# Patient Record
Sex: MALE | Race: WHITE | Employment: OTHER | URBAN - METROPOLITAN AREA
[De-identification: names, ages, dates, MRNs, and addresses within clinical notes are randomized per-mention and may not be internally consistent; named-entity substitution may affect disease eponyms.]

---

## 2017-02-25 RX ORDER — SIMVASTATIN 40 MG/1
TABLET, FILM COATED ORAL
Qty: 90 TAB | Refills: 1 | Status: SHIPPED | OUTPATIENT
Start: 2017-02-25 | End: 2017-07-20 | Stop reason: SDUPTHER

## 2017-05-26 RX ORDER — ALLOPURINOL 100 MG/1
TABLET ORAL
Qty: 90 TAB | Refills: 0 | Status: SHIPPED | OUTPATIENT
Start: 2017-05-26 | End: 2017-07-20 | Stop reason: SDUPTHER

## 2017-05-26 NOTE — TELEPHONE ENCOUNTER
Patient has not been seen in office since 1/2016 and never established care with me. Please schedule him for appointment. Thank you.

## 2017-06-26 ENCOUNTER — HOSPITAL ENCOUNTER (OUTPATIENT)
Dept: LAB | Age: 67
Discharge: HOME | End: 2017-06-26
Payer: MEDICARE

## 2017-06-26 ENCOUNTER — LAB ONLY (OUTPATIENT)
Dept: INTERNAL MEDICINE CLINIC | Age: 67
End: 2017-06-26

## 2017-06-26 DIAGNOSIS — I11.9 HYPERTENSIVE HEART DISEASE WITHOUT CONGESTIVE HEART FAILURE: ICD-10-CM

## 2017-06-26 DIAGNOSIS — E55.9 VITAMIN D DEFICIENCY: ICD-10-CM

## 2017-06-26 DIAGNOSIS — Z95.1 S/P CABG (CORONARY ARTERY BYPASS GRAFT): ICD-10-CM

## 2017-06-26 DIAGNOSIS — E78.5 HYPERLIPIDEMIA, UNSPECIFIED HYPERLIPIDEMIA TYPE: ICD-10-CM

## 2017-06-26 DIAGNOSIS — Z95.2 S/P AVR: ICD-10-CM

## 2017-06-26 DIAGNOSIS — E11.9 TYPE 2 DIABETES MELLITUS WITHOUT COMPLICATION, UNSPECIFIED LONG TERM INSULIN USE STATUS: Primary | ICD-10-CM

## 2017-06-26 DIAGNOSIS — Z12.5 SCREENING PSA (PROSTATE SPECIFIC ANTIGEN): ICD-10-CM

## 2017-06-26 DIAGNOSIS — E11.9 TYPE 2 DIABETES MELLITUS WITHOUT COMPLICATION, UNSPECIFIED LONG TERM INSULIN USE STATUS: ICD-10-CM

## 2017-06-26 LAB
ALBUMIN SERPL BCP-MCNC: 4.1 G/DL (ref 3.4–5)
ALBUMIN/GLOB SERPL: 1.3 {RATIO} (ref 0.8–1.7)
ALP SERPL-CCNC: 55 U/L (ref 45–117)
ALT SERPL-CCNC: 29 U/L (ref 16–61)
ANION GAP BLD CALC-SCNC: 9 MMOL/L (ref 3–18)
AST SERPL W P-5'-P-CCNC: 19 U/L (ref 15–37)
BILIRUB SERPL-MCNC: 1.1 MG/DL (ref 0.2–1)
BUN SERPL-MCNC: 15 MG/DL (ref 7–18)
BUN/CREAT SERPL: 11 (ref 12–20)
CALCIUM SERPL-MCNC: 8.9 MG/DL (ref 8.5–10.1)
CHLORIDE SERPL-SCNC: 103 MMOL/L (ref 100–108)
CHOLEST SERPL-MCNC: 153 MG/DL
CO2 SERPL-SCNC: 23 MMOL/L (ref 21–32)
CREAT SERPL-MCNC: 1.33 MG/DL (ref 0.6–1.3)
EST. AVERAGE GLUCOSE BLD GHB EST-MCNC: 123 MG/DL
GLOBULIN SER CALC-MCNC: 3.1 G/DL (ref 2–4)
GLUCOSE SERPL-MCNC: 112 MG/DL (ref 74–99)
HBA1C MFR BLD: 5.9 % (ref 4.2–5.6)
HDLC SERPL-MCNC: 62 MG/DL (ref 40–60)
HDLC SERPL: 2.5 {RATIO} (ref 0–5)
LDLC SERPL CALC-MCNC: 70.6 MG/DL (ref 0–100)
LIPID PROFILE,FLP: ABNORMAL
POTASSIUM SERPL-SCNC: 4.2 MMOL/L (ref 3.5–5.5)
PROT SERPL-MCNC: 7.2 G/DL (ref 6.4–8.2)
PSA SERPL-MCNC: 6 NG/ML (ref 0–4)
SODIUM SERPL-SCNC: 135 MMOL/L (ref 136–145)
TRIGL SERPL-MCNC: 102 MG/DL (ref ?–150)
VLDLC SERPL CALC-MCNC: 20.4 MG/DL

## 2017-06-26 PROCEDURE — 82043 UR ALBUMIN QUANTITATIVE: CPT | Performed by: INTERNAL MEDICINE

## 2017-06-26 PROCEDURE — 84153 ASSAY OF PSA TOTAL: CPT | Performed by: INTERNAL MEDICINE

## 2017-06-26 PROCEDURE — 80061 LIPID PANEL: CPT | Performed by: INTERNAL MEDICINE

## 2017-06-26 PROCEDURE — 83036 HEMOGLOBIN GLYCOSYLATED A1C: CPT | Performed by: INTERNAL MEDICINE

## 2017-06-26 PROCEDURE — 80053 COMPREHEN METABOLIC PANEL: CPT | Performed by: INTERNAL MEDICINE

## 2017-06-26 PROCEDURE — 82306 VITAMIN D 25 HYDROXY: CPT | Performed by: INTERNAL MEDICINE

## 2017-06-26 PROCEDURE — 36415 COLL VENOUS BLD VENIPUNCTURE: CPT | Performed by: INTERNAL MEDICINE

## 2017-06-27 LAB
25(OH)D3 SERPL-MCNC: 34.7 NG/ML (ref 30–100)
CREAT UR-MCNC: 42.44 MG/DL (ref 30–125)
MICROALBUMIN UR-MCNC: <0.13 MG/DL (ref 0–3)
MICROALBUMIN/CREAT UR-RTO: NORMAL MG/G (ref 0–30)

## 2017-06-29 ENCOUNTER — HOSPITAL ENCOUNTER (OUTPATIENT)
Dept: LAB | Age: 67
Discharge: HOME | End: 2017-06-29
Payer: MEDICARE

## 2017-06-29 ENCOUNTER — PATIENT OUTREACH (OUTPATIENT)
Dept: INTERNAL MEDICINE CLINIC | Age: 67
End: 2017-06-29

## 2017-06-29 ENCOUNTER — OFFICE VISIT (OUTPATIENT)
Dept: INTERNAL MEDICINE CLINIC | Age: 67
End: 2017-06-29

## 2017-06-29 VITALS
SYSTOLIC BLOOD PRESSURE: 130 MMHG | BODY MASS INDEX: 27.23 KG/M2 | DIASTOLIC BLOOD PRESSURE: 72 MMHG | RESPIRATION RATE: 16 BRPM | HEIGHT: 75 IN | HEART RATE: 84 BPM | TEMPERATURE: 99.1 F | WEIGHT: 219 LBS | OXYGEN SATURATION: 98 %

## 2017-06-29 DIAGNOSIS — N18.30 STAGE 3 CHRONIC KIDNEY DISEASE (HCC): ICD-10-CM

## 2017-06-29 DIAGNOSIS — Z23 ENCOUNTER FOR IMMUNIZATION: ICD-10-CM

## 2017-06-29 DIAGNOSIS — Z95.1 S/P CABG X 2: ICD-10-CM

## 2017-06-29 DIAGNOSIS — R73.03 PREDIABETES: ICD-10-CM

## 2017-06-29 DIAGNOSIS — Z00.00 MEDICARE ANNUAL WELLNESS VISIT, SUBSEQUENT: ICD-10-CM

## 2017-06-29 DIAGNOSIS — Z71.89 ADVANCE DIRECTIVE DISCUSSED WITH PATIENT: ICD-10-CM

## 2017-06-29 DIAGNOSIS — I10 ESSENTIAL HYPERTENSION: ICD-10-CM

## 2017-06-29 DIAGNOSIS — Z95.2 S/P AVR (AORTIC VALVE REPLACEMENT): ICD-10-CM

## 2017-06-29 DIAGNOSIS — Z12.83 SCREENING EXAM FOR SKIN CANCER: ICD-10-CM

## 2017-06-29 DIAGNOSIS — I10 ESSENTIAL HYPERTENSION: Primary | ICD-10-CM

## 2017-06-29 DIAGNOSIS — Z12.11 SCREENING FOR COLON CANCER: ICD-10-CM

## 2017-06-29 DIAGNOSIS — R73.02 IMPAIRED GLUCOSE TOLERANCE: ICD-10-CM

## 2017-06-29 DIAGNOSIS — R97.20 ELEVATED PSA: ICD-10-CM

## 2017-06-29 DIAGNOSIS — R97.20 ELEVATED PSA MEASUREMENT: ICD-10-CM

## 2017-06-29 DIAGNOSIS — E78.5 HYPERLIPIDEMIA, UNSPECIFIED HYPERLIPIDEMIA TYPE: ICD-10-CM

## 2017-06-29 LAB
APPEARANCE UR: CLEAR
BACTERIA URNS QL MICRO: NEGATIVE /HPF
BASOPHILS # BLD AUTO: 0 K/UL (ref 0–0.06)
BASOPHILS # BLD: 0 % (ref 0–2)
BILIRUB UR QL: NEGATIVE
COLOR UR: YELLOW
DIFFERENTIAL METHOD BLD: ABNORMAL
EOSINOPHIL # BLD: 0.2 K/UL (ref 0–0.4)
EOSINOPHIL NFR BLD: 3 % (ref 0–5)
EPITH CASTS URNS QL MICRO: NORMAL /LPF (ref 0–5)
ERYTHROCYTE [DISTWIDTH] IN BLOOD BY AUTOMATED COUNT: 12.7 % (ref 11.6–14.5)
GLUCOSE UR STRIP.AUTO-MCNC: NEGATIVE MG/DL
HCT VFR BLD AUTO: 43.1 % (ref 36–48)
HGB BLD-MCNC: 14.7 G/DL (ref 13–16)
HGB UR QL STRIP: NEGATIVE
KETONES UR QL STRIP.AUTO: NEGATIVE MG/DL
LEUKOCYTE ESTERASE UR QL STRIP.AUTO: NEGATIVE
LYMPHOCYTES # BLD AUTO: 21 % (ref 21–52)
LYMPHOCYTES # BLD: 1.2 K/UL (ref 0.9–3.6)
MCH RBC QN AUTO: 33.3 PG (ref 24–34)
MCHC RBC AUTO-ENTMCNC: 34.1 G/DL (ref 31–37)
MCV RBC AUTO: 97.5 FL (ref 74–97)
MONOCYTES # BLD: 0.7 K/UL (ref 0.05–1.2)
MONOCYTES NFR BLD AUTO: 12 % (ref 3–10)
NEUTS SEG # BLD: 3.9 K/UL (ref 1.8–8)
NEUTS SEG NFR BLD AUTO: 64 % (ref 40–73)
NITRITE UR QL STRIP.AUTO: NEGATIVE
PH UR STRIP: 5.5 [PH] (ref 5–8)
PLATELET # BLD AUTO: 214 K/UL (ref 135–420)
PMV BLD AUTO: 9.1 FL (ref 9.2–11.8)
PROT UR STRIP-MCNC: NEGATIVE MG/DL
PSA SERPL-MCNC: 6.6 NG/ML (ref 0–4)
RBC # BLD AUTO: 4.42 M/UL (ref 4.7–5.5)
RBC #/AREA URNS HPF: 0 /HPF (ref 0–5)
SP GR UR REFRACTOMETRY: 1.02 (ref 1–1.03)
TSH SERPL DL<=0.05 MIU/L-ACNC: 1.02 UIU/ML (ref 0.36–3.74)
UROBILINOGEN UR QL STRIP.AUTO: 0.2 EU/DL (ref 0.2–1)
WBC # BLD AUTO: 6 K/UL (ref 4.6–13.2)
WBC URNS QL MICRO: NORMAL /HPF (ref 0–4)

## 2017-06-29 PROCEDURE — 81001 URINALYSIS AUTO W/SCOPE: CPT | Performed by: INTERNAL MEDICINE

## 2017-06-29 PROCEDURE — 84153 ASSAY OF PSA TOTAL: CPT | Performed by: INTERNAL MEDICINE

## 2017-06-29 PROCEDURE — 85025 COMPLETE CBC W/AUTO DIFF WBC: CPT | Performed by: INTERNAL MEDICINE

## 2017-06-29 PROCEDURE — 84443 ASSAY THYROID STIM HORMONE: CPT | Performed by: INTERNAL MEDICINE

## 2017-06-29 RX ORDER — SILDENAFIL 100 MG/1
100 TABLET, FILM COATED ORAL AS NEEDED
Qty: 6 TAB | Refills: 3 | Status: SHIPPED | OUTPATIENT
Start: 2017-06-29

## 2017-06-29 NOTE — PROGRESS NOTES
Patient is in the office today for medicare wellness. Patient states that he is having some constipation    Do you have an Advance Directive yes  Do you want more information no    1. Have you been to the ER, urgent care clinic since your last visit? Hospitalized since your last visit? No    2. Have you seen or consulted any other health care providers outside of the [address] [place] [address] since your last visit? Include any pap smears or colon screening. No        Patient presented to office for Prevnar 13 vaccine. Reviewed medications and allergies with patient. Patient states has no allergies to eggs. Injection given in Right Deltoid. Lot # [id number] Expires 05/18. Patient tolerated injection well and left ambulatory.

## 2017-06-29 NOTE — PROGRESS NOTES
Douglas Dia is a 75 y.o. male and presents for annual Medicare Wellness Visit. Problem List: Reviewed with patient and discussed risk factors. Patient Active Problem List   Diagnosis Code    Hyperlipidemia E78.5    Gout M10.9    Vitamin D deficiency E55.9    CAD in native artery I25.10    S/P CABG (coronary artery bypass graft) Z95.1    S/P AVR Z95.2    Hypertensive heart disease without congestive heart failure I11.9    Type 2 diabetes mellitus without complication ([facility] Y70.6       Current medical providers:  Patient Care Team:  Anne Galeana MD as PCP - General (Internal Medicine)  Bridger Marquez, RN as Ambulatory Care Navigator (Internal Medicine)  LUKE Aviles, RN as Ambulatory Care Navigator (Internal Medicine)  Gregorio Ashraf MD (Cardiology)  Gurjit Santiago MD (Ophthalmology)    PSH: Reviewed with patient  Past Surgical History:   Procedure Laterality Date    HX APPENDECTOMY          SH: Reviewed with patient  Social History   Substance Use Topics    Smoking status: Never Smoker    Smokeless tobacco: Never Used    Alcohol use 7.2 oz/week     12 Cans of beer per week       FH: Reviewed with patient  Family History   Problem Relation Age of Onset    COPD Father     Other Father      pneumoconiosis    Lung Disease Father     Other Mother      meigs syndrome       Medications/Allergies: Reviewed with patient  Current Outpatient Prescriptions on File Prior to Visit   Medication Sig Dispense Refill    allopurinol (ZYLOPRIM) 100 mg tablet TAKE 1 TABLET DAILY 90 Tab 0    simvastatin (ZOCOR) 40 mg tablet TAKE 1 TABLET NIGHTLY 90 Tab 1    metoprolol succinate (TOPROL-XL) 25 mg XL tablet TAKE 1 TABLET DAILY 90 Tab 2    lisinopril (PRINIVIL, ZESTRIL) 5 mg tablet Take 1 Tab by mouth daily. 90 Tab 3    docusate sodium (COLACE) 100 mg capsule Take 100 mg by mouth daily.  cholecalciferol, vitamin D3, 2,000 unit tab Take  by mouth daily.  aspirin delayed-release 81 mg tablet Take 1 tablet by mouth daily. 30 tablet 2     No current facility-administered medications on file prior to visit. No Known Allergies    Objective:  Visit Vitals    /72 (BP 1 Location: Left arm, BP Patient Position: Sitting)    Pulse 84    Temp 99.1 °F (37.3 °C) (Oral)    Resp 16    Ht 6' 3\" (1.905 m)    Wt 219 lb (99.3 kg)    SpO2 98%    BMI 27.37 kg/m2    Body mass index is 27.37 kg/(m^2). Assessment of cognitive impairment: Alert and oriented x 3    Depression Screen:   PHQ over the last two weeks 6/29/2017   Little interest or pleasure in doing things Not at all   Feeling down, depressed or hopeless Not at all   Total Score PHQ 2 0       Fall Risk Assessment:    Fall Risk Assessment, last 12 mths 6/29/2017   Able to walk? Yes   Fall in past 12 months? No       Functional Ability:   Does the patient exhibit a steady gait? yes   How long did it take the patient to get up and walk from a sitting position? 1 second. Is the patient self reliant?  (ie can do own laundry, meals, household chores)  yes     Does the patient handle his/her own medications? yes     Does the patient handle his/her own money? yes     Is the patients home safe (ie good lighting, handrails on stairs and bath, etc.)? yes     Did you notice or did patient express any hearing difficulties? no     Did you notice or did patient express any vision difficulties?   no     Were distance and reading eye charts used? no       Advance Care Planning:   Patient was offered the opportunity to discuss advance care planning:  yes     Does patient have an Advance Directive:  no   If no, did you provide information on Caring Connections?   yes       Plan:      Orders Placed This Encounter    Pneumococcal conjugate 13 valent, IM (PREVNAR-13)    CBC WITH AUTOMATED DIFF    TSH 3RD GENERATION    URINALYSIS W/MICROSCOPIC    PSA - PROSTATE SPECIFIC AG    diph,Pertuss,Acell,,Tet Vac-PF (ADACEL) 2 Lf-(2.5-5-3-5 mcg)-5Lf/0.5 mL susp    varicella zoster vacine live (ZOSTAVAX) 19,400 unit/0.65 mL susr injection    sildenafil citrate (VIAGRA) 100 mg tablet       Health Maintenance   Topic Date Due    FOOT EXAM Q1  07/14/2017 (Originally 7/14/2016)    ZOSTER VACCINE AGE 60>  11/03/2017 (Originally 2/2/2009)    DTaP/Tdap/Td series (1 - Tdap) 11/03/2017 (Originally 1/2/2008)    GLAUCOMA SCREENING Q2Y  12/01/2017 (Originally 2/2/2014)    COLONOSCOPY  12/01/2017 (Originally 2/2/1967)   [address] [facility] [address] Q1  12/01/2017 (Originally 2/2/1959)    INFLUENZA AGE 9 TO ADULT  08/01/2017    HEMOGLOBIN A1C Q6M  12/26/2017    MICROALBUMIN Q1  06/26/2018    LIPID PANEL Q1  06/26/2018    MEDICARE YEARLY EXAM  06/30/2018    Hepatitis C Screening  Addressed    Pneumococcal 65+ Low/Medium Risk  Completed       *Patient verbalized understanding and agreement with the plan. A copy of the After Visit Summary with personalized health plan was given to the patient today.

## 2017-06-29 NOTE — PROGRESS NOTES
Advance Care Planning (ACP) Provider Note - Comprehensive     Date of ACP Conversation: 06/29/17  Persons included in Conversation:  patient  Length of ACP Conversation in minutes:  16 minutes    Authorized Decision Maker (if patient is incapable of making informed decisions): wife (primary), son (secondary)  This person is:  Healthcare Agent/Medical Power of  under Advance Directive          General ACP for ALL Patients with Decision Making Capacity:   Importance of advance care planning, including choosing a healthcare agent to communicate patient's healthcare decisions if patient lost the ability to make decisions, such as after a sudden illness or accident  Understanding of the healthcare agent role was assessed and information provided  Exploration of values, goals, and preferences if recovery is not expected, even with continued medical treatment in the event of: Imminent death  Severe, permanent brain injury  \"In these circumstances, what matters most to you? \"  Care focused more on comfort or quality of life. Review of Existing Advance Directive:  Patient has not completed an advance directive previously. He stated that he would designate his wife and son as healthcare agents and expressed his wish to not have life prolonging procedures for end of life care. For Serious or Chronic Illness:  Understanding of medical condition      Interventions Provided:  Recommended completion of Advance Directive form after review of ACP materials and conversation with prospective healthcare agent   Recommended communicating the plan and making copies for the healthcare agent, personal physician, and others as appropriate (e.g., health system)  Recommended review of completed ACP document annually or upon change in health status   Recommended to complete advance directive and return completed form to office to be copied and scanned into chart. Paperwork provided and reviewed.

## 2017-06-29 NOTE — PATIENT INSTRUCTIONS
Medicare Part B Preventive Services Limitations Recommendation Scheduled   Bone Mass Measurement  (age 72 & older, biennial) Requires diagnosis related to osteoporosis or estrogen deficiency. Biennial benefit unless patient has history of long-term glucocorticoid tx or baseline is needed because initial test was by other method Every 2 years or more frequently if medically necessary. N/A       Cardiovascular Screening Blood Tests (every 5 years)  Total cholesterol, HDL, Triglycerides Order as a panel if possible Every 5 years. Done:  6/26/2017         Colorectal Cancer Screening  -Fecal occult blood test (annual)  -Flexible sigmoidoscopy (5y)  -Screening colonoscopy (10y)  -Barium Enema Colorectal cancer screening, ages 54-65. For all patients 48 and older:  -Annual fecal occult blood test or  colonoscopy every 10 years or  -Flexible sigmoidoscopy every 5 years or  -lower endoscopy to be performed more frequently, if advised by GI. Referral         Counseling to Prevent Tobacco Use (up to 8 sessions per year)  - Counseling greater than 3 and up to 10 minutes  - Counseling greater than 10 minutes Patients must be asymptomatic of tobacco-related conditions to receive as preventive service Two cessation counseling attempts (up to 8 counseling sessions) per year. N/A   Diabetes Screening Tests (at least every 3 years, Medicare covers annually or at 6-month intervals for prediabetic patients)    Fasting blood sugar (FBS) or glucose tolerance test (GTT) Patient must be diagnosed with one of the following:  -Hypertension, Dyslipidemia, obesity, previous impaired FBS or GTT  Or any two of the following: overweight, FH of diabetes, age ? 72, history of gestational diabetes, birth of baby weighing more than 9 pounds Annually or every 6 months if previous diagnosis of elevated FBS, elevated HbA1c, or impaired GTT, or glucosuria.  Done:  6/26/2017  A1c: 5.9         Diabetes Self-Management Training (DSMT) (no USPSTF recommendation) Requires referral by treating physician for patient with diabetes or renal disease. 10 hours of initial DSMT session of no less than 30 minutes each in a continuous 12-month period. 2 hours of follow-up DSMT in subsequent years. Up to 10 hours of initial training within a continuous 12 month period of subsequent years: up to 2 hours of follow-up training each year after the initial year. Resources offered patient declined at this time. Glaucoma Screening (no USPSTF recommendation) Diabetes mellitus, family history, , age 48 or over,  American, age 72 or over Annually for covered beneficiaries. Will schedule with Dr. Marilyn Michaud (HIV) Screening (annually for increased risk patients)  HIV-1 and HIV-2 by EIA, DEBRA, rapid antibody test, or oral mucosa transudate Patient must be at increased risk for HIV infection per USPSTF guidelines or pregnant. Tests covered annually for patients at increased risk. Pregnant patients may receive up to 3 test during pregnancy. Annually for beneficiaries at increased risk, including anyone who asks for the test. Not high risk   Medical Nutrition Therapy (MNT) (for diabetes or renal disease not recommended schedule) Requires referral by treating physician for patient with diabetes or renal disease. Can be provided in same year as diabetes self-management training (DSMT), and CMS recommends medical nutrition therapy take place after DSMT. Up to 3 hours for initial year and 2 hours in subsequent years. First year: 3 hours of one-on-one counseling or subsequent years: 2 hours. Resources offered patient declined at this time.     Prostate Cancer Screening (annually up to age 76)  - Digital rectal exam (KIEL)  - Prostate specific antigen (PSA) Annually (age 48 or over), KIEL not paid separately when covered E/M service is provided on same date Once every 12 months for patients age older than 48years of age includes: digital rectal exam and/or prostate specific antigen test. Done:  6/26/2017  PSA: 6.0    Referral to Urology   Seasonal Influenza Vaccination (annually)  Once per fall or winter season. Done:  10/2016       Pneumococcal Vaccination (once after 72)  Once after age 72 and if more than 5 years since last vaccination and/or uncertainty of vaccine status. Pneumococcal:  10/14/2014    Prevnar 13:  6/29/2017   Hepatitis B Vaccinations (if medium/high risk) Medium/high risk factors:  End-stage renal disease,  Hemophiliacs who received Factor VIII or IX concentrates, Clients of institutions for the mentally retarded, Persons who live in the same house as a HepB virus carrier, Homosexual men, Illicit injectable drug abusers. Schedule course of vaccines if patient not previously vaccinated  *additional shots if medically necessary. Not high risk   Screening Mammography (biennial age 54-69)? Annually (age 36 or over) Age 28 through 44: one baseline or aged 36 and older: annually. N/A         Screening Pap Tests and Pelvic Examination (up to age 72 and after 72 if unknown history or abnormal study last 10 years) Every 24 months except high risk Annually if at high risk for developing cervical or vaginal cancer, or childbearing, age with abnormal Pap test within past 3 years or every 2 years for women at normal risk. N/A         Ultrasound Screening for Abdominal Aortic Aneurysm (AAA) (once) Patient must be referred through IPPE and not have had a screening for abdominal aortic aneurysm before under Medicare. Limited to patients who meet one of the following criteria:  - Men who are 73-68 years old and have smoked more than 100 cigarettes in their lifetime.  -Anyone with a FH of AAA  -Anyone recommended for screening by USPSTF Once in a lifetime. N/A       Schedule of Personalized Health Plan  (Provide Copy to Patient)  The best way to stay healthy is to live a healthy lifestyle.  A healthy lifestyle includes regular exercise, eating a well-balanced diet, keeping a healthy weight and not smoking. Regular physical exams and screening tests are another important way to take care of yourself. Preventive exams provided by health care providers can find health problems early when treatment works best and can keep you from getting certain diseases or illnesses. Preventive services include exams, lab tests, screenings, shots, monitoring and information to help you take care of your own health. All people over 65 should have a pneumonia shot. Pneumonia shots are usually only needed once in a lifetime unless your doctor decides differently. All people over 65 should have a yearly flu shot. People over 65 are at medium to high risk for Hepatitis B. Three shots are needed for complete protection. In addition to your physical exam, some screening tests are recommended:    Bone mass measurement (dexa scan) is recommended every two years if you have certain risk factors, such as personal history of vertebral fracture or chronic steroid medication use    Diabetes Mellitus screening is recommended every year. Glaucoma is an eye disease caused by high pressure in the eye. An eye exam is recommended every year. Cardiovascular screening tests that check your cholesterol and other blood fat (lipid) levels are recommended every five years. Colorectal Cancer screening tests help to find pre-cancerous polyps (growths in the colon) so they can be removed before they turn into cancer. Tests ordered for screening depend on your personal and family history risk factors.     Screening for Prostate Cancer is recommended yearly with a digital rectal exam and/or a PSA test    Here is a list of your current Health Maintenance items with a due date:  Health Maintenance   Topic Date Due    FOOT EXAM Q1  07/14/2017 (Originally 7/14/2016)   [facility] [address] AGE 60>  11/03/2017 (Originally 2/2/2009)    DTaP/Tdap/Td series (1 - Tdap) 11/03/2017 (Originally 1/2/2008)   [address]  12/01/2017 (Originally 2/2/2014)    COLONOSCOPY  12/01/2017 (Originally 2/2/1967)   [address] Q1  12/01/2017 (Originally 2/2/1959)    INFLUENZA AGE 9 TO ADULT  08/01/2017    HEMOGLOBIN A1C Q6M  12/26/2017    MICROALBUMIN Q1  06/26/2018    LIPID PANEL Q1  06/26/2018    MEDICARE YEARLY EXAM  06/30/2018    Hepatitis C Screening  Addressed    Pneumococcal 65+ Low/Medium Risk  Completed            Preventing Falls: Care Instructions  Your Care Instructions  Getting around your home safely can be a challenge if you have injuries or health problems that make it easy for you to fall. Loose rugs and furniture in walkways are among the dangers for many older people who have problems walking or who have poor eyesight. People who have conditions such as arthritis, osteoporosis, or dementia also have to be careful not to fall. You can make your home safer with a few simple measures. Follow-up care is a key part of your treatment and safety. Be sure to make and go to all appointments, and call your doctor if you are having problems. It's also a good idea to know your test results and keep a list of the medicines you take. How can you care for yourself at home? Taking care of yourself  · You may get dizzy if you do not drink enough water. To prevent dehydration, drink plenty of fluids, enough so that your urine is light yellow or clear like water. Choose water and other caffeine-free clear liquids. If you have kidney, heart, or liver disease and have to limit fluids, talk with your doctor before you increase the amount of fluids you drink. · Exercise regularly to improve your strength, muscle tone, and balance. Walk if you can. Swimming may be a good choice if you cannot walk easily. · Have your vision and hearing checked each year or any time you notice a change.  If you have trouble seeing and hearing, you might not be able to avoid objects and could lose your balance. · Know the side effects of the medicines you take. Ask your doctor or pharmacist whether the medicines you take can affect your balance. Sleeping pills or sedatives can affect your balance. · Limit the amount of alcohol you drink. Alcohol can impair your balance and other senses. · Ask your doctor whether calluses or corns on your feet need to be removed. If you wear loose-fitting shoes because of calluses or corns, you can lose your balance and fall. · Talk to your doctor if you have numbness in your feet. Preventing falls at home  · Remove raised doorway thresholds, throw rugs, and clutter. Repair loose carpet or raised areas in the floor. · Move furniture and electrical cords to keep them out of walking paths. · Use nonskid floor wax, and wipe up spills right away, especially on ceramic tile floors. · If you use a walker or cane, put rubber tips on it. If you use crutches, clean the bottoms of them regularly with an abrasive pad, such as steel wool. · Keep your house well lit, especially [place], and outside walkways. Use night-lights in areas such as hallways and bathrooms. Add extra light switches or use remote switches (such as switches that go on or off when you clap your hands) to make it easier to turn lights on if you have to get up during the night. · Install sturdy handrails on stairways. · Move items in your cabinets so that the things you use a lot are on the lower shelves (about waist level). · Keep a cordless phone and a flashlight with new batteries by your bed. If possible, put a phone in each of the main rooms of your house, or carry a cell phone in case you fall and cannot reach a phone. Or, you can wear a device around your neck or wrist. You push a button that sends a signal for help. · Wear low-heeled shoes that fit well and give your feet good support. Use footwear with nonskid soles.  Check the heels and soles of your shoes for wear. Repair or replace worn heels or soles. · Do not wear socks without shoes on wood floors. · Walk on the grass when the sidewalks are slippery. If you live in an area that gets snow and ice in the winter, sprinkle salt on slippery steps and sidewalks. Preventing falls in the bath  · Install grab bars and nonskid mats inside and outside your shower or tub and near the toilet and sinks. · Use shower chairs and bath benches. · Use a hand-held shower head that will allow you to sit while showering. · Get into a tub or shower by putting the weaker leg in first. Get out of a tub or shower with your strong side first.  · Repair loose toilet seats and consider installing a raised toilet seat to make getting on and off the toilet easier. · Keep your bathroom door unlocked while you are in the shower. Where can you learn more? Go to http://luis e-erica.info/. Enter 0476 [id number] in the search box to learn more about \"Preventing Falls: Care Instructions. \"  Current as of: August 4, 2016  Content Version: 11.3  © 1425-9266 [employer]. Care instructions adapted under license by [employer] (which disclaims liability or warranty for this information). If you have questions about a medical condition or this instruction, always ask your healthcare professional. [place] [address] any warranty or liability for your use of this information. Advance Directives: Care Instructions  Your Care Instructions  An advance directive is a legal way to state your wishes at the end of your life. It tells your family and your doctor what to do if you can no longer say what you want. There are two main types of advance directives. You can change them any time that your wishes change. · A living will tells your family and your doctor your wishes about life support and other treatment.   · A durable power of  for health care lets you name a person to make treatment decisions for you when you can't speak for yourself. This person is called a health care agent. If you do not have an advance directive, decisions about your medical care may be made by a doctor or a  who doesn't know you. It may help to think of an advance directive as a gift to the people who care for you. If you have one, they won't have to make tough decisions by themselves. Follow-up care is a key part of your treatment and safety. Be sure to make and go to all appointments, and call your doctor if you are having problems. It's also a good idea to know your test results and keep a list of the medicines you take. How can you care for yourself at home? · Discuss your wishes with your loved ones and your doctor. This way, there are no surprises. · Many states have a unique form. Or you might use a universal form that has been approved by many states. This kind of form can sometimes be completed and stored online. Your electronic copy will then be available wherever you have a connection to the Internet. In most cases, doctors will respect your wishes even if you have a form from a different state. · You don't need a  to do an advance directive. But you may want to get legal advice. · Think about these questions when you prepare an advance directive:  ¨ Who do you want to make decisions about your medical care if you are not able to? Many people choose a family member or close friend. ¨ Do you know enough about life support methods that might be used? If not, talk to your doctor so you understand. ¨ What are you most afraid of that might happen? You might be afraid of having pain, losing your independence, or being kept alive by machines. ¨ Where would you prefer to die? Choices include your home, a hospital, or a nursing home. ¨ Would you like to have information about hospice care to support you and your family? ¨ Do you want to donate organs when you die?   ¨ Do you want certain Scientology practices performed before you die? If so, put your wishes in the advance directive. · Read your advance directive every year, and make changes as needed. When should you call for help? Be sure to contact your doctor if you have any questions. Where can you learn more? Go to http://luis e-erica.info/. Enter R264 in the search box to learn more about \"Advance Directives: Care Instructions. \"  Current as of: November 17, 2016  Content Version: 11.3  © 9751-9076 [employer]. Care instructions adapted under license by [employer] (which disclaims liability or warranty for this information). If you have questions about a medical condition or this instruction, always ask your healthcare professional. [address] any warranty or liability for your use of this information.

## 2017-06-29 NOTE — MR AVS SNAPSHOT
Visit Information Date & Time Provider Department Dept. Phone Encounter #  
 6/29/2017  1:00 PM Orval Meigs, MD Internist of [address] [place] [address] [phone] [id number] Follow-up Instructions Return in about 3 months (around 9/29/2017), or if symptoms worsen or fail to improve. Your Appointments 8/8/2017  8:00 AM  
Follow Up with Colby Conde MD  
[facility] ([facility]) Appt Note: 1 year follow up [place] [address] [address] [address] [phone] [address] [facility] [address] [address]  
  
    
 9/26/2017  1:30 PM  
Office Visit with Orval Meigs, MD  
Internist of [address] [place] [address]) Appt Note: 3 month follow up  
 [address] [facility], [address] [address] [address] [place]  
  
   
 [address] [facility], [address] Upcoming Health Maintenance Date Due  
 FOOT EXAM Q1 7/14/2017* ZOSTER VACCINE AGE 60> 11/3/2017* DTaP/Tdap/Td series (1 - Tdap) 11/3/2017* GLAUCOMA SCREENING Q2Y 12/1/2017* COLONOSCOPY 12/1/2017* EYE EXAM RETINAL OR DILATED Q1 12/1/2017* INFLUENZA AGE 9 TO ADULT 8/1/2017 HEMOGLOBIN A1C Q6M 12/26/2017 MICROALBUMIN Q1 6/26/2018 LIPID PANEL Q1 6/26/2018 MEDICARE YEARLY EXAM 6/30/2018 *Topic was postponed. The date shown is not the original due date. Allergies as of 6/29/2017  Review Complete On: 6/29/2017 By: Serena Birch  
 No Known Allergies Current Immunizations  Reviewed on 4/2/2014 Name Date Influenza Vaccine 10/14/2014 Influenza Vaccine Split 9/29/2011 Influenza Vaccine Whole 9/14/2010 Pneumococcal Conjugate (PCV-13)  Incomplete Pneumococcal Polysaccharide (PPSV-23) 10/14/2014 Td 1/1/2008 Not reviewed this visit You Were Diagnosed With   
  
 Codes Comments Medicare annual wellness visit, subsequent    -  Primary ICD-10-CM: Z00.00 ICD-9-CM: V70.0 Advance directive discussed with patient     ICD-10-CM: Z71.89 ICD-9-CM: V65.49 Elevated PSA     ICD-10-CM: R97.20 ICD-9-CM: 790.93 Essential hypertension     ICD-10-CM: I10 
ICD-9-CM: 401.9 Encounter for immunization     ICD-10-CM: T71 ICD-9-CM: V03.89 Vitals BP Pulse Temp Resp Height(growth percentile) Weight(growth percentile) 130/72 (BP 1 Location: Left arm, BP Patient Position: Sitting) 84 99.1 °F (37.3 °C) (Oral) 16 6' 3\" (1.905 m) 219 lb (99.3 kg) SpO2 BMI Smoking Status 98% 27.37 kg/m2 Never Smoker Vitals History BMI and BSA Data Body Mass Index Body Surface Area  
 27.37 kg/m 2 2.29 m 2 Preferred Pharmacy Pharmacy Name Phone [address] [facility] [phone] Your Updated Medication List  
  
   
This list is accurate as of: 6/29/17  2:37 PM.  Always use your most recent med list.  
  
  
  
  
 allopurinol 100 mg tablet Commonly known as:  ZYLOPRIM  
TAKE 1 TABLET DAILY  
  
 aspirin delayed-release 81 mg tablet Take 1 tablet by mouth daily. cholecalciferol (vitamin D3) 2,000 unit Tab Take  by mouth daily. COLACE 100 mg capsule Generic drug:  docusate sodium Take 100 mg by mouth daily. diph,Pertuss(Acell),Tet Vac-PF 2 Lf-(2.5-5-3-5 mcg)-5Lf/0.5 mL susp Commonly known as:  ADACEL  
0.5 mL by IntraMUSCular route once for 1 dose. lisinopril 5 mg tablet Commonly known as:  Carlene Westford Take 1 Tab by mouth daily. metoprolol succinate 25 mg XL tablet Commonly known as:  TOPROL-XL  
TAKE 1 TABLET DAILY  
  
 sildenafil citrate 100 mg tablet Commonly known as:  VIAGRA Take 1 Tab by mouth as needed. simvastatin 40 mg tablet Commonly known as:  ZOCOR  
TAKE 1 TABLET NIGHTLY  
  
 varicella zoster vacine live 19,400 unit/0.65 mL Susr injection Commonly known as:  ZOSTAVAX 1 Vial by SubCUTAneous route once for 1 dose. Prescriptions Printed Refills diph,Pertuss,Acell,,Tet Vac-PF (ADACEL) 2 Lf-(2.5-5-3-5 mcg)-5Lf/0.5 mL susp 0 Si.5 mL by IntraMUSCular route once for 1 dose. Class: Print Route: IntraMUSCular  
 varicella zoster vacine live (ZOSTAVAX) 19,400 unit/0.65 mL susr injection 0 Si Vial by SubCUTAneous route once for 1 dose. Class: Print Route: SubCUTAneous Prescriptions Sent to Pharmacy Refills  
 sildenafil citrate (VIAGRA) 100 mg tablet 3 Sig: Take 1 Tab by mouth as needed. Class: Normal  
 Pharmacy: [address], [address] [place] [address] #: [phone] Route: Oral  
  
We Performed the Following PNEUMOCOCCAL CONJ VACCINE 13 VALENT IM [id number] CPT(R)] Follow-up Instructions Return in about 3 months (around 2017), or if symptoms worsen or fail to improve. To-Do List   
 2017 Lab:  CBC WITH AUTOMATED DIFF   
  
 2017 Lab:  PROSTATE SPECIFIC AG (PSA)   
  
 2017 Lab:  TSH 3RD GENERATION   
  
 2017 Lab:  URINALYSIS W/MICROSCOPIC Patient Instructions Medicare Part B Preventive Services Limitations Recommendation Scheduled Bone Mass Measurement 
(age 72 & older, biennial) Requires diagnosis related to osteoporosis or estrogen deficiency. Biennial benefit unless patient has history of long-term glucocorticoid tx or baseline is needed because initial test was by other method Every 2 years or more frequently if medically necessary. N/A Cardiovascular Screening Blood Tests (every 5 years) Total cholesterol, HDL, Triglycerides Order as a panel if possible Every 5 years. Done: 
2017 Colorectal Cancer Screening 
-Fecal occult blood test (annual) -Flexible sigmoidoscopy (5y) 
-Screening colonoscopy (10y) -Barium Enema Colorectal cancer screening, ages 54-65.   For all patients 48 and older: 
-Annual fecal occult blood test or 
colonoscopy every 10 years or 
-Flexible sigmoidoscopy every 5 years or 
-lower endoscopy to be performed more frequently, if advised by GI. Referral 
 
 
  
Counseling to Prevent Tobacco Use (up to 8 sessions per year) - Counseling greater than 3 and up to 10 minutes - Counseling greater than 10 minutes Patients must be asymptomatic of tobacco-related conditions to receive as preventive service Two cessation counseling attempts (up to 8 counseling sessions) per year. N/A Diabetes Screening Tests (at least every 3 years, Medicare covers annually or at 6-month intervals for prediabetic patients) Fasting blood sugar (FBS) or glucose tolerance test (GTT) Patient must be diagnosed with one of the following: 
-Hypertension, Dyslipidemia, obesity, previous impaired FBS or GTT 
Or any two of the following: overweight, FH of diabetes, age ? 72, history of gestational diabetes, birth of baby weighing more than 9 pounds Annually or every 6 months if previous diagnosis of elevated FBS, elevated HbA1c, or impaired GTT, or glucosuria. Done: 
6/26/2017 A1c: 5.9 Diabetes Self-Management Training (DSMT) (no USPSTF recommendation) Requires referral by treating physician for patient with diabetes or renal disease. 10 hours of initial DSMT session of no less than 30 minutes each in a continuous 12-month period. 2 hours of follow-up DSMT in subsequent years. Up to 10 hours of initial training within a continuous 12 month period of subsequent years: up to 2 hours of follow-up training each year after the initial year. Resources offered patient declined at this time. Glaucoma Screening (no USPSTF recommendation) Diabetes mellitus, family history, , age 48 or over,  American, age 72 or over Annually for covered beneficiaries. Will schedule with Dr. Valery Donohue Human Immunodeficiency Virus (HIV) Screening (annually for increased risk patients) HIV-1 and HIV-2 by EIA, DEBRA, rapid antibody test, or oral mucosa transudate Patient must be at increased risk for HIV infection per USPSTF guidelines or pregnant. Tests covered annually for patients at increased risk. Pregnant patients may receive up to 3 test during pregnancy. Annually for beneficiaries at increased risk, including anyone who asks for the test. Not high risk Medical Nutrition Therapy (MNT) (for diabetes or renal disease not recommended schedule) Requires referral by treating physician for patient with diabetes or renal disease. Can be provided in same year as diabetes self-management training (DSMT), and CMS recommends medical nutrition therapy take place after DSMT. Up to 3 hours for initial year and 2 hours in subsequent years. First year: 3 hours of one-on-one counseling or subsequent years: 2 hours. Resources offered patient declined at this time. Prostate Cancer Screening (annually up to age 76) - Digital rectal exam (KIEL) - Prostate specific antigen (PSA) Annually (age 48 or over), KIEL not paid separately when covered E/M service is provided on same date Once every 12 months for patients age older than 48years of age includes: digital rectal exam and/or prostate specific antigen test. Done: 
6/26/2017 PSA: 6.0 Referral to Urology Seasonal Influenza Vaccination (annually)  Once per fall or winter season. Done: 
10/2016 Pneumococcal Vaccination (once after 72)  Once after age 72 and if more than 5 years since last vaccination and/or uncertainty of vaccine status. Pneumococcal: 
10/14/2014 Prevnar 13: 
6/29/2017 Hepatitis B Vaccinations (if medium/high risk) Medium/high risk factors:  End-stage renal disease, Hemophiliacs who received Factor VIII or IX concentrates, Clients of institutions for the mentally retarded, Persons who live in the same house as a HepB virus carrier, Homosexual men, Illicit injectable drug abusers. Schedule course of vaccines if patient not previously vaccinated *additional shots if medically necessary. Not high risk Screening Mammography (biennial age 54-69)? Annually (age 36 or over) Age 28 through 44: one baseline or aged 36 and older: annually. N/A Screening Pap Tests and Pelvic Examination (up to age 72 and after 72 if unknown history or abnormal study last 10 years) Every 24 months except high risk Annually if at high risk for developing cervical or vaginal cancer, or childbearing, age with abnormal Pap test within past 3 years or every 2 years for women at normal risk. N/A Ultrasound Screening for Abdominal Aortic Aneurysm (AAA) (once) Patient must be referred through IPPE and not have had a screening for abdominal aortic aneurysm before under Medicare. Limited to patients who meet one of the following criteria: 
- Men who are 73-68 years old and have smoked more than 100 cigarettes in their lifetime. 
-Anyone with a FH of AAA 
-Anyone recommended for screening by USPSTF Once in a lifetime. N/A Schedule of Personalized Health Plan (Provide Copy to Patient) The best way to stay healthy is to live a healthy lifestyle. A healthy lifestyle includes regular exercise, eating a well-balanced diet, keeping a healthy weight and not smoking. Regular physical exams and screening tests are another important way to take care of yourself. Preventive exams provided by health care providers can find health problems early when treatment works best and can keep you from getting certain diseases or illnesses. Preventive services include exams, lab tests, screenings, shots, monitoring and information to help you take care of your own health. All people over 65 should have a pneumonia shot. Pneumonia shots are usually only needed once in a lifetime unless your doctor decides differently. All people over 65 should have a yearly flu shot. People over 65 are at medium to high risk for Hepatitis B. Three shots are needed for complete protection. In addition to your physical exam, some screening tests are recommended: 
 
Bone mass measurement (dexa scan) is recommended every two years if you have certain risk factors, such as personal history of vertebral fracture or chronic steroid medication use Diabetes Mellitus screening is recommended every year. Glaucoma is an eye disease caused by high pressure in the eye. An eye exam is recommended every year. Cardiovascular screening tests that check your cholesterol and other blood fat (lipid) levels are recommended every five years. Colorectal Cancer screening tests help to find pre-cancerous polyps (growths in the colon) so they can be removed before they turn into cancer. Tests ordered for screening depend on your personal and family history risk factors. Screening for Prostate Cancer is recommended yearly with a digital rectal exam and/or a PSA test 
 
Here is a list of your current Health Maintenance items with a due date: 
Health Maintenance Topic Date Due  
 FOOT EXAM Q1  07/14/2017 (Originally 7/14/2016)  ZOSTER VACCINE AGE 60>  11/03/2017 (Originally 2/2/2009)  DTaP/Tdap/Td series (1 - Tdap) 11/03/2017 (Originally 1/2/2008)  GLAUCOMA SCREENING Q2Y  12/01/2017 (Originally 2/2/2014)  COLONOSCOPY  12/01/2017 (Originally 2/2/1967)  EYE EXAM RETINAL OR DILATED Q1  12/01/2017 (Originally 2/2/1959)  INFLUENZA AGE 9 TO ADULT  08/01/2017  
 HEMOGLOBIN A1C Q6M  12/26/2017  MICROALBUMIN Q1  06/26/2018  LIPID PANEL Q1  06/26/2018  MEDICARE YEARLY EXAM  06/30/2018  Hepatitis C Screening  Addressed  Pneumococcal 65+ Low/Medium Risk  Completed Preventing Falls: Care Instructions Your Care Instructions Getting around your home safely can be a challenge if you have injuries or health problems that make it easy for you to fall.  Loose rugs and furniture in walkways are among the dangers for many older people who have problems walking or who have poor eyesight. People who have conditions such as arthritis, osteoporosis, or dementia also have to be careful not to fall. You can make your home safer with a few simple measures. Follow-up care is a key part of your treatment and safety. Be sure to make and go to all appointments, and call your doctor if you are having problems. It's also a good idea to know your test results and keep a list of the medicines you take. How can you care for yourself at home? Taking care of yourself · You may get dizzy if you do not drink enough water. To prevent dehydration, drink plenty of fluids, enough so that your urine is light yellow or clear like water. Choose water and other caffeine-free clear liquids. If you have kidney, heart, or liver disease and have to limit fluids, talk with your doctor before you increase the amount of fluids you drink. · Exercise regularly to improve your strength, muscle tone, and balance. Walk if you can. Swimming may be a good choice if you cannot walk easily. · Have your vision and hearing checked each year or any time you notice a change. If you have trouble seeing and hearing, you might not be able to avoid objects and could lose your balance. · Know the side effects of the medicines you take. Ask your doctor or pharmacist whether the medicines you take can affect your balance. Sleeping pills or sedatives can affect your balance. · Limit the amount of alcohol you drink. Alcohol can impair your balance and other senses. · Ask your doctor whether calluses or corns on your feet need to be removed. If you wear loose-fitting shoes because of calluses or corns, you can lose your balance and fall. · Talk to your doctor if you have numbness in your feet. Preventing falls at home · Remove raised doorway thresholds, throw rugs, and clutter.  Repair loose carpet or raised areas in the floor. · Move furniture and electrical cords to keep them out of walking paths. · Use nonskid floor wax, and wipe up spills right away, especially on ceramic tile floors. · If you use a walker or cane, put rubber tips on it. If you use crutches, clean the bottoms of them regularly with an abrasive pad, such as steel wool. · Keep your house well lit, especially [facility], and outside walkways. Use night-lights in areas such as hallways and bathrooms. Add extra light switches or use remote switches (such as switches that go on or off when you clap your hands) to make it easier to turn lights on if you have to get up during the night. · Install sturdy handrails on stairways. · Move items in your cabinets so that the things you use a lot are on the lower shelves (about waist level). · Keep a cordless phone and a flashlight with new batteries by your bed. If possible, put a phone in each of the main rooms of your house, or carry a cell phone in case you fall and cannot reach a phone. Or, you can wear a device around your neck or wrist. You push a button that sends a signal for help. · Wear low-heeled shoes that fit well and give your feet good support. Use footwear with nonskid soles. Check the heels and soles of your shoes for wear. Repair or replace worn heels or soles. · Do not wear socks without shoes on wood floors. · Walk on the grass when the sidewalks are slippery. If you live in an area that gets snow and ice in the winter, sprinkle salt on slippery steps and sidewalks. Preventing falls in the bath · Install grab bars and nonskid mats inside and outside your shower or tub and near the toilet and sinks. · Use shower chairs and bath benches. · Use a hand-held shower head that will allow you to sit while showering.  
· Get into a tub or shower by putting the weaker leg in first. Get out of a tub or shower with your strong side first. 
 · Repair loose toilet seats and consider installing a raised toilet seat to make getting on and off the toilet easier. · Keep your bathroom door unlocked while you are in the shower. Where can you learn more? Go to http://luis e-erica.info/. Enter [id number] [phone] in the search box to learn more about \"Preventing Falls: Care Instructions. \" Current as of: August 4, 2016 Content Version: 11.3 © 1114-5879 [employer]. Care instructions adapted under license by [employer] (which disclaims liability or warranty for this information). If you have questions about a medical condition or this instruction, always ask your healthcare professional. [place] [address] any warranty or liability for your use of this information. Advance Directives: Care Instructions Your Care Instructions An advance directive is a legal way to state your wishes at the end of your life. It tells your family and your doctor what to do if you can no longer say what you want. There are two main types of advance directives. You can change them any time that your wishes change. · A living will tells your family and your doctor your wishes about life support and other treatment. · A durable power of  for health care lets you name a person to make treatment decisions for you when you can't speak for yourself. This person is called a health care agent. If you do not have an advance directive, decisions about your medical care may be made by a doctor or a  who doesn't know you. It may help to think of an advance directive as a gift to the people who care for you. If you have one, they won't have to make tough decisions by themselves. Follow-up care is a key part of your treatment and safety. Be sure to make and go to all appointments, and call your doctor if you are having problems. It's also a good idea to know your test results and keep a list of the medicines you take. How can you care for yourself at home? · Discuss your wishes with your loved ones and your doctor. This way, there are no surprises. · Many states have a unique form. Or you might use a universal form that has been approved by many states. This kind of form can sometimes be completed and stored online. Your electronic copy will then be available wherever you have a connection to the Internet. In most cases, doctors will respect your wishes even if you have a form from a different state. · You don't need a  to do an advance directive. But you may want to get legal advice. · Think about these questions when you prepare an advance directive: ¨ Who do you want to make decisions about your medical care if you are not able to? Many people choose a family member or close friend. ¨ Do you know enough about life support methods that might be used? If not, talk to your doctor so you understand. ¨ What are you most afraid of that might happen? You might be afraid of having pain, losing your independence, or being kept alive by machines. ¨ Where would you prefer to die? Choices include your home, a hospital, or a nursing home. ¨ Would you like to have information about hospice care to support you and your family? ¨ Do you want to donate organs when you die? ¨ Do you want certain Christian practices performed before you die? If so, put your wishes in the advance directive. · Read your advance directive every year, and make changes as needed. When should you call for help? Be sure to contact your doctor if you have any questions. Where can you learn more? Go to http://luis e-erica.info/. Enter R264 in the search box to learn more about \"Advance Directives: Care Instructions. \" Current as of: November 17, 2016 Content Version: 11.3 © 5561-1447 [employer].  Care instructions adapted under license by [employer] (which disclaims liability or warranty for this information). If you have questions about a medical condition or this instruction, always ask your healthcare professional. [address] any warranty or liability for your use of this information. Introducing [facility]! Dear Terence Mcfarlane: Thank you for requesting a Pos[employer]. account. Our records indicate that you already have an active Posib[employer]. account. You can access your account anytime at [email or web address] Did you know that you can access your hospital and ER discharge instructions at any time in Posibl.? You can also review all of your test results from your hospital stay or ER visit. Additional Information If you have questions, please visit the Frequently Asked Questions section of the Posib[employer]. website at [email or web address] Remember, Posib[employer]. is NOT to be used for urgent needs. For medical emergencies, dial 911. Now available from your iPhone and Android! Please provide this summary of care documentation to your next provider. Your primary care clinician is listed as Jaqueline Anand. If you have any questions after today's visit, please call [phone].

## 2017-07-03 ENCOUNTER — TELEPHONE (OUTPATIENT)
Dept: INTERNAL MEDICINE CLINIC | Age: 67
End: 2017-07-03

## 2017-07-03 PROBLEM — R73.02 IMPAIRED GLUCOSE TOLERANCE: Status: ACTIVE | Noted: 2017-07-03

## 2017-07-03 PROBLEM — R97.20 ELEVATED PSA MEASUREMENT: Status: ACTIVE | Noted: 2017-07-03

## 2017-07-03 PROBLEM — N18.30 STAGE 3 CHRONIC KIDNEY DISEASE (HCC): Status: ACTIVE | Noted: 2017-07-03

## 2017-07-03 NOTE — TELEPHONE ENCOUNTER
Please let the patient know that the blood work drawn following his visit showed that his blood count and thyroid function were normal. His PSA was repeated given new elevation to 6.0 and it confirmed that it is indeed elevated and result was slightly higher at 6.6. Please stress the importance of following up with urology referral and ask if he has yet been contacted by [facility]. Thank you.

## 2017-07-03 NOTE — PROGRESS NOTES
HPI:   Warden Brooks is a 75y.o. year old male who presents today to [facility]. He has a history of hypertension, hyperlipidemia, ASHD s/p CABG, aortic stenosis s/p AVR, diabetes mellitus, and gout. He reports that he is doing well and is currently without complaints. He has a history of hypertension, hyperlipidemia, ASHD, and aortic stenosis. In 10/2014, he presented with progressive chest pain and shortness of breath and echocardiogram (10/16/2014) showed normal LV function (EF 55-60%), no RWMA, grade 1 diastolic dysfunction, mild LAE, and severe AS (mean gradient 30 mmHg, peak 67 mmHg, and AV area 0.8 cm2). He was referred to see Dr. Neymar Marie and underwent cardiac catheterization (11/6/2014) showing 100% RCA (distal collaterals from left), 70-80% distal LAD, 80% proximal LCx, inferior basal hypokinesis, and EF 55%. On 11/13/2014, he underwent CABG 2 vessel (LIMA to LAD and SVG to OM1) and AV replacement with a bioprosthetic 23 mm Marifer Barbosa Magna pericardial valve by Dr. Samina Kumar. He has done well since that time and remains asymptomatic. He denies any chest pain, shortness of breath at rest or with exertion, palpitations, lightheadedness, or edema. His current regimen includes aspirin, metoprolol, lisinopril, and moderate intensity dose simvastatin. He is followed by Dr. Neymar Marie. He has not had a follow-up echocardiogram since his surgery despite recommendation to do so annually in order to monitor his bioprosthetic valve. He has a history of diabetes mellitus, which has not required treatment with medication. Review of his record shows that his Hba1c has remained between 5.7-6.3 since 2011. He denies any polyuria, polydipsia, nocturia, or blurry vision, and has no history of retinopathy, neuropathy, or nephropathy.  He has regular eye exams with Dr. Dinora Blakely, although does not recall the date of his last exam.     He has a history of gout, but has not had a flare in many years since being treated with allopurinol. He has never had a screening colonoscopy despite recommendation to do so. He states that he is now willing to proceed with GI referral to schedule. He denies any abdominal pain, nausea, vomiting, melena, hematochezia, or change in bowel movements. Past Medical History:   Diagnosis Date    Aortic stenosis, severe 10/16/2014    Echocardiogram EF 60% peak gradient 67 mmHg, mean gradient 30 mmHg, MARY 0.8 cm    DM (diabetes mellitus) ([address]     Gout     HCD (hypertensive cardiovascular disease)     History of echocardiogram 10/16/2014    EF 55-60%. No RWMA. Mild LVH. Gr 1 DDfx. Mild LAE. Severe AS (mean grad 30 mmHg).  Hyperlipidemia     S/P AVR (aortic valve replacement) 11/13/2014    #23 mm Marifer Barbosa Magna pericardial valve. Dr. Katty Yao.  S/P CABG x 2 11/13/2014    LIMA to LAD, SVG to OM1. Dr. Katty Yao.  S/P cardiac catheterization 11/06/2014    RCA dom. mRCA 100%. LM patent. dLAD 75% x 2. pCX 80% (ELENA-3). LVEDP 14 mmHg. EF 50-55%. Severe inferobasal hypk. Severe AS. AV replacement & CABG recommended. Past Surgical History:   Procedure Laterality Date    HX APPENDECTOMY       Current Outpatient Prescriptions   Medication Sig    sildenafil citrate (VIAGRA) 100 mg tablet Take 1 Tab by mouth as needed.  allopurinol (ZYLOPRIM) 100 mg tablet TAKE 1 TABLET DAILY    simvastatin (ZOCOR) 40 mg tablet TAKE 1 TABLET NIGHTLY    metoprolol succinate (TOPROL-XL) 25 mg XL tablet TAKE 1 TABLET DAILY    lisinopril (PRINIVIL, ZESTRIL) 5 mg tablet Take 1 Tab by mouth daily.  docusate sodium (COLACE) 100 mg capsule Take 100 mg by mouth daily.  cholecalciferol, vitamin D3, 2,000 unit tab Take  by mouth daily.  aspirin delayed-release 81 mg tablet Take 1 tablet by mouth daily. No current facility-administered medications for this visit.       Allergies and Intolerances:   No Known Allergies     Family History:   Family History   Problem Relation Age of Onset    COPD Father     Other Father      pneumoconiosis    Lung Disease Father     Other Mother      meigs syndrome     Social History:   He  reports that he has never smoked. He has never used smokeless tobacco. He reports that he drinks approximately one case of beer per week. He is  with two adult children. He is retired from working in the [employer]. He started as a , and then became an . History   Alcohol Use    7.2 oz/week    12 Cans of beer per week     Immunization History:  Immunization History   Administered Date(s) Administered    Influenza Vaccine 10/14/2014    Influenza Vaccine Split 09/29/2011    Influenza Vaccine Whole 09/14/2010    Pneumococcal Conjugate (PCV-13) 06/29/2017    Pneumococcal Polysaccharide (PPSV-23) 10/14/2014    Td 01/01/2008       Review of Systems:   As above included in HPI. Otherwise 11 point review of systems negative including constitutional, skin, HENT, eyes, respiratory, cardiovascular, gastrointestinal, genitourinary, musculoskeletal, endocrine, hematologic, allergy, and neurologic. Physical:   Vitals:   BP: 130/72  HR: 84  WT: 219 lb (99.3 kg)  BMI:  27.37 kg/m2    Exam:   Patient appears in no apparent distress. Affect is appropriate. HEENT --Anicteric sclerae, tympanic membranes normal,  ear canals normal.  PERRL, EOMI, conjunctiva and lids normal.   Sinuses were nontender, turbinates normal, hearing normal.  Oropharynx without  erythema, normal tongue, oral mucosa and tonsils. No cervical lymphadenopathy. No thyromegaly, JVD, or bruits. Carotid pulses 2+ with normal upstroke. Lungs --Clear to auscultation. No wheezing or rales. Heart --Regular rate and rhythm, no murmurs, rubs, gallops, or clicks. Chest wall --Nontender to palpation. PMI normal.  Abdomen -- Soft and nontender, no hepatosplenomegaly or masses. Prostate  -- patient refused  Rectal  -- patient refused  Extremities -- Without cyanosis, clubbing, edema.  2+ pulses equally and bilaterally. Normal looking digits, ROM intact  Neuro -- CN 2-12 intact, strength 5/5 with intact soft touch in all extremities    Review of Data:  Labs:  Hospital Outpatient Visit on 06/26/2017   Component Date Value Ref Range Status    Hemoglobin A1c 06/26/2017 5.9* 4.2 - 5.6 % Final    Est. average glucose 06/26/2017 123  mg/dL Final    Vitamin D 25-Hydroxy 06/26/2017 34.7  30 - 100 ng/mL Final    Prostate Specific Ag 06/26/2017 6.0* 0.0 - 4.0 ng/mL Final    Sodium 06/26/2017 135* 136 - 145 mmol/L Final    Potassium 06/26/2017 4.2  3.5 - 5.5 mmol/L Final    Chloride 06/26/2017 103  100 - 108 mmol/L Final    CO2 06/26/2017 23  21 - 32 mmol/L Final    Anion gap 06/26/2017 9  3.0 - 18 mmol/L Final    Glucose 06/26/2017 112* 74 - 99 mg/dL Final    BUN 06/26/2017 15  7.0 - 18 MG/DL Final    Creatinine 06/26/2017 1.33* 0.6 - 1.3 MG/DL Final    BUN/Creatinine ratio 06/26/2017 11* 12 - 20   Final    GFR est AA 06/26/2017 >60  >60 ml/min/1.73m2 Final    GFR est non-AA 06/26/2017 53* >60 ml/min/1.73m2 Final    Calcium 06/26/2017 8.9  8.5 - 10.1 MG/DL Final    Bilirubin, total 06/26/2017 1.1* 0.2 - 1.0 MG/DL Final    ALT (SGPT) 06/26/2017 29  16 - 61 U/L Final    AST (SGOT) 06/26/2017 19  15 - 37 U/L Final    Alk. phosphatase 06/26/2017 55  45 - 117 U/L Final    Protein, total 06/26/2017 7.2  6.4 - 8.2 g/dL Final    Albumin 06/26/2017 4.1  3.4 - 5.0 g/dL Final    Globulin 06/26/2017 3.1  2.0 - 4.0 g/dL Final    A-G Ratio 06/26/2017 1.3  0.8 - 1.7   Final    Microalbumin,urine random 06/26/2017 <0.13  0 - 3.0 MG/DL Final    Creatinine, urine 06/26/2017 42.44  30 - 125 mg/dL Final    Microalbumin/Creat ratio (mg/g cre* 06/26/2017 Cannot calculate ratio due to micro albumin result outside reportable range.   0 - 30 mg/g Final    LIPID PROFILE 06/26/2017        Final    Cholesterol, total 06/26/2017 153  <200 MG/DL Final    Triglyceride 06/26/2017 102  <150 MG/DL Final    HDL Cholesterol 06/26/2017 62* 40 - 60 MG/DL Final    LDL, calculated 06/26/2017 70.6  0 - 100 MG/DL Final    VLDL, calculated 06/26/2017 20.4  MG/DL Final    CHOL/HDL Ratio 06/26/2017 2.5  0 - 5.0   Final     Health Maintenance:  Screening:    Colorectal: refused screening previously   Depression: none   DM (HbA1c/FPG): HbA1c 5.9 (6/2017)   Hepatitis C: unknown   Falls: none   DEXA: N/A   PSA/KIEL: PSA 6.0 (6/2017); refused KIEL today   Glaucoma: regular eye exams with Dr. Charles Alfredo (unknown last exam)   Smoking: none   Vitamin D: 34.7 (6/2017)   Medicare Wellness: today    Impression:  Patient Active Problem List   Diagnosis Code    Hyperlipidemia E78.5    Gout M10.9    Vitamin D deficiency E55.9    CAD in native artery I25.10    Hypertensive heart disease without congestive heart failure I11.9    Prediabetes R73.03    S/P CABG x 2 Z95.1    S/P AVR (aortic valve replacement) Z95.2    Elevated PSA measurement R97.20    Impaired glucose tolerance R73.02    Stage 3 chronic kidney disease N18.3       Plan:  1. Hypertension. Well controlled on current regimen of lisinopril and metoprolol. Renal function stable with creatinine 1.33 / eGFR 53. Review of record shows evidence of chronic renal disease stage 3 since 11/2014. Continue to follow. 2. Hyperlipidemia. On moderate intensity dose simvastatin with LDL 70, indicative of good control. Continue to follow. Emphasized importance of lifestyle modifications, including diet, exercise, and weight loss. 3. ASHD s/p 2 vessel CABG. Remains asymptomatic. On aspirin, metoprolol, and statin. Followed by Dr. Sweta Nicole. 4. Aortic stenosis s/p bioprosthetic AVR. Remains asymptomatic, but has not had follow-up echocardiogram as instructed since placement of valve. Will address at next visit. Has appointment with Dr. Sweta Nicole in 8/2017. 5. Prediabetes.  Patient with diagnosis of diabetes mellitus, but review of record shows HbA1c ranging from 5.7-6.3 since 2011 and -112 during that time period. He did have one glucose reading in 11/2014 at 128, but unclear if fasting and not confirmed. On Ace-I and statin. Continue follow-up with Dr. Chavo Granger for annual eye exams. Will perform foot exam at next visit and obtain urine microalbumin/ creatinine ratio. Does have evidence of chronic renal disease stage 3.  6. Chronic renal disease, stage 3. Review of record shows baseline creatinine 1.22-1.37/ eGFR 55-59 since 11/2014. Most likely secondary to long standing hypertension and prediabetes, although also time of CABG and AVR so may be related to stress of surgery. On statin and Ace-I. Discussed importance of avoiding NSAIDS and prerenal status. Follow. 7. Elevated PSA. PSA increased to 6.0. Review of record shows it in 1.3-2.9 range previously, but last checked in 10/2014. Patient refused KIEL today. Will refer to urology for evaluation. 8. Gout. Asymptomatic. On allopurinol. 9. Health maintenance. Will give Prevnar today. Given scripts for Tdap and Zoster vaccines. Other immunizations up to date. Agreeable to referral to GI for colonoscopy as never had colorectal cancer screening. Will refer. Also, referral to dermatology for screening skin exam given multiple actinic keratosis. Stressed importance of regular eye exams with Dr. Chavo Granger. Discussed decreasing alcohol consumption. Will check hepatitis C status at next visit. Vitamin D level normal. Continue maintenance dose supplement. In addition, an annual Medicare wellness visit was done today. Reviewed and agree with assessment. Total time: 40 minutes spent with the patient in face-to-face consultation of which greater than 50% was spent on counseling, answering questions and/or coordination of care. Complex medical review and management performed. Patient understands recommendations and agrees with plan. Follow-up in 3 months.

## 2017-07-12 ENCOUNTER — TELEPHONE (OUTPATIENT)
Dept: INTERNAL MEDICINE CLINIC | Age: 67
End: 2017-07-12

## 2017-07-12 NOTE — TELEPHONE ENCOUNTER
LMTCB per Dr Viktoriya Askew request, she referred him to urology but he is not scheduled, we need to be sure he goes.  LM to see if urology contacted him or not

## 2017-07-12 NOTE — TELEPHONE ENCOUNTER
PT returned call, he said urology contacted him to schedule yesterday but he never called them back. I told him to be sure he schedules with them.  He said he also called dermatology

## 2017-07-21 RX ORDER — METOPROLOL SUCCINATE 25 MG/1
25 TABLET, EXTENDED RELEASE ORAL DAILY
Qty: 90 TAB | Refills: 2 | Status: SHIPPED | OUTPATIENT
Start: 2017-07-21 | End: 2018-05-16 | Stop reason: SDUPTHER

## 2017-07-21 NOTE — TELEPHONE ENCOUNTER
From: Mansoor Samuels  To: Jaki Arroyo MD  Sent: 7/20/2017 9:17 PM EDT  Subject: Medication Renewal Request    Original authorizing provider: Jaki Arroyo MD    Roger Subramanian would like a refill of the following medications:  metoprolol succinate (TOPROL-XL) 25 mg XL tablet Jaki Arroyo MD]    Preferred pharmacy: [facility]    Comment:  Please send prescriptions to [facility].  Thanks, Dyllan Willis    Medication renewals requested in this message routed to other providers:  simvastatin (ZOCOR) 40 mg tablet Jayashree Lyons MD]  allopurinol (ZYLOPRIM) 100 mg tablet Jayashree Lyons MD]

## 2017-08-08 ENCOUNTER — OFFICE VISIT (OUTPATIENT)
Dept: CARDIOLOGY CLINIC | Age: 67
End: 2017-08-08

## 2017-08-08 VITALS
WEIGHT: 220 LBS | OXYGEN SATURATION: 96 % | HEART RATE: 70 BPM | DIASTOLIC BLOOD PRESSURE: 76 MMHG | HEIGHT: 75 IN | BODY MASS INDEX: 27.35 KG/M2 | SYSTOLIC BLOOD PRESSURE: 128 MMHG

## 2017-08-08 DIAGNOSIS — E78.5 HYPERLIPIDEMIA, UNSPECIFIED HYPERLIPIDEMIA TYPE: ICD-10-CM

## 2017-08-08 DIAGNOSIS — Z95.2 S/P AVR (AORTIC VALVE REPLACEMENT): ICD-10-CM

## 2017-08-08 DIAGNOSIS — I25.10 CAD IN NATIVE ARTERY: Primary | ICD-10-CM

## 2017-08-08 DIAGNOSIS — I11.9 HYPERTENSIVE HEART DISEASE WITHOUT CONGESTIVE HEART FAILURE: ICD-10-CM

## 2017-08-08 NOTE — PROGRESS NOTES
1. Have you been to the ER, urgent care clinic since your last visit? Hospitalized since your last visit?no     2. Have you seen or consulted any other health care providers outside of the [address] [place] [address] since your last visit? Include any pap smears or colon screening.  No

## 2017-08-08 NOTE — PROGRESS NOTES
HISTORY OF PRESENT ILLNESS  Jaime Latham is a 75 y.o. male. ASSESSMENT and PLAN    Mr. Leisa Belcher initially presented with chest pains, and significant aortic stenosis with mean gradient of 30 mmHg, peak gradient of 67 mmHg, MARY of 0.8 cm². He ultimately underwent open-heart surgery in November of 2014. He had coronary artery bypass graft surgery with LIMA to LAD and SVG to OM1. He also had aortic valve replacement with #23 Marifer-Barbosa Magna pericardial valve.  CAD:   Remains stable symptomatically.  Bioprosthetic AVR: Clinically stable. Repeat echocardiogram will be checked prior to his next visit.  BP:   Well-controlled.  HR:    Stable.  CHF:   There is no evidence of decompensated CHF noted.  Weight:   His weight today is 220 pounds. This is up about 10 pounds from last year. I advised him to keep his weight no higher than his current weight. Ideally, he should weigh about 200 210 pounds.  Cholesterol:   Target LDL <70. He remains on simvastatin 40 mg daily.  Tobacco:   He does not smoke cigarettes.  Anti-platelet:   Remains on ASA. Repeat echocardiogram is requested. I will see him back annually. Thank you. Encounter Diagnoses   Name Primary?  CAD in native artery Yes    Hypertensive heart disease without congestive heart failure     Hyperlipidemia, unspecified hyperlipidemia type     S/P AVR (aortic valve replacement)      current treatment plan is effective, no change in therapy  lab results and schedule of future lab studies reviewed with patient  reviewed diet, exercise and weight control  cardiovascular risk and specific lipid/LDL goals reviewed  use of aspirin to prevent MI and TIA's discussed      HPI  Today, Mr. Ancelmo Savage has no complaints of chest pains, increased shortness of breath or decreased exercise capacity. He denies any orthopnea or PND. He denies any palpitations or dizziness. He remains active physically.     Review of Systems Respiratory: Negative for shortness of breath. Cardiovascular: Negative for chest pain, palpitations, orthopnea, claudication, leg swelling and PND. All other systems reviewed and are negative. Physical Exam   Constitutional: He is oriented to person, place, and time. He appears well-developed and well-nourished. HENT:   Head: Normocephalic. Eyes: Conjunctivae are normal.   Neck: Neck supple. No JVD present. Carotid bruit is not present. No thyromegaly present. Cardiovascular: Normal rate and regular rhythm. Pulmonary/Chest: Breath sounds normal.   Abdominal: Bowel sounds are normal.   Musculoskeletal: He exhibits no edema. Neurological: He is alert and oriented to person, place, and time. Skin: Skin is warm and dry. Nursing note and vitals reviewed. PCP: Antoinette Fonseca MD    Past Medical History:   Diagnosis Date    Aortic stenosis     s/p AVR    Aortic stenosis, severe 10/16/2014    Echocardiogram EF 60% peak gradient 67 mmHg, mean gradient 30 mmHg, MARY 0.8 cm    ASHD (arteriosclerotic heart disease)     Chronic renal disease, stage III     DM (diabetes mellitus) ([address]     Gout     Gout     HCD (hypertensive cardiovascular disease)     History of echocardiogram 10/16/2014    EF 55-60%. No RWMA. Mild LVH. Gr 1 DDfx. Mild LAE. Severe AS (mean grad 30 mmHg).  Hyperlipidemia     Hypertension     S/P AVR (aortic valve replacement) 11/13/2014    #23 mm Marifer Barbosa Magna pericardial valve. Dr. Abran Eid.  S/P CABG x 2 11/13/2014    LIMA to LAD, SVG to OM1. Dr. Abran Eid.  S/P cardiac catheterization 11/06/2014    RCA dom. mRCA 100%. LM patent. dLAD 75% x 2. pCX 80% (ELENA-3). LVEDP 14 mmHg. EF 50-55%. Severe inferobasal hypk. Severe AS. AV replacement & CABG recommended.        Past Surgical History:   Procedure Laterality Date    HX APPENDECTOMY         Current Outpatient Prescriptions   Medication Sig Dispense Refill    simvastatin (ZOCOR) 40 mg tablet Take 1 Tab by mouth nightly. 90 Tab 3    allopurinol (ZYLOPRIM) 100 mg tablet Take 1 Tab by mouth daily. 90 Tab 3    metoprolol succinate (TOPROL-XL) 25 mg XL tablet Take 1 Tab by mouth daily. 90 Tab 2    sildenafil citrate (VIAGRA) 100 mg tablet Take 1 Tab by mouth as needed. 6 Tab 3    lisinopril (PRINIVIL, ZESTRIL) 5 mg tablet Take 1 Tab by mouth daily. 90 Tab 3    docusate sodium (COLACE) 100 mg capsule Take 100 mg by mouth daily.  cholecalciferol, vitamin D3, 2,000 unit tab Take  by mouth daily.  aspirin delayed-release 81 mg tablet Take 1 tablet by mouth daily.  30 tablet 2       The patient has a family history of    Social History   Substance Use Topics    Smoking status: Never Smoker    Smokeless tobacco: Never Used    Alcohol use 7.2 oz/week     12 Cans of beer per week       Lab Results   Component Value Date/Time    Cholesterol, total 153 06/26/2017 10:09 AM    HDL Cholesterol 62 06/26/2017 10:09 AM    LDL, calculated 70.6 06/26/2017 10:09 AM    Triglyceride 102 06/26/2017 10:09 AM    CHOL/HDL Ratio 2.5 06/26/2017 10:09 AM        BP Readings from Last 3 Encounters:   08/08/17 128/76   08/04/17 122/80   06/29/17 130/72        Pulse Readings from Last 3 Encounters:   08/08/17 70   06/29/17 84   08/09/16 64       Wt Readings from Last 3 Encounters:   08/08/17 99.8 kg (220 lb)   08/04/17 97.1 kg (214 lb)   06/29/17 99.3 kg (219 lb)         EKG: unchanged from previous tracings, normal sinus rhythm, occasional PVC noted, unifocal.

## 2017-08-21 ENCOUNTER — HOSPITAL ENCOUNTER (OUTPATIENT)
Dept: NON INVASIVE DIAGNOSTICS | Age: 67
Discharge: HOME | End: 2017-08-21
Attending: INTERNAL MEDICINE
Payer: MEDICARE

## 2017-08-21 DIAGNOSIS — Z95.2 S/P AVR (AORTIC VALVE REPLACEMENT): ICD-10-CM

## 2017-08-21 DIAGNOSIS — I25.10 CAD IN NATIVE ARTERY: ICD-10-CM

## 2017-08-21 PROCEDURE — 74011250636 HC RX REV CODE- 250/636: Performed by: INTERNAL MEDICINE

## 2017-08-21 PROCEDURE — C8929 TTE W OR WO FOL WCON,DOPPLER: HCPCS

## 2017-08-21 RX ADMIN — PERFLUTREN 2 ML: 6.52 INJECTION, SUSPENSION INTRAVENOUS at 10:48

## 2017-08-29 NOTE — PROGRESS NOTES
Per  Your last note \" Bioprosthetic AVR: Clinically stable. Repeat echocardiogram will be checked prior to his next visit.

## 2017-09-18 RX ORDER — LISINOPRIL 5 MG/1
TABLET ORAL
Qty: 90 TAB | Refills: 3 | Status: SHIPPED | OUTPATIENT
Start: 2017-09-18 | End: 2018-10-04 | Stop reason: SDUPTHER

## 2017-09-19 ENCOUNTER — TELEPHONE (OUTPATIENT)
Dept: CARDIOLOGY CLINIC | Age: 67
End: 2017-09-19

## 2017-09-19 NOTE — TELEPHONE ENCOUNTER
----- Message from [id number] Romeo Hines MD sent at 9/18/2017  3:33 PM EDT -----  Echo looks good. ----- Message -----     From: Meghan Herrera RN     Sent: 8/29/2017   7:51 AM       To: [id number] Romeo Hines MD    Per  Your last note \" Bioprosthetic AVR: Clinically stable. Repeat echocardiogram will be checked prior to his next visit.

## 2017-09-21 ENCOUNTER — TELEPHONE (OUTPATIENT)
Dept: INTERNAL MEDICINE CLINIC | Age: 67
End: 2017-09-21

## 2017-09-26 ENCOUNTER — OFFICE VISIT (OUTPATIENT)
Dept: INTERNAL MEDICINE CLINIC | Age: 67
End: 2017-09-26

## 2017-09-26 VITALS
BODY MASS INDEX: 27.23 KG/M2 | HEART RATE: 87 BPM | HEIGHT: 75 IN | SYSTOLIC BLOOD PRESSURE: 128 MMHG | OXYGEN SATURATION: 98 % | DIASTOLIC BLOOD PRESSURE: 82 MMHG | TEMPERATURE: 98.4 F | WEIGHT: 219 LBS | RESPIRATION RATE: 16 BRPM

## 2017-09-26 DIAGNOSIS — E78.5 HYPERLIPIDEMIA, UNSPECIFIED HYPERLIPIDEMIA TYPE: ICD-10-CM

## 2017-09-26 DIAGNOSIS — I11.9 HYPERTENSIVE HEART DISEASE WITHOUT CONGESTIVE HEART FAILURE: ICD-10-CM

## 2017-09-26 DIAGNOSIS — Z95.1 S/P CABG X 2: ICD-10-CM

## 2017-09-26 DIAGNOSIS — C61 PROSTATE CANCER (HCC): ICD-10-CM

## 2017-09-26 DIAGNOSIS — M10.9 GOUT, UNSPECIFIED CAUSE, UNSPECIFIED CHRONICITY, UNSPECIFIED SITE: Chronic | ICD-10-CM

## 2017-09-26 DIAGNOSIS — N18.30 STAGE 3 CHRONIC KIDNEY DISEASE (HCC): ICD-10-CM

## 2017-09-26 DIAGNOSIS — Z11.59 NEED FOR HEPATITIS C SCREENING TEST: ICD-10-CM

## 2017-09-26 DIAGNOSIS — R73.02 IMPAIRED GLUCOSE TOLERANCE: ICD-10-CM

## 2017-09-26 DIAGNOSIS — I25.10 CAD IN NATIVE ARTERY: ICD-10-CM

## 2017-09-26 DIAGNOSIS — Z95.2 S/P AVR (AORTIC VALVE REPLACEMENT): ICD-10-CM

## 2017-09-26 DIAGNOSIS — I10 ESSENTIAL HYPERTENSION: Primary | ICD-10-CM

## 2017-09-26 DIAGNOSIS — Z12.11 COLON CANCER SCREENING: ICD-10-CM

## 2017-09-26 DIAGNOSIS — R73.03 PREDIABETES: ICD-10-CM

## 2017-09-26 NOTE — PATIENT INSTRUCTIONS
Prediabetes: Care Instructions  Your Care Instructions  Prediabetes is a warning sign that you are at risk for getting type 2 diabetes. It means that your blood sugar is higher than it should be. The food you eat turns into sugar, which your body uses for energy. Normally, an organ called the pancreas makes insulin, which allows the sugar in your blood to get into your body's cells. But when your body can't use insulin the right way, the sugar doesn't move into cells. It stays in your blood instead. This is called insulin resistance. The buildup of sugar in the blood causes prediabetes. The good news is that lifestyle changes may help you get your blood sugar back to normal and help you avoid or delay diabetes. Follow-up care is a key part of your treatment and safety. Be sure to make and go to all appointments, and call your doctor if you are having problems. It's also a good idea to know your test results and keep a list of the medicines you take. How can you care for yourself at home? · Watch your weight. A healthy weight helps your body use insulin properly. · Limit the amount of calories, sweets, and unhealthy fat you eat. Ask your doctor if you should see a dietitian. A registered dietitian can help you create meal plans that fit your lifestyle. · Get at least 30 minutes of exercise on most days of the week. Exercise helps control your blood sugar. It also helps you maintain a healthy weight. Walking is a good choice. You also may want to do other activities, such as running, swimming, cycling, or playing tennis or team sports. · Do not smoke. Smoking can make prediabetes worse. If you need help quitting, talk to your doctor about stop-smoking programs and medicines. These can increase your chances of quitting for good. · If your doctor prescribed medicines, take them exactly as prescribed. Call your doctor if you think you are having a problem with your medicine.  You will get more details on the specific medicines your doctor prescribes. When should you call for help? Watch closely for changes in your health, and be sure to contact your doctor if:  · You have any symptoms of diabetes. These may include:  ¨ Being thirsty more often. ¨ Urinating more. ¨ Being hungrier. ¨ Losing weight. ¨ Being very tired. ¨ Having blurry vision. · You have a wound that will not heal.  · You have an infection that will not go away. · You have problems with your blood pressure. · You want more information about diabetes and how you can keep from getting it. Where can you learn more? Go to http://[facility] [employer].info/. Enter I222 in the search box to learn more about \"Prediabetes: Care Instructions. \"  Current as of: March 13, 2017  Content Version: 11.3  © 7355-1587 [employer]. Care instructions adapted under license by [employer] (which disclaims liability or warranty for this information). If you have questions about a medical condition or this instruction, always ask your healthcare professional. [address] any warranty or liability for your use of this information.

## 2017-09-26 NOTE — MR AVS SNAPSHOT
Visit Information Date & Time Provider Department Dept. Phone Encounter #  
 9/26/2017  1:30 PM Jaqueline Anand MD Internist of [address] [place] [address] Place [id number] Follow-up Instructions Return in about 3 months (around 12/26/2017), or if symptoms worsen or fail to improve. Your Appointments 1/4/2018  9:45 AM  
LAB with [facility] NURSE VISIT Internist of [facility] ([address]) Appt Note: lab  
 [address], [address] [place] [address] [place]  
  
   
 [address], [address]  
  
    
 1/10/2018 10:00 AM  
Office Visit with Jaqueline Anand MD  
Internist of [address] [place] [address]) Appt Note: 3 month f/u  
 [address] [facility], [address] [place] [address] [place]  
  
   
 [address], [address]  
  
    
 8/14/2018  8:00 AM  
Follow Up with Lou Peterson MD  
[facility] ([facility]) Appt Note: 1 year follow up [place] [id number]  
[phone] [address] [facility] [address] [address]  
  
    
  
 2/26/2018  9:45 AM  
Any with Shellie Marino MD  
Urology of [facility] ([address] [place] [address]) Appt Note: 6m fu  
 [address] 
[address] [facility] [address]  
[address] [facility] [address] [facility] [address] [facility] [address] Upcoming Health Maintenance Date Due  
 EYE EXAM RETINAL OR DILATED Q1 2/2/1959 COLONOSCOPY 2/2/1967 DTaP/Tdap/Td series (1 - Tdap) 1/2/2008 ZOSTER VACCINE AGE 60> 12/2/2008 GLAUCOMA SCREENING Q2Y 2/2/2014 FOOT EXAM Q1 7/14/2016 INFLUENZA AGE 9 TO ADULT 8/1/2017 HEMOGLOBIN A1C Q6M 12/26/2017 MICROALBUMIN Q1 6/26/2018 LIPID PANEL Q1 6/26/2018 MEDICARE YEARLY EXAM 6/30/2018 Allergies as of 9/26/2017  Review Complete On: 9/26/2017 By: Calixto Garcia No Known Allergies Current Immunizations  Reviewed on 4/2/2014 Name Date Influenza Vaccine 10/14/2014 Influenza Vaccine Split 9/29/2011 Influenza Vaccine Whole 9/14/2010 Pneumococcal Conjugate (PCV-13) 6/29/2017 Pneumococcal Polysaccharide (PPSV-23) 10/14/2014 Td 1/1/2008 Not reviewed this visit You Were Diagnosed With   
  
 Codes Comments Prediabetes    -  Primary ICD-10-CM: R73.03 
ICD-9-CM: 790.29 Colon cancer screening     ICD-10-CM: Z12.11 ICD-9-CM: V76.51 Vitals BP Pulse Temp Resp Height(growth percentile) Weight(growth percentile) 128/82 (BP 1 Location: Left arm, BP Patient Position: Sitting) 87 98.4 °F (36.9 °C) (Oral) 16 6' 3\" (1.905 m) 219 lb (99.3 kg) SpO2 BMI Smoking Status 98% 27.37 kg/m2 Never Smoker Vitals History BMI and BSA Data Body Mass Index Body Surface Area  
 27.37 kg/m 2 2.29 m 2 Preferred Pharmacy Pharmacy Name Phone [address] [facility] [phone] Your Updated Medication List  
  
   
This list is accurate as of: 9/26/17  2:47 PM.  Always use your most recent med list.  
  
  
  
  
 allopurinol 100 mg tablet Commonly known as:  Tati Lauren Take 1 Tab by mouth daily. aspirin delayed-release 81 mg tablet Take 1 tablet by mouth daily. cholecalciferol (vitamin D3) 2,000 unit Tab Take  by mouth daily. COLACE 100 mg capsule Generic drug:  docusate sodium Take 100 mg by mouth daily. lisinopril 5 mg tablet Commonly known as:  PRINIVIL, ZESTRIL  
TAKE 1 TABLET DAILY  
  
 metoprolol succinate 25 mg XL tablet Commonly known as:  TOPROL-XL Take 1 Tab by mouth daily. sildenafil citrate 100 mg tablet Commonly known as:  VIAGRA Take 1 Tab by mouth as needed. simvastatin 40 mg tablet Commonly known as:  ZOCOR Take 1 Tab by mouth nightly. We Performed the Following COLOGUARD TEST (FECAL DNA COLORECTAL CANCER SCREENING) [48568 CPT(R)] REFERRAL TO OPHTHALMOLOGY [REF57 Custom] Follow-up Instructions Return in about 3 months (around 12/26/2017), or if symptoms worsen or fail to improve. Referral Information Referral ID Referred By Referred To  
  
 [id number] Mary Lou Jj MD   
   [address] [place], [address] [address] Phone: [phone] Fax: [phone] Visits Status Start Date End Date 1 New Request 9/26/17 9/26/18 If your referral has a status of pending review or denied, additional information will be sent to support the outcome of this decision. Patient Instructions Prediabetes: Care Instructions Your Care Instructions Prediabetes is a warning sign that you are at risk for getting type 2 diabetes. It means that your blood sugar is higher than it should be. The food you eat turns into sugar, which your body uses for energy. Normally, an organ called the pancreas makes insulin, which allows the sugar in your blood to get into your body's cells. But when your body can't use insulin the right way, the sugar doesn't move into cells. It stays in your blood instead. This is called insulin resistance. The buildup of sugar in the blood causes prediabetes. The good news is that lifestyle changes may help you get your blood sugar back to normal and help you avoid or delay diabetes. Follow-up care is a key part of your treatment and safety. Be sure to make and go to all appointments, and call your doctor if you are having problems. It's also a good idea to know your test results and keep a list of the medicines you take. How can you care for yourself at home? · Watch your weight. A healthy weight helps your body use insulin properly. · Limit the amount of calories, sweets, and unhealthy fat you eat. Ask your doctor if you should see a dietitian.  A registered dietitian can help you create meal plans that fit your lifestyle. · Get at least 30 minutes of exercise on most days of the week. Exercise helps control your blood sugar. It also helps you maintain a healthy weight. Walking is a good choice. You also may want to do other activities, such as running, swimming, cycling, or playing tennis or team sports. · Do not smoke. Smoking can make prediabetes worse. If you need help quitting, talk to your doctor about stop-smoking programs and medicines. These can increase your chances of quitting for good. · If your doctor prescribed medicines, take them exactly as prescribed. Call your doctor if you think you are having a problem with your medicine. You will get more details on the specific medicines your doctor prescribes. When should you call for help? Watch closely for changes in your health, and be sure to contact your doctor if: 
· You have any symptoms of diabetes. These may include: ¨ Being thirsty more often. ¨ Urinating more. ¨ Being hungrier. ¨ Losing weight. ¨ Being very tired. ¨ Having blurry vision. · You have a wound that will not heal. 
· You have an infection that will not go away. · You have problems with your blood pressure. · You want more information about diabetes and how you can keep from getting it. Where can you learn more? Go to http://luis e-erica.info/. Enter I222 in the search box to learn more about \"Prediabetes: Care Instructions. \" Current as of: March 13, 2017 Content Version: 11.3 © 2383-4505 [employer]. Care instructions adapted under license by [employer] (which disclaims liability or warranty for this information). If you have questions about a medical condition or this instruction, always ask your healthcare professional. [place] [address] any warranty or liability for your use of this information. Introducing [facility]! Dear Alison Olmos: Thank you for requesting a [employer] account. Our records indicate that you already have an active [employer] account. You can access your account anytime at [email or web address] Path Did you know that you can access your hospital and ER discharge instructions at any time in [employer]? You can also review all of your test results from your hospital stay or ER visit. Additional Information If you have questions, please visit the Frequently Asked Questions section of the [employer] website at [email or web address] Remember, [employer] is NOT to be used for urgent needs. For medical emergencies, dial 911. Now available from your iPhone and Android! Please provide this summary of care documentation to your next provider. Your primary care clinician is listed as Antoniette Fonseca. If you have any questions after today's visit, please call [phone].

## 2017-09-26 NOTE — PROGRESS NOTES
1. Have you been to the ER, urgent care clinic or hospitalized since your last visit? NO.     2. Have you seen or consulted any other health care providers outside of the [address] [place] [address] since your last visit (Include any pap smears or colon screening)?  NO        Health Maintenance Due   Topic Date Due    EYE EXAM RETINAL OR DILATED Q1  02/02/1959    COLONOSCOPY  02/02/1967    DTaP/Tdap/Td series (1 - Tdap) 01/02/2008    ZOSTER VACCINE AGE 60>  12/02/2008    GLAUCOMA SCREENING Q2Y  02/02/2014    FOOT EXAM Q1  07/14/2016    INFLUENZA AGE 9 TO ADULT  08/01/2017

## 2017-09-30 PROBLEM — C61 PROSTATE CANCER (HCC): Status: ACTIVE | Noted: 2017-09-30

## 2017-09-30 PROBLEM — I10 ESSENTIAL HYPERTENSION: Status: ACTIVE | Noted: 2017-09-30

## 2017-09-30 NOTE — PROGRESS NOTES
HPI:   Richard Thibodeaux is a 75y.o. year old male who presents for evaluation of hypertension, hyperlipidemia, ASHD s/p CABG, aortic stenosis s/p AVR, diabetes mellitus, chronic renal disease stage 3, and gout. He was last seen on 6/29/2017 and was noted to have an elevated PSA of 6.0, which was confirmed on repeat to be 6.6. He was referred to Dr. Phoenix Gordon and PSA was again repeated and found to be increased at 7.24. He underwent TRUS biopsy on 8/17/2017, which showed [id number] Kaylynn Grade 7 (3 + 4) adenocarcinoma of the prostate in 3/12 cores, 2-10% of each core. Options for management were discussed and patient selected active surveillance. Plans are for MRI prostate in 6 months. He states today that he is doing well and is currently without complaints. He has a history of hypertension, hyperlipidemia, ASHD, and aortic stenosis. In 10/2014, he presented with progressive chest pain and shortness of breath and echocardiogram (10/16/2014) showed normal LV function (EF 55-60%), no RWMA, grade 1 diastolic dysfunction, mild LAE, and severe AS (mean gradient 30 mmHg, peak 67 mmHg, and AV area 0.8 cm2). He was referred to see Dr. Serina Pedro and underwent cardiac catheterization (11/6/2014) showing 100% RCA (distal collaterals from left), 70-80% distal LAD, 80% proximal LCx, inferior basal hypokinesis, and EF 55%. On 11/13/2014, he underwent CABG 2 vessel (LIMA to LAD and SVG to OM1) and AV replacement with a bioprosthetic 23 mm Marifer Barbosa Magna pericardial valve by Dr. Lucas Khan. He has done well since that time and remains asymptomatic. He denies any chest pain, shortness of breath at rest or with exertion, palpitations, lightheadedness, or edema. His current regimen includes aspirin, metoprolol, lisinopril, and moderate intensity dose simvastatin. He is followed by Dr. Serina Pedro.  He had a repeat echocardiogram (8/21/2017) showing normal LV function (EF 55-60%), no RWMA, mild MIL, and normally functioning bioprosthetic valve with peak gradient 17 mmHg, mean gradient 10 mmHg, and valve area 1.86 cm2. He has a history of diabetes mellitus, which has not required treatment with medication. Review of his record shows that his Hba1c has remained between 5.7-6.3 since 2011. He denies any polyuria, polydipsia, nocturia, or blurry vision, and has no history of retinopathy or neuropathy. He does have evidence of chronic renal disease, stage 3, with baseline creatinine 1.22-1.37/ eGFR 55-59 since 11/2014. He has regular eye exams with Dr. Ana Akers, although does not recall the date of his last exam.     He has a history of gout, but has not had a flare in many years since being treated with allopurinol. He has never had a screening colonoscopy despite recommendation to do so. He was referred following his last visit, but decided not to proceed. He denies any abdominal pain, nausea, vomiting, melena, hematochezia, or change in bowel movements. Past Medical History:   Diagnosis Date    Aortic stenosis     s/p AVR    Aortic stenosis, severe 10/16/2014    Echocardiogram EF 60% peak gradient 67 mmHg, mean gradient 30 mmHg, MARY 0.8 cm    ASHD (arteriosclerotic heart disease)     Chronic renal disease, stage III     DM [facility]diabetes mellitus) ([facility] [address]     Gout     HCD (hypertensive cardiovascular disease)     History of echocardiogram 10/16/2014    EF 55-60%. No RWMA. Mild LVH. Gr 1 DDfx. Mild LAE. Severe AS (mean grad 30 mmHg).  Hyperlipidemia     Hypertension     Prostate cancer ([facility] [address] 08/17/2017    [id number] Kaylynn Grade 7 (3 + 4) adenocarcinoma of the prostate in 3/12 cores, 2-10% of each core; PSA 7.24. Dr. Chyna Blanchard.  S/P AVR (aortic valve replacement) 11/13/2014    #23 mm Marifer Barbosa Magna pericardial valve. Dr. Georgina Rowell.  S/P CABG x 2 11/13/2014    LIMA to LAD, SVG to OM1. Dr. Georgina Rowell.  S/P cardiac catheterization 11/06/2014    RCA dom. mRCA 100%. LM patent. dLAD 75% x 2.   pCX 80% (ELENA-3). LVEDP 14 mmHg. EF 50-55%. Severe inferobasal hypk. Severe AS. AV replacement & CABG recommended. Past Surgical History:   Procedure Laterality Date    HX APPENDECTOMY       Current Outpatient Prescriptions   Medication Sig    lisinopril (PRINIVIL, ZESTRIL) 5 mg tablet TAKE 1 TABLET DAILY    simvastatin (ZOCOR) 40 mg tablet Take 1 Tab by mouth nightly.  allopurinol (ZYLOPRIM) 100 mg tablet Take 1 Tab by mouth daily.  metoprolol succinate (TOPROL-XL) 25 mg XL tablet Take 1 Tab by mouth daily.  docusate sodium (COLACE) 100 mg capsule Take 100 mg by mouth daily.  cholecalciferol, vitamin D3, 2,000 unit tab Take  by mouth daily.  aspirin delayed-release 81 mg tablet Take 1 tablet by mouth daily.  sildenafil citrate (VIAGRA) 100 mg tablet Take 1 Tab by mouth as needed. No current facility-administered medications for this visit. Allergies and Intolerances:   No Known Allergies     Family History:   Family History   Problem Relation Age of Onset   Kennedy Perrin COPD Father     Other Father      pneumoconiosis    Lung Disease Father     Other Mother      meigs syndrome     Social History:   He  reports that he has never smoked. He has never used smokeless tobacco. He reports that he drinks approximately one case of beer per week. He is  with two adult children. He is retired from working in the [employer]. He started as a , and then became an . History   Alcohol Use    7.2 oz/week    12 Cans of beer per week     Immunization History:  Immunization History   Administered Date(s) Administered    Influenza Vaccine 10/14/2014    Influenza Vaccine Split 09/29/2011    Influenza Vaccine Whole 09/14/2010    Pneumococcal Conjugate (PCV-13) 06/29/2017    Pneumococcal Polysaccharide (PPSV-23) 10/14/2014    Td 01/01/2008       Review of Systems:   As above included in HPI.   Otherwise 11 point review of systems negative including constitutional, skin, HENT, eyes, respiratory, cardiovascular, gastrointestinal, genitourinary, musculoskeletal, endocrine, hematologic, allergy, and neurologic. Physical:   Vitals:   BP: 128/82  HR: 87  WT: 219 lb (99.3 kg)  BMI:  27.37 kg/m2    Exam:   Pt appears well; alert and oriented x 3; appropriate affect. HEENT: PERRLA, anicteric, oropharynx clear, no JVD, adenopathy or thyromegaly. No carotid bruits or radiated murmur. Lungs: clear to auscultation, no wheezes, rhonchi, or rales. Heart: regular rate and rhythm. No murmur, rubs, gallops  Abdomen: soft, nontender, nondistended, normal bowel sounds, no hepatosplenomegaly or masses. Extremities: without edema. Pulses 1-2+ bilaterally. Review of Data:  Labs:  Office Visit on 08/28/2017   Component Date Value Ref Range Status    Color (UA POC) 08/28/2017 Yellow   Final    Clarity (UA POC) 08/28/2017 Clear   Final    Glucose (UA POC) 08/28/2017 Negative  Negative Final    Bilirubin (UA POC) 08/28/2017 Negative  Negative Final    Ketones (UA POC) 08/28/2017 Negative  Negative Final    Specific gravity (UA POC) 08/28/2017 1.005  1.001 - 1.035 Final    Blood (UA POC) 08/28/2017 3+  Negative Final    pH (UA POC) 08/28/2017 5.5  4.6 - 8.0 Final    Protein (UA POC) 08/28/2017 Negative  Negative mg/dL Final    Urobilinogen (UA POC) 08/28/2017 0.2 mg/dL  0.2 - 1 Final    Nitrites (UA POC) 08/28/2017 Negative  Negative Final    Leukocyte esterase (UA POC) 08/28/2017 Negative  Negative Final     Health Maintenance:  Screening:    Colorectal: refused screening previously   Depression: none   DM (HbA1c/FPG): HbA1c 5.9 (6/2017)   Hepatitis C: unknown   Falls: none   DEXA: N/A   PSA/KIEL: PSA 7.24 (8/2017).  Now followed by Dr. Jackson Tamez   Glaucoma: regular eye exams with Dr. Cecil Hoang (unknown last exam)   Smoking: none   Vitamin D: 34.7 (6/2017)   Medicare Wellness: 6/29/2017    Impression:  Patient Active Problem List   Diagnosis Code    Hyperlipidemia E78.5    Gout M10.9  Vitamin D deficiency E55.9    CAD in native artery I25.10    Hypertensive heart disease without congestive heart failure I11.9    Prediabetes R73.03    S/P CABG x 2 Z95.1    S/P AVR (aortic valve replacement) Z95.2    Elevated PSA measurement R97.20    Impaired glucose tolerance R73.02    Stage 3 chronic kidney disease N18.3    Prostate cancer ([facility] [address] C61    Essential hypertension I10       Plan:  1. Hypertension. Well controlled on current regimen of lisinopril and metoprolol. Renal function stable with creatinine 1.33 / eGFR 53. Continue to follow. 2. Hyperlipidemia. On moderate intensity dose simvastatin with LDL 70, indicative of good control. Continue to follow. Emphasized importance of lifestyle modifications, including diet, exercise, and weight loss. 3. ASHD s/p 2 vessel CABG. Remains asymptomatic. On aspirin, metoprolol, and statin. Followed by Dr. Wally Pulido. 4. Aortic stenosis s/p bioprosthetic AVR. Remains asymptomatic, and repeat echocardiogram in 8/2017 with good functioning valve. Follow. 5. Prediabetes. Patient with diagnosis of diabetes mellitus, but review of record shows HbA1c ranging from 5.7-6.3 since 2011 and -112 during that time period. He did have one glucose reading in 11/2014 at 128, but unclear if fasting and not confirmed. On Ace-I and statin. Continue follow-up with Dr. Cristel Granados for annual eye exams. Will perform foot exam at next visit. Urine microalbumin/ creatinine ratio without evidence of microalbuminuria. Does have evidence of chronic renal disease stage 3.  6. Chronic renal disease, stage 3. Most likely secondary to long standing hypertension and prediabetes, although also appears to have developed at time of CABG and AVR so may be related to stress of surgery. On statin and Ace-I. Discussed importance of avoiding NSAIDS and prerenal status. Follow. 7. Prostate cancer. [id number] Kaylynn Grade 7 (3 + 4) adenocarcinoma. Patient choosing active surveillance.  MRI prostate ordered for 6 months, but patient has not yet scheduled. Will discuss at next visit. Being followed by Dr. Missy Ni. 8. Gout. Asymptomatic. On allopurinol. 9. Health maintenance. Received Prevnar at last visit, completing pneumococcal series. Given scripts for Tdap and Zoster vaccines at last visit, but patient unsure if obtained. Will check with pharmacy. Other immunizations up to date. Currently not willing to pursue colonoscopy, but agreeable to testing with Cologuard. Did not undergo dermatology screening skin exam as referred following last visit. Will refer to Dr. Smiley Miranda as patient overdue for exam. Discussed decreasing alcohol consumption. Will check hepatitis C status at next visit. Vitamin D level normal. Continue maintenance dose supplement. Medicare wellness visit up to date. Total time: 40 minutes spent with the patient in face-to-face consultation of which greater than 50% was spent on counseling, answering questions and/or coordination of care. Complex medical review and management performed. Patient understands recommendations and agrees with plan. Follow-up in 3 months.

## 2017-11-07 ENCOUNTER — TELEPHONE (OUTPATIENT)
Dept: INTERNAL MEDICINE CLINIC | Age: 67
End: 2017-11-07

## 2018-01-08 ENCOUNTER — HOSPITAL ENCOUNTER (OUTPATIENT)
Dept: LAB | Age: 68
Discharge: HOME | End: 2018-01-08
Payer: MEDICARE

## 2018-01-08 DIAGNOSIS — Z11.59 NEED FOR HEPATITIS C SCREENING TEST: ICD-10-CM

## 2018-01-08 DIAGNOSIS — E78.5 HYPERLIPIDEMIA, UNSPECIFIED HYPERLIPIDEMIA TYPE: ICD-10-CM

## 2018-01-08 DIAGNOSIS — I11.9 HYPERTENSIVE HEART DISEASE WITHOUT CONGESTIVE HEART FAILURE: ICD-10-CM

## 2018-01-08 DIAGNOSIS — R73.03 PREDIABETES: ICD-10-CM

## 2018-01-08 DIAGNOSIS — I10 ESSENTIAL HYPERTENSION: ICD-10-CM

## 2018-01-08 DIAGNOSIS — R73.02 IMPAIRED GLUCOSE TOLERANCE: ICD-10-CM

## 2018-01-08 LAB
ALBUMIN SERPL-MCNC: 4.2 G/DL (ref 3.4–5)
ALBUMIN/GLOB SERPL: 1.3 {RATIO} (ref 0.8–1.7)
ALP SERPL-CCNC: 56 U/L (ref 45–117)
ALT SERPL-CCNC: 34 U/L (ref 16–61)
ANION GAP SERPL CALC-SCNC: 10 MMOL/L (ref 3–18)
AST SERPL-CCNC: 21 U/L (ref 15–37)
BILIRUB SERPL-MCNC: 0.8 MG/DL (ref 0.2–1)
BUN SERPL-MCNC: 16 MG/DL (ref 7–18)
BUN/CREAT SERPL: 13 (ref 12–20)
CALCIUM SERPL-MCNC: 9.3 MG/DL (ref 8.5–10.1)
CHLORIDE SERPL-SCNC: 103 MMOL/L (ref 100–108)
CHOLEST SERPL-MCNC: 179 MG/DL
CO2 SERPL-SCNC: 24 MMOL/L (ref 21–32)
CREAT SERPL-MCNC: 1.24 MG/DL (ref 0.6–1.3)
EST. AVERAGE GLUCOSE BLD GHB EST-MCNC: 126 MG/DL
GLOBULIN SER CALC-MCNC: 3.3 G/DL (ref 2–4)
GLUCOSE SERPL-MCNC: 120 MG/DL (ref 74–99)
HBA1C MFR BLD: 6 % (ref 4.2–5.6)
HDLC SERPL-MCNC: 71 MG/DL (ref 40–60)
HDLC SERPL: 2.5 {RATIO} (ref 0–5)
LDLC SERPL CALC-MCNC: 83.8 MG/DL (ref 0–100)
LIPID PROFILE,FLP: ABNORMAL
POTASSIUM SERPL-SCNC: 4.1 MMOL/L (ref 3.5–5.5)
PROT SERPL-MCNC: 7.5 G/DL (ref 6.4–8.2)
SODIUM SERPL-SCNC: 137 MMOL/L (ref 136–145)
TRIGL SERPL-MCNC: 121 MG/DL (ref ?–150)
VLDLC SERPL CALC-MCNC: 24.2 MG/DL

## 2018-01-08 PROCEDURE — 83036 HEMOGLOBIN GLYCOSYLATED A1C: CPT | Performed by: INTERNAL MEDICINE

## 2018-01-08 PROCEDURE — 36415 COLL VENOUS BLD VENIPUNCTURE: CPT | Performed by: INTERNAL MEDICINE

## 2018-01-08 PROCEDURE — 80053 COMPREHEN METABOLIC PANEL: CPT | Performed by: INTERNAL MEDICINE

## 2018-01-08 PROCEDURE — 80061 LIPID PANEL: CPT | Performed by: INTERNAL MEDICINE

## 2018-01-08 PROCEDURE — 86803 HEPATITIS C AB TEST: CPT | Performed by: INTERNAL MEDICINE

## 2018-01-09 LAB
HCV AB SER IA-ACNC: <0.02 INDEX
HCV AB SERPL QL IA: NEGATIVE
HCV COMMENT,HCGAC: NORMAL

## 2018-01-10 ENCOUNTER — TELEPHONE (OUTPATIENT)
Dept: INTERNAL MEDICINE CLINIC | Age: 68
End: 2018-01-10

## 2018-01-10 ENCOUNTER — OFFICE VISIT (OUTPATIENT)
Dept: INTERNAL MEDICINE CLINIC | Age: 68
End: 2018-01-10

## 2018-01-10 VITALS
HEIGHT: 75 IN | WEIGHT: 222.8 LBS | HEART RATE: 79 BPM | OXYGEN SATURATION: 99 % | BODY MASS INDEX: 27.7 KG/M2 | RESPIRATION RATE: 16 BRPM | DIASTOLIC BLOOD PRESSURE: 78 MMHG | TEMPERATURE: 98.4 F | SYSTOLIC BLOOD PRESSURE: 130 MMHG

## 2018-01-10 DIAGNOSIS — I11.9 HYPERTENSIVE HEART DISEASE WITHOUT CONGESTIVE HEART FAILURE: ICD-10-CM

## 2018-01-10 DIAGNOSIS — Z95.2 S/P AVR (AORTIC VALVE REPLACEMENT): ICD-10-CM

## 2018-01-10 DIAGNOSIS — I10 ESSENTIAL HYPERTENSION: Primary | ICD-10-CM

## 2018-01-10 DIAGNOSIS — N18.30 STAGE 3 CHRONIC KIDNEY DISEASE (HCC): ICD-10-CM

## 2018-01-10 DIAGNOSIS — M10.9 GOUT, UNSPECIFIED CAUSE, UNSPECIFIED CHRONICITY, UNSPECIFIED SITE: Chronic | ICD-10-CM

## 2018-01-10 DIAGNOSIS — E78.5 HYPERLIPIDEMIA, UNSPECIFIED HYPERLIPIDEMIA TYPE: ICD-10-CM

## 2018-01-10 DIAGNOSIS — C61 PROSTATE CANCER (HCC): ICD-10-CM

## 2018-01-10 DIAGNOSIS — I25.10 CAD IN NATIVE ARTERY: ICD-10-CM

## 2018-01-10 DIAGNOSIS — Z95.1 S/P CABG X 2: ICD-10-CM

## 2018-01-10 DIAGNOSIS — E11.22 TYPE 2 DIABETES MELLITUS WITH STAGE 3 CHRONIC KIDNEY DISEASE, WITHOUT LONG-TERM CURRENT USE OF INSULIN (HCC): ICD-10-CM

## 2018-01-10 DIAGNOSIS — N18.30 TYPE 2 DIABETES MELLITUS WITH STAGE 3 CHRONIC KIDNEY DISEASE, WITHOUT LONG-TERM CURRENT USE OF INSULIN (HCC): ICD-10-CM

## 2018-01-10 NOTE — MR AVS SNAPSHOT
Visit Information Date & Time Provider Department Dept. Phone Encounter #  
 1/10/2018 10:00 AM Bakari Peterson MD [facility] [phone] Follow-up Instructions Return in about 5 months (around 6/10/2018), or if symptoms worsen or fail to improve. Your Appointments 8/14/2018  8:00 AM  
Follow Up with Lobito Lora MD  
[facility] ([address]) Appt Note: 1 year follow up [facility] [address] [address] [id number] [phone] [address] [facility] [address] [address]  
  
    
  
 2/26/2018  9:45 AM  
Any with Dave Norris MD  
[facility] ([address]) Appt Note: 6m fu  
 [address] 
[address] [facility] [address]  
[address] [facility] [address] [facility] [address] Upcoming Health Maintenance Date Due  
 EYE EXAM RETINAL OR DILATED Q1 2/2/1959 DTaP/Tdap/Td series (1 - Tdap) 1/2/2008 ZOSTER VACCINE AGE 60> 12/2/2008 GLAUCOMA SCREENING Q2Y 2/2/2014 FOOT EXAM Q1 7/14/2016 MICROALBUMIN Q1 6/26/2018 MEDICARE YEARLY EXAM 6/30/2018 HEMOGLOBIN A1C Q6M 7/8/2018 LIPID PANEL Q1 1/8/2019 COLONOSCOPY 10/26/2020 Allergies as of 1/10/2018  Review Complete On: 1/10/2018 By: Jenni Cook No Known Allergies Current Immunizations  Reviewed on 4/2/2014 Name Date Influenza High Dose Vaccine PF 10/10/2017 Influenza Vaccine 10/14/2014 Influenza Vaccine Split 9/29/2011 Influenza Vaccine Whole 9/14/2010 Pneumococcal Conjugate (PCV-13) 6/29/2017 Pneumococcal Polysaccharide (PPSV-23) 10/14/2014 Td 1/1/2008 Not reviewed this visit You Were Diagnosed With   
  
 Codes Comments Impaired glucose tolerance    -  Primary ICD-10-CM: R73.02 
ICD-9-CM: 790.22 Vitals BP Pulse Temp Resp Height(growth percentile) Weight(growth percentile) 130/78 (BP 1 Location: Left arm, BP Patient Position: Sitting) 79 98.4 °F (36.9 °C) (Oral) 16 6' 3\" (1.905 m) 222 lb 12.8 oz (101.1 kg) SpO2 BMI Smoking Status 99% 27.85 kg/m2 Never Smoker Vitals History BMI and BSA Data Body Mass Index Body Surface Area  
 27.85 kg/m 2 2.31 m 2 Preferred Pharmacy Pharmacy Name Phone [address] [facility] [phone] Your Updated Medication List  
  
   
This list is accurate as of: 1/10/18 10:53 AM.  Always use your most recent med list.  
  
  
  
  
 allopurinol 100 mg tablet Commonly known as:  Shanel Moh Take 1 Tab by mouth daily. aspirin delayed-release 81 mg tablet Take 1 tablet by mouth daily. cholecalciferol (vitamin D3) 2,000 unit Tab Take  by mouth daily. COLACE 100 mg capsule Generic drug:  docusate sodium Take 100 mg by mouth daily. diph,Pertuss(Acell),Tet Vac-PF 2 Lf-(2.5-5-3-5 mcg)-5Lf/0.5 mL susp Commonly known as:  ADACEL  
0.5 mL by IntraMUSCular route once for 1 dose. lisinopril 5 mg tablet Commonly known as:  PRINIVIL, ZESTRIL  
TAKE 1 TABLET DAILY  
  
 metoprolol succinate 25 mg XL tablet Commonly known as:  TOPROL-XL Take 1 Tab by mouth daily. sildenafil citrate 100 mg tablet Commonly known as:  VIAGRA Take 1 Tab by mouth as needed. simvastatin 40 mg tablet Commonly known as:  ZOCOR Take 1 Tab by mouth nightly. Prescriptions Printed Refills diph,Pertuss,Acell,,Tet Vac-PF (ADACEL) 2 Lf-(2.5-5-3-5 mcg)-5Lf/0.5 mL susp 0 Si.5 mL by IntraMUSCular route once for 1 dose. Class: Print Route: IntraMUSCular We Performed the Following  DIABETES FOOT EXAM [[facility]] Follow-up Instructions Return in about 5 months (around 6/10/2018), or if symptoms worsen or fail to improve. Patient Instructions Learning About Diabetes Food Guidelines Your Care Instructions Meal planning is important to manage diabetes. It helps keep your blood sugar at a target level (which you set with your doctor). You don't have to eat special foods. You can eat what your family eats, including sweets once in a while. But you do have to pay attention to how often you eat and how much you eat of certain foods. You may want to work with a dietitian or a certified diabetes educator (CDE) to help you plan meals and snacks. A dietitian or CDE can also help you lose weight if that is one of your goals. What should you know about eating carbs? Managing the amount of carbohydrate (carbs) you eat is an important part of healthy meals when you have diabetes. Carbohydrate is found in many foods. · Learn which foods have carbs. And learn the amounts of carbs in different foods. ¨ Bread, cereal, pasta, and rice have about 15 grams of carbs in a serving. A serving is 1 slice of bread (1 ounce), ½ cup of cooked cereal, or 1/3 cup of cooked pasta or rice. ¨ Fruits have 15 grams of carbs in a serving. A serving is 1 small fresh fruit, such as an apple or orange; ½ of a banana; ½ cup of cooked or canned fruit; ½ cup of fruit juice; 1 cup of melon or raspberries; or 2 tablespoons of dried fruit. ¨ Milk and no-sugar-added yogurt have 15 grams of carbs in a serving. A serving is 1 cup of milk or 2/3 cup of no-sugar-added yogurt. ¨ Starchy vegetables have 15 grams of carbs in a serving. A serving is ½ cup of mashed potatoes or sweet potato; 1 cup winter squash; ½ of a small baked potato; ½ cup of cooked beans; or ½ cup cooked corn or green peas. · Learn how much carbs to eat each day and at each meal. A dietitian or CDE can teach you how to keep track of the amount of carbs you eat. This is called carbohydrate counting.  
· If you are not sure how to count carbohydrate grams, use the Plate Method to plan meals. It is a good, quick way to make sure that you have a balanced meal. It also helps you spread carbs throughout the day. ¨ Divide your plate by types of foods. Put non-starchy vegetables on half the plate, meat or other protein food on one-quarter of the plate, and a grain or starchy vegetable in the final quarter of the plate. To this you can add a small piece of fruit and 1 cup of milk or yogurt, depending on how many carbs you are supposed to eat at a meal. 
· Try to eat about the same amount of carbs at each meal. Do not \"save up\" your daily allowance of carbs to eat at one meal. 
· Proteins have very little or no carbs per serving. Examples of proteins are beef, chicken, turkey, fish, eggs, tofu, cheese, cottage cheese, and peanut butter. A serving size of meat is 3 ounces, which is about the size of a deck of cards. Examples of meat substitute serving sizes (equal to 1 ounce of meat) are 1/4 cup of cottage cheese, 1 egg, 1 tablespoon of peanut butter, and ½ cup of tofu. How can you eat out and still eat healthy? · Learn to estimate the serving sizes of foods that have carbohydrate. If you measure food at home, it will be easier to estimate the amount in a serving of restaurant food. · If the meal you order has too much carbohydrate (such as potatoes, corn, or baked beans), ask to have a low-carbohydrate food instead. Ask for a salad or green vegetables. · If you use insulin, check your blood sugar before and after eating out to help you plan how much to eat in the future. · If you eat more carbohydrate at a meal than you had planned, take a walk or do other exercise. This will help lower your blood sugar. What else should you know? · Limit saturated fat, such as the fat from meat and dairy products. This is a healthy choice because people who have diabetes are at higher risk of heart disease.  So choose lean cuts of meat and nonfat or low-fat dairy products. Use olive or canola oil instead of butter or shortening when cooking. · Don't skip meals. Your blood sugar may drop too low if you skip meals and take insulin or certain medicines for diabetes. · Check with your doctor before you drink alcohol. Alcohol can cause your blood sugar to drop too low. Alcohol can also cause a bad reaction if you take certain diabetes medicines. Follow-up care is a key part of your treatment and safety. Be sure to make and go to all appointments, and call your doctor if you are having problems. It's also a good idea to know your test results and keep a list of the medicines you take. Where can you learn more? Go to http://luis e-erica.info/. Enter L572 in the search box to learn more about \"Learning About Diabetes Food Guidelines. \" Current as of: March 13, 2017 Content Version: 11.4 © 8979-0902 [employer]. Care instructions adapted under license by [employer] (which disclaims liability or warranty for this information). If you have questions about a medical condition or this instruction, always ask your healthcare professional. [address] any warranty or liability for your use of this information. Learning About Meal Planning for Diabetes Why plan your meals? Meal planning can be a key part of managing diabetes. Planning meals and snacks with the right balance of carbohydrate, protein, and fat can help you keep your blood sugar at the target level you set with your doctor. You don't have to eat special foods. You can eat what your family eats, including sweets once in a while. But you do have to pay attention to how often you eat and how much you eat of certain foods. You may want to work with a dietitian or a certified diabetes educator. He or she can give you tips and meal ideas and can answer your questions about meal planning.  This health professional can also help you reach a healthy weight if that is one of your goals. What plan is right for you? Your dietitian or diabetes educator may suggest that you start with the plate format or carbohydrate counting. The plate format The plate format is a simple way to help you manage how you eat. You plan meals by learning how much space each food should take on a plate. Using the plate format helps you spread carbohydrate throughout the day. It can make it easier to keep your blood sugar level within your target range. It also helps you see if you're eating healthy portion sizes. To use the plate format, you put non-starchy vegetables on half your plate. Add meat or meat substitutes on one-quarter of the plate. Put a grain or starchy vegetable (such as brown rice or a potato) on the final quarter of the plate. You can add a small piece of fruit and some low-fat or fat-free milk or yogurt, depending on your carbohydrate goal for each meal. 
Here are some tips for using the plate format: · Make sure that you are not using an oversized plate. A 9-inch plate is best. Many restaurants use larger plates. · Get used to using the plate format at home. Then you can use it when you eat out. · Write down your questions about using the plate format. Talk to your doctor, a dietitian, or a diabetes educator about your concerns. Carbohydrate counting With carbohydrate counting, you plan meals based on the amount of carbohydrate in each food. Carbohydrate raises blood sugar higher and more quickly than any other nutrient. It is found in desserts, breads and cereals, and fruit. It's also found in starchy vegetables such as potatoes and corn, grains such as rice and pasta, and milk and yogurt. Spreading carbohydrate throughout the day helps keep your blood sugar levels within your target range.  
Your daily amount depends on several things, including your weight, how active you are, which diabetes medicines you take, and what your goals are for your blood sugar levels. A registered dietitian or diabetes educator can help you plan how much carbohydrate to include in each meal and snack. A guideline for your daily amount of carbohydrate is: · 45 to 60 grams at each meal. That's about the same as 3 to 4 carbohydrate servings. · 15 to 20 grams at each snack. That's about the same as 1 carbohydrate serving. The Nutrition Facts label on packaged foods tells you how much carbohydrate is in a serving of the food. First, look at the serving size on the food label. Is that the amount you eat in a serving? All of the nutrition information on a food label is based on that serving size. So if you eat more or less than that, you'll need to adjust the other numbers. Total carbohydrate is the next thing you need to look for on the label. If you count carbohydrate servings, one serving of carbohydrate is 15 grams. For foods that don't come with labels, such as fresh fruits and vegetables, you'll need a guide that lists carbohydrate in these foods. Ask your doctor, dietitian, or diabetes educator about books or other nutrition guides you can use. If you take insulin, you need to know how many grams of carbohydrate are in a meal. This lets you know how much rapid-acting insulin to take before you eat. If you use an insulin pump, you get a constant rate of insulin during the day. So the pump must be programmed at meals to give you extra insulin to cover the rise in blood sugar after meals. When you know how much carbohydrate you will eat, you can take the right amount of insulin. Or, if you always use the same amount of insulin, you need to make sure that you eat the same amount of carbohydrate at meals. If you need more help to understand carbohydrate counting and food labels, ask your doctor, dietitian, or diabetes educator. How do you get started with meal planning? Here are some tips to get started: · Plan your meals a week at a time. Don't forget to include snacks too. · Use cookbooks or online recipes to plan several main meals. Plan some quick meals for busy nights. You also can double some recipes that freeze well. Then you can save half for other busy nights when you don't have time to cook. · Make sure you have the ingredients you need for your recipes. If you're running low on basic items, put these items on your shopping list too. · List foods that you use to make breakfasts, lunches, and snacks. List plenty of fruits and vegetables. · Post this list on the refrigerator. Add to it as you think of more things you need. · Take the list to the store to do your weekly shopping. Follow-up care is a key part of your treatment and safety. Be sure to make and go to all appointments, and call your doctor if you are having problems. It's also a good idea to know your test results and keep a list of the medicines you take. Where can you learn more? Go to http://luis e-erica.info/. Libra Kaplan in the search box to learn more about \"Learning About Meal Planning for Diabetes. \" Current as of: March 13, 2017 Content Version: 11.4 © 9506-4440 Healthwise, [employer]. Care instructions adapted under license by [employer] (which disclaims liability or warranty for this information). If you have questions about a medical condition or this instruction, always ask your healthcare professional. [place] [address] any warranty or liability for your use of this information. Learning About Healthy Weight What is a healthy weight? A healthy weight is the weight at which you feel good about yourself and have energy for work and play. It's also one that lowers your risk for health problems. What can you do to stay at a healthy weight?  
It can be hard to stay at a healthy weight, especially when fast food, vending-machine snacks, and processed foods are so easy to find. And with your busy lifestyle, activity may be low on your list of things to do. But staying at a healthy weight may be easier than you think. Here are some dos and don'ts for staying at a healthy weight: 
Do eat healthy foods The kinds of foods you eat have a big impact on both your weight and your health. Reaching and staying at a healthy weight is not about going on a diet. It's about making healthier food choices every day and changing your diet for good. Healthy eating means eating a variety of foods so that you get all the nutrients you need. Your body needs protein, carbohydrate, and fats for energy. They keep your heart beating, your brain active, and your muscles working. On most days, try to eat from each food group. This means eating a variety of: · Whole grains, such as whole wheat breads and pastas. · Fruits and vegetables. · Dairy products, such as low-fat milk, yogurt, and cheese. · Lean proteins, such as all types of fish, chicken without the skin, and beans. Don't have too much or too little of one thing. All foods, if eaten in moderation, can be part of healthy eating. Even sweets can be okay. If your favorite foods are high in fat, salt, sugar, or calories, limit how often you eat them. Eat smaller servings, or look for healthy substitutes. Do watch what you eat Many people eat more than their bodies need. Part of staying at a healthy weight means learning how much food you really need from day to day and not eating more than that. Even with healthy foods, eating too much can make you gain weight. Having a well-balanced diet means that you eat enough, but not too much, and that your food gives you the nutrients you need to stay healthy. So listen to your body. Eat when you're hungry. Stop when you feel satisfied. It's a good idea to have healthy snacks ready for when you get hungry. Keep healthy snacks with you at work, in your car, and at home. If you have a healthy snack easily available, you'll be less likely to pick a candy bar or bag of chips from a vending machine instead. Some healthy snacks you might want to keep on hand are fruit, low-fat yogurt, string cheese, low-fat microwave popcorn, raisins and other dried fruit, nuts, whole wheat crackers, pretzels, carrots, celery sticks, and broccoli. Do some physical activity A big part of reaching and staying at a healthy weight is being active. When you're active, you burn calories. This makes it easier to reach and stay at a healthy weight. When you're active on a regular basis, your body burns more calories, even when you're at rest. Being active helps you lose fat and build lean muscle. Try to be active for at least 1 hour every day. This may sound like a lot, but it's okay to be active in smaller blocks of time that add up to 1 hour a day. Any activity that makes your heart beat faster and keeps it there for a while counts. A brisk walk, run, or swim will get your heart beating faster. So will climbing stairs, shooting baskets, or cycling. Even some household chores like vacuuming and mowing the lawn will get your heart rate up. Pick activities that you enjoy-ones that make your heart beat faster, your muscles stronger, and your muscles and joints more flexible. If you find more than one thing you like doing, do them all. You don't have to do the same thing every day. Don't diet Diets don't work. Diets are temporary. Because you give up so much when you diet, you may be hungry and think about food all the time. And after you stop dieting, you also may overeat to make up for what you missed. Most people who diet end up gaining back the pounds they lost-and more. Remember that healthy bodies come in lots of shapes and sizes. Everyone can get healthier by eating better and being more active. Where can you learn more? Go to http://luis e-erica.info/. Enter 449 5500 in the search box to learn more about \"Learning About Healthy Weight. \" Current as of: October 13, 2016 Content Version: 11.4 © 4205-6624 Healthwise, [employer]. Care instructions adapted under license by [employer] (which disclaims liability or warranty for this information). If you have questions about a medical condition or this instruction, always ask your healthcare professional. [address] any warranty or liability for your use of this information. Introducing [facility]! Dear Delmi Paredes: Thank you for requesting a Sqeeqee account. Our records indicate that you already have an active Sqeeqee account. You can access your account anytime at [email or web address]. [employer] Did you know that you can access your hospital and ER discharge instructions at any time in Sqeeqee? You can also review all of your test results from your hospital stay or ER visit. Additional Information If you have questions, please visit the Frequently Asked Questions section of the Sqeeqee website at [email or web address]. [employer]/. Remember, Sqeeqee is NOT to be used for urgent needs. For medical emergencies, dial 911. Now available from your iPhone and Android! Please provide this summary of care documentation to your next provider. Your primary care clinician is listed as Khoa Saenz. If you have any questions after today's visit, please call [phone].

## 2018-01-10 NOTE — TELEPHONE ENCOUNTER
Please request recent eye exam from Dr. Estuardo Saldaña . Patient was seen in last 6 months . Thank you.

## 2018-01-10 NOTE — PROGRESS NOTES
Chief Complaint   Patient presents with    Hypertension     3 month follow up with lab results. Health Maintenance Due   Topic Date Due    EYE EXAM RETINAL OR DILATED Q1  02/02/1959    DTaP/Tdap/Td series (1 - Tdap) 01/02/2008    ZOSTER VACCINE AGE 60>  12/02/2008    GLAUCOMA SCREENING Q2Y  02/02/2014    FOOT EXAM Q1  07/14/2016     1. Have you been to the ER, urgent care clinic or hospitalized since your last visit? NO.     2. Have you seen or consulted any other health care providers outside of the [address] [place] [address] since your last visit (Include any pap smears or colon screening)?  No

## 2018-01-10 NOTE — PATIENT INSTRUCTIONS
Learning About Diabetes Food Guidelines  Your Care Instructions    Meal planning is important to manage diabetes. It helps keep your blood sugar at a target level (which you set with your doctor). You don't have to eat special foods. You can eat what your family eats, including sweets once in a while. But you do have to pay attention to how often you eat and how much you eat of certain foods. You may want to work with a dietitian or a certified diabetes educator (CDE) to help you plan meals and snacks. A dietitian or CDE can also help you lose weight if that is one of your goals. What should you know about eating carbs? Managing the amount of carbohydrate (carbs) you eat is an important part of healthy meals when you have diabetes. Carbohydrate is found in many foods. · Learn which foods have carbs. And learn the amounts of carbs in different foods. ¨ Bread, cereal, pasta, and rice have about 15 grams of carbs in a serving. A serving is 1 slice of bread (1 ounce), ½ cup of cooked cereal, or 1/3 cup of cooked pasta or rice. ¨ Fruits have 15 grams of carbs in a serving. A serving is 1 small fresh fruit, such as an apple or orange; ½ of a banana; ½ cup of cooked or canned fruit; ½ cup of fruit juice; 1 cup of melon or raspberries; or 2 tablespoons of dried fruit. ¨ Milk and no-sugar-added yogurt have 15 grams of carbs in a serving. A serving is 1 cup of milk or 2/3 cup of no-sugar-added yogurt. ¨ Starchy vegetables have 15 grams of carbs in a serving. A serving is ½ cup of mashed potatoes or sweet potato; 1 cup winter squash; ½ of a small baked potato; ½ cup of cooked beans; or ½ cup cooked corn or green peas. · Learn how much carbs to eat each day and at each meal. A dietitian or CDE can teach you how to keep track of the amount of carbs you eat. This is called carbohydrate counting. · If you are not sure how to count carbohydrate grams, use the Plate Method to plan meals.  It is a good, quick way to make sure that you have a balanced meal. It also helps you spread carbs throughout the day. ¨ Divide your plate by types of foods. Put non-starchy vegetables on half the plate, meat or other protein food on one-quarter of the plate, and a grain or starchy vegetable in the final quarter of the plate. To this you can add a small piece of fruit and 1 cup of milk or yogurt, depending on how many carbs you are supposed to eat at a meal.  · Try to eat about the same amount of carbs at each meal. Do not \"save up\" your daily allowance of carbs to eat at one meal.  · Proteins have very little or no carbs per serving. Examples of proteins are beef, chicken, turkey, fish, eggs, tofu, cheese, cottage cheese, and peanut butter. A serving size of meat is 3 ounces, which is about the size of a deck of cards. Examples of meat substitute serving sizes (equal to 1 ounce of meat) are 1/4 cup of cottage cheese, 1 egg, 1 tablespoon of peanut butter, and ½ cup of tofu. How can you eat out and still eat healthy? · Learn to estimate the serving sizes of foods that have carbohydrate. If you measure food at home, it will be easier to estimate the amount in a serving of restaurant food. · If the meal you order has too much carbohydrate (such as potatoes, corn, or baked beans), ask to have a low-carbohydrate food instead. Ask for a salad or green vegetables. · If you use insulin, check your blood sugar before and after eating out to help you plan how much to eat in the future. · If you eat more carbohydrate at a meal than you had planned, take a walk or do other exercise. This will help lower your blood sugar. What else should you know? · Limit saturated fat, such as the fat from meat and dairy products. This is a healthy choice because people who have diabetes are at higher risk of heart disease. So choose lean cuts of meat and nonfat or low-fat dairy products.  Use olive or canola oil instead of butter or shortening when cooking. · Don't skip meals. Your blood sugar may drop too low if you skip meals and take insulin or certain medicines for diabetes. · Check with your doctor before you drink alcohol. Alcohol can cause your blood sugar to drop too low. Alcohol can also cause a bad reaction if you take certain diabetes medicines. Follow-up care is a key part of your treatment and safety. Be sure to make and go to all appointments, and call your doctor if you are having problems. It's also a good idea to know your test results and keep a list of the medicines you take. Where can you learn more? Go to http://luis e-erica.info/. Enter L364 in the search box to learn more about \"Learning About Diabetes Food Guidelines. \"  Current as of: March 13, 2017  Content Version: 11.4  © 9345-6326 [employer]. Care instructions adapted under license by [employer] (which disclaims liability or warranty for this information). If you have questions about a medical condition or this instruction, always ask your healthcare professional. [address] any warranty or liability for your use of this information. Learning About Meal Planning for Diabetes  Why plan your meals? Meal planning can be a key part of managing diabetes. Planning meals and snacks with the right balance of carbohydrate, protein, and fat can help you keep your blood sugar at the target level you set with your doctor. You don't have to eat special foods. You can eat what your family eats, including sweets once in a while. But you do have to pay attention to how often you eat and how much you eat of certain foods. You may want to work with a dietitian or a certified diabetes educator. He or she can give you tips and meal ideas and can answer your questions about meal planning. This health professional can also help you reach a healthy weight if that is one of your goals. What plan is right for you?   Your dietitian or diabetes educator may suggest that you start with the plate format or carbohydrate counting. The plate format  The plate format is a simple way to help you manage how you eat. You plan meals by learning how much space each food should take on a plate. Using the plate format helps you spread carbohydrate throughout the day. It can make it easier to keep your blood sugar level within your target range. It also helps you see if you're eating healthy portion sizes. To use the plate format, you put non-starchy vegetables on half your plate. Add meat or meat substitutes on one-quarter of the plate. Put a grain or starchy vegetable (such as brown rice or a potato) on the final quarter of the plate. You can add a small piece of fruit and some low-fat or fat-free milk or yogurt, depending on your carbohydrate goal for each meal.  Here are some tips for using the plate format:  · Make sure that you are not using an oversized plate. A 9-inch plate is best. Many restaurants use larger plates. · Get used to using the plate format at home. Then you can use it when you eat out. · Write down your questions about using the plate format. Talk to your doctor, a dietitian, or a diabetes educator about your concerns. Carbohydrate counting  With carbohydrate counting, you plan meals based on the amount of carbohydrate in each food. Carbohydrate raises blood sugar higher and more quickly than any other nutrient. It is found in desserts, breads and cereals, and fruit. It's also found in starchy vegetables such as potatoes and corn, grains such as rice and pasta, and milk and yogurt. Spreading carbohydrate throughout the day helps keep your blood sugar levels within your target range. Your daily amount depends on several things, including your weight, how active you are, which diabetes medicines you take, and what your goals are for your blood sugar levels.  A registered dietitian or diabetes educator can help you plan how much carbohydrate to include in each meal and snack. A guideline for your daily amount of carbohydrate is:  · 45 to 60 grams at each meal. That's about the same as 3 to 4 carbohydrate servings. · 15 to 20 grams at each snack. That's about the same as 1 carbohydrate serving. The Nutrition Facts label on packaged foods tells you how much carbohydrate is in a serving of the food. First, look at the serving size on the food label. Is that the amount you eat in a serving? All of the nutrition information on a food label is based on that serving size. So if you eat more or less than that, you'll need to adjust the other numbers. Total carbohydrate is the next thing you need to look for on the label. If you count carbohydrate servings, one serving of carbohydrate is 15 grams. For foods that don't come with labels, such as fresh fruits and vegetables, you'll need a guide that lists carbohydrate in these foods. Ask your doctor, dietitian, or diabetes educator about books or other nutrition guides you can use. If you take insulin, you need to know how many grams of carbohydrate are in a meal. This lets you know how much rapid-acting insulin to take before you eat. If you use an insulin pump, you get a constant rate of insulin during the day. So the pump must be programmed at meals to give you extra insulin to cover the rise in blood sugar after meals. When you know how much carbohydrate you will eat, you can take the right amount of insulin. Or, if you always use the same amount of insulin, you need to make sure that you eat the same amount of carbohydrate at meals. If you need more help to understand carbohydrate counting and food labels, ask your doctor, dietitian, or diabetes educator. How do you get started with meal planning? Here are some tips to get started:  · Plan your meals a week at a time. Don't forget to include snacks too. · Use cookbooks or online recipes to plan several main meals.  Plan some quick meals for busy nights. You also can double some recipes that freeze well. Then you can save half for other busy nights when you don't have time to cook. · Make sure you have the ingredients you need for your recipes. If you're running low on basic items, put these items on your shopping list too. · List foods that you use to make breakfasts, lunches, and snacks. List plenty of fruits and vegetables. · Post this list on the refrigerator. Add to it as you think of more things you need. · Take the list to the store to do your weekly shopping. Follow-up care is a key part of your treatment and safety. Be sure to make and go to all appointments, and call your doctor if you are having problems. It's also a good idea to know your test results and keep a list of the medicines you take. Where can you learn more? Go to http://luis [employer] [employer].info/. Julius Mcintyre in the search box to learn more about \"Learning About Meal Planning for Diabetes. \"  Current as of: March 13, 2017  Content Version: 11.4  © 6202-7108 [employer]. Care instructions adapted under license by [employer] (which disclaims liability or warranty for this information). If you have questions about a medical condition or this instruction, always ask your healthcare professional. [address] any warranty or liability for your use of this information. Learning About Healthy Weight  What is a healthy weight? A healthy weight is the weight at which you feel good about yourself and have energy for work and play. It's also one that lowers your risk for health problems. What can you do to stay at a healthy weight? It can be hard to stay at a healthy weight, especially when fast food, vending-machine snacks, and processed foods are so easy to find. And with your busy lifestyle, activity may be low on your list of things to do.  But staying at a healthy weight may be easier than you think.  Here are some dos and don'ts for staying at a healthy weight:  Do eat healthy foods  The kinds of foods you eat have a big impact on both your weight and your health. Reaching and staying at a healthy weight is not about going on a diet. It's about making healthier food choices every day and changing your diet for good. Healthy eating means eating a variety of foods so that you get all the nutrients you need. Your body needs protein, carbohydrate, and fats for energy. They keep your heart beating, your brain active, and your muscles working. On most days, try to eat from each food group. This means eating a variety of:  · Whole grains, such as whole wheat breads and pastas. · Fruits and vegetables. · Dairy products, such as low-fat milk, yogurt, and cheese. · Lean proteins, such as all types of fish, chicken without the skin, and beans. Don't have too much or too little of one thing. All foods, if eaten in moderation, can be part of healthy eating. Even sweets can be okay. If your favorite foods are high in fat, salt, sugar, or calories, limit how often you eat them. Eat smaller servings, or look for healthy substitutes. Do watch what you eat  Many people eat more than their bodies need. Part of staying at a healthy weight means learning how much food you really need from day to day and not eating more than that. Even with healthy foods, eating too much can make you gain weight. Having a well-balanced diet means that you eat enough, but not too much, and that your food gives you the nutrients you need to stay healthy. So listen to your body. Eat when you're hungry. Stop when you feel satisfied. It's a good idea to have healthy snacks ready for when you get hungry. Keep healthy snacks with you at work, in your car, and at home. If you have a healthy snack easily available, you'll be less likely to pick a candy bar or bag of chips from a vending machine instead.   Some healthy snacks you might want to keep on hand are fruit, low-fat yogurt, string cheese, low-fat microwave popcorn, raisins and other dried fruit, nuts, whole wheat crackers, pretzels, carrots, celery sticks, and broccoli. Do some physical activity  A big part of reaching and staying at a healthy weight is being active. When you're active, you burn calories. This makes it easier to reach and stay at a healthy weight. When you're active on a regular basis, your body burns more calories, even when you're at rest. Being active helps you lose fat and build lean muscle. Try to be active for at least 1 hour every day. This may sound like a lot, but it's okay to be active in smaller blocks of time that add up to 1 hour a day. Any activity that makes your heart beat faster and keeps it there for a while counts. A brisk walk, run, or swim will get your heart beating faster. So will climbing stairs, shooting baskets, or cycling. Even some household chores like vacuuming and mowing the lawn will get your heart rate up. Pick activities that you enjoy-ones that make your heart beat faster, your muscles stronger, and your muscles and joints more flexible. If you find more than one thing you like doing, do them all. You don't have to do the same thing every day. Don't diet  Diets don't work. Diets are temporary. Because you give up so much when you diet, you may be hungry and think about food all the time. And after you stop dieting, you also may overeat to make up for what you missed. Most people who diet end up gaining back the pounds they lost-and more. Remember that healthy bodies come in lots of shapes and sizes. Everyone can get healthier by eating better and being more active. Where can you learn more? Go to http://luis e-erica.info/. Enter 979 9630 in the search box to learn more about \"Learning About Healthy Weight. \"  Current as of: October 13, 2016  Content Version: 11.4  © 3868-3957 [employer], [employer].  Care instructions adapted under license by [employer] (which disclaims liability or warranty for this information). If you have questions about a medical condition or this instruction, always ask your healthcare professional. [address] any warranty or liability for your use of this information.

## 2018-01-13 PROBLEM — E11.22 TYPE 2 DIABETES MELLITUS WITH STAGE 3 CHRONIC KIDNEY DISEASE, WITHOUT LONG-TERM CURRENT USE OF INSULIN (HCC): Status: ACTIVE | Noted: 2017-07-03

## 2018-01-13 PROBLEM — N18.30 TYPE 2 DIABETES MELLITUS WITH STAGE 3 CHRONIC KIDNEY DISEASE, WITHOUT LONG-TERM CURRENT USE OF INSULIN (HCC): Status: ACTIVE | Noted: 2017-07-03

## 2018-01-14 NOTE — PROGRESS NOTES
HPI:   Milka Zepeda is a 75y.o. year old male who presents for evaluation of hypertension, hyperlipidemia, ASHD s/p CABG, aortic stenosis s/p AVR, diabetes mellitus, chronic renal disease stage 3, prostate cancer, and gout. He reports that he is doing well and is currently without complaints. In 6/2017, he was noted to have an elevated PSA of 6.0, which was confirmed on repeat to be 6.6. He was referred to Dr. Mimi Varela and PSA was again repeated and found to be increased at 7.24. He underwent TRUS biopsy on 8/17/2017, which showed [id number] [place] Grade 7 (3 + 4) adenocarcinoma of the prostate in 3/12 cores, 2-10% of each core. Options for management were discussed and patient selected active surveillance. Plans are for MRI prostate in 6 months (due 2/2018). He denies any dysuria, hematuria, or flank pain. He has a history of hypertension, hyperlipidemia, ASHD, and aortic stenosis. In 10/2014, he presented with progressive chest pain and shortness of breath and echocardiogram (10/16/2014) showed normal LV function (EF 55-60%), no RWMA, grade 1 diastolic dysfunction, mild LAE, and severe AS (mean gradient 30 mmHg, peak 67 mmHg, and AV area 0.8 cm2). He was referred to see Dr. Priscila Otero and underwent cardiac catheterization (11/6/2014) showing 100% RCA (distal collaterals from left), 70-80% distal LAD, 80% proximal LCx, inferior basal hypokinesis, and EF 55%. On 11/13/2014, he underwent 2 vessel CABG (LIMA to LAD and SVG to OM1) and AV replacement with a bioprosthetic 23 mm Marifer Barbosa Magna pericardial valve by Dr. Celia Ashraf. He has done well since that time and remains asymptomatic. He denies any chest pain, shortness of breath at rest or with exertion, palpitations, lightheadedness, or edema. His current regimen includes aspirin, metoprolol, lisinopril, and moderate intensity dose simvastatin. He is followed by Dr. Priscila tOero.  He had a repeat echocardiogram (8/21/2017) showing normal LV function (EF 55-60%), no RWMA, mild MIL, and normally functioning bioprosthetic valve with peak gradient 17 mmHg, mean gradient 10 mmHg, and valve area 1.86 cm2. He has a history of diabetes mellitus, which has not required treatment with medication. Review of his record shows that his Hba1c has remained between 5.7-6.3 since 2011. He denies any polyuria, polydipsia, nocturia, or blurry vision, and has no history of retinopathy or neuropathy. He does have evidence of chronic renal disease, stage 3, with baseline creatinine 1.22-1.37/ eGFR 55-59 since 11/2014. He has regular eye exams with Dr. Marilin Johnston, although does not recall the date of his last exam.     He has a history of gout, but has not had a flare in many years since being treated with allopurinol. He has never had a screening colonoscopy. He denies any abdominal pain, nausea, vomiting, melena, hematochezia, or change in bowel movements. Past Medical History:   Diagnosis Date    Aortic stenosis     s/p AVR    Aortic stenosis, severe 10/16/2014    Echocardiogram EF 60% peak gradient 67 mmHg, mean gradient 30 mmHg, MARY 0.8 cm    ASHD (arteriosclerotic heart disease)     Chronic renal disease, stage III     DM [facility]diabetes mellitus) ([facility] [address]     Gout     HCD (hypertensive cardiovascular disease)     History of echocardiogram 10/16/2014    EF 55-60%. No RWMA. Mild LVH. Gr 1 DDfx. Mild LAE. Severe AS (mean grad 30 mmHg).  Hyperlipidemia     Hypertension     Prostate cancer ([facility] [address] 08/17/2017    [id number] [place] Grade 7 (3 + 4) adenocarcinoma of the prostate in 3/12 cores, 2-10% of each core; PSA 7.24. Dr. Lashay Friedman.  S/P AVR (aortic valve replacement) 11/13/2014    #23 mm Marifer Barbosa Magna pericardial valve. Dr. Juan Sheffield.  S/P CABG x 2 11/13/2014    LIMA to LAD, SVG to OM1. Dr. Juan Sheffield.  S/P cardiac catheterization 11/06/2014    RCA dom. mRCA 100%. LM patent. dLAD 75% x 2. pCX 80% (ELENA-3). LVEDP 14 mmHg. EF 50-55%.   Severe inferobasal hypk. Severe AS. AV replacement & CABG recommended. Past Surgical History:   Procedure Laterality Date    HX APPENDECTOMY       Current Outpatient Prescriptions   Medication Sig    lisinopril (PRINIVIL, ZESTRIL) 5 mg tablet TAKE 1 TABLET DAILY    simvastatin (ZOCOR) 40 mg tablet Take 1 Tab by mouth nightly.  allopurinol (ZYLOPRIM) 100 mg tablet Take 1 Tab by mouth daily.  metoprolol succinate (TOPROL-XL) 25 mg XL tablet Take 1 Tab by mouth daily.  docusate sodium (COLACE) 100 mg capsule Take 100 mg by mouth daily.  cholecalciferol, vitamin D3, 2,000 unit tab Take  by mouth daily.  aspirin delayed-release 81 mg tablet Take 1 tablet by mouth daily.  sildenafil citrate (VIAGRA) 100 mg tablet Take 1 Tab by mouth as needed. No current facility-administered medications for this visit. Allergies and Intolerances:   No Known Allergies     Family History:   Family History   Problem Relation Age of Onset   [facility] COPD Father     Other Father      pneumoconiosis    Lung Disease Father     Other Mother      meigs syndrome     Social History:   He  reports that he has never smoked. He has never used smokeless tobacco. He reports that he drinks approximately one case of beer per week. He is  with two adult children. He is retired from working in the [employer]. He started as a , and then became an . History   Alcohol Use    7.2 oz/week    12 Cans of beer per week     Immunization History:  Immunization History   Administered Date(s) Administered    Influenza High Dose Vaccine PF 10/10/2017    Influenza Vaccine 10/14/2014    Influenza Vaccine Split 09/29/2011    Influenza Vaccine Whole 09/14/2010    Pneumococcal Conjugate (PCV-13) 06/29/2017    Pneumococcal Polysaccharide (PPSV-23) 10/14/2014    Td 01/01/2008    Tdap 01/12/2018       Review of Systems:   As above included in HPI.   Otherwise 11 point review of systems negative including constitutional, skin, HENT, eyes, respiratory, cardiovascular, gastrointestinal, genitourinary, musculoskeletal, endocrine, hematologic, allergy, and neurologic. Physical:   Vitals:   BP: 130/78  HR: 79  WT: 222 lb 12.8 oz (101.1 kg)  BMI:  27.85 kg/m2    Exam:   Pt appears well; alert and oriented x 3; appropriate affect. HEENT: PERRLA, anicteric, oropharynx clear, no JVD, adenopathy or thyromegaly. No carotid bruits or radiated murmur. Lungs: clear to auscultation, no wheezes, rhonchi, or rales. Heart: regular rate and rhythm. No murmur, rubs, gallops  Abdomen: soft, nontender, nondistended, normal bowel sounds, no hepatosplenomegaly or masses. Extremities: without edema. Pulses 1-2+ bilaterally. Foot exam: tuning fork and microfilament test with decreased sensation on great toe bilaterally; normal dorsalis pedis and posterior tibial pulses bilaterally.       Review of Data:  Labs:  Hospital Outpatient Visit on 01/08/2018   Component Date Value Ref Range Status    Hemoglobin A1c 01/08/2018 6.0* 4.2 - 5.6 % Final    Est. average glucose 01/08/2018 126  mg/dL Final    LIPID PROFILE 01/08/2018        Final    Cholesterol, total 01/08/2018 179  <200 MG/DL Final    Triglyceride 01/08/2018 121  <150 MG/DL Final    HDL Cholesterol 01/08/2018 71* 40 - 60 MG/DL Final    LDL, calculated 01/08/2018 83.8  0 - 100 MG/DL Final    VLDL, calculated 01/08/2018 24.2  MG/DL Final    CHOL/HDL Ratio 01/08/2018 2.5  0 - 5.0   Final    Sodium 01/08/2018 137  136 - 145 mmol/L Final    Potassium 01/08/2018 4.1  3.5 - 5.5 mmol/L Final    Chloride 01/08/2018 103  100 - 108 mmol/L Final    CO2 01/08/2018 24  21 - 32 mmol/L Final    Anion gap 01/08/2018 10  3.0 - 18 mmol/L Final    Glucose 01/08/2018 120* 74 - 99 mg/dL Final    BUN 01/08/2018 16  7.0 - 18 MG/DL Final    Creatinine 01/08/2018 1.24  0.6 - 1.3 MG/DL Final    BUN/Creatinine ratio 01/08/2018 13  12 - 20   Final    GFR est AA 01/08/2018 >60  >60 ml/min/1.73m2 Final    GFR est non-AA 01/08/2018 58* >60 ml/min/1.73m2 Final    Calcium 01/08/2018 9.3  8.5 - 10.1 MG/DL Final    Bilirubin, total 01/08/2018 0.8  0.2 - 1.0 MG/DL Final    ALT (SGPT) 01/08/2018 34  16 - 61 U/L Final    AST (SGOT) 01/08/2018 21  15 - 37 U/L Final    Alk. phosphatase 01/08/2018 56  45 - 117 U/L Final    Protein, total 01/08/2018 7.5  6.4 - 8.2 g/dL Final    Albumin 01/08/2018 4.2  3.4 - 5.0 g/dL Final    Globulin 01/08/2018 3.3  2.0 - 4.0 g/dL Final    A-G Ratio 01/08/2018 1.3  0.8 - 1.7   Final    Hepatitis C virus Ab 01/08/2018 <0.02  <0.80 Index Final    Hep C  virus Ab Interp. 01/08/2018 NEGATIVE   NEG   Final    Hep C  virus Ab comment 01/08/2018        Final     Health Maintenance:  Screening:    Colorectal: Cologuard (11/2/2017) negative. Due 2020. Depression: none   DM (HbA1c/FPG): HbA1c 6.0 (1/2018)   Hepatitis C: negative (1/2018)   Falls: none   DEXA: N/A   PSA/KIEL: PSA 7.24 (8/2017). Now followed by Dr. Moris Ramirez   Glaucoma: regular eye exams with Dr. Candida Hernandez (reports seen in last 2 months)   Smoking: none   Vitamin D: 34.7 (6/2017)   Medicare Wellness: 6/29/2017    Impression:  Patient Active Problem List   Diagnosis Code    Hyperlipidemia E78.5    Gout M10.9    Vitamin D deficiency E55.9    CAD in native artery I25.10    Hypertensive heart disease without congestive heart failure I11.9    S/P CABG x 2 Z95.1    S/P AVR (aortic valve replacement) Z95.2    Elevated PSA measurement R97.20    Type 2 diabetes mellitus with stage 3 chronic kidney disease, without long-term current use of insulin (HCC) E11.22, N18.3    Stage 3 chronic kidney disease N18.3    Prostate cancer ([facility] [address] C61    Essential hypertension I10       Plan:  1. Hypertension. Well controlled on current regimen of lisinopril and metoprolol. Renal function stable with creatinine 1.24 / eGFR 58. Continue to follow. 2. Hyperlipidemia.  On moderate intensity dose simvastatin with LDL 83, indicative of reasonable control, although goal would be <70 given history of ASHD and CABG. Continue to follow. Emphasized importance of lifestyle modifications, including diet, exercise, and weight loss. If remains elevated, will consider changing statin to Lipitor or Crestor. 3. ASHD s/p 2 vessel CABG. Remains asymptomatic. On aspirin, metoprolol, and statin. Followed by Dr. Susie Sanchez. 4. Aortic stenosis s/p bioprosthetic AVR. Remains asymptomatic, and repeat echocardiogram in 8/2017 with good functioning valve. Follow. 5. Diabetes mellitus. Patient with diagnosis of diabetes mellitus, but review of record shows HbA1c ranging from 5.7-6.3 since 2011 and -112 during that time period. He did have one glucose reading in 11/2014 at 128, but unclear if fasting and not confirmed. On Ace-I and statin. Continue follow-up with Dr. Wily Hunter for annual eye exams. Will request note. Foot exam abnormal with decrease sensation of great toes. Urine microalbumin/ creatinine ratio without evidence of microalbuminuria, but does have evidence of chronic renal disease stage 3. Emphasized importance of lifestyle modifications, including diet, exercise, and weight loss. 6. Chronic renal disease, stage 3. Most likely secondary to long standing hypertension and prediabetes, although also appears to have developed at time of CABG and AVR so may be related to stress of surgery. On statin and Ace-I. Discussed importance of avoiding NSAIDS and prerenal status. Follow. 7. Prostate cancer. [id number] [place] Grade 7 (3 + 4) adenocarcinoma. Patient choosing active surveillance. MRI prostate ordered for 6 months, but patient has not yet scheduled. Addressed importance of surveillance MRI and provided with phone number for scheduling. Being followed by Dr. Fatoumata Jenkins. 8. Gout. Asymptomatic. On allopurinol. 9. Health maintenance. Already received influenza vaccine. Completed pneumococcal series.  Given scripts for Tdap and Zoster vaccines at last visit, but patient did not obtain. Will given new scripts today. Other immunizations up to date. Completed Cologuard for colorectal cancer screening. Did have eye exam with Dr. Ricky Santo. Will request note. Discussed decreasing alcohol consumption. Vitamin D level normal. Continue maintenance dose supplement. Medicare wellness visit up to date. Total time: 40 minutes spent with the patient in face-to-face consultation of which greater than 50% was spent on counseling, answering questions and/or coordination of care. Complex medical review and management performed. Patient understands recommendations and agrees with plan.   Follow-up in 6/2017 for physical.

## 2018-05-16 RX ORDER — METOPROLOL SUCCINATE 25 MG/1
25 TABLET, EXTENDED RELEASE ORAL DAILY
Qty: 90 TAB | Refills: 3 | Status: SHIPPED | OUTPATIENT
Start: 2018-05-16 | End: 2019-05-15 | Stop reason: SDUPTHER

## 2018-06-05 ENCOUNTER — HOSPITAL ENCOUNTER (OUTPATIENT)
Dept: LAB | Age: 68
Discharge: HOME | End: 2018-06-05
Payer: MEDICARE

## 2018-06-05 ENCOUNTER — APPOINTMENT (OUTPATIENT)
Dept: INTERNAL MEDICINE CLINIC | Age: 68
End: 2018-06-05

## 2018-06-05 DIAGNOSIS — E78.5 HYPERLIPIDEMIA, UNSPECIFIED HYPERLIPIDEMIA TYPE: ICD-10-CM

## 2018-06-05 DIAGNOSIS — N18.30 TYPE 2 DIABETES MELLITUS WITH STAGE 3 CHRONIC KIDNEY DISEASE, WITHOUT LONG-TERM CURRENT USE OF INSULIN (HCC): ICD-10-CM

## 2018-06-05 DIAGNOSIS — I10 ESSENTIAL HYPERTENSION: ICD-10-CM

## 2018-06-05 DIAGNOSIS — E11.22 TYPE 2 DIABETES MELLITUS WITH STAGE 3 CHRONIC KIDNEY DISEASE, WITHOUT LONG-TERM CURRENT USE OF INSULIN (HCC): ICD-10-CM

## 2018-06-05 DIAGNOSIS — N18.30 STAGE 3 CHRONIC KIDNEY DISEASE (HCC): ICD-10-CM

## 2018-06-05 DIAGNOSIS — I25.10 CAD IN NATIVE ARTERY: ICD-10-CM

## 2018-06-05 LAB
ALBUMIN SERPL-MCNC: 4.1 G/DL (ref 3.4–5)
ALBUMIN/GLOB SERPL: 1.3 {RATIO} (ref 0.8–1.7)
ALP SERPL-CCNC: 50 U/L (ref 45–117)
ALT SERPL-CCNC: 27 U/L (ref 16–61)
ANION GAP SERPL CALC-SCNC: 10 MMOL/L (ref 3–18)
APPEARANCE UR: CLEAR
AST SERPL-CCNC: 20 U/L (ref 15–37)
BACTERIA URNS QL MICRO: ABNORMAL /HPF
BASOPHILS # BLD: 0 K/UL (ref 0–0.06)
BASOPHILS NFR BLD: 0 % (ref 0–2)
BILIRUB SERPL-MCNC: 0.9 MG/DL (ref 0.2–1)
BILIRUB UR QL: NEGATIVE
BUN SERPL-MCNC: 20 MG/DL (ref 7–18)
BUN/CREAT SERPL: 14 (ref 12–20)
CALCIUM SERPL-MCNC: 8.6 MG/DL (ref 8.5–10.1)
CHLORIDE SERPL-SCNC: 104 MMOL/L (ref 100–108)
CHOLEST SERPL-MCNC: 154 MG/DL
CO2 SERPL-SCNC: 22 MMOL/L (ref 21–32)
COLOR UR: YELLOW
CREAT SERPL-MCNC: 1.38 MG/DL (ref 0.6–1.3)
DIFFERENTIAL METHOD BLD: ABNORMAL
EOSINOPHIL # BLD: 0.3 K/UL (ref 0–0.4)
EOSINOPHIL NFR BLD: 6 % (ref 0–5)
EPITH CASTS URNS QL MICRO: ABNORMAL /LPF (ref 0–5)
ERYTHROCYTE [DISTWIDTH] IN BLOOD BY AUTOMATED COUNT: 13.3 % (ref 11.6–14.5)
EST. AVERAGE GLUCOSE BLD GHB EST-MCNC: 123 MG/DL
GLOBULIN SER CALC-MCNC: 3.1 G/DL (ref 2–4)
GLUCOSE SERPL-MCNC: 108 MG/DL (ref 74–99)
GLUCOSE UR STRIP.AUTO-MCNC: NEGATIVE MG/DL
HBA1C MFR BLD: 5.9 % (ref 4.2–5.6)
HCT VFR BLD AUTO: 41.4 % (ref 36–48)
HDLC SERPL-MCNC: 60 MG/DL (ref 40–60)
HDLC SERPL: 2.6 {RATIO} (ref 0–5)
HGB BLD-MCNC: 13.9 G/DL (ref 13–16)
HGB UR QL STRIP: NEGATIVE
KETONES UR QL STRIP.AUTO: NEGATIVE MG/DL
LDLC SERPL CALC-MCNC: 71.8 MG/DL (ref 0–100)
LEUKOCYTE ESTERASE UR QL STRIP.AUTO: NEGATIVE
LIPID PROFILE,FLP: NORMAL
LYMPHOCYTES # BLD: 1.3 K/UL (ref 0.9–3.6)
LYMPHOCYTES NFR BLD: 23 % (ref 21–52)
MCH RBC QN AUTO: 33.3 PG (ref 24–34)
MCHC RBC AUTO-ENTMCNC: 33.6 G/DL (ref 31–37)
MCV RBC AUTO: 99.3 FL (ref 74–97)
MONOCYTES # BLD: 0.5 K/UL (ref 0.05–1.2)
MONOCYTES NFR BLD: 8 % (ref 3–10)
NEUTS SEG # BLD: 3.4 K/UL (ref 1.8–8)
NEUTS SEG NFR BLD: 63 % (ref 40–73)
NITRITE UR QL STRIP.AUTO: NEGATIVE
PH UR STRIP: 5.5 [PH] (ref 5–8)
PLATELET # BLD AUTO: 176 K/UL (ref 135–420)
PMV BLD AUTO: 9 FL (ref 9.2–11.8)
POTASSIUM SERPL-SCNC: 4.4 MMOL/L (ref 3.5–5.5)
PROT SERPL-MCNC: 7.2 G/DL (ref 6.4–8.2)
PROT UR STRIP-MCNC: NEGATIVE MG/DL
RBC # BLD AUTO: 4.17 M/UL (ref 4.7–5.5)
RBC #/AREA URNS HPF: ABNORMAL /HPF (ref 0–5)
SODIUM SERPL-SCNC: 136 MMOL/L (ref 136–145)
SP GR UR REFRACTOMETRY: 1.01 (ref 1–1.03)
TRIGL SERPL-MCNC: 111 MG/DL (ref ?–150)
TSH SERPL DL<=0.05 MIU/L-ACNC: 0.96 UIU/ML (ref 0.36–3.74)
UROBILINOGEN UR QL STRIP.AUTO: 0.2 EU/DL (ref 0.2–1)
VLDLC SERPL CALC-MCNC: 22.2 MG/DL
WBC # BLD AUTO: 5.5 K/UL (ref 4.6–13.2)
WBC URNS QL MICRO: ABNORMAL /HPF (ref 0–4)

## 2018-06-05 PROCEDURE — 85025 COMPLETE CBC W/AUTO DIFF WBC: CPT | Performed by: INTERNAL MEDICINE

## 2018-06-05 PROCEDURE — 36415 COLL VENOUS BLD VENIPUNCTURE: CPT | Performed by: INTERNAL MEDICINE

## 2018-06-05 PROCEDURE — 84443 ASSAY THYROID STIM HORMONE: CPT | Performed by: INTERNAL MEDICINE

## 2018-06-05 PROCEDURE — 83036 HEMOGLOBIN GLYCOSYLATED A1C: CPT | Performed by: INTERNAL MEDICINE

## 2018-06-05 PROCEDURE — 82043 UR ALBUMIN QUANTITATIVE: CPT | Performed by: INTERNAL MEDICINE

## 2018-06-05 PROCEDURE — 80061 LIPID PANEL: CPT | Performed by: INTERNAL MEDICINE

## 2018-06-05 PROCEDURE — 80053 COMPREHEN METABOLIC PANEL: CPT | Performed by: INTERNAL MEDICINE

## 2018-06-05 PROCEDURE — 81001 URINALYSIS AUTO W/SCOPE: CPT | Performed by: INTERNAL MEDICINE

## 2018-06-05 PROCEDURE — 82306 VITAMIN D 25 HYDROXY: CPT | Performed by: INTERNAL MEDICINE

## 2018-06-06 LAB
25(OH)D3 SERPL-MCNC: 28.7 NG/ML (ref 30–100)
CREAT UR-MCNC: 59.15 MG/DL (ref 30–125)
MICROALBUMIN UR-MCNC: <0.13 MG/DL (ref 0–3)
MICROALBUMIN/CREAT UR-RTO: NORMAL MG/G (ref 0–30)

## 2018-07-30 RX ORDER — SIMVASTATIN 40 MG/1
TABLET, FILM COATED ORAL
Qty: 90 TAB | Refills: 3 | Status: SHIPPED | OUTPATIENT
Start: 2018-07-30 | End: 2019-07-28 | Stop reason: SDUPTHER

## 2018-07-30 RX ORDER — ALLOPURINOL 100 MG/1
TABLET ORAL
Qty: 90 TAB | Refills: 3 | Status: SHIPPED | OUTPATIENT
Start: 2018-07-30 | End: 2019-07-28 | Stop reason: SDUPTHER

## 2018-08-31 ENCOUNTER — OFFICE VISIT (OUTPATIENT)
Dept: CARDIOLOGY CLINIC | Age: 68
End: 2018-08-31

## 2018-08-31 VITALS
DIASTOLIC BLOOD PRESSURE: 80 MMHG | WEIGHT: 212 LBS | HEART RATE: 71 BPM | BODY MASS INDEX: 26.36 KG/M2 | OXYGEN SATURATION: 97 % | SYSTOLIC BLOOD PRESSURE: 140 MMHG | HEIGHT: 75 IN

## 2018-08-31 DIAGNOSIS — I10 ESSENTIAL HYPERTENSION: ICD-10-CM

## 2018-08-31 DIAGNOSIS — E78.5 HYPERLIPIDEMIA, UNSPECIFIED HYPERLIPIDEMIA TYPE: ICD-10-CM

## 2018-08-31 DIAGNOSIS — N18.30 STAGE 3 CHRONIC KIDNEY DISEASE (HCC): ICD-10-CM

## 2018-08-31 DIAGNOSIS — Z95.2 S/P AVR (AORTIC VALVE REPLACEMENT): ICD-10-CM

## 2018-08-31 DIAGNOSIS — I11.9 HYPERTENSIVE HEART DISEASE WITHOUT CONGESTIVE HEART FAILURE: ICD-10-CM

## 2018-08-31 DIAGNOSIS — I25.10 CAD IN NATIVE ARTERY: Primary | ICD-10-CM

## 2018-08-31 NOTE — PROGRESS NOTES
HISTORY OF PRESENT ILLNESS Hardeep Gillis is a 70 y.o. male. ASSESSMENT and PLAN Mr. Ricky Hua initially presented with chest pains, and significant aortic stenosis with mean gradient of 30 mmHg, peak gradient of 67 mmHg, MARY of 0.8 cm². He ultimately underwent open-heart surgery in November of 2014. He had coronary artery bypass graft surgery with LIMA to LAD and SVG to OM1. He also had aortic valve replacement with #23 Marifer-Barbosa Magna pericardial valve. His echocardiogram from August 2017 shows normal LV function with well-functioning Marifer-Barbosa magna pericardial bioprosthetic aortic valve with mean gradient of 10 mmHg, and MARY of 1.86 cm². ? CAD:   Symptomatically stable. ? Bioprosthetic AVR: Clinically stable. 
? BP:   Reasonably well-controlled. ? HR:    Stable. ? CHF:   There is no evidence of decompensated CHF noted. ? Weight:   His weight today is 212 pounds. His baseline, target weight is 200-210 pounds. ? Cholesterol:   Target LDL <70. Zocor 40 mg daily. ? Anti-platelet:   Remains on ASA. I will see him back annually. Thank you. Encounter Diagnoses Name Primary?  CAD in native artery Yes  Hypertensive heart disease without congestive heart failure  Essential hypertension  Hyperlipidemia, unspecified hyperlipidemia type  S/P AVR (aortic valve replacement)  Stage 3 chronic kidney disease   
 
current treatment plan is effective, no change in therapy 
lab results and schedule of future lab studies reviewed with patient 
reviewed diet, exercise and weight control 
cardiovascular risk and specific lipid/LDL goals reviewed 
use of aspirin to prevent MI and TIA's discussed HPI Today, Mr. Heidi Maxwell has no complaints of chest pains, increased shortness of breath or decreased exercise capacity. He denies any orthopnea or PND. Denies any palpitations or dizziness.   He remains active physically doing yard work and managing his house. He is currently helping to take care of his wife who is undergoing some medical issues. Review of Systems Respiratory: Positive for shortness of breath. Cardiovascular: Negative for chest pain, palpitations, orthopnea, claudication, leg swelling and PND. All other systems reviewed and are negative. Physical Exam  
Constitutional: He is oriented to person, place, and time. He appears well-developed and well-nourished. HENT:  
Head: Normocephalic. Eyes: Conjunctivae are normal.  
Neck: Neck supple. No JVD present. Carotid bruit is not present. No thyromegaly present. Cardiovascular: Normal rate and regular rhythm. Pulmonary/Chest: Breath sounds normal.  
Abdominal: Bowel sounds are normal.  
Musculoskeletal: He exhibits no edema. Neurological: He is alert and oriented to person, place, and time. Skin: Skin is warm and dry. Nursing note and vitals reviewed. PCP: Sagar Han MD 
 
Past Medical History:  
Diagnosis Date  Aortic stenosis s/p AVR  Aortic stenosis, severe 10/16/2014 Echocardiogram EF 60% peak gradient 67 mmHg, mean gradient 30 mmHg, MARY 0.8 cm  ASHD (arteriosclerotic heart disease)  Chronic renal disease, stage III   
 DM (diabetes mellitus) ([facility] [address]  Gout  HCD (hypertensive cardiovascular disease)  History of echocardiogram 10/16/2014 EF 55-60%. No RWMA. Mild LVH. Gr 1 DDfx. Mild LAE. Severe AS (mean grad 30 mmHg).  Hyperlipidemia  Hypertension  Prostate cancer ([facility] [address] 08/17/2017  
 [id number] [place] Grade 7 (3 + 4) adenocarcinoma of the prostate in 3/12 cores, 2-10% of each core; PSA 7.24. Dr. Nestor Hong.  S/P AVR (aortic valve replacement) 11/13/2014 #23 mm Marifer [facility] pericardial valve. Dr. Amilcar Lujan.  S/P CABG x 2 11/13/2014 LIMA to LAD, SVG to OM1. Dr. Amilcar Lujan.  S/P cardiac catheterization 11/06/2014 RCA dom. mRCA 100%. LM patent. dLAD 75% x 2. pCX 80% (ELENA-3). LVEDP 14 mmHg. EF 50-55%. Severe inferobasal hypk. Severe AS. AV replacement & CABG recommended. Past Surgical History:  
Procedure Laterality Date  HX APPENDECTOMY Current Outpatient Prescriptions Medication Sig Dispense Refill  allopurinol (ZYLOPRIM) 100 mg tablet TAKE 1 TABLET DAILY 90 Tab 3  
 simvastatin (ZOCOR) 40 mg tablet TAKE 1 TABLET NIGHTLY 90 Tab 3  
 metoprolol succinate (TOPROL-XL) 25 mg XL tablet Take 1 Tab by mouth daily. 90 Tab 3  
 lisinopril (PRINIVIL, ZESTRIL) 5 mg tablet TAKE 1 TABLET DAILY 90 Tab 3  
 sildenafil citrate (VIAGRA) 100 mg tablet Take 1 Tab by mouth as needed. 6 Tab 3  
 docusate sodium (COLACE) 100 mg capsule Take 100 mg by mouth daily.  cholecalciferol, vitamin D3, 2,000 unit tab Take  by mouth daily.  aspirin delayed-release 81 mg tablet Take 1 tablet by mouth daily. 30 tablet 2 The patient has a family history of 
 
Social History Substance Use Topics  Smoking status: Never Smoker  Smokeless tobacco: Never Used  Alcohol use 7.2 oz/week 12 Cans of beer per week Lab Results Component Value Date/Time Cholesterol, total 154 06/05/2018 10:21 AM  
 HDL Cholesterol 60 06/05/2018 10:21 AM  
 LDL, calculated 71.8 06/05/2018 10:21 AM  
 Triglyceride 111 06/05/2018 10:21 AM  
 CHOL/HDL Ratio 2.6 06/05/2018 10:21 AM  
  
 
BP Readings from Last 3 Encounters:  
08/31/18 140/80  
01/10/18 130/78  
09/26/17 128/82 Pulse Readings from Last 3 Encounters:  
08/31/18 71  
01/10/18 79  
09/26/17 87 Wt Readings from Last 3 Encounters:  
08/31/18 96.2 kg (212 lb)  
01/10/18 101.1 kg (222 lb 12.8 oz) 09/26/17 99.3 kg (219 lb) EKG: normal EKG, normal sinus rhythm, unchanged from previous tracings.

## 2018-08-31 NOTE — PROGRESS NOTES
Tex Andino presents today for Chief Complaint Patient presents with  Coronary Artery Disease 1 year follow up  Shortness of Breath  
  exertion Tex Andino preferred language for health care discussion is english/other. Is someone accompanying this pt? Self Is the patient using any DME equipment during OV? No  
 
Depression Screening: PHQ over the last two weeks 1/10/2018 Little interest or pleasure in doing things Several days Feeling down, depressed, irritable, or hopeless Not at all Total Score PHQ 2 1 Learning Assessment: 
Learning Assessment 8/31/2018 PRIMARY LEARNER Patient PRIMARY LANGUAGE ENGLISH  
LEARNER PREFERENCE PRIMARY DEMONSTRATION  
  -  
ANSWERED BY Patient RELATIONSHIP SELF Abuse Screening: 
Abuse Screening Questionnaire 12/16/2014 Do you ever feel afraid of your partner? Soraya Garcia Are you in a relationship with someone who physically or mentally threatens you? Soraya Garcia Is it safe for you to go home? Claudy Granados Fall Risk Fall Risk Assessment, last 12 mths 1/10/2018 Able to walk? Yes Fall in past 12 months? No  
 
 
Pt currently taking Antiplatelet therapy? ASA 81mg daily Coordination of Care: 1. Have you been to the ER, urgent care clinic since your last visit? Hospitalized since your last visit? No  
 
2. Have you seen or consulted any other health care providers outside of the [address] [place] [address] since your last visit? Include any pap smears or colon screening.  No

## 2018-08-31 NOTE — MR AVS SNAPSHOT
[facility], [address] [place] [address] 
[phone] Patient: Becki Yen MRN: [id number] FHO:[id number] Visit Information Date & Time Provider Department Dept. Phone Encounter #  
 8/31/2018  1:00 PM Phoenix Lynne MD [facility] [id number] Your Appointments 10/4/2018 11:00 AM  
Office Visit with Alyce Booth MD  
[facility] [address]) Appt Note: ov bs; ov r/s  
 [address] [facility], [address] [place] [address]  
  
   
 [address] [facility], [facility] Upcoming Health Maintenance Date Due  
 EYE EXAM RETINAL OR DILATED Q1 2/2/1960 GLAUCOMA SCREENING Q2Y 2/2/2014 MEDICARE YEARLY EXAM 6/30/2018 Influenza Age 5 to Adult 8/1/2018 ZOSTER VACCINE AGE 60> 1/31/2019* HEMOGLOBIN A1C Q6M 12/5/2018 FOOT EXAM Q1 1/10/2019 MICROALBUMIN Q1 6/5/2019 LIPID PANEL Q1 6/5/2019 Cologuard Q 3 Years 11/2/2020 DTaP/Tdap/Td series (2 - Td) 1/12/2028 *Topic was postponed. The date shown is not the original due date. Allergies as of 8/31/2018  Review Complete On: 1/13/2018 By: Alyce Booth MD  
 No Known Allergies Current Immunizations  Reviewed on 4/2/2014 Name Date Influenza High Dose Vaccine PF 10/10/2017 Influenza Vaccine 10/14/2014 Influenza Vaccine Split 9/29/2011 Influenza Vaccine Whole 9/14/2010 Pneumococcal Conjugate (PCV-13) 6/29/2017 Pneumococcal Polysaccharide (PPSV-23) 10/14/2014 Td 1/1/2008 Tdap 1/12/2018 Not reviewed this visit You Were Diagnosed With   
  
 Codes Comments Hypertensive heart disease without congestive heart failure    -  Primary ICD-10-CM: I11.9 ICD-9-CM: 402.90 Essential hypertension     ICD-10-CM: I10 
ICD-9-CM: 401.9   
 CAD in native artery     ICD-10-CM: I25.10 ICD-9-CM: 414.01   
 Hyperlipidemia, unspecified hyperlipidemia type     ICD-10-CM: E78.5 ICD-9-CM: 272.4 Vitals BP Pulse Height(growth percentile) Weight(growth percentile) SpO2 BMI  
 140/80 71 6' 3\" (1.905 m) 212 lb (96.2 kg) 97% 26.5 kg/m2 Smoking Status Never Smoker Vitals History BMI and BSA Data Body Mass Index Body Surface Area  
 26.5 kg/m 2 2.26 m 2 Preferred Pharmacy Pharmacy Name Phone [facility], [facility] [phone] Your Updated Medication List  
  
   
This list is accurate as of 8/31/18  1:34 PM.  Always use your most recent med list.  
  
  
  
  
 allopurinol 100 mg tablet Commonly known as:  ZYLOPRIM  
TAKE 1 TABLET DAILY  
  
 aspirin delayed-release 81 mg tablet Take 1 tablet by mouth daily. cholecalciferol (vitamin D3) 2,000 unit Tab Take  by mouth daily. COLACE 100 mg capsule Generic drug:  docusate sodium Take 100 mg by mouth daily. lisinopril 5 mg tablet Commonly known as:  PRINIVIL, ZESTRIL  
TAKE 1 TABLET DAILY  
  
 metoprolol succinate 25 mg XL tablet Commonly known as:  TOPROL-XL Take 1 Tab by mouth daily. sildenafil citrate 100 mg tablet Commonly known as:  VIAGRA Take 1 Tab by mouth as needed. simvastatin 40 mg tablet Commonly known as:  ZOCOR  
TAKE 1 TABLET NIGHTLY We Performed the Following AMB POC EKG ROUTINE W/ 12 LEADS, INTER & REP [15697 CPT(R)] Introducing [facility]! Dear Rachna Hinkle: Thank you for requesting a Shmoop account. Our records indicate that you already have an active Shmoop account. You can access your account anytime at [email or web address] Did you know that you can access your hospital and ER discharge instructions at any time in Shmoop? You can also review all of your test results from your hospital stay or ER visit. Additional Information If you have questions, please visit the Frequently Asked Questions section of the [email or web address] website at [email or web address] Remember, [employer] is NOT to be used for urgent needs. For medical emergencies, dial 911. Now available from your iPhone and Android! Please provide this summary of care documentation to your next provider. Your primary care clinician is listed as Yudith Kirby. If you have any questions after today's visit, please call [phone].

## 2018-10-04 ENCOUNTER — OFFICE VISIT (OUTPATIENT)
Dept: INTERNAL MEDICINE CLINIC | Age: 68
End: 2018-10-04

## 2018-10-04 ENCOUNTER — HOSPITAL ENCOUNTER (OUTPATIENT)
Dept: LAB | Age: 68
Discharge: HOME | End: 2018-10-04
Payer: MEDICARE

## 2018-10-04 VITALS
BODY MASS INDEX: 27.1 KG/M2 | RESPIRATION RATE: 16 BRPM | HEART RATE: 76 BPM | OXYGEN SATURATION: 98 % | TEMPERATURE: 98.3 F | HEIGHT: 75 IN | SYSTOLIC BLOOD PRESSURE: 150 MMHG | WEIGHT: 218 LBS | DIASTOLIC BLOOD PRESSURE: 72 MMHG

## 2018-10-04 DIAGNOSIS — I25.10 CAD IN NATIVE ARTERY: ICD-10-CM

## 2018-10-04 DIAGNOSIS — N18.30 TYPE 2 DIABETES MELLITUS WITH STAGE 3 CHRONIC KIDNEY DISEASE, WITHOUT LONG-TERM CURRENT USE OF INSULIN (HCC): ICD-10-CM

## 2018-10-04 DIAGNOSIS — E11.22 TYPE 2 DIABETES MELLITUS WITH STAGE 3 CHRONIC KIDNEY DISEASE, WITHOUT LONG-TERM CURRENT USE OF INSULIN (HCC): ICD-10-CM

## 2018-10-04 DIAGNOSIS — C61 PROSTATE CANCER (HCC): ICD-10-CM

## 2018-10-04 DIAGNOSIS — I10 ESSENTIAL HYPERTENSION: ICD-10-CM

## 2018-10-04 DIAGNOSIS — Z00.00 MEDICARE ANNUAL WELLNESS VISIT, SUBSEQUENT: ICD-10-CM

## 2018-10-04 DIAGNOSIS — Z23 ENCOUNTER FOR IMMUNIZATION: ICD-10-CM

## 2018-10-04 DIAGNOSIS — Z95.2 S/P AVR (AORTIC VALVE REPLACEMENT): ICD-10-CM

## 2018-10-04 DIAGNOSIS — E55.9 VITAMIN D DEFICIENCY: ICD-10-CM

## 2018-10-04 DIAGNOSIS — E78.5 HYPERLIPIDEMIA, UNSPECIFIED HYPERLIPIDEMIA TYPE: ICD-10-CM

## 2018-10-04 DIAGNOSIS — R97.20 RISING PSA LEVEL: ICD-10-CM

## 2018-10-04 DIAGNOSIS — M10.09 IDIOPATHIC GOUT OF MULTIPLE SITES, UNSPECIFIED CHRONICITY: Chronic | ICD-10-CM

## 2018-10-04 DIAGNOSIS — Z95.1 S/P CABG X 2: ICD-10-CM

## 2018-10-04 DIAGNOSIS — I10 ESSENTIAL HYPERTENSION: Primary | ICD-10-CM

## 2018-10-04 DIAGNOSIS — Z71.89 ACP (ADVANCE CARE PLANNING): ICD-10-CM

## 2018-10-04 LAB
ALBUMIN SERPL-MCNC: 4.4 G/DL (ref 3.4–5)
ANION GAP SERPL CALC-SCNC: 15 MMOL/L (ref 3–18)
BUN SERPL-MCNC: 18 MG/DL (ref 7–18)
BUN/CREAT SERPL: 13 (ref 12–20)
CALCIUM SERPL-MCNC: 9.1 MG/DL (ref 8.5–10.1)
CHLORIDE SERPL-SCNC: 105 MMOL/L (ref 100–108)
CO2 SERPL-SCNC: 18 MMOL/L (ref 21–32)
CREAT SERPL-MCNC: 1.41 MG/DL (ref 0.6–1.3)
GLUCOSE SERPL-MCNC: 107 MG/DL (ref 74–99)
PHOSPHATE SERPL-MCNC: 3.8 MG/DL (ref 2.5–4.9)
POTASSIUM SERPL-SCNC: 4.5 MMOL/L (ref 3.5–5.5)
PSA SERPL-MCNC: 11.3 NG/ML (ref 0–4)
SODIUM SERPL-SCNC: 138 MMOL/L (ref 136–145)

## 2018-10-04 PROCEDURE — 83036 HEMOGLOBIN GLYCOSYLATED A1C: CPT | Performed by: INTERNAL MEDICINE

## 2018-10-04 PROCEDURE — 84153 ASSAY OF PSA TOTAL: CPT | Performed by: INTERNAL MEDICINE

## 2018-10-04 PROCEDURE — 80069 RENAL FUNCTION PANEL: CPT | Performed by: INTERNAL MEDICINE

## 2018-10-04 RX ORDER — LISINOPRIL 10 MG/1
10 TABLET ORAL DAILY
Qty: 90 TAB | Refills: 2
Start: 2018-10-04 | End: 2018-11-06

## 2018-10-04 NOTE — PROGRESS NOTES
This is the Subsequent Medicare Annual Wellness Exam, performed 12 months or more after the Initial AWV or the last Subsequent AWV I have reviewed the patient's medical history in detail and updated the computerized patient record. History Past Medical History:  
Diagnosis Date  Aortic stenosis s/p AVR  Aortic stenosis, severe 10/16/2014 Echocardiogram EF 60% peak gradient 67 mmHg, mean gradient 30 mmHg, MARY 0.8 cm  ASHD (arteriosclerotic heart disease)  Chronic renal disease, stage III ([facility] [address]  DM [facility]diabetes mellitus) [facility]  Gout  HCD (hypertensive cardiovascular disease)  History of echocardiogram 10/16/2014 EF 55-60%. No RWMA. Mild LVH. Gr 1 DDfx. Mild LAE. Severe AS (mean grad 30 mmHg).  Hyperlipidemia  Hypertension  Prostate cancer ([facility] 08/17/2017  
 B4dIhCt [place] Grade 7 (3 + 4) adenocarcinoma of the prostate in 3/12 cores, 2-10% of each core; PSA 7.24. Dr. Lin Esquivel.  S/P AVR (aortic valve replacement) 11/13/2014 #23 mm [facility] pericardial valve. Dr. Grazyna Donato.  S/P CABG x 2 11/13/2014 LIMA to LAD, SVG to OM1. Dr. Grazyna Donato.  S/P cardiac catheterization 11/06/2014 RCA dom. mRCA 100%. LM patent. dLAD 75% x 2. pCX 80% (ELENA-3). LVEDP 14 mmHg. EF 50-55%. Severe inferobasal hypk. Severe AS. AV replacement & CABG recommended. Past Surgical History:  
Procedure Laterality Date  HX APPENDECTOMY Current Outpatient Prescriptions Medication Sig Dispense Refill  lisinopril (PRINIVIL, ZESTRIL) 10 mg tablet Take 1 Tab by mouth daily. 90 Tab 2  
 allopurinol (ZYLOPRIM) 100 mg tablet TAKE 1 TABLET DAILY 90 Tab 3  
 simvastatin (ZOCOR) 40 mg tablet TAKE 1 TABLET NIGHTLY 90 Tab 3  
 metoprolol succinate (TOPROL-XL) 25 mg XL tablet Take 1 Tab by mouth daily. 90 Tab 3  cholecalciferol, vitamin D3, 2,000 unit tab Take  by mouth daily.  aspirin delayed-release 81 mg tablet Take 1 tablet by mouth daily. 30 tablet 2  
 sildenafil citrate (VIAGRA) 100 mg tablet Take 1 Tab by mouth as needed. 6 Tab 3  
 docusate sodium (COLACE) 100 mg capsule Take 100 mg by mouth daily. No Known Allergies Family History Problem Relation Age of Onset  COPD Father  Other Father   
  pneumoconiosis  Lung Disease Father  Other Mother   
  meigs syndrome Social History Substance Use Topics  Smoking status: Never Smoker  Smokeless tobacco: Never Used  Alcohol use 7.2 oz/week 12 Cans of beer per week Patient Active Problem List  
Diagnosis Code  Hyperlipidemia E78.5  Gout M10.9  Vitamin D deficiency E55.9  CAD in native artery I25.10  Hypertensive heart disease without congestive heart failure I11.9  
 S/P CABG x 2 Z95.1  
 S/P AVR (aortic valve replacement) Z95.2  Elevated PSA measurement R97.20  Type 2 diabetes mellitus with stage 3 chronic kidney disease, without long-term current use of insulin (HCC) E11.22, N18.3  Stage 3 chronic kidney disease (HCC) N18.3  Prostate cancer ([facility] [address] C61  
 Essential hypertension I10 Depression Risk Factor Screening: PHQ over the last two weeks 10/4/2018 Little interest or pleasure in doing things Not at all Feeling down, depressed, irritable, or hopeless Not at all Total Score PHQ 2 0 Alcohol Risk Factor Screening: You do not drink alcohol or very rarely. Functional Ability and Level of Safety:  
Hearing Loss Hearing is good, although notes slight decrease on right. Activities of Daily Living The home contains: no safety equipment. Patient does total self care Fall Risk Fall Risk Assessment, last 12 mths 10/4/2018 Able to walk? Yes Fall in past 12 months? Yes Fall with injury? Yes  
Number of falls in past 12 months 1 Fall Risk Score 2 Abuse Screen Patient is not abused Cognitive Screening Evaluation of Cognitive Function: 
Has your family/caregiver stated any concerns about your memory: no 
Normal 
 
Patient Care Team  
Patient Care Team: 
Shasha Miller MD as PCP - General (Internal Medicine) Carrie Youngblood, RN as Ambulatory Care Navigator (Internal Medicine) LUKE Robledo, RN as Ambulatory Care Navigator (Internal Medicine) Álvaro Enriquez MD (Cardiology) Carson Branch MD (Ophthalmology) Channing Sahu MD (Urology) Assessment/Plan Education and counseling provided: 
Are appropriate based on today's review and evaluation End-of-Life planning (with patient's consent) Influenza Vaccine Colorectal cancer screening tests Cardiovascular screening blood test 
Screening for glaucoma Diabetes screening test 
Shingrix vaccine Diagnoses and all orders for this visit: 1. Essential hypertension 
-     RENAL FUNCTION PANEL; Future 2. Type 2 diabetes mellitus with stage 3 chronic kidney disease, without long-term current use of insulin ([facility] [address] 
-     RENAL FUNCTION PANEL; Future 
-     HEMOGLOBIN A1C WITH EAG; Future 3. Rising PSA level 4. [facility] 
-     PROSTATE SPECIFIC AG; Future 5. Encounter for immunization -     Influenza Vaccine Inactivated (IIV) (FLUAD), Subunit, Adjuvanted, IM (24995) 6. CAD in native artery 7. S/P AVR (aortic valve replacement) 8. S/P CABG x 2 
 
9. Hyperlipidemia, unspecified hyperlipidemia type 10. Idiopathic gout of multiple sites, unspecified chronicity 11. Vitamin D deficiency 12. Medicare annual wellness visit, subsequent 13. ACP (advance care planning) Other orders 
-     varicella-zoster recombinant, PF, (SHINGRIX) 50 mcg/0.5 mL susr injection; 0.5 mL by IntraMUSCular route once for 1 dose. Repeat x 1 dose in 2-6 months 
-     lisinopril (PRINIVIL, ZESTRIL) 10 mg tablet; Take 1 Tab by mouth daily. Health Maintenance Due Topic Date Due  
  EYE EXAM RETINAL OR DILATED Q1  02/[birth year]/1959  Shingrix Vaccine Age 50> (1 of 2) 02/02/1999  GLAUCOMA SCREENING Q2Y  02/02/2014

## 2018-10-04 NOTE — PATIENT INSTRUCTIONS
Increase lisinopril to 10 mg daily. May take two 5 mg tablets each day. DASH Diet: Care Instructions Your Care Instructions The DASH diet is an eating plan that can help lower your blood pressure. DASH stands for Dietary Approaches to Stop Hypertension. Hypertension is high blood pressure. The DASH diet focuses on eating foods that are high in calcium, potassium, and magnesium. These nutrients can lower blood pressure. The foods that are highest in these nutrients are fruits, vegetables, low-fat dairy products, nuts, seeds, and legumes. But taking calcium, potassium, and magnesium supplements instead of eating foods that are high in those nutrients does not have the same effect. The DASH diet also includes whole grains, fish, and poultry. The DASH diet is one of several lifestyle changes your doctor may recommend to lower your high blood pressure. Your doctor may also want you to decrease the amount of sodium in your diet. Lowering sodium while following the DASH diet can lower blood pressure even further than just the DASH diet alone. Follow-up care is a key part of your treatment and safety. Be sure to make and go to all appointments, and call your doctor if you are having problems. It's also a good idea to know your test results and keep a list of the medicines you take. How can you care for yourself at home? Following the DASH diet · Eat 4 to 5 servings of fruit each day. A serving is 1 medium-sized piece of fruit, ½ cup chopped or canned fruit, 1/4 cup dried fruit, or 4 ounces (½ cup) of fruit juice. Choose fruit more often than fruit juice. · Eat 4 to 5 servings of vegetables each day. A serving is 1 cup of lettuce or raw leafy vegetables, ½ cup of chopped or cooked vegetables, or 4 ounces (½ cup) of vegetable juice. Choose vegetables more often than vegetable juice. · Get 2 to 3 servings of low-fat and fat-free dairy each day.  A serving is 8 ounces of milk, 1 cup of yogurt, or 1 ½ ounces of cheese. · Eat 6 to 8 servings of grains each day. A serving is 1 slice of bread, 1 ounce of dry cereal, or ½ cup of cooked rice, pasta, or cooked cereal. Try to choose whole-grain products as much as possible. · Limit lean meat, poultry, and fish to 2 servings each day. A serving is 3 ounces, about the size of a deck of cards. · Eat 4 to 5 servings of nuts, seeds, and legumes (cooked dried beans, lentils, and split peas) each week. A serving is 1/3 cup of nuts, 2 tablespoons of seeds, or ½ cup of cooked beans or peas. · Limit fats and oils to 2 to 3 servings each day. A serving is 1 teaspoon of vegetable oil or 2 tablespoons of salad dressing. · Limit sweets and added sugars to 5 servings or less a week. A serving is 1 tablespoon jelly or jam, ½ cup sorbet, or 1 cup of lemonade. · Eat less than 2,300 milligrams (mg) of sodium a day. If you limit your sodium to 1,500 mg a day, you can lower your blood pressure even more. Tips for success · Start small. Do not try to make dramatic changes to your diet all at once. You might feel that you are missing out on your favorite foods and then be more likely to not follow the plan. Make small changes, and stick with them. Once those changes become habit, add a few more changes. · Try some of the following: ¨ Make it a goal to eat a fruit or vegetable at every meal and at snacks. This will make it easy to get the recommended amount of fruits and vegetables each day. ¨ Try yogurt topped with fruit and nuts for a snack or healthy dessert. ¨ Add lettuce, tomato, cucumber, and onion to sandwiches. ¨ Combine a ready-made pizza crust with low-fat mozzarella cheese and lots of vegetable toppings. Try using tomatoes, squash, spinach, broccoli, carrots, cauliflower, and onions. ¨ Have a variety of cut-up vegetables with a low-fat dip as an appetizer instead of chips and dip. ¨ Sprinkle sunflower seeds or chopped almonds over salads. Or try adding chopped walnuts or almonds to cooked vegetables. ¨ Try some vegetarian meals using beans and peas. Add garbanzo or kidney beans to salads. Make burritos and tacos with mashed gaffney beans or black beans. Where can you learn more? Go to http://luis [employer]-erica.info/. Enter N482 in the search box to learn more about \"DASH Diet: Care Instructions. \" Current as of: December 6, 2017 Content Version: 11.8 © 1897-5644 [employer]. Care instructions adapted under license by [employer] (which disclaims liability or warranty for this information). If you have questions about a medical condition or this instruction, always ask your healthcare professional. [address] any warranty or liability for your use of this information. Medicare Wellness Visit, Male The best way to improve and maintain good health is to have a healthy lifestyle by eating a well-balanced diet, exercising regularly, limiting alcohol and stopping smoking. Regular visits with your physician or non-physician health care provider also support your good health. Preventive screening tests can find health problems before they become diseases or illnesses. Preventive services such as immunizations prevent serious infections. All people over age 72 should have a Pneumovax and a Prevnar-13 shot to prevent potentially life threatening infections with the pneumococcus bacteria, a common cause of pneumonia. These are once in a lifetime unless you and your provider decide differently. All people over 65 should have a yearly influenza vaccine or \"flu\" shot. This does not prevent infection with cold viruses but has been proven to prevent hospitalization and death from influenza.  
 
Although Medicare part B \"regular Medicare\" currently only covers tetanus vaccination in the context of an injury, a tetanus vaccine (Tdap or Td) is recommended every 10 years. A shingles vaccine is recommended once in a lifetime after age 61. The Shingles vaccine is also not covered by Medicare part B. Note, however, that both the Shingles vaccine and Tdap/Td are generally covered by secondary carriers. Please check your coverage and out of pocket expenses. Consider contacting your local health department because it may stock these vaccines for a reasonable charge. We currently have documentation of the following immunization history for you: 
Immunization History Administered Date(s) Administered  Influenza High Dose Vaccine PF 10/10/2017  Influenza Vaccine 10/14/2014  Influenza Vaccine Split 09/29/2011  Influenza Vaccine Whole 09/14/2010  Pneumococcal Conjugate (PCV-13) 06/29/2017  Pneumococcal Polysaccharide (PPSV-23) 10/14/2014  Td 01/01/2008  Tdap 01/12/2018 Screening for infection with Hepatitis C is recommended for anyone born between [birth year] through [address]. The table at the bottom of this document indicates the status of this and other screening services. Screening for diabetes mellitus with a blood sugar test (glucose) should be done at least every 3 years until age 79. You and your health care provider may decide whether to continue screening after age 79. The most recent blood glucose we have on file for you is:  
Lab Results Component Value Date/Time Glucose 108 (H) 06/05/2018 10:21 AM  
 Glucose (POC) 105 11/17/2014 07:18 AM  
 Glucose,  (H) 11/14/2014 04:26 AM  
 
 
Glaucoma is a disease of the eye due to increased ocular pressure that can lead to blindness. People with risk factors for glaucoma ( race, diabetes, family history) should be screened at least every 2 years by an eye professional. This may be covered annually if indicated as determined by you and your doctor. Cardiovascular screening tests that check for elevated lipids or cholesterol (fatty part of blood) which can lead to heart disease and strokes should be done every 4-6 years through age 79. You and your health care provider may decide whether to continue screening after age 79. The most recent lipid panel we have on file for you is:  
Lab Results Component Value Date/Time Cholesterol, total 154 06/05/2018 10:21 AM  
 HDL Cholesterol 60 06/05/2018 10:21 AM  
 LDL, calculated 71.8 06/05/2018 10:21 AM  
 VLDL, calculated 22.2 06/05/2018 10:21 AM  
 Triglyceride 111 06/05/2018 10:21 AM  
 CHOL/HDL Ratio 2.6 06/05/2018 10:21 AM  
 
 
Colorectal cancer screening that evaluates for blood or polyps in your colon for people with average risk should be done yearly as a stool test, every five years as a flexible sigmoidoscope or every 10 years as a colonoscopy up to age 76. You and your health care provider may decide whether to continue screening after age 76. Men up to age 76 may elect to screen for prostate cancer with a blood test called a PSA at certain intervals, depending on their personal and family history. This decision is between the patient and his provider. The most recent PSA values we have on file for you are: 
Lab Results Component Value Date/Time  
 Prostate Specific Ag 7.24 (H) 08/04/2017 03:06 PM  
 Prostate Specific Ag 6.6 (H) 06/29/2017 02:32 PM  
 Prostate Specific Ag 6.0 (H) 06/26/2017 10:09 AM  
 Prostate Specific Ag 2.9 10/07/2014 07:53 AM  
 Prostate Specific Ag 2.5 09/19/2013 07:51 AM  
 Prostate Specific Ag 1.3 02/02/2009 If you have been a smoker or had family history of abdominal aortic aneurysms, you and your provider may decide to schedule an ultrasound test of your aorta. Our records show this was done on: N/A People who have smoked the equivalent of 1 pack per day for 30 years or more may benefit from screening for lung cancer with a yearly low dose CT scan until they have been non smokers for 15 years or competing health conditions render this unlikely to be beneficial. Our records show: N/A Your Medicare Wellness Exam is recommended annually. Here is a list of your current Health Maintenance items with a due date: 
Health Maintenance Topic Date Due  
 EYE EXAM RETINAL OR DILATED Q1  02/02/1959  Shingrix Vaccine Age 50> (1 of 2) 02/02/1999  GLAUCOMA SCREENING Q2Y  02/02/2014  MEDICARE YEARLY EXAM  06/30/2018  Influenza Age 5 to Adult  08/01/2018  HEMOGLOBIN A1C Q6M  12/05/2018  
 FOOT EXAM Q1  01/10/2019  MICROALBUMIN Q1  06/05/2019  LIPID PANEL Q1  06/05/2019  Cologuard Q 3 Years  11/02/2020  
 DTaP/Tdap/Td series (2 - Td) 01/12/2028  Hepatitis C Screening  Addressed  Pneumococcal 65+ High/Highest Risk  Completed

## 2018-10-04 NOTE — ACP (ADVANCE CARE PLANNING)
Advance Care Planning (ACP) Provider Note - Comprehensive     Date of ACP Conversation: 10/04/18  Persons included in Conversation:  patient  Length of ACP Conversation in minutes:  16 minutes    Authorized Decision Maker (if patient is incapable of making informed decisions): wife and children  This person is:  Healthcare Agent/Medical Power of  under Advance Directive          General ACP for ALL Patients with Decision Making Capacity:   Importance of advance care planning, including choosing a healthcare agent to communicate patient's healthcare decisions if patient lost the ability to make decisions, such as after a sudden illness or accident  Understanding of the healthcare agent role was assessed and information provided  Exploration of values, goals, and preferences if recovery is not expected, even with continued medical treatment in the event of: Imminent death  Severe, permanent brain injury  \"In these circumstances, what matters most to you? \"  Care focused more on comfort or quality of life. Review of Existing Advance Directive:  Patient has an existing advance directive on file, signed 8/14/2013. It designates his wife and children as his healthcare agents and expresses that he does not wish life prolonging procedures for end of life care. It was reviewed with him and still reflects his wishes.      For Serious or Chronic Illness:  Understanding of medical condition      Interventions Provided:  Reviewed existing Advance Directive   Recommended review of completed ACP document annually or upon change in health status

## 2018-10-04 NOTE — MR AVS SNAPSHOT
[address] [id number] [address] 
[phone] Patient: Carlee Barrientos MRN: [id number] Visit Information Date & Time Provider Department Dept. Phone Encounter #  
 10/4/2018 11:00 AM Ramu Guzman MD [facility] [address] [place] [address] Road [phone] [id number] Follow-up Instructions Return in about 4 weeks (around 11/1/2018), or if symptoms worsen or fail to improve. Your Appointments 9/3/2019  1:00 PM  
Follow Up with Jayde Tenorio MD  
[facility] ([facility]) Appt Note: 1 yr f/u with EKG  
 [address] [address] [id number] [address] [address] [phone] [address] [place] [address] [address] Upcoming Health Maintenance Date Due  
 EYE EXAM RETINAL OR DILATED Q1 2/2/1959 Shingrix Vaccine Age 50> (1 of 2) 2/2/1999 GLAUCOMA SCREENING Q2Y 2/2/2014 Influenza Age 5 to Adult 8/1/2018 HEMOGLOBIN A1C Q6M 12/5/2018 FOOT EXAM Q1 1/10/2019 MICROALBUMIN Q1 6/5/2019 LIPID PANEL Q1 6/5/2019 MEDICARE YEARLY EXAM 10/5/2019 Cologuard Q 3 Years 11/2/2020 DTaP/Tdap/Td series (2 - Td) 1/12/2028 Allergies as of 10/4/2018  Review Complete On: 10/4/2018 By: Tristen Owens No Known Allergies Current Immunizations  Reviewed on 4/2/2014 Name Date Influenza High Dose Vaccine PF 10/10/2017 Influenza Vaccine 10/14/2014 Influenza Vaccine (Tri) Adjuvanted  Incomplete Influenza Vaccine Split 9/29/2011 Influenza Vaccine Whole 9/14/2010 Pneumococcal Conjugate (PCV-13) 6/29/2017 Pneumococcal Polysaccharide (PPSV-23) 10/14/2014 Td 1/1/2008 Tdap 1/12/2018 Not reviewed this visit You Were Diagnosed With   
  
 Codes Comments Encounter for immunization    -  Primary ICD-10-CM: Z97 ICD-9-CM: V03.89 Essential hypertension     ICD-10-CM: I10 
ICD-9-CM: 401.9 Type 2 diabetes mellitus with stage 3 chronic kidney disease, without long-term current use of insulin (HCC)     ICD-10-CM: E11.22, N18.3 ICD-9-CM: 250.40, 585.3 Prostate cancer [facility])     ICD-10-CM: D91 ICD-9-CM: 390 Vitals BP Pulse Temp Resp Height(growth percentile) Weight(growth percentile) 150/72 (BP 1 Location: Right arm, BP Patient Position: Sitting) 76 98.3 °F (36.8 °C) (Oral) 16 6' 3\" (1.905 m) 218 lb (98.9 kg) SpO2 BMI Smoking Status 98% 27.25 kg/m2 Never Smoker Vitals History BMI and BSA Data Body Mass Index Body Surface Area  
 27.25 kg/m 2 2.29 m 2 Preferred Pharmacy Pharmacy Name Phone [facility], [facility] [phone] Your Updated Medication List  
  
   
This list is accurate as of 10/4/18 12:31 PM.  Always use your most recent med list.  
  
  
  
  
 allopurinol 100 mg tablet Commonly known as:  ZYLOPRIM  
TAKE 1 TABLET DAILY  
  
 aspirin delayed-release 81 mg tablet Take 1 tablet by mouth daily. cholecalciferol (vitamin D3) 2,000 unit Tab Take  by mouth daily. COLACE 100 mg capsule Generic drug:  docusate sodium Take 100 mg by mouth daily. lisinopril 5 mg tablet Commonly known as:  PRINIVIL, ZESTRIL  
TAKE 1 TABLET DAILY  
  
 metoprolol succinate 25 mg XL tablet Commonly known as:  TOPROL-XL Take 1 Tab by mouth daily. sildenafil citrate 100 mg tablet Commonly known as:  VIAGRA Take 1 Tab by mouth as needed. simvastatin 40 mg tablet Commonly known as:  ZOCOR  
TAKE 1 TABLET NIGHTLY  
  
 varicella-zoster recombinant (PF) 50 mcg/0.5 mL Susr injection Commonly known as:  SHINGRIX  
0.5 mL by IntraMUSCular route once for 1 dose. Repeat x 1 dose in 2-6 months Prescriptions Printed  Refills  
 varicella-zoster recombinant, PF, (SHINGRIX) 50 mcg/0.5 mL susr injection 1  
 Si.5 mL by IntraMUSCular route once for 1 dose. Repeat x 1 dose in 2-6 months Class: Print Route: IntraMUSCular We Performed the Following INFLUENZA VACCINE INACTIVATED (IIV), SUBUNIT, ADJUVANTED, IM [id number] CPT(R)] Follow-up Instructions Return in about 4 weeks (around 2018), or if symptoms worsen or fail to improve. To-Do List   
 10/04/2018 Lab:  HEMOGLOBIN A1C WITH EAG   
  
 10/04/2018 Lab:  PSA, DIAGNOSTIC (PROSTATE SPECIFIC AG) 10/04/2018 Lab:  RENAL FUNCTION PANEL Patient Instructions Increase lisinopril to 10 mg daily. May take two 5 mg tablets each day. DASH Diet: Care Instructions Your Care Instructions The DASH diet is an eating plan that can help lower your blood pressure. DASH stands for Dietary Approaches to Stop Hypertension. Hypertension is high blood pressure. The DASH diet focuses on eating foods that are high in calcium, potassium, and magnesium. These nutrients can lower blood pressure. The foods that are highest in these nutrients are fruits, vegetables, low-fat dairy products, nuts, seeds, and legumes. But taking calcium, potassium, and magnesium supplements instead of eating foods that are high in those nutrients does not have the same effect. The DASH diet also includes whole grains, fish, and poultry. The DASH diet is one of several lifestyle changes your doctor may recommend to lower your high blood pressure. Your doctor may also want you to decrease the amount of sodium in your diet. Lowering sodium while following the DASH diet can lower blood pressure even further than just the DASH diet alone. Follow-up care is a key part of your treatment and safety. Be sure to make and go to all appointments, and call your doctor if you are having problems. It's also a good idea to know your test results and keep a list of the medicines you take. How can you care for yourself at home? Following the DASH diet · Eat 4 to 5 servings of fruit each day. A serving is 1 medium-sized piece of fruit, ½ cup chopped or canned fruit, 1/4 cup dried fruit, or 4 ounces (½ cup) of fruit juice. Choose fruit more often than fruit juice. · Eat 4 to 5 servings of vegetables each day. A serving is 1 cup of lettuce or raw leafy vegetables, ½ cup of chopped or cooked vegetables, or 4 ounces (½ cup) of vegetable juice. Choose vegetables more often than vegetable juice. · Get 2 to 3 servings of low-fat and fat-free dairy each day. A serving is 8 ounces of milk, 1 cup of yogurt, or 1 ½ ounces of cheese. · Eat 6 to 8 servings of grains each day. A serving is 1 slice of bread, 1 ounce of dry cereal, or ½ cup of cooked rice, pasta, or cooked cereal. Try to choose whole-grain products as much as possible. · Limit lean meat, poultry, and fish to 2 servings each day. A serving is 3 ounces, about the size of a deck of cards. · Eat 4 to 5 servings of nuts, seeds, and legumes (cooked dried beans, lentils, and split peas) each week. A serving is 1/3 cup of nuts, 2 tablespoons of seeds, or ½ cup of cooked beans or peas. · Limit fats and oils to 2 to 3 servings each day. A serving is 1 teaspoon of vegetable oil or 2 tablespoons of salad dressing. · Limit sweets and added sugars to 5 servings or less a week. A serving is 1 tablespoon jelly or jam, ½ cup sorbet, or 1 cup of lemonade. · Eat less than 2,300 milligrams (mg) of sodium a day. If you limit your sodium to 1,500 mg a day, you can lower your blood pressure even more. Tips for success · Start small. Do not try to make dramatic changes to your diet all at once. You might feel that you are missing out on your favorite foods and then be more likely to not follow the plan. Make small changes, and stick with them. Once those changes become habit, add a few more changes. · Try some of the following: ¨ Make it a goal to eat a fruit or vegetable at every meal and at snacks. This will make it easy to get the recommended amount of fruits and vegetables each day. ¨ Try yogurt topped with fruit and nuts for a snack or healthy dessert. ¨ Add lettuce, tomato, cucumber, and onion to sandwiches. ¨ Combine a ready-made pizza crust with low-fat mozzarella cheese and lots of vegetable toppings. Try using tomatoes, squash, spinach, broccoli, carrots, cauliflower, and onions. ¨ Have a variety of cut-up vegetables with a low-fat dip as an appetizer instead of chips and dip. ¨ Sprinkle sunflower seeds or chopped almonds over salads. Or try adding chopped walnuts or almonds to cooked vegetables. ¨ Try some vegetarian meals using beans and peas. Add garbanzo or kidney beans to salads. Make burritos and tacos with mashed gaffney beans or black beans. Where can you learn more? Go to http://luis e-erica.info/. Enter T169 in the search box to learn more about \"DASH Diet: Care Instructions. \" Current as of: December 6, 2017 Content Version: 11.8 © 9980-2803 be2. Care instructions adapted under license by "[employer] (which disclaims liability or warranty for this information). If you have questions about a medical condition or this instruction, always ask your healthcare professional. [place] [address] any warranty or liability for your use of this information. Medicare Wellness Visit, Male The best way to improve and maintain good health is to have a healthy lifestyle by eating a well-balanced diet, exercising regularly, limiting alcohol and stopping smoking. Regular visits with your physician or non-physician health care provider also support your good health. Preventive screening tests can find health problems before they become diseases or illnesses. Preventive services such as immunizations prevent serious infections.  
 
All people over age 72 should have a Pneumovax and a Prevnar-13 shot to prevent potentially life threatening infections with the pneumococcus bacteria, a common cause of pneumonia. These are once in a lifetime unless you and your provider decide differently. All people over 65 should have a yearly influenza vaccine or \"flu\" shot. This does not prevent infection with cold viruses but has been proven to prevent hospitalization and death from influenza. Although Medicare part B \"regular Medicare\" currently only covers tetanus vaccination in the context of an injury, a tetanus vaccine (Tdap or Td) is recommended every 10 years. A shingles vaccine is recommended once in a lifetime after age 61. The Shingles vaccine is also not covered by Medicare part B. Note, however, that both the Shingles vaccine and Tdap/Td are generally covered by secondary carriers. Please check your coverage and out of pocket expenses. Consider contacting your local health department because it may stock these vaccines for a reasonable charge. We currently have documentation of the following immunization history for you: 
Immunization History Administered Date(s) Administered  Influenza High Dose Vaccine PF 10/10/2017  Influenza Vaccine 10/14/2014  Influenza Vaccine Split 09/29/2011  Influenza Vaccine Whole 09/14/2010  Pneumococcal Conjugate (PCV-13) 06/29/2017  Pneumococcal Polysaccharide (PPSV-23) 10/14/2014  Td 01/01/2008  Tdap 01/12/2018 Screening for infection with Hepatitis C is recommended for anyone born between [birth year] through [address]. The table at the bottom of this document indicates the status of this and other screening services. Screening for diabetes mellitus with a blood sugar test (glucose) should be done at least every 3 years until age 79. You and your health care provider may decide whether to continue screening after age 79. The most recent blood glucose we have on file for you is:  
Lab Results Component Value Date/Time Glucose 108 (H) 06/05/2018 10:21 AM  
 Glucose (POC) 105 11/17/2014 07:18 AM  
 Glucose,  (H) 11/14/2014 04:26 AM  
 
 
Glaucoma is a disease of the eye due to increased ocular pressure that can lead to blindness. People with risk factors for glaucoma ( race, diabetes, family history) should be screened at least every 2 years by an eye professional. This may be covered annually if indicated as determined by you and your doctor. Cardiovascular screening tests that check for elevated lipids or cholesterol (fatty part of blood) which can lead to heart disease and strokes should be done every 4-6 years through age 79. You and your health care provider may decide whether to continue screening after age 79. The most recent lipid panel we have on file for you is:  
Lab Results Component Value Date/Time Cholesterol, total 154 06/05/2018 10:21 AM  
 HDL Cholesterol 60 06/05/2018 10:21 AM  
 LDL, calculated 71.8 06/05/2018 10:21 AM  
 VLDL, calculated 22.2 06/05/2018 10:21 AM  
 Triglyceride 111 06/05/2018 10:21 AM  
 CHOL/HDL Ratio 2.6 06/05/2018 10:21 AM  
 
 
Colorectal cancer screening that evaluates for blood or polyps in your colon for people with average risk should be done yearly as a stool test, every five years as a flexible sigmoidoscope or every 10 years as a colonoscopy up to age 76. You and your health care provider may decide whether to continue screening after age 76. Men up to age 76 may elect to screen for prostate cancer with a blood test called a PSA at certain intervals, depending on their personal and family history. This decision is between the patient and his provider. The most recent PSA values we have on file for you are: 
Lab Results Component Value Date/Time  
 Prostate Specific Ag 7.24 (H) 08/04/2017 03:06 PM  
 Prostate Specific Ag 6.6 (H) 06/29/2017 02:32 PM  
 Prostate Specific Ag 6.0 (H) 06/26/2017 10:09 AM  
 Prostate Specific Ag 2.9 10/07/2014 07:53 AM  
 Prostate Specific Ag 2.5 09/19/2013 07:51 AM  
 Prostate Specific Ag 1.3 02/02/2009 If you have been a smoker or had family history of abdominal aortic aneurysms, you and your provider may decide to schedule an ultrasound test of your aorta. Our records show this was done on: N/A People who have smoked the equivalent of 1 pack per day for 30 years or more may benefit from screening for lung cancer with a yearly low dose CT scan until they have been non smokers for 15 years or competing health conditions render this unlikely to be beneficial. Our records show: N/A Your Medicare Wellness Exam is recommended annually. Here is a list of your current Health Maintenance items with a due date: 
Health Maintenance Topic Date Due  
 EYE EXAM RETINAL OR DILATED Q1  02/02/1959  Shingrix Vaccine Age 50> (1 of 2) 02/02/1999  GLAUCOMA SCREENING Q2Y  02/02/2014  MEDICARE YEARLY EXAM  06/30/2018  Influenza Age 5 to Adult  08/01/2018  HEMOGLOBIN A1C Q6M  12/05/2018  
 FOOT EXAM Q1  01/10/2019  MICROALBUMIN Q1  06/05/2019  LIPID PANEL Q1  06/05/2019  Cologuard Q 3 Years  11/02/2020  
 DTaP/Tdap/Td series (2 - Td) 01/12/2028  Hepatitis C Screening  Addressed  Pneumococcal 65+ High/Highest Risk  Completed [facility] SERVICES! Dear Colleen Love: Thank you for requesting a [employer] account. Our records indicate that you already have an active [employer] account. You can access your account anytime at [email or web address] Did you know that you can access your hospital and ER discharge instructions at any time in [employer]? You can also review all of your test results from your hospital stay or ER visit. Additional Information If you have questions, please visit the Frequently Asked Questions section of the [employer] website at [email or web address] Remember, [employer] is NOT to be used for urgent needs.  For medical emergencies, dial 911. Now available from your iPhone and Android! Please provide this summary of care documentation to your next provider. Your primary care clinician is listed as Eve Yusuf. If you have any questions after today's visit, please call [phone].

## 2018-10-04 NOTE — PROGRESS NOTES
Chief Complaint Patient presents with  Hypertension 10 month follow up with labs from June. Health Maintenance Due Topic Date Due  
 EYE EXAM RETINAL OR DILATED Q1  02/02/1959  Shingrix Vaccine Age 50> (1 of 2) 02/02/1999  GLAUCOMA SCREENING Q2Y  02/02/2014  MEDICARE YEARLY EXAM  06/30/2018  Influenza Age 5 to Adult  08/01/2018 Patient given influenza vaccine, Adjuvanted FLUAD, in left deltoid, per verbal order from Dr. Beverly Rodriguez. Instructed patient to sit and wait 10-20 minutes before leaving the premises so that we can watch for any complications or adverse reactions. Patient given vaccine information statement handout before vaccine was given. Patient tolerated well without adverse reactions or complications. 1. Have you been to the ER, urgent care clinic or hospitalized since your last visit? NO.  
 
2. Have you seen or consulted any other health care providers outside of the [facility] since your last visit (Include any pap smears or colon screening)?  NO

## 2018-10-05 ENCOUNTER — TELEPHONE (OUTPATIENT)
Dept: INTERNAL MEDICINE CLINIC | Age: 68
End: 2018-10-05

## 2018-10-05 LAB
EST. AVERAGE GLUCOSE BLD GHB EST-MCNC: 123 MG/DL
HBA1C MFR BLD: 5.9 % (ref 4.2–5.6)

## 2018-10-05 NOTE — PROGRESS NOTES
Called and discussed lab results with patient. Notified of significant rise in PSA to 11.3 from 6.6 one year ago. Patient with known prostate cancer (Kaylynn 3+4) diagnosed 8/2017 and had opted for active surveillance, but did not follow-up. Provided with phone number to Dr. Ana Mendez office and urged to schedule an appointment.

## 2018-10-05 NOTE — TELEPHONE ENCOUNTER
Please fax result of PSA from labs drawn 10/4/2018 to Dr. Florence Swartz office. Called and notified patient regarding rising PSA and told to schedule a follow-up appointment with Dr. Macrina Hilliard. Patient with prostate cancer diagnosed in 8/2017 but he did not follow up with Dr. Macrina Hilliard. Now with increasing PSA. Please note on fax and hopefully they will also reach out to him to schedule. Thanks.

## 2018-10-08 NOTE — PROGRESS NOTES
HPI:  
Maxi Ware is a 70y.o. year old male who presents for evaluation of hypertension, hyperlipidemia, ASHD s/p CABG, aortic stenosis s/p AVR, diabetes mellitus, chronic renal disease stage 3, prostate cancer, and gout. He reports that he is doing well and is currently without complaints. In 6/2017, he was noted to have an elevated PSA of 6.0, which was confirmed on repeat to be 6.6. He was referred to Dr. George Delgado and PSA was again repeated and found to be increased at 7.24. He underwent TRUS biopsy on 8/17/2017, which showed [id number] [place] Grade 7 (3 + 4) adenocarcinoma of the prostate in 3/12 cores, 2-10% of each core. Options for management were discussed and he selected active surveillance. Plans were for repeat PSA and MRI prostate in 6 months (due 2/2018). However, the patient never had tests performed and he did not follow-up with urology. He denies any dysuria, hematuria, or flank pain. He has a history of hypertension, hyperlipidemia, ASHD, and aortic stenosis. In 10/2014, he presented with progressive chest pain and shortness of breath and an echocardiogram was obtained (10/16/2014) showing normal LV function (EF 55-60%), no RWMA, grade 1 diastolic dysfunction, mild LAE, and severe AS (mean gradient 30 mmHg, peak 67 mmHg, and AV area 0.8 cm2). He was referred to see Dr. Ana Lilia Lares and underwent cardiac catheterization (11/6/2014) showing 100% RCA (distal collaterals from left), 70-80% distal LAD, 80% proximal LCx, inferior basal hypokinesis, and EF 55%. On 11/13/2014, he underwent 2 vessel CABG (LIMA to LAD and SVG to OM1) and AV replacement with a bioprosthetic 23 mm Marifer Barbosa Magna pericardial valve by Dr. Celsa Daniel. He has done well since that time and remains asymptomatic. He denies any chest pain, shortness of breath at rest or with exertion, palpitations, lightheadedness, or edema.  His current regimen includes aspirin, metoprolol, lisinopril, and moderate intensity dose simvastatin. He is followed by Dr. Basilio Fountain. He had a repeat echocardiogram (8/21/2017) showing normal LV function (EF 55-60%), no RWMA, mild MIL, and normally functioning bioprosthetic valve with peak gradient 17 mmHg, mean gradient 10 mmHg, and valve area 1.86 cm2. He has a history of diabetes mellitus, which has not required treatment with medication. Review of his record shows that his Hba1c has remained between 5.7-6.3 since 2011. He denies any polyuria, polydipsia, nocturia, or blurry vision, and has no history of retinopathy or neuropathy. He does have evidence of chronic renal disease, stage 3, with baseline creatinine 1.22-1.37/ eGFR 55-59 since 11/2014. He has regular eye exams with Dr. Christopher Lezama, although does not recall the date of his last exam.  
 
He has a history of gout, but has not had a flare in many years since being treated with allopurinol. He has never had a screening colonoscopy. He did undergo Cologuard testing in 11/2017 which was negative. He denies any abdominal pain, nausea, vomiting, melena, hematochezia, or change in bowel movements. Past Medical History:  
Diagnosis Date  Aortic stenosis s/p AVR  Aortic stenosis, severe 10/16/2014 Echocardiogram EF 60% peak gradient 67 mmHg, mean gradient 30 mmHg, MARY 0.8 cm  ASHD (arteriosclerotic heart disease)  Chronic renal disease, stage III ([address]  DM (diabetes mellitus) [facility]  Gout  HCD (hypertensive cardiovascular disease)  History of echocardiogram 10/16/2014 EF 55-60%. No RWMA. Mild LVH. Gr 1 DDfx. Mild LAE. Severe AS (mean grad 30 mmHg).  Hyperlipidemia  Hypertension  Prostate cancer ([facility] [address] 08/17/2017  
 [id number] [place] Grade 7 (3 + 4) adenocarcinoma of the prostate in 3/12 cores, 2-10% of each core; PSA 7.24. Dr. Jenna Siegel.  S/P AVR (aortic valve replacement) 11/13/2014 #23 mm Marifer [facility] pericardial valve. Dr. Crow Springer.  S/P CABG x 2 11/13/2014 LIMA to LAD, SVG to OM1. Dr. Crow Springer.  S/P cardiac catheterization 11/06/2014 RCA dom. mRCA 100%. LM patent. dLAD 75% x 2. pCX 80% (ELENA-3). LVEDP 14 mmHg. EF 50-55%. Severe inferobasal hypk. Severe AS. AV replacement & CABG recommended. Past Surgical History:  
Procedure Laterality Date  HX APPENDECTOMY Current Outpatient Prescriptions Medication Sig  
 lisinopril (PRINIVIL, ZESTRIL) 10 mg tablet Take 1 Tab by mouth daily.  allopurinol (ZYLOPRIM) 100 mg tablet TAKE 1 TABLET DAILY  simvastatin (ZOCOR) 40 mg tablet TAKE 1 TABLET NIGHTLY  metoprolol succinate (TOPROL-XL) 25 mg XL tablet Take 1 Tab by mouth daily.  cholecalciferol, vitamin D3, 2,000 unit tab Take  by mouth daily.  aspirin delayed-release 81 mg tablet Take 1 tablet by mouth daily.  sildenafil citrate (VIAGRA) 100 mg tablet Take 1 Tab by mouth as needed.  docusate sodium (COLACE) 100 mg capsule Take 100 mg by mouth daily. No current facility-administered medications for this visit. Allergies and Intolerances:  
No Known Allergies Family History:  
Family History Problem Relation Age of Onset  COPD Father  Other Father   
  pneumoconiosis  Lung Disease Father  Other Mother   
  meigs syndrome Social History: He  reports that he has never smoked. He has never used smokeless tobacco. He reports that he drinks approximately one case of beer per week. He is  with two adult children. He is retired from working in the [employer]. He started as a , and then became an . History Alcohol Use  
 7.2 oz/week  12 Cans of beer per week Immunization History: 
Immunization History Administered Date(s) Administered  Influenza High Dose Vaccine PF 10/10/2017  Influenza Vaccine 10/14/2014  Influenza Vaccine (Tri) Adjuvanted 10/04/2018  Influenza Vaccine Split 09/29/2011  Influenza Vaccine Whole 09/14/2010  Pneumococcal Conjugate (PCV-13) 06/29/2017  Pneumococcal Polysaccharide (PPSV-23) 10/14/2014  Td 01/01/2008  Tdap 01/12/2018 Review of Systems: As above included in HPI. Otherwise 11 point review of systems negative including constitutional, skin, HENT, eyes, respiratory, cardiovascular, gastrointestinal, genitourinary, musculoskeletal, endocrine, hematologic, allergy, and neurologic. Physical:  
Vitals:  
BP: 150/72 HR: 76 WT: 218 lb (98.9 kg) BMI:  27.25 kg/m2 Exam:  
Pt appears well; alert and oriented x 3; appropriate affect. HEENT: PERRLA, anicteric, oropharynx clear, no JVD, adenopathy or thyromegaly. No carotid bruits or radiated murmur. Lungs: clear to auscultation, no wheezes, rhonchi, or rales. Heart: regular rate and rhythm. No murmur, rubs, gallops Abdomen: soft, nontender, nondistended, normal bowel sounds, no hepatosplenomegaly or masses. Extremities: without edema. Pulses 1-2+ bilaterally. Review of Data: 
Labs: 
No visits with results within 1 Month(s) from this visit. Latest known visit with results is: 
 
Hospital Outpatient Visit on 06/05/2018 Component Date Value Ref Range Status  WBC 06/05/2018 5.5  4.6 - 13.2 K/uL Final  
 RBC 06/05/2018 4.17* 4.70 - 5.50 M/uL Final  
 HGB 06/05/2018 13.9  13.0 - 16.0 g/dL Final  
 HCT 06/05/2018 41.4  36.0 - 48.0 % Final  
 MCV 06/05/2018 99.3* 74.0 - 97.0 FL Final  
 MCH 06/05/2018 33.3  24.0 - 34.0 PG Final  
 MCHC 06/05/2018 33.6  31.0 - 37.0 g/dL Final  
 RDW 06/05/2018 13.3  11.6 - 14.5 % Final  
 PLATELET 87/39/3450 685  135 - 420 K/uL Final  
 MPV 06/05/2018 9.0* 9.2 - 11.8 FL Final  
 NEUTROPHILS 06/05/2018 63  40 - 73 % Final  
 LYMPHOCYTES 06/05/2018 23  21 - 52 % Final  
 MONOCYTES 06/05/2018 8  3 - 10 % Final  
 EOSINOPHILS 06/05/2018 6* 0 - 5 % Final  
 BASOPHILS 06/05/2018 0  0 - 2 % Final  
  ABS. NEUTROPHILS 06/05/2018 3.4  1.8 - 8.0 K/UL Final  
 ABS. LYMPHOCYTES 06/05/2018 1.3  0.9 - 3.6 K/UL Final  
 ABS. MONOCYTES 06/05/2018 0.5  0.05 - 1.2 K/UL Final  
 ABS. EOSINOPHILS 06/05/2018 0.3  0.0 - 0.4 K/UL Final  
 ABS. BASOPHILS 06/05/2018 0.0  0.0 - 0.06 K/UL Final  
 DF 06/05/2018 AUTOMATED    Final  
 Hemoglobin A1c 06/05/2018 5.9* 4.2 - 5.6 % Final  
 Est. average glucose 06/05/2018 123  mg/dL Final  
 LIPID PROFILE 06/05/2018        Final  
 Cholesterol, total 06/05/2018 154  <200 MG/DL Final  
 Triglyceride 06/05/2018 111  <150 MG/DL Final  
 HDL Cholesterol 06/05/2018 60  40 - 60 MG/DL Final  
 LDL, calculated 06/05/2018 71.8  0 - 100 MG/DL Final  
 VLDL, calculated 06/05/2018 22.2  MG/DL Final  
 CHOL/HDL Ratio 06/05/2018 2.6  0 - 5.0   Final  
 Sodium 06/05/2018 136  136 - 145 mmol/L Final  
 Potassium 06/05/2018 4.4  3.5 - 5.5 mmol/L Final  
 Chloride 06/05/2018 104  100 - 108 mmol/L Final  
 CO2 06/05/2018 22  21 - 32 mmol/L Final  
 Anion gap 06/05/2018 10  3.0 - 18 mmol/L Final  
 Glucose 06/05/2018 108* 74 - 99 mg/dL Final  
 BUN 06/05/2018 20* 7.0 - 18 MG/DL Final  
 Creatinine 06/05/2018 1.38* 0.6 - 1.3 MG/DL Final  
 BUN/Creatinine ratio 06/05/2018 14  12 - 20   Final  
 GFR est AA 06/05/2018 >60  >60 ml/min/1.73m2 Final  
 GFR est non-AA 06/05/2018 51* >60 ml/min/1.73m2 Final  
 Calcium 06/05/2018 8.6  8.5 - 10.1 MG/DL Final  
 Bilirubin, total 06/05/2018 0.9  0.2 - 1.0 MG/DL Final  
 ALT (SGPT) 06/05/2018 27  16 - 61 U/L Final  
 AST (SGOT) 06/05/2018 20  15 - 37 U/L Final  
 Alk.  phosphatase 06/05/2018 50  45 - 117 U/L Final  
 Protein, total 06/05/2018 7.2  6.4 - 8.2 g/dL Final  
 Albumin 06/05/2018 4.1  3.4 - 5.0 g/dL Final  
 Globulin 06/05/2018 3.1  2.0 - 4.0 g/dL Final  
 A-G Ratio 06/05/2018 1.3  0.8 - 1.7   Final  
 Color 06/05/2018 YELLOW    Final  
 Appearance 06/05/2018 CLEAR    Final  
  Specific gravity 06/05/2018 1.012  1.005 - 1.030   Final  
 pH (UA) 06/05/2018 5.5  5.0 - 8.0   Final  
 Protein 06/05/2018 NEGATIVE   NEG mg/dL Final  
 Glucose 06/05/2018 NEGATIVE   NEG mg/dL Final  
 Ketone 06/05/2018 NEGATIVE   NEG mg/dL Final  
 Bilirubin 06/05/2018 NEGATIVE   NEG   Final  
 Blood 06/05/2018 NEGATIVE   NEG   Final  
 Urobilinogen 06/05/2018 0.2  0.2 - 1.0 EU/dL Final  
 Nitrites 06/05/2018 NEGATIVE   NEG   Final  
 Leukocyte Esterase 06/05/2018 NEGATIVE   NEG   Final  
 WBC 06/05/2018 0 to 3  0 - 4 /hpf Final  
 RBC 06/05/2018 0 to 3  0 - 5 /hpf Final  
 Epithelial cells 06/05/2018 FEW  0 - 5 /lpf Final  
 Bacteria 06/05/2018 FEW* NEG /hpf Final  
 TSH 06/05/2018 0.96  0.36 - 3.74 uIU/mL Final  
 Microalbumin,urine random 06/05/2018 <0.13  0 - 3.0 MG/DL Final  
 Creatinine, urine 06/05/2018 59.15  30 - 125 mg/dL Final  
 Microalbumin/Creat ratio (mg/g cre* 06/05/2018 Cannot calculate ratio due to microalbumin result outside reportable range. 0 - 30 mg/g Final  
 Vitamin D 25-Hydroxy 06/05/2018 28.7* 30 - 100 ng/mL Final  
 
Health Maintenance: 
Screening:  
 Colorectal: Cologuard (11/2/2017) negative. Due 2020. Depression: none DM (HbA1c/FPG): HbA1c 5.9 (6/2018) Hepatitis C: negative (1/2018) Falls: none DEXA: N/A 
 PSA/KIEL: PSA 7.24 (8/2017). Followed by Dr. Jenna Siegel 
 Glaucoma: regular eye exams with Dr. Christopher Lezama Smoking: none Vitamin D: 28.7 (6/2017) Medicare Wellness: today Impression: 
Patient Active Problem List  
Diagnosis Code  Hyperlipidemia E78.5  Gout M10.9  Vitamin D deficiency E55.9  CAD in native artery I25.10  Hypertensive heart disease without congestive heart failure I11.9  
 S/P CABG x 2 Z95.1  
 S/P AVR (aortic valve replacement) Z95.2  Elevated PSA measurement R97.20  Type 2 diabetes mellitus with stage 3 chronic kidney disease, without long-term current use of insulin (HCC) E11.22, N18.3  Stage 3 chronic kidney disease (HCC) N18.3  Prostate cancer ([facility] [address] C61  
 Essential hypertension I10 Plan: 1. Hypertension. Blood pressure elevated today on current regimen of lisinopril 5 mg daily and metoprolol succinate 25 mg daily. Instructed to increase lisinopril to 10 mg daily. Requested that patient begin monitoring his blood pressure at home. Renal function stable with creatinine 1.38 / eGFR 51. Will reassess blood pressure in 4 weeks. Continue to follow. 2. Hyperlipidemia. On moderate intensity dose simvastatin with LDL 71, indicative of good control in this patient with goal <70 given history of ASHD and CABG. Continue to follow. Emphasized importance of lifestyle modifications, including diet, exercise, and weight loss. If remains elevated, will consider changing statin to Lipitor or Crestor. 3. ASHD s/p 2 vessel CABG. Remains asymptomatic. On aspirin, metoprolol, and statin. Followed by Dr. Jaime Jeong. 4. Aortic stenosis s/p bioprosthetic AVR. Remains asymptomatic, and repeat echocardiogram in 8/2017 with good functioning valve. Follow. 5. Diabetes mellitus. Patient with diagnosis of diabetes mellitus, but review of record shows HbA1c ranging from 5.7-6.3 since 2011 and -112 during that time period. He did have one glucose reading in 11/2014 at 128, but unclear if fasting and not confirmed. Today HbA1c improved to 5.9. On Ace-I and statin. Continue follow-up with Dr. Nighat Gutierrez for annual eye exams. Foot exam abnormal with decrease sensation of great toes. Urine microalbumin/ creatinine ratio without evidence of microalbuminuria, but does have evidence of chronic renal disease stage 3. Emphasized importance of lifestyle modifications, including diet, exercise, and weight loss. 6. Chronic renal disease, stage 3.  Most likely secondary to long standing hypertension and prediabetes, although also appears to have developed at time of CABG and AVR so may be related to stress of surgery. On statin and Ace-I. Discussed importance of avoiding NSAIDS and prerenal status. Follow. 7. Prostate cancer. [id number] [place] Grade 7 (3 + 4) adenocarcinoma. Patient chose active surveillance rather than definitive treatment. Repeat PSA and MRI prostate ordered for 6 months, but patient did not follow-up. Addressed importance of surveillance MRI and provided with phone number at last visit for scheduling. However, he states that he is too busy caring for his wife and has not had time to make an appointment. Will obtain repeat PSA today. Had been followed by Dr. Zaki Casiano. 8. Gout. Asymptomatic. On allopurinol. 9. Health maintenance. Will give influenza vaccine today. Given script for Shingrix vaccine. Completed pneumococcal series. Given scripts for Tdap previously, but patient did not obtain. Other immunizations up to date. Completed Cologuard for colorectal cancer screening. Did have eye exam with Dr. Caridad Basurto. Will request note. Discussed decreasing alcohol consumption. Vitamin D level normal. Continue maintenance dose supplement. In addition, an annual Medicare wellness visit was done today. Patient understands recommendations and agrees with plan. Follow-up in 4 weeks to reassess blood pressure. [facility] Outpatient Visit on 10/04/2018 Component Date Value Ref Range Status  Prostate Specific Ag 10/04/2018 11.3* 0.0 - 4.0 ng/mL Final  
 Sodium 10/04/2018 138  136 - 145 mmol/L Final  
 Potassium 10/04/2018 4.5  3.5 - 5.5 mmol/L Final  
 Chloride 10/04/2018 105  100 - 108 mmol/L Final  
 CO2 10/04/2018 18* 21 - 32 mmol/L Final  
 Anion gap 10/04/2018 15  3.0 - 18 mmol/L Final  
 Glucose 10/04/2018 107* 74 - 99 mg/dL Final  
 BUN 10/04/2018 18  7.0 - 18 MG/DL Final  
 Creatinine 10/04/2018 1.41* 0.6 - 1.3 MG/DL Final  
 BUN/Creatinine ratio 10/04/2018 13  12 - 20   Final  
  GFR est AA 10/04/2018 >60  >60 ml/min/1.73m2 Final  
 GFR est non-AA 10/04/2018 50* >60 ml/min/1.73m2 Final  
 Calcium 10/04/2018 9.1  8.5 - 10.1 MG/DL Final  
 Phosphorus 10/04/2018 3.8  2.5 - 4.9 MG/DL Final  
 Albumin 10/04/2018 4.4  3.4 - 5.0 g/dL Final  
 Hemoglobin A1c 10/04/2018 5.9* 4.2 - 5.6 % Final  
 Est. average glucose 10/04/2018 123  mg/dL Final  
 
Reviewed labs from visit. Renal function stable with creatinine 1.41/ eGFR 50. HbA1c stable at 5.9 PSA increased to 11.3 from 6.6 one year ago. Patient with known prostate cancer ([place] 3+4) diagnosed 8/2017 and had opted for active surveillance, but did not follow-up. Provided with phone number to Dr. Leandra Ortiz office and urged to schedule an appointment.

## 2018-11-06 ENCOUNTER — OFFICE VISIT (OUTPATIENT)
Dept: INTERNAL MEDICINE CLINIC | Age: 68
End: 2018-11-06

## 2018-11-06 VITALS
WEIGHT: 221.6 LBS | BODY MASS INDEX: 27.55 KG/M2 | RESPIRATION RATE: 14 BRPM | OXYGEN SATURATION: 97 % | TEMPERATURE: 98.6 F | SYSTOLIC BLOOD PRESSURE: 144 MMHG | HEART RATE: 87 BPM | HEIGHT: 75 IN | DIASTOLIC BLOOD PRESSURE: 70 MMHG

## 2018-11-06 DIAGNOSIS — I10 ESSENTIAL HYPERTENSION: Primary | ICD-10-CM

## 2018-11-06 DIAGNOSIS — M1A.09X0 IDIOPATHIC CHRONIC GOUT OF MULTIPLE SITES WITHOUT TOPHUS: ICD-10-CM

## 2018-11-06 DIAGNOSIS — Z95.2 S/P AVR (AORTIC VALVE REPLACEMENT): ICD-10-CM

## 2018-11-06 DIAGNOSIS — E11.22 TYPE 2 DIABETES MELLITUS WITH STAGE 3 CHRONIC KIDNEY DISEASE, WITHOUT LONG-TERM CURRENT USE OF INSULIN (HCC): ICD-10-CM

## 2018-11-06 DIAGNOSIS — R97.20 RISING PSA LEVEL: ICD-10-CM

## 2018-11-06 DIAGNOSIS — Z95.1 S/P CABG X 2: ICD-10-CM

## 2018-11-06 DIAGNOSIS — E78.5 HYPERLIPIDEMIA, UNSPECIFIED HYPERLIPIDEMIA TYPE: ICD-10-CM

## 2018-11-06 DIAGNOSIS — C61 PROSTATE CANCER (HCC): ICD-10-CM

## 2018-11-06 DIAGNOSIS — I25.10 CAD IN NATIVE ARTERY: ICD-10-CM

## 2018-11-06 DIAGNOSIS — N18.30 TYPE 2 DIABETES MELLITUS WITH STAGE 3 CHRONIC KIDNEY DISEASE, WITHOUT LONG-TERM CURRENT USE OF INSULIN (HCC): ICD-10-CM

## 2018-11-06 DIAGNOSIS — N18.30 STAGE 3 CHRONIC KIDNEY DISEASE (HCC): ICD-10-CM

## 2018-11-06 RX ORDER — LISINOPRIL 20 MG/1
20 TABLET ORAL DAILY
Qty: 90 TAB | Refills: 2 | Status: SHIPPED | OUTPATIENT
Start: 2018-11-06 | End: 2018-11-16

## 2018-11-06 NOTE — PROGRESS NOTES
1. Have you been to the ER, urgent care clinic or hospitalized since your last visit? NO.  
 
2. Have you seen or consulted any other health care providers outside of the [address] [place] [address] since your last visit (Include any pap smears or colon screening)? Dr. Zamora Proper

## 2018-11-06 NOTE — PATIENT INSTRUCTIONS
Increase lisinopril to 20 mg daily. DASH Diet: Care Instructions Your Care Instructions The DASH diet is an eating plan that can help lower your blood pressure. DASH stands for Dietary Approaches to Stop Hypertension. Hypertension is high blood pressure. The DASH diet focuses on eating foods that are high in calcium, potassium, and magnesium. These nutrients can lower blood pressure. The foods that are highest in these nutrients are fruits, vegetables, low-fat dairy products, nuts, seeds, and legumes. But taking calcium, potassium, and magnesium supplements instead of eating foods that are high in those nutrients does not have the same effect. The DASH diet also includes whole grains, fish, and poultry. The DASH diet is one of several lifestyle changes your doctor may recommend to lower your high blood pressure. Your doctor may also want you to decrease the amount of sodium in your diet. Lowering sodium while following the DASH diet can lower blood pressure even further than just the DASH diet alone. Follow-up care is a key part of your treatment and safety. Be sure to make and go to all appointments, and call your doctor if you are having problems. It's also a good idea to know your test results and keep a list of the medicines you take. How can you care for yourself at home? Following the DASH diet · Eat 4 to 5 servings of fruit each day. A serving is 1 medium-sized piece of fruit, ½ cup chopped or canned fruit, 1/4 cup dried fruit, or 4 ounces (½ cup) of fruit juice. Choose fruit more often than fruit juice. · Eat 4 to 5 servings of vegetables each day. A serving is 1 cup of lettuce or raw leafy vegetables, ½ cup of chopped or cooked vegetables, or 4 ounces (½ cup) of vegetable juice. Choose vegetables more often than vegetable juice. · Get 2 to 3 servings of low-fat and fat-free dairy each day. A serving is 8 ounces of milk, 1 cup of yogurt, or 1 ½ ounces of cheese. · Eat 6 to 8 servings of grains each day. A serving is 1 slice of bread, 1 ounce of dry cereal, or ½ cup of cooked rice, pasta, or cooked cereal. Try to choose whole-grain products as much as possible. · Limit lean meat, poultry, and fish to 2 servings each day. A serving is 3 ounces, about the size of a deck of cards. · Eat 4 to 5 servings of nuts, seeds, and legumes (cooked dried beans, lentils, and split peas) each week. A serving is 1/3 cup of nuts, 2 tablespoons of seeds, or ½ cup of cooked beans or peas. · Limit fats and oils to 2 to 3 servings each day. A serving is 1 teaspoon of vegetable oil or 2 tablespoons of salad dressing. · Limit sweets and added sugars to 5 servings or less a week. A serving is 1 tablespoon jelly or jam, ½ cup sorbet, or 1 cup of lemonade. · Eat less than 2,300 milligrams (mg) of sodium a day. If you limit your sodium to 1,500 mg a day, you can lower your blood pressure even more. Tips for success · Start small. Do not try to make dramatic changes to your diet all at once. You might feel that you are missing out on your favorite foods and then be more likely to not follow the plan. Make small changes, and stick with them. Once those changes become habit, add a few more changes. · Try some of the following: ? Make it a goal to eat a fruit or vegetable at every meal and at snacks. This will make it easy to get the recommended amount of fruits and vegetables each day. ? Try yogurt topped with fruit and nuts for a snack or healthy dessert. ? Add lettuce, tomato, cucumber, and onion to sandwiches. ? Combine a ready-made pizza crust with low-fat mozzarella cheese and lots of vegetable toppings. Try using tomatoes, squash, spinach, broccoli, carrots, cauliflower, and onions. ? Have a variety of cut-up vegetables with a low-fat dip as an appetizer instead of chips and dip. ? Sprinkle sunflower seeds or chopped almonds over salads.  Or try adding chopped walnuts or almonds to cooked vegetables. ? Try some vegetarian meals using beans and peas. Add garbanzo or kidney beans to salads. Make burritos and tacos with mashed gaffney beans or black beans. Where can you learn more? Go to http://luis e-erica.info/. Enter L493 in the search box to learn more about \"DASH Diet: Care Instructions. \" Current as of: December 6, 2017 Content Version: 11.8 © 2511-4533 [employer]. Care instructions adapted under license by [employer] (which disclaims liability or warranty for this information). If you have questions about a medical condition or this instruction, always ask your healthcare professional. [address] any warranty or liability for your use of this information.

## 2018-11-10 NOTE — PROGRESS NOTES
HPI:  
Carlee Barrientos is a 70y.o. year old male who presents for reevaluation of blood pressure. He has a history of hypertension, hyperlipidemia, ASHD s/p CABG, aortic stenosis s/p AVR, diabetes mellitus, chronic renal disease stage 3, prostate cancer, and gout. He was last seen on 10/4/2018 and blood pressure was noted to be elevated at 150/72 on lisinopril 5 mg daily and metoprolol succinate 25 mg daily. His dose of lisinopril was increased to 10 mg daily and he returns today for follow-up. He states that he has not been monitoring his blood pressure at home since he does not have a blood pressure cuff, but has increased the medication as instructed. At his last visit, he also admitted to not following up with Dr. Reina Chapman since his diagnosis of prostate cancer was made. A PSA level was obtained, and was found to have increased to 11.3 and he was advised to follow-up with Dr. Reina Chapman. He states that he did have an appointment on 11/1/2018, and is scheduled to undergo a prostate MRI 3T followed by repeat TRUS biopsy. He is otherwise without complaints. In 6/2017, he was noted to have an elevated PSA of 6.0, which was confirmed on repeat to be 6.6. He was referred to Dr. Reina Chapman and PSA was again repeated and found to be increased at 7.24. He underwent TRUS biopsy on 8/17/2017, which showed [id number] [place] Grade 7 (3 + 4) adenocarcinoma of the prostate in 3/12 cores, 2-10% of each core. Options for management were discussed and he selected active surveillance. Plans were for repeat PSA and MRI prostate in 6 months (due 2/2018). However, the patient never had tests performed and he did not follow-up as discussed. He denies any dysuria, hematuria, or flank pain. He has a history of hypertension, hyperlipidemia, ASHD, and aortic stenosis.  In 10/2014, he presented with progressive chest pain and shortness of breath and an echocardiogram was obtained (10/16/2014) showing normal LV function (EF 55-60%), no RWMA, grade 1 diastolic dysfunction, mild LAE, and severe AS (mean gradient 30 mmHg, peak 67 mmHg, and AV area 0.8 cm2). He was referred to see Dr. Radha Mccarty and underwent cardiac catheterization (11/6/2014) showing 100% RCA (distal collaterals from left), 70-80% distal LAD, 80% proximal LCx, inferior basal hypokinesis, and EF 55%. On 11/13/2014, he underwent 2 vessel CABG (LIMA to LAD and SVG to OM1) and AV replacement with a bioprosthetic 23 mm Marifer Barbosa Magna pericardial valve by Dr. Brte Abdullahi. He has done well since that time and remains asymptomatic. He denies any chest pain, shortness of breath at rest or with exertion, palpitations, lightheadedness, or edema. His current regimen includes aspirin, metoprolol, lisinopril, and moderate intensity dose simvastatin. He is followed by Dr. Radha Mccarty. He had a repeat echocardiogram (8/21/2017) showing normal LV function (EF 55-60%), no RWMA, mild MIL, and normally functioning bioprosthetic valve with peak gradient 17 mmHg, mean gradient 10 mmHg, and valve area 1.86 cm2. He has a history of diabetes mellitus, which has not required treatment with medication. Review of his record shows that his Hba1c has remained between 5.7-6.3 since 2011. He denies any polyuria, polydipsia, nocturia, or blurry vision, and has no history of retinopathy or neuropathy. He does have evidence of chronic renal disease, stage 3, with baseline creatinine 1.22-1.37/ eGFR 55-59 since 11/2014. He has regular eye exams with Dr. Tisha Khan, although does not recall the date of his last exam.  
 
He has a history of gout, but has not had a flare in many years since being treated with allopurinol. He has never had a screening colonoscopy. He did undergo Cologuard testing in 11/2017 which was negative. He denies any abdominal pain, nausea, vomiting, melena, hematochezia, or change in bowel movements. Past Medical History:  
Diagnosis Date  Aortic stenosis s/p AVR  Aortic stenosis, severe 10/16/2014 Echocardiogram EF 60% peak gradient 67 mmHg, mean gradient 30 mmHg, MARY 0.8 cm  ASHD (arteriosclerotic heart disease)  Chronic renal disease, stage III ([facility]  DM (diabetes mellitus) ([facility]  Gout  HCD (hypertensive cardiovascular disease)  History of echocardiogram 10/16/2014 EF 55-60%. No RWMA. Mild LVH. Gr 1 DDfx. Mild LAE. Severe AS (mean grad 30 mmHg).  Hyperlipidemia  Hypertension  Prostate cancer ([facility] 08/17/2017  
 [id number] [place] Grade 7 (3 + 4) adenocarcinoma of the prostate in 3/12 cores, 2-10% of each core; PSA 7.24. Dr. Dara Valenzuela.  S/P AVR (aortic valve replacement) 11/13/2014 #23 mm Marifer [facility] pericardial valve. Dr. Reinier Sexton.  S/P CABG x 2 11/13/2014 LIMA to LAD, SVG to OM1. Dr. Reinier Sexton.  S/P cardiac catheterization 11/06/2014 RCA dom. mRCA 100%. LM patent. dLAD 75% x 2. pCX 80% (ELENA-3). LVEDP 14 mmHg. EF 50-55%. Severe inferobasal hypk. Severe AS. AV replacement & CABG recommended. Past Surgical History:  
Procedure Laterality Date  HX APPENDECTOMY Current Outpatient Medications Medication Sig  
 lisinopril (PRINIVIL, ZESTRIL) 20 mg tablet Take 1 Tab by mouth daily.  allopurinol (ZYLOPRIM) 100 mg tablet TAKE 1 TABLET DAILY  simvastatin (ZOCOR) 40 mg tablet TAKE 1 TABLET NIGHTLY  metoprolol succinate (TOPROL-XL) 25 mg XL tablet Take 1 Tab by mouth daily.  sildenafil citrate (VIAGRA) 100 mg tablet Take 1 Tab by mouth as needed.  docusate sodium (COLACE) 100 mg capsule Take 100 mg by mouth daily.  cholecalciferol, vitamin D3, 2,000 unit tab Take  by mouth daily.  aspirin delayed-release 81 mg tablet Take 1 tablet by mouth daily. No current facility-administered medications for this visit. Allergies and Intolerances:  
No Known Allergies Family History:  
Family History Problem Relation Age of Onset  COPD Father  Other Father   
     pneumoconiosis  Lung Disease Father  Other Mother   
     meigs syndrome Social History: He  reports that  has never smoked. he has never used smokeless tobacco. He reports that he drinks approximately one case of beer per week. He is  with two adult children. He is retired from working in the [employer]. He started as a , and then became an . Social History Substance and Sexual Activity Alcohol Use Yes  Alcohol/week: 7.2 oz  Types: 12 Cans of beer per week Immunization History: 
Immunization History Administered Date(s) Administered  Influenza High Dose Vaccine PF 10/10/2017, 10/26/2018  Influenza Vaccine 10/14/2014  Influenza Vaccine (Tri) Adjuvanted 10/04/2018  Influenza Vaccine Split 09/29/2011  Influenza Vaccine Whole 09/14/2010  Pneumococcal Conjugate (PCV-13) 06/29/2017  Pneumococcal Polysaccharide (PPSV-23) 10/14/2014  Td 01/01/2008  Tdap 01/12/2018 Review of Systems: As above included in HPI. Otherwise 11 point review of systems negative including constitutional, skin, HENT, eyes, respiratory, cardiovascular, gastrointestinal, genitourinary, musculoskeletal, endocrine, hematologic, allergy, and neurologic. Physical:  
Vitals:  
BP: 144/70 HR: 87 
WT: 221 lb 9.6 oz (100.5 kg) BMI:  27.25 kg/m2 Exam:  
Pt appears well; alert and oriented x 3; appropriate affect. HEENT: PERRLA, anicteric, oropharynx clear, no JVD, adenopathy or thyromegaly. No carotid bruits or radiated murmur. Lungs: clear to auscultation, no wheezes, rhonchi, or rales. Heart: regular rate and rhythm. No murmur, rubs, gallops Abdomen: soft, nontender, nondistended, normal bowel sounds, no hepatosplenomegaly or masses. Extremities: without edema. Pulses 1-2+ bilaterally. Review of Data: 
Labs: 
Office Visit on 11/01/2018 Component Date Value Ref Range Status  Color (UA POC) 11/01/2018 Yellow   Final  
 Clarity (UA POC) 11/01/2018 Clear   Final  
 Glucose (UA POC) 11/01/2018 Negative  Negative Final  
 Bilirubin (UA POC) 11/01/2018 Negative  Negative Final  
 Ketones (UA POC) 11/01/2018 Negative  Negative Final  
 Specific gravity (UA POC) 11/01/2018 1.010  1.001 - 1.035 Final  
 Blood (UA POC) 11/01/2018 Negative  Negative Final  
 pH (UA POC) 11/01/2018 6.0  4.6 - 8.0 Final  
 Protein (UA POC) 11/01/2018 Negative  Negative Final  
 Urobilinogen (UA POC) 11/01/2018 0.2 mg/dL  0.2 - 1 Final  
 Nitrites (UA POC) 11/01/2018 Negative  Negative Final  
 Leukocyte esterase (UA POC) 11/01/2018 Negative  Negative Final  
Hospital Outpatient Visit on 10/04/2018 Component Date Value Ref Range Status  Prostate Specific Ag 10/04/2018 11.3* 0.0 - 4.0 ng/mL Final  
 Sodium 10/04/2018 138  136 - 145 mmol/L Final  
 Potassium 10/04/2018 4.5  3.5 - 5.5 mmol/L Final  
 Chloride 10/04/2018 105  100 - 108 mmol/L Final  
 CO2 10/04/2018 18* 21 - 32 mmol/L Final  
 Anion gap 10/04/2018 15  3.0 - 18 mmol/L Final  
 Glucose 10/04/2018 107* 74 - 99 mg/dL Final  
 BUN 10/04/2018 18  7.0 - 18 MG/DL Final  
 Creatinine 10/04/2018 1.41* 0.6 - 1.3 MG/DL Final  
 BUN/Creatinine ratio 10/04/2018 13  12 - 20   Final  
 GFR est AA 10/04/2018 >60  >60 ml/min/1.73m2 Final  
 GFR est non-AA 10/04/2018 50* >60 ml/min/1.73m2 Final  
 Calcium 10/04/2018 9.1  8.5 - 10.1 MG/DL Final  
 Phosphorus 10/04/2018 3.8  2.5 - 4.9 MG/DL Final  
 Albumin 10/04/2018 4.4  3.4 - 5.0 g/dL Final  
 Hemoglobin A1c 10/04/2018 5.9* 4.2 - 5.6 % Final  
 Est. average glucose 10/04/2018 123  mg/dL Final  
 
 
Health Maintenance: 
Screening:  
 Colorectal: Cologuard (11/2/2017) negative. Due 2020. Depression: none DM (HbA1c/FPG): HbA1c 5.9 (10/2018) Hepatitis C: negative (1/2018) Falls: none DEXA: N/A 
 PSA/KIEL: PSA 11.3 (10/2018). Followed by Dr. Yaya Childers Glaucoma: regular eye exams with Dr. Benjamin Kumar. Overdue for exam. 
 Smoking: none Vitamin D: 28.7 (6/2017) Medicare Wellness: 10/4/2018 Impression: 
Patient Active Problem List  
Diagnosis Code  Hyperlipidemia E78.5  Gout M10.9  Vitamin D deficiency E55.9  CAD in native artery I25.10  Hypertensive heart disease without congestive heart failure I11.9  
 S/P CABG x 2 Z95.1  
 S/P AVR (aortic valve replacement) Z95.2  Elevated PSA measurement R97.20  Type 2 diabetes mellitus with stage 3 chronic kidney disease, without long-term current use of insulin (HCC) E11.22, N18.3  Stage 3 chronic kidney disease (HCC) N18.3  Prostate cancer ([facility] C61  
 Essential hypertension I10 Plan: 1. Hypertension. Blood pressure improved, but remains elevated today on current regimen of lisinopril 10 mg daily and metoprolol succinate 25 mg daily. Instructed to increase lisinopril to 20 mg daily. Again requested that he begin monitoring his blood pressure at home,but he states that he is unable to do so. Will ask him to return in 4 weeks for reassessment. Renal function has been stable with creatinine 1.38 / eGFR 51. Will reassess blood pressure and renal function in 4 weeks. Continue to follow. 2. Prostate cancer. [id number] [place] Grade 7 (3 + 4) adenocarcinoma. Patient chose active surveillance rather than definitive treatment. Repeat PSA and MRI prostate ordered for 2/2018, but he did not follow-up. Repeat PSA obtained and had increased from 7.24 at diagnosis to 11.3. Referred for follow-up with Dr. Hebert Husbands, and now scheduled for MRI prostate next month with probable repeat TRUS biopsy. Patient aware of need to keep appointments. 3. Hyperlipidemia. On moderate intensity dose simvastatin with LDL 71 (6/2018), indicative of good control in this patient with goal <70 given history of ASHD and CABG. Continue to follow.  Emphasized importance of lifestyle modifications, including diet, exercise, and weight loss. If remains elevated, will consider changing statin to Lipitor or Crestor. 4. ASHD s/p 2 vessel CABG. Remains asymptomatic. On aspirin, metoprolol, and statin. Followed by Dr. Amna Quevedo. 5. Aortic stenosis s/p bioprosthetic AVR. Remains asymptomatic, and repeat echocardiogram in 8/2017 with good functioning valve. Follow. 6. Diabetes mellitus. Patient with diagnosis of diabetes mellitus, but review of record shows HbA1c ranging from 5.7-6.3 since 2011 and -112 during that time period. He did have one glucose reading in 11/2014 at 128, but unclear if fasting and not confirmed. Today HbA1c improved to 5.9. On Ace-I and statin. Continue follow-up with Dr. Alma Tyler for annual eye exams. Foot exam abnormal with decrease sensation of great toes. Urine microalbumin/ creatinine ratio without evidence of microalbuminuria, but does have evidence of chronic renal disease stage 3. Emphasized importance of lifestyle modifications, including diet, exercise, and weight loss. 7. Chronic renal disease, stage 3. Most likely secondary to long standing hypertension and prediabetes, although also appears to have developed at time of CABG and AVR so may be related to stress of surgery. On statin and Ace-I. Discussed importance of avoiding NSAIDS and prerenal status. Follow. 8. Gout. Asymptomatic. On allopurinol. 9. Health maintenance. Already received influenza vaccine. Given script for Shingrix vaccine but not yet obtained. Completed pneumococcal series. Given script for Tdap previously, but patient did not obtain. Other immunizations up to date. Completed Cologuard for colorectal cancer screening. Did not yet have eye exam with Dr. Alma Tyler. Stressed importance of follow-up. Discussed decreasing alcohol consumption. Vitamin D level normal. Continue maintenance dose supplement. Medicare wellness visit up to date. Patient understands recommendations and agrees with plan. Follow-up in 4 weeks to reassess blood pressure and renal function.

## 2018-11-16 ENCOUNTER — TELEPHONE (OUTPATIENT)
Dept: INTERNAL MEDICINE CLINIC | Age: 68
End: 2018-11-16

## 2018-11-16 RX ORDER — LISINOPRIL 5 MG/1
10 TABLET ORAL DAILY
Qty: 30 TAB | Refills: 0
Start: 2018-11-16 | End: 2018-12-13

## 2018-11-16 NOTE — TELEPHONE ENCOUNTER
His dose of lisinopril was increased from 5 mg to 10 mg at visit on 10/4/2018 for elevated blood pressure. He returned for follow-up on 11/6/2018 and given that his blood pressure was still elevated, he was instructed to increase his dose from 10 mg to 20 mg. Please clarify what he is taking and if he is following instructions. Thanks.

## 2018-11-16 NOTE — TELEPHONE ENCOUNTER
Patient is confused regarding his Lisinopril. Stating when he saw you on the 6th he was taking 5 mg and you told him to increase to 10 mg. Looking at your note, it says he was taking 10 mg and to increase to 20 mg. Can you please clarify?

## 2018-11-16 NOTE — TELEPHONE ENCOUNTER
Spoke with patient. States that he did not increase his dose as instructed after his visit on 10/4/2018. He did increase his dose on 11/6/2018 to 10 mg daily. He states he is taking two of the 5 mg tablets. Instructed to continue at 10 mg dose, and will reassess his blood pressure at next visit in 12/2018 before making any further changes. Chart corrected to reflect his current dose.

## 2018-12-13 ENCOUNTER — OFFICE VISIT (OUTPATIENT)
Dept: INTERNAL MEDICINE CLINIC | Age: 68
End: 2018-12-13

## 2018-12-13 VITALS
HEIGHT: 75 IN | DIASTOLIC BLOOD PRESSURE: 82 MMHG | WEIGHT: 224 LBS | BODY MASS INDEX: 27.85 KG/M2 | RESPIRATION RATE: 16 BRPM | HEART RATE: 72 BPM | OXYGEN SATURATION: 99 % | SYSTOLIC BLOOD PRESSURE: 160 MMHG | TEMPERATURE: 97.8 F

## 2018-12-13 DIAGNOSIS — C61 PROSTATE CANCER (HCC): ICD-10-CM

## 2018-12-13 DIAGNOSIS — N18.30 STAGE 3 CHRONIC KIDNEY DISEASE (HCC): ICD-10-CM

## 2018-12-13 DIAGNOSIS — I25.10 CAD IN NATIVE ARTERY: ICD-10-CM

## 2018-12-13 DIAGNOSIS — E11.22 TYPE 2 DIABETES MELLITUS WITH STAGE 3 CHRONIC KIDNEY DISEASE, WITHOUT LONG-TERM CURRENT USE OF INSULIN (HCC): ICD-10-CM

## 2018-12-13 DIAGNOSIS — Z95.2 S/P AVR (AORTIC VALVE REPLACEMENT): ICD-10-CM

## 2018-12-13 DIAGNOSIS — I10 ESSENTIAL HYPERTENSION: Primary | ICD-10-CM

## 2018-12-13 DIAGNOSIS — R97.20 RISING PSA LEVEL: ICD-10-CM

## 2018-12-13 DIAGNOSIS — E78.5 HYPERLIPIDEMIA, UNSPECIFIED HYPERLIPIDEMIA TYPE: ICD-10-CM

## 2018-12-13 DIAGNOSIS — N18.30 TYPE 2 DIABETES MELLITUS WITH STAGE 3 CHRONIC KIDNEY DISEASE, WITHOUT LONG-TERM CURRENT USE OF INSULIN (HCC): ICD-10-CM

## 2018-12-13 DIAGNOSIS — M1A.09X0 IDIOPATHIC CHRONIC GOUT OF MULTIPLE SITES WITHOUT TOPHUS: Chronic | ICD-10-CM

## 2018-12-13 DIAGNOSIS — Z95.1 S/P CABG X 2: ICD-10-CM

## 2018-12-13 DIAGNOSIS — I11.9 HYPERTENSIVE HEART DISEASE WITHOUT CONGESTIVE HEART FAILURE: ICD-10-CM

## 2018-12-13 RX ORDER — LISINOPRIL 20 MG/1
20 TABLET ORAL DAILY
Qty: 30 TAB | Refills: 0
Start: 2018-12-13 | End: 2019-05-08 | Stop reason: SDUPTHER

## 2018-12-13 NOTE — PATIENT INSTRUCTIONS
Increase lisinopril 20 mg daily. DASH Diet: Care Instructions  Your Care Instructions    The DASH diet is an eating plan that can help lower your blood pressure. DASH stands for Dietary Approaches to Stop Hypertension. Hypertension is high blood pressure. The DASH diet focuses on eating foods that are high in calcium, potassium, and magnesium. These nutrients can lower blood pressure. The foods that are highest in these nutrients are fruits, vegetables, low-fat dairy products, nuts, seeds, and legumes. But taking calcium, potassium, and magnesium supplements instead of eating foods that are high in those nutrients does not have the same effect. The DASH diet also includes whole grains, fish, and poultry. The DASH diet is one of several lifestyle changes your doctor may recommend to lower your high blood pressure. Your doctor may also want you to decrease the amount of sodium in your diet. Lowering sodium while following the DASH diet can lower blood pressure even further than just the DASH diet alone. Follow-up care is a key part of your treatment and safety. Be sure to make and go to all appointments, and call your doctor if you are having problems. It's also a good idea to know your test results and keep a list of the medicines you take. How can you care for yourself at home? Following the DASH diet  · Eat 4 to 5 servings of fruit each day. A serving is 1 medium-sized piece of fruit, ½ cup chopped or canned fruit, 1/4 cup dried fruit, or 4 ounces (½ cup) of fruit juice. Choose fruit more often than fruit juice. · Eat 4 to 5 servings of vegetables each day. A serving is 1 cup of lettuce or raw leafy vegetables, ½ cup of chopped or cooked vegetables, or 4 ounces (½ cup) of vegetable juice. Choose vegetables more often than vegetable juice. · Get 2 to 3 servings of low-fat and fat-free dairy each day. A serving is 8 ounces of milk, 1 cup of yogurt, or 1 ½ ounces of cheese.   · Eat 6 to 8 servings of grains each day. A serving is 1 slice of bread, 1 ounce of dry cereal, or ½ cup of cooked rice, pasta, or cooked cereal. Try to choose whole-grain products as much as possible. · Limit lean meat, poultry, and fish to 2 servings each day. A serving is 3 ounces, about the size of a deck of cards. · Eat 4 to 5 servings of nuts, seeds, and legumes (cooked dried beans, lentils, and split peas) each week. A serving is 1/3 cup of nuts, 2 tablespoons of seeds, or ½ cup of cooked beans or peas. · Limit fats and oils to 2 to 3 servings each day. A serving is 1 teaspoon of vegetable oil or 2 tablespoons of salad dressing. · Limit sweets and added sugars to 5 servings or less a week. A serving is 1 tablespoon jelly or jam, ½ cup sorbet, or 1 cup of lemonade. · Eat less than 2,300 milligrams (mg) of sodium a day. If you limit your sodium to 1,500 mg a day, you can lower your blood pressure even more. Tips for success  · Start small. Do not try to make dramatic changes to your diet all at once. You might feel that you are missing out on your favorite foods and then be more likely to not follow the plan. Make small changes, and stick with them. Once those changes become habit, add a few more changes. · Try some of the following:  ? Make it a goal to eat a fruit or vegetable at every meal and at snacks. This will make it easy to get the recommended amount of fruits and vegetables each day. ? Try yogurt topped with fruit and nuts for a snack or healthy dessert. ? Add lettuce, tomato, cucumber, and onion to sandwiches. ? Combine a ready-made pizza crust with low-fat mozzarella cheese and lots of vegetable toppings. Try using tomatoes, squash, spinach, broccoli, carrots, cauliflower, and onions. ? Have a variety of cut-up vegetables with a low-fat dip as an appetizer instead of chips and dip. ? Sprinkle sunflower seeds or chopped almonds over salads. Or try adding chopped walnuts or almonds to cooked vegetables.   ? Try some vegetarian meals using beans and peas. Add garbanzo or kidney beans to salads. Make burritos and tacos with mashed gaffney beans or black beans. Where can you learn more? Go to http://luis [employer]-erica.info/. Enter C113 in the search box to learn more about \"DASH Diet: Care Instructions. \"  Current as of: December 6, 2017  Content Version: 11.8  © 6374-7964 [employer], [employer]. Care instructions adapted under license by [employer] (which disclaims liability or warranty for this information). If you have questions about a medical condition or this instruction, always ask your healthcare professional. [address] any warranty or liability for your use of this information.

## 2018-12-13 NOTE — PROGRESS NOTES
Chief Complaint   Patient presents with    Hypertension     1 month follow up     1. Have you been to the ER, urgent care clinic or hospitalized since your last visit? NO.     2. Have you seen or consulted any other health care providers outside of the [address] [place] [address] since your last visit (Include any pap smears or colon screening)?  NO

## 2018-12-16 NOTE — PROGRESS NOTES
HPI:   Jazz Corral is a 70y.o. year old male who presents for reevaluation of blood pressure. He has a history of hypertension, hyperlipidemia, ASHD s/p CABG, aortic stenosis s/p AVR, diabetes mellitus, chronic renal disease stage 3, prostate cancer, and gout. He was seen on 10/4/2018 and blood pressure was noted to be elevated at 150/72 on lisinopril 5 mg daily and metoprolol succinate 25 mg daily. His dose of lisinopril was increased to 10 mg daily and he returned on 11/6/2018 for follow-up. His blood pressure remained elevated, and he was instructed to increase his dose of lisinopril to 20 mg daily. He returns today for follow-up. He states that he does not monitor his blood pressure at home since he does not have a blood pressure cuff. When questioned regarding the dose of lisinopril he is now taking, he states that he is taking two (2) of the 5 mg tablets, although he did receive a new bottle of 20 mg tablets from his mail order pharmacy. However, he states that he has not yet started this dose. He is otherwise without complaints. In 6/2017, he was noted to have an elevated PSA of 6.0, which was confirmed on repeat to be 6.6. He was referred to Dr. Zaki Casiano and PSA was again repeated and found to be increased at 7.24. He underwent TRUS biopsy on 8/17/2017, which showed [id number] [place] Grade 7 (3 + 4) adenocarcinoma of the prostate in 3/12 cores, 2-10% of each core. Options for management were discussed and he selected active surveillance. Plans were for repeat PSA and MRI prostate in 6 months (due 2/2018). However, the patient never had tests performed and he did not follow-up as discussed. On 10/4/2018, at his routine visit, a PSA level was obtained and was found to have increased to 11.3, and he was advised to follow-up with Dr. Zaki Casiano. He is now scheduled to undergo a prostate MRI 3T at [facility] on 12/31/2018 with a follow-up appointment with Dr. Zaki Casiano on 1/11/2018.  He states that he is being considered for a repeat TRUS biopsy depending on the results of the MRI. He denies any dysuria, hematuria, or flank pain. He has a history of hypertension, hyperlipidemia, ASHD, and aortic stenosis. In 10/2014, he presented with progressive chest pain and shortness of breath and an echocardiogram was obtained (10/16/2014) showing normal LV function (EF 55-60%), no RWMA, grade 1 diastolic dysfunction, mild LAE, and severe AS (mean gradient 30 mmHg, peak 67 mmHg, and AV area 0.8 cm2). He was referred to see Dr. Radha Mccarty and underwent cardiac catheterization (11/6/2014) showing 100% RCA (distal collaterals from left), 70-80% distal LAD, 80% proximal LCx, inferior basal hypokinesis, and EF 55%. On 11/13/2014, he underwent 2 vessel CABG (LIMA to LAD and SVG to OM1) and AV replacement with a bioprosthetic 23 mm Marifer Barbosa Magna pericardial valve by Dr. Bret Abdullahi. He has done well since that time and remains asymptomatic. He denies any chest pain, shortness of breath at rest or with exertion, palpitations, lightheadedness, or edema. His current regimen includes aspirin, metoprolol, lisinopril, and moderate intensity dose simvastatin. He is followed by Dr. Radha Mccarty. He had a repeat echocardiogram (8/21/2017) showing normal LV function (EF 55-60%), no RWMA, mild MIL, and normally functioning bioprosthetic valve with peak gradient 17 mmHg, mean gradient 10 mmHg, and valve area 1.86 cm2. He has a history of diabetes mellitus, which has not required treatment with medication. Review of his record shows that his Hba1c has remained between 5.7-6.3 since 2011. He denies any polyuria, polydipsia, nocturia, or blurry vision, and has no history of retinopathy or neuropathy. He does have evidence of chronic renal disease, stage 3, with baseline creatinine 1.22-1.37/ eGFR 55-59 since 11/2014.  He has regular eye exams with Dr. Tisha Khan, although does not recall the date of his last exam.     He has a history of gout, but has not had a flare in many years since being treated with allopurinol. He has never had a screening colonoscopy. He did undergo Cologuard testing in 11/2017 which was negative. He denies any abdominal pain, nausea, vomiting, melena, hematochezia, or change in bowel movements. Past Medical History:   Diagnosis Date    Aortic stenosis     s/p AVR    Aortic stenosis, severe 10/16/2014    Echocardiogram EF 60% peak gradient 67 mmHg, mean gradient 30 mmHg, MARY 0.8 cm    ASHD (arteriosclerotic heart disease)     Chronic renal disease, stage III (HCC)     DM (diabetes mellitus) ([facility]     Gout     HCD (hypertensive cardiovascular disease)     History of echocardiogram 10/16/2014    EF 55-60%. No RWMA. Mild LVH. Gr 1 DDfx. Mild LAE. Severe AS (mean grad 30 mmHg).  Hyperlipidemia     Hypertension     Prostate cancer ([facility] 08/17/2017    [id number] [place] Grade 7 (3 + 4) adenocarcinoma of the prostate in 3/12 cores, 2-10% of each core; PSA 7.24. Dr. Mallorie Berg.  S/P AVR (aortic valve replacement) 11/13/2014    #23 mm Marifer Barbosa Magna pericardial valve. Dr. Ayaz Gaston.  S/P CABG x 2 11/13/2014    LIMA to LAD, SVG to OM1. Dr. Ayaz Gaston.  S/P cardiac catheterization 11/06/2014    RCA dom. mRCA 100%. LM patent. dLAD 75% x 2. pCX 80% (ELENA-3). LVEDP 14 mmHg. EF 50-55%. Severe inferobasal hypk. Severe AS. AV replacement & CABG recommended. Past Surgical History:   Procedure Laterality Date    HX APPENDECTOMY       Current Outpatient Medications   Medication Sig    lisinopril (PRINIVIL, ZESTRIL) 20 mg tablet Take 1 Tab by mouth daily.  allopurinol (ZYLOPRIM) 100 mg tablet TAKE 1 TABLET DAILY    simvastatin (ZOCOR) 40 mg tablet TAKE 1 TABLET NIGHTLY    metoprolol succinate (TOPROL-XL) 25 mg XL tablet Take 1 Tab by mouth daily.  cholecalciferol, vitamin D3, 2,000 unit tab Take  by mouth daily.  aspirin delayed-release 81 mg tablet Take 1 tablet by mouth daily.     sildenafil citrate (VIAGRA) 100 mg tablet Take 1 Tab by mouth as needed.  docusate sodium (COLACE) 100 mg capsule Take 100 mg by mouth daily. No current facility-administered medications for this visit. Allergies and Intolerances:   No Known Allergies     Family History:   Family History   Problem Relation Age of Onset   [facility] COPD Father     Other Father         pneumoconiosis    Lung Disease Father     Other Mother         meigs syndrome     Social History:   He  reports that  has never smoked. he has never used smokeless tobacco. He reports that he drinks approximately one case of beer per week. He is  with two adult children. He is retired from working in the [employer]. He started as a , and then became an . Social History     Substance and Sexual Activity   Alcohol Use Yes    Alcohol/week: 7.2 oz    Types: 12 Cans of beer per week     Immunization History:  Immunization History   Administered Date(s) Administered    Influenza High Dose Vaccine PF 10/10/2017, 10/26/2018    Influenza Vaccine 10/14/2014    Influenza Vaccine (Tri) Adjuvanted 10/04/2018    Influenza Vaccine Split 09/29/2011    Influenza Vaccine Whole 09/14/2010    Pneumococcal Conjugate (PCV-13) 06/29/2017    Pneumococcal Polysaccharide (PPSV-23) 10/14/2014    Td 01/01/2008    Tdap 01/12/2018       Review of Systems:   As above included in HPI. Otherwise 11 point review of systems negative including constitutional, skin, HENT, eyes, respiratory, cardiovascular, gastrointestinal, genitourinary, musculoskeletal, endocrine, hematologic, allergy, and neurologic. Physical:   Vitals:   BP: 160/82  HR: 72  WT: 224 lb (101.6 kg)  BMI:  28.00 kg/m2    Exam:   Pt appears well; alert and oriented x 3; appropriate affect. HEENT: PERRLA, anicteric, oropharynx clear, no JVD, adenopathy or thyromegaly. No carotid bruits or radiated murmur.   Lungs: clear to auscultation, no wheezes, rhonchi, or rales.  Heart: regular rate and rhythm. No murmur, rubs, gallops  Abdomen: soft, nontender, nondistended, normal bowel sounds, no hepatosplenomegaly or masses. Extremities: without edema. Pulses 1-2+ bilaterally. Review of Data:  Labs:  Office Visit on 11/01/2018   Component Date Value Ref Range Status    Color (UA POC) 11/01/2018 Yellow   Final    Clarity (UA POC) 11/01/2018 Clear   Final    Glucose (UA POC) 11/01/2018 Negative  Negative Final    Bilirubin (UA POC) 11/01/2018 Negative  Negative Final    Ketones (UA POC) 11/01/2018 Negative  Negative Final    Specific gravity (UA POC) 11/01/2018 1.010  1.001 - 1.035 Final    Blood (UA POC) 11/01/2018 Negative  Negative Final    pH (UA POC) 11/01/2018 6.0  4.6 - 8.0 Final    Protein (UA POC) 11/01/2018 Negative  Negative Final    Urobilinogen (UA POC) 11/01/2018 0.2 mg/dL  0.2 - 1 Final    Nitrites (UA POC) 11/01/2018 Negative  Negative Final    Leukocyte esterase (UA POC) 11/01/2018 Negative  Negative Final       Health Maintenance:  Screening:    Colorectal: Cologuard (11/2/2017) negative. Due 2020. Depression: none   DM (HbA1c/FPG): HbA1c 5.9 (10/2018)   Hepatitis C: negative (1/2018)   Falls: none   DEXA: N/A   PSA/KIEL: PSA 11.3 (10/2018). Followed by Dr. Ap Rodriguez   Glaucoma: regular eye exams with Dr. Serina Grayson.  Overdue for exam.   Smoking: none   Vitamin D: 28.7 (6/2017)   Medicare Wellness: 10/4/2018    Impression:  Patient Active Problem List   Diagnosis Code    Hyperlipidemia E78.5    Gout M10.9    Vitamin D deficiency E55.9    CAD in native artery I25.10    Hypertensive heart disease without congestive heart failure I11.9    S/P CABG x 2 Z95.1    S/P AVR (aortic valve replacement) Z95.2    Rising PSA level R97.20    Type 2 diabetes mellitus with stage 3 chronic kidney disease, without long-term current use of insulin (HCC) E11.22, N18.3    Stage 3 chronic kidney disease (HCC) N18.3    Prostate cancer ([address] C61    Essential hypertension I10       Plan:  1. Hypertension. Blood pressure continues to remain elevated today on current regimen of lisinopril 10 mg daily and metoprolol succinate 25 mg daily. He was instructed to increase lisinopril to 20 mg daily at his last visit one month ago, but did not do so. He was again instructed to begin 20 mg daily, and asked to discard the 5 mg tablets given the confusion. However, he states that he will take four (4) 5 mg tablets until gone prior to beginning the 20 mg tablet dose. Again requested that he begin monitoring his blood pressure at home, but he states that he is unable to do so. Will ask him to return in 4 weeks for reassessment. Renal function has been stable with creatinine 1.38 / eGFR 51. Will not reassess today since he did not increase his dose. Will reassess blood pressure and renal function in 4 weeks. Continue to follow. 2. Prostate cancer. [id number] [place] Grade 7 (3 + 4) adenocarcinoma. Patient chose active surveillance rather than definitive treatment. Repeat PSA and MRI prostate ordered for 2/2018, but he did not follow-up. Repeat PSA obtained and had increased from 7.24 at diagnosis to 11.3. Referred for follow-up with Dr. Marion Polanco, and now scheduled for MRI prostate on 12/31/2018 with probable repeat TRUS biopsy pending results. Patient aware of need to keep appointments. 3. Hyperlipidemia. On moderate intensity dose simvastatin with LDL 71 (6/2018), indicative of good control in this patient with goal <70 given history of ASHD and CABG. Continue to follow. Emphasized importance of lifestyle modifications, including diet, exercise, and weight loss. Continue to follow. 4. ASHD s/p 2 vessel CABG. Remains asymptomatic. On aspirin, metoprolol, and statin. Followed by Dr. Dae Drake. 5. Aortic stenosis s/p bioprosthetic AVR. Remains asymptomatic, and repeat echocardiogram in 8/2017 with good functioning valve. Follow. 6. Diabetes mellitus.  Patient with diagnosis of diabetes mellitus, but review of record shows HbA1c ranging from 5.7-6.3 since 2011 and -112 during that time period. He did have one glucose reading in 11/2014 at 128, but unclear if fasting and not confirmed. HbA1c now improved to 5.9. On Ace-I and statin. Continue follow-up with Dr. Imelda Castellanos for annual eye exams. Foot exam abnormal with decreased sensation of great toes. Urine microalbumin/ creatinine ratio without evidence of microalbuminuria, but does have evidence of chronic renal disease stage 3. Emphasized importance of lifestyle modifications, including diet, exercise, and weight loss. 7. Chronic renal disease, stage 3. Most likely secondary to long standing hypertension and prediabetes, although also appears to have developed at time of CABG and AVR so may be related to stress of surgery. On statin and Ace-I. Discussed importance of avoiding NSAIDS and prerenal status. Follow. 8. Gout. Asymptomatic. On allopurinol. 9. Health maintenance. Already received influenza vaccine. Given script for Shingrix vaccine but not yet obtained. Completed pneumococcal series. Given script for Tdap previously, but patient did not obtain. Other immunizations up to date. Completed Cologuard for colorectal cancer screening. Did not yet have eye exam with Dr. Imelda Castellanos. Stressed importance of follow-up. Discussed decreasing alcohol consumption. Vitamin D level normal. Continue maintenance dose supplement. Medicare wellness visit up to date. Patient understands recommendations and agrees with plan. Follow-up in 4 weeks to reassess blood pressure and renal function.

## 2019-01-09 ENCOUNTER — OFFICE VISIT (OUTPATIENT)
Dept: INTERNAL MEDICINE CLINIC | Age: 69
End: 2019-01-09

## 2019-01-09 VITALS
SYSTOLIC BLOOD PRESSURE: 152 MMHG | RESPIRATION RATE: 18 BRPM | OXYGEN SATURATION: 99 % | DIASTOLIC BLOOD PRESSURE: 70 MMHG | HEART RATE: 71 BPM | BODY MASS INDEX: 27.98 KG/M2 | WEIGHT: 225 LBS | HEIGHT: 75 IN | TEMPERATURE: 97.8 F

## 2019-01-09 DIAGNOSIS — H61.21 IMPACTED CERUMEN OF RIGHT EAR: ICD-10-CM

## 2019-01-09 DIAGNOSIS — E78.5 HYPERLIPIDEMIA, UNSPECIFIED HYPERLIPIDEMIA TYPE: ICD-10-CM

## 2019-01-09 DIAGNOSIS — N18.30 STAGE 3 CHRONIC KIDNEY DISEASE (HCC): ICD-10-CM

## 2019-01-09 DIAGNOSIS — Z95.2 S/P AVR (AORTIC VALVE REPLACEMENT): ICD-10-CM

## 2019-01-09 DIAGNOSIS — E11.22 TYPE 2 DIABETES MELLITUS WITH STAGE 3 CHRONIC KIDNEY DISEASE, WITHOUT LONG-TERM CURRENT USE OF INSULIN (HCC): ICD-10-CM

## 2019-01-09 DIAGNOSIS — C61 PROSTATE CANCER (HCC): ICD-10-CM

## 2019-01-09 DIAGNOSIS — N18.30 TYPE 2 DIABETES MELLITUS WITH STAGE 3 CHRONIC KIDNEY DISEASE, WITHOUT LONG-TERM CURRENT USE OF INSULIN (HCC): ICD-10-CM

## 2019-01-09 DIAGNOSIS — I10 ESSENTIAL HYPERTENSION: Primary | ICD-10-CM

## 2019-01-09 DIAGNOSIS — I25.10 CAD IN NATIVE ARTERY: ICD-10-CM

## 2019-01-09 DIAGNOSIS — Z95.1 S/P CABG X 2: ICD-10-CM

## 2019-01-09 DIAGNOSIS — H92.02 LEFT EAR PAIN: ICD-10-CM

## 2019-01-09 DIAGNOSIS — R97.20 RISING PSA LEVEL: ICD-10-CM

## 2019-01-09 DIAGNOSIS — M1A.09X0 IDIOPATHIC CHRONIC GOUT OF MULTIPLE SITES WITHOUT TOPHUS: Chronic | ICD-10-CM

## 2019-01-09 RX ORDER — HYDROCHLOROTHIAZIDE 12.5 MG/1
12.5 TABLET ORAL DAILY
Qty: 30 TAB | Refills: 3 | Status: SHIPPED | OUTPATIENT
Start: 2019-01-09 | End: 2019-01-31 | Stop reason: SDUPTHER

## 2019-01-09 RX ORDER — ACETAMINOPHEN 160 MG/5ML
SUSPENSION, ORAL (FINAL DOSE FORM) ORAL
COMMUNITY

## 2019-01-09 NOTE — PATIENT INSTRUCTIONS
Begin hydrochlorothiazide 12.5 mg daily. Continue lisinopril 20 mg daily. DASH Diet: Care Instructions Your Care Instructions The DASH diet is an eating plan that can help lower your blood pressure. DASH stands for Dietary Approaches to Stop Hypertension. Hypertension is high blood pressure. The DASH diet focuses on eating foods that are high in calcium, potassium, and magnesium. These nutrients can lower blood pressure. The foods that are highest in these nutrients are fruits, vegetables, low-fat dairy products, nuts, seeds, and legumes. But taking calcium, potassium, and magnesium supplements instead of eating foods that are high in those nutrients does not have the same effect. The DASH diet also includes whole grains, fish, and poultry. The DASH diet is one of several lifestyle changes your doctor may recommend to lower your high blood pressure. Your doctor may also want you to decrease the amount of sodium in your diet. Lowering sodium while following the DASH diet can lower blood pressure even further than just the DASH diet alone. Follow-up care is a key part of your treatment and safety. Be sure to make and go to all appointments, and call your doctor if you are having problems. It's also a good idea to know your test results and keep a list of the medicines you take. How can you care for yourself at home? Following the DASH diet · Eat 4 to 5 servings of fruit each day. A serving is 1 medium-sized piece of fruit, ½ cup chopped or canned fruit, 1/4 cup dried fruit, or 4 ounces (½ cup) of fruit juice. Choose fruit more often than fruit juice. · Eat 4 to 5 servings of vegetables each day. A serving is 1 cup of lettuce or raw leafy vegetables, ½ cup of chopped or cooked vegetables, or 4 ounces (½ cup) of vegetable juice. Choose vegetables more often than vegetable juice. · Get 2 to 3 servings of low-fat and fat-free dairy each day.  A serving is 8 ounces of milk, 1 cup of yogurt, or 1 ½ ounces of cheese. · Eat 6 to 8 servings of grains each day. A serving is 1 slice of bread, 1 ounce of dry cereal, or ½ cup of cooked rice, pasta, or cooked cereal. Try to choose whole-grain products as much as possible. · Limit lean meat, poultry, and fish to 2 servings each day. A serving is 3 ounces, about the size of a deck of cards. · Eat 4 to 5 servings of nuts, seeds, and legumes (cooked dried beans, lentils, and split peas) each week. A serving is 1/3 cup of nuts, 2 tablespoons of seeds, or ½ cup of cooked beans or peas. · Limit fats and oils to 2 to 3 servings each day. A serving is 1 teaspoon of vegetable oil or 2 tablespoons of salad dressing. · Limit sweets and added sugars to 5 servings or less a week. A serving is 1 tablespoon jelly or jam, ½ cup sorbet, or 1 cup of lemonade. · Eat less than 2,300 milligrams (mg) of sodium a day. If you limit your sodium to 1,500 mg a day, you can lower your blood pressure even more. Tips for success · Start small. Do not try to make dramatic changes to your diet all at once. You might feel that you are missing out on your favorite foods and then be more likely to not follow the plan. Make small changes, and stick with them. Once those changes become habit, add a few more changes. · Try some of the following: ? Make it a goal to eat a fruit or vegetable at every meal and at snacks. This will make it easy to get the recommended amount of fruits and vegetables each day. ? Try yogurt topped with fruit and nuts for a snack or healthy dessert. ? Add lettuce, tomato, cucumber, and onion to sandwiches. ? Combine a ready-made pizza crust with low-fat mozzarella cheese and lots of vegetable toppings. Try using tomatoes, squash, spinach, broccoli, carrots, cauliflower, and onions. ? Have a variety of cut-up vegetables with a low-fat dip as an appetizer instead of chips and dip. ? Sprinkle sunflower seeds or chopped almonds over salads. Or try adding chopped walnuts or almonds to cooked vegetables. ? Try some vegetarian meals using beans and peas. Add garbanzo or kidney beans to salads. Make burritos and tacos with mashed gaffney beans or black beans. Where can you learn more? Go to http://luis [employer]-erica.info/. Enter H667 in the search box to learn more about \"DASH Diet: Care Instructions. \" Current as of: December 6, 2017 Content Version: 11.8 © 0343-4116 [employer]. Care instructions adapted under license by [employer] (which disclaims liability or warranty for this information). If you have questions about a medical condition or this instruction, always ask your healthcare professional. [address] any warranty or liability for your use of this information.

## 2019-01-09 NOTE — PROGRESS NOTES
Chief Complaint Patient presents with  Hypertension 4 week follow up 1. Have you been to the ER, urgent care clinic or hospitalized since your last visit? NO.  
 
2. Have you seen or consulted any other health care providers outside of the [address] [place] [address] since your last visit (Include any pap smears or colon screening)? NO Patient states he has an appointment with Dr. Epperson Never this coming Friday, Jan. 11, 2018.

## 2019-01-13 ENCOUNTER — TELEPHONE (OUTPATIENT)
Dept: INTERNAL MEDICINE CLINIC | Age: 69
End: 2019-01-13

## 2019-01-13 NOTE — TELEPHONE ENCOUNTER
Please contact patient and ask him to schedule a lab appointment prior to his next visit on 1/31/2019. Thanks.

## 2019-01-13 NOTE — PROGRESS NOTES
HPI:  
Douglas Dia is a 70y.o. year old male who presents for reevaluation of blood pressure. He has a history of hypertension, hyperlipidemia, ASHD s/p CABG, aortic stenosis s/p AVR, diabetes mellitus, chronic renal disease stage 3, prostate cancer, and gout. He was seen on 10/4/2018 and blood pressure was noted to be elevated at 150/72 on lisinopril 5 mg daily and metoprolol succinate 25 mg daily. His dose of lisinopril was increased to 10 mg daily and he returned on 11/6/2018 for follow-up. His blood pressure remained elevated, and he was instructed to increase his dose of lisinopril to 20 mg daily. He returned on 12/13/2018 and reported that he was only taking two (2) of the 5 mg tablets, although he did receive a new bottle of 20 mg tablets from his mail order pharmacy. However, he states that he has not yet started this dose since he wanted to use up the 5 mg tablets. His blood pressure remained elevated at 160/82 and he was instructed to increase his dose of lisinopril to 20 mg daily (four (4) of the 5 mg tablets). He returns today and reports that he did increase his dose as instructed. He also reports that he is having discomfort and \"popping\" in his left ear. He denies any decreased hearing, nasal congestion, fever, or chills. He is otherwise without complaints. In 6/2017, he was noted to have an elevated PSA of 6.0, which was confirmed on repeat to be 6.6. He was referred to Dr. Antonio Gregory and PSA was again repeated and found to be increased at 7.24. He underwent TRUS biopsy on 8/17/2017, which showed [id number] [place] Grade 7 (3 + 4) adenocarcinoma of the prostate in 3/12 cores, 2-10% of each core. Options for management were discussed and he selected active surveillance. Plans were for repeat PSA and MRI prostate in 6 months (due 2/2018). However, the patient never had tests performed and he did not follow-up as discussed.  On 10/4/2018, at his routine visit, a PSA level was obtained and was found to have increased to 11.3, and he was advised to follow-up with Dr. Jessica Forbes. He underwent a prostate MRI 3T at [facility] on 12/31/2018 showing several peripheral zone PI-RADS 4 observations: right posterolateral peripheral zone at the mid gland/apex and anterior peripheral zone at the apex bilaterally. The former exhibits questionable capsular bulging laterally, but not a definitive appearance for extracapsular extension. He has a follow-up appointment with Dr. Jessica Forbes on 1/11/2019 to discuss and formulate plan of treatment. He denies any dysuria, hematuria, or flank pain. He has a history of hypertension, hyperlipidemia, ASHD, and aortic stenosis. In 10/2014, he presented with progressive chest pain and shortness of breath and an echocardiogram was obtained (10/16/2014) showing normal LV function (EF 55-60%), no RWMA, grade 1 diastolic dysfunction, mild LAE, and severe AS (mean gradient 30 mmHg, peak 67 mmHg, and AV area 0.8 cm2). He was referred to see Dr. Tod Burleson and underwent cardiac catheterization (11/6/2014) showing 100% RCA (distal collaterals from left), 70-80% distal LAD, 80% proximal LCx, inferior basal hypokinesis, and EF 55%. On 11/13/2014, he underwent 2 vessel CABG (LIMA to LAD and SVG to OM1) and AV replacement with a bioprosthetic 23 mm Marifer Barbosa Magna pericardial valve by Dr. Ezequiel Martínez. He has done well since that time and remains asymptomatic. He denies any chest pain, shortness of breath at rest or with exertion, palpitations, lightheadedness, or edema. His current regimen includes aspirin, metoprolol, lisinopril, and moderate intensity dose simvastatin. He is followed by Dr. Tod Burleson. He had a repeat echocardiogram (8/21/2017) showing normal LV function (EF 55-60%), no RWMA, mild MIL, and normally functioning bioprosthetic valve with peak gradient 17 mmHg, mean gradient 10 mmHg, and valve area 1.86 cm2. He has a history of diabetes mellitus, which has not required treatment with medication. Review of his record shows that his Hba1c has remained between 5.7-6.3 since 2011. He denies any polyuria, polydipsia, nocturia, or blurry vision, and has no history of retinopathy or neuropathy. He does have evidence of chronic renal disease, stage 3, with baseline creatinine 1.22-1.37/ eGFR 55-59 since 11/2014. He has regular eye exams with Dr. Erica Almodovar, although does not recall the date of his last exam.  
 
He has a history of gout, but has not had a flare in many years since being treated with allopurinol. He has never had a screening colonoscopy. He did undergo Cologuard testing in 11/2017 which was negative. He denies any abdominal pain, nausea, vomiting, melena, hematochezia, or change in bowel movements. Past Medical History:  
Diagnosis Date  Aortic stenosis s/p AVR  Aortic stenosis, severe 10/16/2014 Echocardiogram EF 60% peak gradient 67 mmHg, mean gradient 30 mmHg, MARY 0.8 cm  ASHD (arteriosclerotic heart disease)  Chronic renal disease, stage III ([facility]  DM (diabetes mellitus) ([facility]  Gout  HCD (hypertensive cardiovascular disease)  History of echocardiogram 10/16/2014 EF 55-60%. No RWMA. Mild LVH. Gr 1 DDfx. Mild LAE. Severe AS (mean grad 30 mmHg).  Hyperlipidemia  Hypertension  Prostate cancer ([facility] 08/17/2017  
 [id number] [place] Grade 7 (3 + 4) adenocarcinoma of the prostate in 3/12 cores, 2-10% of each core; PSA 7.24. Dr. Mars Bhatt.  S/P AVR (aortic valve replacement) 11/13/2014 #23 mm Marifer [facility] pericardial valve. Dr. Holly Kidney.  S/P CABG x 2 11/13/2014 LIMA to LAD, SVG to OM1. Dr. Holly Kidney.  S/P cardiac catheterization 11/06/2014 RCA dom. mRCA 100%. LM patent. dLAD 75% x 2. pCX 80% (ELENA-3). LVEDP 14 mmHg. EF 50-55%. Severe inferobasal hypk. Severe AS. AV replacement & CABG recommended. Past Surgical History:  
Procedure Laterality Date  HX APPENDECTOMY Current Outpatient Medications Medication Sig  
 ascorbic acid, vitamin C, (VITAMIN C) 500 mg tablet Take  by mouth.  hydroCHLOROthiazide (HYDRODIURIL) 12.5 mg tablet Take 1 Tab by mouth daily.  lisinopril (PRINIVIL, ZESTRIL) 20 mg tablet Take 1 Tab by mouth daily.  allopurinol (ZYLOPRIM) 100 mg tablet TAKE 1 TABLET DAILY  simvastatin (ZOCOR) 40 mg tablet TAKE 1 TABLET NIGHTLY  metoprolol succinate (TOPROL-XL) 25 mg XL tablet Take 1 Tab by mouth daily.  docusate sodium (COLACE) 100 mg capsule Take 100 mg by mouth daily.  cholecalciferol, vitamin D3, 2,000 unit tab Take  by mouth daily.  aspirin delayed-release 81 mg tablet Take 1 tablet by mouth daily.  sildenafil citrate (VIAGRA) 100 mg tablet Take 1 Tab by mouth as needed. No current facility-administered medications for this visit. Allergies and Intolerances:  
No Known Allergies Family History:  
Family History Problem Relation Age of Onset  COPD Father  Other Father   
     pneumoconiosis  Lung Disease Father  Other Mother   
     meigs syndrome Social History: He  reports that  has never smoked. he has never used smokeless tobacco. He reports that he drinks approximately one case of beer per week. He is  with two adult children. He is retired from working in the [employer]. He started as a , and then became an . Social History Substance and Sexual Activity Alcohol Use Yes  Alcohol/week: 7.2 oz  Types: 12 Cans of beer per week Immunization History: 
Immunization History Administered Date(s) Administered  Influenza High Dose Vaccine PF 10/10/2017, 10/26/2018  Influenza Vaccine 10/14/2014  Influenza Vaccine (Tri) Adjuvanted 10/04/2018  Influenza Vaccine Split 09/29/2011  Influenza Vaccine Whole 09/14/2010  Pneumococcal Conjugate (PCV-13) 06/29/2017  Pneumococcal Polysaccharide (PPSV-23) 10/14/2014  Td 01/01/2008  Tdap 01/12/2018 Review of Systems: As above included in HPI. Otherwise 11 point review of systems negative including constitutional, skin, HENT, eyes, respiratory, cardiovascular, gastrointestinal, genitourinary, musculoskeletal, endocrine, hematologic, allergy, and neurologic. Physical:  
Vitals:  
BP: 152/70 HR: 71 
WT: 225 lb (102.1 kg) BMI:  28.12 kg/m2 Exam:  
Pt appears well; alert and oriented x 3; appropriate affect. HEENT: PERRLA, anicteric, oropharynx clear, bilateral cerumen impaction, no JVD, adenopathy or thyromegaly. No carotid bruits or radiated murmur. Lungs: clear to auscultation, no wheezes, rhonchi, or rales. Heart: regular rate and rhythm. No murmur, rubs, gallops Abdomen: soft, nontender, nondistended, normal bowel sounds, no hepatosplenomegaly or masses. Extremities: without edema. Pulses 1-2+ bilaterally. Review of Data: 
Labs: 
No visits with results within 2 Month(s) from this visit. Latest known visit with results is:  
Office Visit on 11/01/2018 Component Date Value Ref Range Status  Color (UA POC) 11/01/2018 Yellow   Final  
 Clarity (UA POC) 11/01/2018 Clear   Final  
 Glucose (UA POC) 11/01/2018 Negative  Negative Final  
 Bilirubin (UA POC) 11/01/2018 Negative  Negative Final  
 Ketones (UA POC) 11/01/2018 Negative  Negative Final  
 Specific gravity (UA POC) 11/01/2018 1.010  1.001 - 1.035 Final  
 Blood (UA POC) 11/01/2018 Negative  Negative Final  
 pH (UA POC) 11/01/2018 6.0  4.6 - 8.0 Final  
 Protein (UA POC) 11/01/2018 Negative  Negative Final  
 Urobilinogen (UA POC) 11/01/2018 0.2 mg/dL  0.2 - 1 Final  
 Nitrites (UA POC) 11/01/2018 Negative  Negative Final  
 Leukocyte esterase (UA POC) 11/01/2018 Negative  Negative Final  
 
 
Health Maintenance: 
Screening:  
 Colorectal: Cologuard (11/2/2017) negative. Due 2020. Depression: none DM (HbA1c/FPG): HbA1c 5.9 (10/2018) Hepatitis C: negative (1/2018) Falls: none DEXA: N/A 
 PSA/KIEL: PSA 11.3 (10/2018). Followed by Dr. Joshua Clark 
 Glaucoma: regular eye exams with Dr. Vidya Villasenor. Overdue for exam. 
 Smoking: none Vitamin D: 28.7 (6/2017) Medicare Wellness: 10/4/2018 Impression: 
Patient Active Problem List  
Diagnosis Code  Hyperlipidemia E78.5  Gout M10.9  Vitamin D deficiency E55.9  CAD in native artery I25.10  Hypertensive heart disease without congestive heart failure I11.9  
 S/P CABG x 2 Z95.1  
 S/P AVR (aortic valve replacement) Z95.2  Rising PSA level R97.20  Type 2 diabetes mellitus with stage 3 chronic kidney disease, without long-term current use of insulin (HCC) E11.22, N18.3  Stage 3 chronic kidney disease (HCC) N18.3  Prostate cancer ([facility] C61  
 Essential hypertension I10 Plan: 1. Hypertension. Blood pressure improved, but continues to remain elevated today on current regimen of lisinopril 20 mg daily and metoprolol succinate 25 mg daily. Again requested that he begin monitoring his blood pressure at home, but he states that he is unable to do so. Will begin hydrochlorothiazide 12.5 mg daily and ask him to return in 3-4 weeks for reassessment. Renal function has been stable with creatinine 1.38 / eGFR 51. Will reassess blood pressure and renal function in 4 weeks. Continue to follow. 2. Prostate cancer. [id number] [place] Grade 7 (3 + 4) adenocarcinoma. Patient chose active surveillance rather than definitive treatment. Repeat PSA and MRI prostate ordered for 2/2018, but he did not follow-up. Repeat PSA obtained and had increased from 7.24 at diagnosis to 11.3.  Referred for follow-up with Dr. Joshua Clark, and MRI prostate performed on 12/31/2018 showing several peripheral zone PI-RADS 4 with one area showing questionable capsular bulging laterally, but not a definitive appearance for extracapsular extension. He has a follow-up appointment with Dr. Phoenix Gordon on 1/11/2019 to discuss further plans and therapy. 3. Hyperlipidemia. On moderate intensity dose simvastatin with LDL 71 (6/2018), indicative of good control in this patient with goal <70 given history of ASHD and CABG. Continue to follow. Emphasized importance of lifestyle modifications, including diet, exercise, and weight loss. Continue to follow. 4. ASHD s/p 2 vessel CABG. Remains asymptomatic. On aspirin, metoprolol, and statin. Followed by Dr. Serina Pedro. 5. Aortic stenosis s/p bioprosthetic AVR. Remains asymptomatic, and repeat echocardiogram in 8/2017 with good functioning valve. Follow. 6. Diabetes mellitus. Patient with diagnosis of diabetes mellitus, but review of record shows HbA1c ranging from 5.7-6.3 since 2011 and -112 during that time period. He did have one glucose reading in 11/2014 at 128, but unclear if fasting and not confirmed. HbA1c now improved to 5.9. On Ace-I and statin. Continue follow-up with Dr. Tresa Faust for annual eye exams. Foot exam abnormal with decreased sensation of great toes. Will repeat next visit. Urine microalbumin/ creatinine ratio without evidence of microalbuminuria, but does have evidence of chronic renal disease stage 3. Emphasized importance of lifestyle modifications, including diet, exercise, and weight loss. 7. Chronic renal disease, stage 3. Most likely secondary to long standing hypertension and prediabetes, although also appears to have developed at time of CABG and AVR so may be related to stress of surgery. On statin and Ace-I. Discussed importance of avoiding NSAIDS and prerenal status. Follow. 8. Gout. Asymptomatic. On allopurinol. 9. Cerumen impaction/left ear discomfort. Will refer to Dr. Carol Velásquez for evaluation and management. 10. Health maintenance. Already received influenza vaccine.  Given script for Shingrix vaccine but not yet obtained. Completed pneumococcal series. Given script for Tdap previously, but patient did not obtain. Other immunizations up to date. Completed Cologuard for colorectal cancer screening. Did not yet have eye exam with Dr. Marilee Casper. Stressed importance of follow-up. Discussed decreasing alcohol consumption. Vitamin D level normal. Continue maintenance dose supplement. Medicare wellness visit up to date. Patient understands recommendations and agrees with plan. Follow-up in 4 weeks to reassess blood pressure and renal function.

## 2019-01-22 ENCOUNTER — HOSPITAL ENCOUNTER (OUTPATIENT)
Dept: LAB | Age: 69
Discharge: HOME | End: 2019-01-22
Payer: MEDICARE

## 2019-01-22 ENCOUNTER — APPOINTMENT (OUTPATIENT)
Dept: INTERNAL MEDICINE CLINIC | Age: 69
End: 2019-01-22

## 2019-01-22 DIAGNOSIS — E11.22 TYPE 2 DIABETES MELLITUS WITH STAGE 3 CHRONIC KIDNEY DISEASE, WITHOUT LONG-TERM CURRENT USE OF INSULIN (HCC): ICD-10-CM

## 2019-01-22 DIAGNOSIS — Z95.2 S/P AVR (AORTIC VALVE REPLACEMENT): ICD-10-CM

## 2019-01-22 DIAGNOSIS — R97.20 RISING PSA LEVEL: ICD-10-CM

## 2019-01-22 DIAGNOSIS — H61.21 IMPACTED CERUMEN OF RIGHT EAR: ICD-10-CM

## 2019-01-22 DIAGNOSIS — I10 ESSENTIAL HYPERTENSION: ICD-10-CM

## 2019-01-22 DIAGNOSIS — E78.5 HYPERLIPIDEMIA, UNSPECIFIED HYPERLIPIDEMIA TYPE: ICD-10-CM

## 2019-01-22 DIAGNOSIS — N18.30 STAGE 3 CHRONIC KIDNEY DISEASE (HCC): ICD-10-CM

## 2019-01-22 DIAGNOSIS — H92.02 LEFT EAR PAIN: ICD-10-CM

## 2019-01-22 DIAGNOSIS — N18.30 TYPE 2 DIABETES MELLITUS WITH STAGE 3 CHRONIC KIDNEY DISEASE, WITHOUT LONG-TERM CURRENT USE OF INSULIN (HCC): ICD-10-CM

## 2019-01-22 DIAGNOSIS — I25.10 CAD IN NATIVE ARTERY: ICD-10-CM

## 2019-01-22 DIAGNOSIS — M1A.09X0 IDIOPATHIC CHRONIC GOUT OF MULTIPLE SITES WITHOUT TOPHUS: Chronic | ICD-10-CM

## 2019-01-22 DIAGNOSIS — C61 PROSTATE CANCER (HCC): ICD-10-CM

## 2019-01-22 DIAGNOSIS — Z95.1 S/P CABG X 2: ICD-10-CM

## 2019-01-22 LAB
ALBUMIN SERPL-MCNC: 4.6 G/DL (ref 3.4–5)
ALBUMIN/GLOB SERPL: 1.3 {RATIO} (ref 0.8–1.7)
ALP SERPL-CCNC: 49 U/L (ref 45–117)
ALT SERPL-CCNC: 30 U/L (ref 16–61)
ANION GAP SERPL CALC-SCNC: 8 MMOL/L (ref 3–18)
AST SERPL-CCNC: 24 U/L (ref 15–37)
BASOPHILS # BLD: 0 K/UL (ref 0–0.1)
BASOPHILS NFR BLD: 1 % (ref 0–2)
BILIRUB SERPL-MCNC: 1 MG/DL (ref 0.2–1)
BUN SERPL-MCNC: 33 MG/DL (ref 7–18)
BUN/CREAT SERPL: 23 (ref 12–20)
CALCIUM SERPL-MCNC: 9.8 MG/DL (ref 8.5–10.1)
CHLORIDE SERPL-SCNC: 100 MMOL/L (ref 100–108)
CHOLEST SERPL-MCNC: 172 MG/DL
CO2 SERPL-SCNC: 27 MMOL/L (ref 21–32)
CREAT SERPL-MCNC: 1.42 MG/DL (ref 0.6–1.3)
DIFFERENTIAL METHOD BLD: ABNORMAL
EOSINOPHIL # BLD: 0.2 K/UL (ref 0–0.4)
EOSINOPHIL NFR BLD: 3 % (ref 0–5)
ERYTHROCYTE [DISTWIDTH] IN BLOOD BY AUTOMATED COUNT: 12.6 % (ref 11.6–14.5)
GLOBULIN SER CALC-MCNC: 3.5 G/DL (ref 2–4)
GLUCOSE SERPL-MCNC: 137 MG/DL (ref 74–99)
HCT VFR BLD AUTO: 44.1 % (ref 36–48)
HDLC SERPL-MCNC: 69 MG/DL (ref 40–60)
HDLC SERPL: 2.5 {RATIO} (ref 0–5)
HGB BLD-MCNC: 14.5 G/DL (ref 13–16)
LDLC SERPL CALC-MCNC: 84.4 MG/DL (ref 0–100)
LIPID PROFILE,FLP: ABNORMAL
LYMPHOCYTES # BLD: 1.4 K/UL (ref 0.9–3.6)
LYMPHOCYTES NFR BLD: 23 % (ref 21–52)
MAGNESIUM SERPL-MCNC: 2.2 MG/DL (ref 1.6–2.6)
MCH RBC QN AUTO: 33.6 PG (ref 24–34)
MCHC RBC AUTO-ENTMCNC: 32.9 G/DL (ref 31–37)
MCV RBC AUTO: 102.3 FL (ref 74–97)
MONOCYTES # BLD: 0.6 K/UL (ref 0.05–1.2)
MONOCYTES NFR BLD: 9 % (ref 3–10)
NEUTS SEG # BLD: 4 K/UL (ref 1.8–8)
NEUTS SEG NFR BLD: 64 % (ref 40–73)
PLATELET # BLD AUTO: 233 K/UL (ref 135–420)
PMV BLD AUTO: 9.3 FL (ref 9.2–11.8)
POTASSIUM SERPL-SCNC: 5.3 MMOL/L (ref 3.5–5.5)
PROT SERPL-MCNC: 8.1 G/DL (ref 6.4–8.2)
RBC # BLD AUTO: 4.31 M/UL (ref 4.7–5.5)
SODIUM SERPL-SCNC: 135 MMOL/L (ref 136–145)
TRIGL SERPL-MCNC: 93 MG/DL (ref ?–150)
VLDLC SERPL CALC-MCNC: 18.6 MG/DL
WBC # BLD AUTO: 6.1 K/UL (ref 4.6–13.2)

## 2019-01-22 PROCEDURE — 36415 COLL VENOUS BLD VENIPUNCTURE: CPT

## 2019-01-22 PROCEDURE — 82306 VITAMIN D 25 HYDROXY: CPT

## 2019-01-22 PROCEDURE — 80053 COMPREHEN METABOLIC PANEL: CPT

## 2019-01-22 PROCEDURE — 80061 LIPID PANEL: CPT

## 2019-01-22 PROCEDURE — 83735 ASSAY OF MAGNESIUM: CPT

## 2019-01-22 PROCEDURE — 85025 COMPLETE CBC W/AUTO DIFF WBC: CPT

## 2019-01-23 LAB — 25(OH)D3 SERPL-MCNC: 27.4 NG/ML (ref 30–100)

## 2019-01-30 ENCOUNTER — HOSPITAL ENCOUNTER (OUTPATIENT)
Dept: RADIATION THERAPY | Age: 69
Discharge: HOME | End: 2019-01-30
Payer: MEDICARE

## 2019-01-30 PROCEDURE — 99211 OFF/OP EST MAY X REQ PHY/QHP: CPT

## 2019-01-31 ENCOUNTER — TELEPHONE (OUTPATIENT)
Dept: INTERNAL MEDICINE CLINIC | Age: 69
End: 2019-01-31

## 2019-01-31 ENCOUNTER — OFFICE VISIT (OUTPATIENT)
Dept: INTERNAL MEDICINE CLINIC | Age: 69
End: 2019-01-31

## 2019-01-31 VITALS
RESPIRATION RATE: 16 BRPM | HEART RATE: 76 BPM | SYSTOLIC BLOOD PRESSURE: 124 MMHG | DIASTOLIC BLOOD PRESSURE: 60 MMHG | OXYGEN SATURATION: 98 % | TEMPERATURE: 97.8 F | BODY MASS INDEX: 26.98 KG/M2 | WEIGHT: 217 LBS | HEIGHT: 75 IN

## 2019-01-31 DIAGNOSIS — E55.9 VITAMIN D DEFICIENCY: ICD-10-CM

## 2019-01-31 DIAGNOSIS — N18.30 TYPE 2 DIABETES MELLITUS WITH STAGE 3 CHRONIC KIDNEY DISEASE, WITHOUT LONG-TERM CURRENT USE OF INSULIN (HCC): ICD-10-CM

## 2019-01-31 DIAGNOSIS — Z95.2 S/P AVR (AORTIC VALVE REPLACEMENT): ICD-10-CM

## 2019-01-31 DIAGNOSIS — Z95.1 S/P CABG X 2: ICD-10-CM

## 2019-01-31 DIAGNOSIS — N18.30 STAGE 3 CHRONIC KIDNEY DISEASE (HCC): ICD-10-CM

## 2019-01-31 DIAGNOSIS — E78.5 HYPERLIPIDEMIA, UNSPECIFIED HYPERLIPIDEMIA TYPE: ICD-10-CM

## 2019-01-31 DIAGNOSIS — E11.22 TYPE 2 DIABETES MELLITUS WITH STAGE 3 CHRONIC KIDNEY DISEASE, WITHOUT LONG-TERM CURRENT USE OF INSULIN (HCC): ICD-10-CM

## 2019-01-31 DIAGNOSIS — I25.10 CAD IN NATIVE ARTERY: ICD-10-CM

## 2019-01-31 DIAGNOSIS — M1A.09X0 IDIOPATHIC CHRONIC GOUT OF MULTIPLE SITES WITHOUT TOPHUS: Chronic | ICD-10-CM

## 2019-01-31 DIAGNOSIS — C61 PROSTATE CANCER (HCC): ICD-10-CM

## 2019-01-31 DIAGNOSIS — I10 ESSENTIAL HYPERTENSION: Primary | ICD-10-CM

## 2019-01-31 RX ORDER — HYDROCHLOROTHIAZIDE 12.5 MG/1
12.5 TABLET ORAL DAILY
Qty: 90 TAB | Refills: 3 | Status: SHIPPED | OUTPATIENT
Start: 2019-01-31 | End: 2019-06-05

## 2019-01-31 NOTE — PROGRESS NOTES
Chief Complaint Patient presents with  Hypertension 3 week follow up with lab results. 1. Have you been to the ER, urgent care clinic or hospitalized since your last visit? NO.  
 
2. Have you seen or consulted any other health care providers outside of the [address] [place] [address] since your last visit (Include any pap smears or colon screening)?  NO

## 2019-01-31 NOTE — PATIENT INSTRUCTIONS
Learning About Diabetes Food Guidelines Your Care Instructions Meal planning is important to manage diabetes. It helps keep your blood sugar at a target level (which you set with your doctor). You don't have to eat special foods. You can eat what your family eats, including sweets once in a while. But you do have to pay attention to how often you eat and how much you eat of certain foods. You may want to work with a dietitian or a certified diabetes educator (CDE) to help you plan meals and snacks. A dietitian or CDE can also help you lose weight if that is one of your goals. What should you know about eating carbs? Managing the amount of carbohydrate (carbs) you eat is an important part of healthy meals when you have diabetes. Carbohydrate is found in many foods. · Learn which foods have carbs. And learn the amounts of carbs in different foods. ? Bread, cereal, pasta, and rice have about 15 grams of carbs in a serving. A serving is 1 slice of bread (1 ounce), ½ cup of cooked cereal, or 1/3 cup of cooked pasta or rice. ? Fruits have 15 grams of carbs in a serving. A serving is 1 small fresh fruit, such as an apple or orange; ½ of a banana; ½ cup of cooked or canned fruit; ½ cup of fruit juice; 1 cup of melon or raspberries; or 2 tablespoons of dried fruit. ? Milk and no-sugar-added yogurt have 15 grams of carbs in a serving. A serving is 1 cup of milk or 2/3 cup of no-sugar-added yogurt. ? Starchy vegetables have 15 grams of carbs in a serving. A serving is ½ cup of mashed potatoes or sweet potato; 1 cup winter squash; ½ of a small baked potato; ½ cup of cooked beans; or ½ cup cooked corn or green peas. · Learn how much carbs to eat each day and at each meal. A dietitian or CDE can teach you how to keep track of the amount of carbs you eat. This is called carbohydrate counting.  
· If you are not sure how to count carbohydrate grams, use the Plate Method to plan meals. It is a good, quick way to make sure that you have a balanced meal. It also helps you spread carbs throughout the day. ? Divide your plate by types of foods. Put non-starchy vegetables on half the plate, meat or other protein food on one-quarter of the plate, and a grain or starchy vegetable in the final quarter of the plate. To this you can add a small piece of fruit and 1 cup of milk or yogurt, depending on how many carbs you are supposed to eat at a meal. 
· Try to eat about the same amount of carbs at each meal. Do not \"save up\" your daily allowance of carbs to eat at one meal. 
· Proteins have very little or no carbs per serving. Examples of proteins are beef, chicken, turkey, fish, eggs, tofu, cheese, cottage cheese, and peanut butter. A serving size of meat is 3 ounces, which is about the size of a deck of cards. Examples of meat substitute serving sizes (equal to 1 ounce of meat) are 1/4 cup of cottage cheese, 1 egg, 1 tablespoon of peanut butter, and ½ cup of tofu. How can you eat out and still eat healthy? · Learn to estimate the serving sizes of foods that have carbohydrate. If you measure food at home, it will be easier to estimate the amount in a serving of restaurant food. · If the meal you order has too much carbohydrate (such as potatoes, corn, or baked beans), ask to have a low-carbohydrate food instead. Ask for a salad or green vegetables. · If you use insulin, check your blood sugar before and after eating out to help you plan how much to eat in the future. · If you eat more carbohydrate at a meal than you had planned, take a walk or do other exercise. This will help lower your blood sugar. What else should you know? · Limit saturated fat, such as the fat from meat and dairy products. This is a healthy choice because people who have diabetes are at higher risk of heart disease.  So choose lean cuts of meat and nonfat or low-fat dairy products. Use olive or canola oil instead of butter or shortening when cooking. · Don't skip meals. Your blood sugar may drop too low if you skip meals and take insulin or certain medicines for diabetes. · Check with your doctor before you drink alcohol. Alcohol can cause your blood sugar to drop too low. Alcohol can also cause a bad reaction if you take certain diabetes medicines. Follow-up care is a key part of your treatment and safety. Be sure to make and go to all appointments, and call your doctor if you are having problems. It's also a good idea to know your test results and keep a list of the medicines you take. Where can you learn more? Go to http://luis e-erica.info/. Enter O593 in the search box to learn more about \"Learning About Diabetes Food Guidelines. \" Current as of: July 25, 2018 Content Version: 11.9 © 1272-3655 [employer]. Care instructions adapted under license by [employer] (which disclaims liability or warranty for this information). If you have questions about a medical condition or this instruction, always ask your healthcare professional. [address] any warranty or liability for your use of this information. DASH Diet: Care Instructions Your Care Instructions The DASH diet is an eating plan that can help lower your blood pressure. DASH stands for Dietary Approaches to Stop Hypertension. Hypertension is high blood pressure. The DASH diet focuses on eating foods that are high in calcium, potassium, and magnesium. These nutrients can lower blood pressure. The foods that are highest in these nutrients are fruits, vegetables, low-fat dairy products, nuts, seeds, and legumes. But taking calcium, potassium, and magnesium supplements instead of eating foods that are high in those nutrients does not have the same effect. The DASH diet also includes whole grains, fish, and poultry. The DASH diet is one of several lifestyle changes your doctor may recommend to lower your high blood pressure. Your doctor may also want you to decrease the amount of sodium in your diet. Lowering sodium while following the DASH diet can lower blood pressure even further than just the DASH diet alone. Follow-up care is a key part of your treatment and safety. Be sure to make and go to all appointments, and call your doctor if you are having problems. It's also a good idea to know your test results and keep a list of the medicines you take. How can you care for yourself at home? Following the DASH diet · Eat 4 to 5 servings of fruit each day. A serving is 1 medium-sized piece of fruit, ½ cup chopped or canned fruit, 1/4 cup dried fruit, or 4 ounces (½ cup) of fruit juice. Choose fruit more often than fruit juice. · Eat 4 to 5 servings of vegetables each day. A serving is 1 cup of lettuce or raw leafy vegetables, ½ cup of chopped or cooked vegetables, or 4 ounces (½ cup) of vegetable juice. Choose vegetables more often than vegetable juice. · Get 2 to 3 servings of low-fat and fat-free dairy each day. A serving is 8 ounces of milk, 1 cup of yogurt, or 1 ½ ounces of cheese. · Eat 6 to 8 servings of grains each day. A serving is 1 slice of bread, 1 ounce of dry cereal, or ½ cup of cooked rice, pasta, or cooked cereal. Try to choose whole-grain products as much as possible. · Limit lean meat, poultry, and fish to 2 servings each day. A serving is 3 ounces, about the size of a deck of cards. · Eat 4 to 5 servings of nuts, seeds, and legumes (cooked dried beans, lentils, and split peas) each week. A serving is 1/3 cup of nuts, 2 tablespoons of seeds, or ½ cup of cooked beans or peas. · Limit fats and oils to 2 to 3 servings each day. A serving is 1 teaspoon of vegetable oil or 2 tablespoons of salad dressing. · Limit sweets and added sugars to 5 servings or less a week.  A serving is 1 tablespoon jelly or jam, ½ cup sorbet, or 1 cup of lemonade. · Eat less than 2,300 milligrams (mg) of sodium a day. If you limit your sodium to 1,500 mg a day, you can lower your blood pressure even more. Tips for success · Start small. Do not try to make dramatic changes to your diet all at once. You might feel that you are missing out on your favorite foods and then be more likely to not follow the plan. Make small changes, and stick with them. Once those changes become habit, add a few more changes. · Try some of the following: ? Make it a goal to eat a fruit or vegetable at every meal and at snacks. This will make it easy to get the recommended amount of fruits and vegetables each day. ? Try yogurt topped with fruit and nuts for a snack or healthy dessert. ? Add lettuce, tomato, cucumber, and onion to sandwiches. ? Combine a ready-made pizza crust with low-fat mozzarella cheese and lots of vegetable toppings. Try using tomatoes, squash, spinach, broccoli, carrots, cauliflower, and onions. ? Have a variety of cut-up vegetables with a low-fat dip as an appetizer instead of chips and dip. ? Sprinkle sunflower seeds or chopped almonds over salads. Or try adding chopped walnuts or almonds to cooked vegetables. ? Try some vegetarian meals using beans and peas. Add garbanzo or kidney beans to salads. Make burritos and tacos with mashed gaffney beans or black beans. Where can you learn more? Go to http://luis e-erica.info/. Enter W945 in the search box to learn more about \"DASH Diet: Care Instructions. \" Current as of: July 22, 2018 Content Version: 11.9 © 5262-1387 [employer]. Care instructions adapted under license by [employer] (which disclaims liability or warranty for this information).  If you have questions about a medical condition or this instruction, always ask your healthcare professional. [employer] disclaims any warranty or liability for your use of this information.

## 2019-02-04 NOTE — PROGRESS NOTES
HPI:  
Amy Murillo is a 78y.o. year old male who presents for reevaluation of blood pressure. He has a history of hypertension, hyperlipidemia, ASHD s/p CABG, aortic stenosis s/p AVR, diabetes mellitus, chronic renal disease stage 3, prostate cancer, and gout. He was first seen on 10/4/2018 and blood pressure was noted to be elevated at 150/72 on lisinopril 5 mg daily and metoprolol succinate 25 mg daily. His dose of lisinopril was gradually increased and at his last visit on 1/9/2019, he was taking lisinopril 20 mg daily and metoprolol succinate 25 mg daily. His blood pressure was improved, but remained elevated, and he was started on hydrochlorothiazide 12.5 mg daily. He returns today and reports that he has had no difficulty tolerating the new medication. He has not been monitoring his blood pressure at home. He also reports that he has met with Dr. Izabela Everett, and plans are to proceed with EBRT for treatment of his prostate cancer. He is in the process of scheduling fiducial marker and Space[employer] placement to minimize effects on surrounding tissues. He is otherwise without complaints and feeling generally well. In 6/2017, he was noted to have an elevated PSA of 6.0, which was confirmed on repeat to be 6.6. He was referred to Dr. Dianna Atkins and PSA was again repeated and found to be increased at 7.24. He underwent TRUS biopsy on 8/17/2017, which showed [id number] Ol[place] Grade 7 (3 + 4) adenocarcinoma of the prostate in 3/12 cores, 2-10% of each core. Options for management were discussed and he selected active surveillance. Plans were for repeat PSA and MRI prostate in 6 months (due 2/2018). However, the patient never had tests performed and he did not follow-up as discussed. On 10/4/2018, at his routine visit, a PSA level was obtained and was found to have increased to 11.3, and he was advised to follow-up with Dr. Dianna Atkins.  He underwent a prostate MRI 3T at [facility] on 12/31/2018 showing several peripheral zone PI-RADS 4 observations: right posterolateral peripheral zone at the mid gland/apex and anterior peripheral zone at the apex bilaterally. The former exhibits questionable capsular bulging laterally, but not a definitive appearance for extracapsular extension. He had a follow-up appointment with Dr. Alexa Toure on 1/11/2019 and decision made to proceed with EBRT. He had a staging bone scan (1/23/2019) which was negative for osseous metastases, and a CT abdomen and pelvis (1/23/2019) which showed no adenopathy of evidence of metastases, mild hepatic steatosis, aortic atherosclerosis, and cholelithiasis. He met with Dr. Asa Smith on 1/30/2019 and has decided to proceed with ERBT for definitive treatment. He has a history of hypertension, hyperlipidemia, ASHD, and aortic stenosis. In 10/2014, he presented with progressive chest pain and shortness of breath and an echocardiogram was obtained (10/16/2014) showing normal LV function (EF 55-60%), no RWMA, grade 1 diastolic dysfunction, mild LAE, and severe AS (mean gradient 30 mmHg, peak 67 mmHg, and AV area 0.8 cm2). He was referred to see Dr. Humera Infante and underwent cardiac catheterization (11/6/2014) showing 100% RCA (distal collaterals from left), 70-80% distal LAD, 80% proximal LCx, inferior basal hypokinesis, and EF 55%. On 11/13/2014, he underwent 2 vessel CABG (LIMA to LAD and SVG to OM1) and AV replacement with a bioprosthetic 23 mm Marifer Barbosa Magna pericardial valve by Dr. Nikolas Norton. He has done well since that time and remains asymptomatic. He denies any chest pain, shortness of breath at rest or with exertion, palpitations, lightheadedness, or edema. His current regimen includes aspirin, metoprolol, lisinopril, and moderate intensity dose simvastatin. He is followed by Dr. Humera Infante.  He had a repeat echocardiogram (8/21/2017) showing normal LV function (EF 55-60%), no RWMA, mild MIL, and normally functioning bioprosthetic valve with peak gradient 17 mmHg, mean gradient 10 mmHg, and valve area 1.86 cm2. He has a history of diabetes mellitus, which has not required treatment with medication. Review of his record shows that his Hba1c has remained between 5.7-6.3 since 2011. He denies any polyuria, polydipsia, nocturia, or blurry vision, and has no history of retinopathy or neuropathy. He does have evidence of chronic renal disease, stage 3, with baseline creatinine 1.22-1.37/ eGFR 55-59 since 11/2014. He had been having regular eye exams with Dr. Jes Prado, although does not recall the date of his last exam and is overdue. He has a history of gout, but has not had a flare in many years since being treated with allopurinol. He has never had a screening colonoscopy. He did undergo Cologuard testing in 11/2017 which was negative. He denies any abdominal pain, nausea, vomiting, melena, hematochezia, or change in bowel movements. Past Medical History:  
Diagnosis Date  Aortic stenosis s/p AVR  Aortic stenosis, severe 10/16/2014 Echocardiogram EF 60% peak gradient 67 mmHg, mean gradient 30 mmHg, MARY 0.8 cm  ASHD (arteriosclerotic heart disease)  Chronic renal disease, stage III ([address]  DM (diabetes mellitus) [facility]  Gout  HCD (hypertensive cardiovascular disease)  History of echocardiogram 10/16/2014 EF 55-60%. No RWMA. Mild LVH. Gr 1 DDfx. Mild LAE. Severe AS (mean grad 30 mmHg).  Hyperlipidemia  Hypertension  Prostate cancer ([facility] [address] 08/17/2017  
 [id number] Kaylynn Grade 7 (3 + 4) adenocarcinoma of the prostate in 3/12 cores, 2-10% of each core; PSA 7.24. Dr. Yemi Matthews.  S/P AVR (aortic valve replacement) 11/13/2014 #23 mm Marifer [facility] pericardial valve. Dr. Fabiola Salazar.  S/P CABG x 2 11/13/2014 LIMA to LAD, SVG to OM1. Dr. Fabiola Salazar.  S/P cardiac catheterization 11/06/2014 RCA dom. mRCA 100%. LM patent. dLAD 75% x 2. pCX 80% (ELENA-3). LVEDP 14 mmHg. EF 50-55%. Severe inferobasal hypk. Severe AS. AV replacement & CABG recommended. Past Surgical History:  
Procedure Laterality Date  HX APPENDECTOMY Current Outpatient Medications Medication Sig  
 hydroCHLOROthiazide (HYDRODIURIL) 12.5 mg tablet Take 1 Tab by mouth daily.  ascorbic acid, vitamin C, (VITAMIN C) 500 mg tablet Take  by mouth.  lisinopril (PRINIVIL, ZESTRIL) 20 mg tablet Take 1 Tab by mouth daily.  allopurinol (ZYLOPRIM) 100 mg tablet TAKE 1 TABLET DAILY  simvastatin (ZOCOR) 40 mg tablet TAKE 1 TABLET NIGHTLY  metoprolol succinate (TOPROL-XL) 25 mg XL tablet Take 1 Tab by mouth daily.  docusate sodium (COLACE) 100 mg capsule Take 100 mg by mouth daily.  cholecalciferol, vitamin D3, 2,000 unit tab Take  by mouth daily.  aspirin delayed-release 81 mg tablet Take 1 tablet by mouth daily.  sildenafil citrate (VIAGRA) 100 mg tablet Take 1 Tab by mouth as needed. No current facility-administered medications for this visit. Allergies and Intolerances:  
No Known Allergies Family History:  
Family History Problem Relation Age of Onset  COPD Father  Other Father   
     pneumoconiosis  Lung Disease Father  Other Mother   
     meigs syndrome Social History: He  reports that  has never smoked. he has never used smokeless tobacco. He reports that he drinks approximately one case of beer per week. He is  with two adult children. He is retired from working in the [employer]. He started as a , and then became an . Social History Substance and Sexual Activity Alcohol Use Yes  Alcohol/week: 7.2 oz  Types: 12 Cans of beer per week Immunization History: 
Immunization History Administered Date(s) Administered  Influenza High Dose Vaccine PF 10/10/2017, 10/26/2018  Influenza Vaccine 10/14/2014  Influenza Vaccine (Tri) Adjuvanted 10/04/2018  Influenza Vaccine Split 09/29/2011  Influenza Vaccine Whole 09/14/2010  Pneumococcal Conjugate (PCV-13) 06/29/2017  Pneumococcal Polysaccharide (PPSV-23) 10/14/2014  Td 01/01/2008  Tdap 01/12/2018 Review of Systems: As above included in HPI. Otherwise 11 point review of systems negative including constitutional, skin, HENT, eyes, respiratory, cardiovascular, gastrointestinal, genitourinary, musculoskeletal, endocrine, hematologic, allergy, and neurologic. Physical:  
Vitals:  
BP: 124/60 HR: 76 WT: 217 lb (98.4 kg) BMI:  27.12 kg/m2 Exam:  
Pt appears well; alert and oriented x 3; appropriate affect. HEENT: PERRLA, anicteric, oropharynx clear, bilateral cerumen impaction, no JVD, adenopathy or thyromegaly. No carotid bruits or radiated murmur. Lungs: clear to auscultation, no wheezes, rhonchi, or rales. Heart: regular rate and rhythm. No murmur, rubs, gallops Abdomen: soft, nontender, nondistended, normal bowel sounds, no hepatosplenomegaly or masses. Extremities: without edema. Pulses 1-2+ bilaterally. Diabetic foot exam:  
 
Left Foot: 
 Visual Exam: normal  
 Pulse DP: 2+ (normal) Filament test: normal sensation Vibratory sensation: normal 
   
Right Foot: 
 Visual Exam: normal  
 Pulse DP: 2+ (normal) Filament test: normal sensation Vibratory sensation: normal 
 
 
 
Review of Data: 
Labs: Hospital Outpatient Visit on 01/22/2019 Component Date Value Ref Range Status  Magnesium 01/22/2019 2.2  1.6 - 2.6 mg/dL Final  
 WBC 01/22/2019 6.1  4.6 - 13.2 K/uL Final  
 RBC 01/22/2019 4.31* 4.70 - 5.50 M/uL Final  
 HGB 01/22/2019 14.5  13.0 - 16.0 g/dL Final  
 HCT 01/22/2019 44.1  36.0 - 48.0 % Final  
 MCV 01/22/2019 102.3* 74.0 - 97.0 FL Final  
 MCH 01/22/2019 33.6  24.0 - 34.0 PG Final  
 MCHC 01/22/2019 32.9  31.0 - 37.0 g/dL Final  
 RDW 01/22/2019 12.6  11.6 - 14.5 % Final  
  PLATELET 65/80/8118 940  135 - 420 K/uL Final  
 MPV 01/22/2019 9.3  9.2 - 11.8 FL Final  
 NEUTROPHILS 01/22/2019 64  40 - 73 % Final  
 LYMPHOCYTES 01/22/2019 23  21 - 52 % Final  
 MONOCYTES 01/22/2019 9  3 - 10 % Final  
 EOSINOPHILS 01/22/2019 3  0 - 5 % Final  
 BASOPHILS 01/22/2019 1  0 - 2 % Final  
 ABS. NEUTROPHILS 01/22/2019 4.0  1.8 - 8.0 K/UL Final  
 ABS. LYMPHOCYTES 01/22/2019 1.4  0.9 - 3.6 K/UL Final  
 ABS. MONOCYTES 01/22/2019 0.6  0.05 - 1.2 K/UL Final  
 ABS. EOSINOPHILS 01/22/2019 0.2  0.0 - 0.4 K/UL Final  
 ABS. BASOPHILS 01/22/2019 0.0  0.0 - 0.1 K/UL Final  
 DF 01/22/2019 AUTOMATED    Final  
 LIPID PROFILE 01/22/2019        Final  
 Cholesterol, total 01/22/2019 172  <200 MG/DL Final  
 Triglyceride 01/22/2019 93  <150 MG/DL Final  
 HDL Cholesterol 01/22/2019 69* 40 - 60 MG/DL Final  
 LDL, calculated 01/22/2019 84.4  0 - 100 MG/DL Final  
 VLDL, calculated 01/22/2019 18.6  MG/DL Final  
 CHOL/HDL Ratio 01/22/2019 2.5  0 - 5.0   Final  
 Sodium 01/22/2019 135* 136 - 145 mmol/L Final  
 Potassium 01/22/2019 5.3  3.5 - 5.5 mmol/L Final  
 Chloride 01/22/2019 100  100 - 108 mmol/L Final  
 CO2 01/22/2019 27  21 - 32 mmol/L Final  
 Anion gap 01/22/2019 8  3.0 - 18 mmol/L Final  
 Glucose 01/22/2019 137* 74 - 99 mg/dL Final  
 BUN 01/22/2019 33* 7.0 - 18 MG/DL Final  
 Creatinine 01/22/2019 1.42* 0.6 - 1.3 MG/DL Final  
 BUN/Creatinine ratio 01/22/2019 23* 12 - 20   Final  
 GFR est AA 01/22/2019 60* >60 ml/min/1.73m2 Final  
 GFR est non-AA 01/22/2019 49* >60 ml/min/1.73m2 Final  
 Calcium 01/22/2019 9.8  8.5 - 10.1 MG/DL Final  
 Bilirubin, total 01/22/2019 1.0  0.2 - 1.0 MG/DL Final  
 ALT (SGPT) 01/22/2019 30  16 - 61 U/L Final  
 AST (SGOT) 01/22/2019 24  15 - 37 U/L Final  
 Alk.  phosphatase 01/22/2019 49  45 - 117 U/L Final  
 Protein, total 01/22/2019 8.1  6.4 - 8.2 g/dL Final  
 Albumin 01/22/2019 4.6  3.4 - 5.0 g/dL Final  
  Globulin 01/22/2019 3.5  2.0 - 4.0 g/dL Final  
 A-G Ratio 01/22/2019 1.3  0.8 - 1.7   Final  
 Vitamin D 25-Hydroxy 01/22/2019 27.4* 30 - 100 ng/mL Final  
Office Visit on 01/11/2019 Component Date Value Ref Range Status  Color (UA POC) 01/11/2019 Yellow   Final  
 Clarity (UA POC) 01/11/2019 Clear   Final  
 Glucose (UA POC) 01/11/2019 Negative  Negative Final  
 Bilirubin (UA POC) 01/11/2019 Negative  Negative Final  
 Ketones (UA POC) 01/11/2019 Negative  Negative Final  
 Specific gravity (UA POC) 01/11/2019 1.005  1.001 - 1.035 Final  
 Blood (UA POC) 01/11/2019 Negative  Negative Final  
 pH (UA POC) 01/11/2019 5.5  4.6 - 8.0 Final  
 Protein (UA POC) 01/11/2019 Negative  Negative Final  
 Urobilinogen (UA POC) 01/11/2019 0.2 mg/dL  0.2 - 1 Final  
 Nitrites (UA POC) 01/11/2019 Negative  Negative Final  
 Leukocyte esterase (UA POC) 01/11/2019 Negative  Negative Final  
 
 
Health Maintenance: 
Screening:  
 Colorectal: Cologuard (11/2/2017) negative. Due 2020. Depression: none DM (HbA1c/FPG): HbA1c 5.9 (10/2018) Hepatitis C: negative (1/2018) Falls: none DEXA: N/A 
 PSA/KIEL: PSA 11.3 (10/2018). Followed by Dr. Scott Espinal 
 Glaucoma: regular eye exams with Dr. Karel Harper. Overdue for exam. 
 Smoking: none Vitamin D: 27.4 (1/2019) Medicare Wellness: 10/4/2018 Impression: 
Patient Active Problem List  
Diagnosis Code  Hyperlipidemia E78.5  Gout M10.9  Vitamin D deficiency E55.9  CAD in native artery I25.10  Hypertensive heart disease without congestive heart failure I11.9  
 S/P CABG x 2 Z95.1  
 S/P AVR (aortic valve replacement) Z95.2  Rising PSA level R97.20  Type 2 diabetes mellitus with stage 3 chronic kidney disease, without long-term current use of insulin (HCC) E11.22, N18.3  Stage 3 chronic kidney disease (HCC) N18.3  Prostate cancer ([address] C61  
 Essential hypertension I10 Plan: 1. Hypertension. Blood pressure control much improved with addition of hydrochlorothiazide 12.5 mg daily to regimen of lisinopril 20 mg daily and metoprolol succinate 25 mg daily. Renal function remains generally stable with creatinine 1.42 / eGFR 49. Advised to continue to drink plenty of fluids. Continue to follow. 2. Prostate cancer. [id number] Kaylynn Grade 7 (3 + 4) adenocarcinoma. Patient chose active surveillance rather than definitive treatment. Repeat PSA and MRI prostate ordered for 2/2018, but he did not follow-up. Repeat PSA obtained and had increased from 7.24 at diagnosis to 11.3. Referred for follow-up with Dr. Marcial Holter, and MRI prostate performed on 12/31/2018 showing several peripheral zone PI-RADS 4 with one area showing questionable capsular bulging laterally, but not a definitive appearance for extracapsular extension. He has a follow-up appointment with Dr. Marcial Holter and decision made to proceed with EBRT. He met with Dr. Clovis Perdomo and in the process of being scheduled. Continue to follow. 3. Hyperlipidemia. On moderate intensity dose simvastatin with LDL 84 and HDL 69, indicative of fair control in this patient with goal <70 given history of ASHD and CABG. Continue to follow. Emphasized importance of lifestyle modifications, including diet, exercise, and weight loss. Weight decreased eight pounds since last visit. If lipid panel does not improve by next visit, will consider changing to higher potency statin. Continue to follow. 4. ASHD s/p 2 vessel CABG. Remains asymptomatic. On aspirin, metoprolol, and statin. Followed by Dr. Shannan Howell. 5. Aortic stenosis s/p bioprosthetic AVR. Remains asymptomatic, and repeat echocardiogram in 8/2017 with good functioning valve. Follow. 6. Diabetes mellitus. Patient with diagnosis of diabetes mellitus, but review of record shows HbA1c ranging from 5.7-6.3 since 2011 and -112 during that time period.  He did have one glucose reading in 11/2014 at 128, but unclear if fasting and not confirmed. HbA1c now improved to 5.9. On Ace-I and statin. Continue follow-up with Dr. Edin Flor for annual eye exams. Foot exam normal today. Urine microalbumin/ creatinine ratio without evidence of microalbuminuria, but does have evidence of chronic renal disease stage 3. Emphasized importance of lifestyle modifications, including diet, exercise, and weight loss. 7. Chronic renal disease, stage 3. Most likely secondary to long standing hypertension and prediabetes, although also appears to have developed at time of CABG and AVR so may be related to stress of surgery. On statin and Ace-I. Discussed importance of avoiding NSAIDS and prerenal status. Follow. 8. Gout. Asymptomatic. On allopurinol. 9. Health maintenance. Already received influenza vaccine. Given script for Shingrix vaccine but not yet obtained. Completed pneumococcal series. Given script for Tdap previously, but patient did not obtain. Other immunizations up to date. Completed Cologuard for colorectal cancer screening. Did not yet have eye exam with Dr. Edin Flor. Stressed importance of follow-up. Discussed decreasing alcohol consumption. Vitamin D level normal. Continue maintenance dose supplement. Medicare wellness visit up to date. Patient understands recommendations and agrees with plan. Follow-up in 3 months

## 2019-03-08 ENCOUNTER — HOSPITAL ENCOUNTER (OUTPATIENT)
Dept: LAB | Age: 69
Discharge: HOME | End: 2019-03-08
Payer: MEDICARE

## 2019-03-08 DIAGNOSIS — C61 MALIGNANT NEOPLASM OF PROSTATE (HCC): ICD-10-CM

## 2019-03-08 DIAGNOSIS — R39.14 FEELING OF INCOMPLETE BLADDER EMPTYING: ICD-10-CM

## 2019-03-08 LAB
ANION GAP SERPL CALC-SCNC: 9 MMOL/L (ref 3–18)
APPEARANCE UR: CLEAR
ATRIAL RATE: 83 BPM
BACTERIA URNS QL MICRO: ABNORMAL /HPF
BASOPHILS # BLD: 0 K/UL (ref 0–0.1)
BASOPHILS NFR BLD: 0 % (ref 0–2)
BILIRUB UR QL: NEGATIVE
BUN SERPL-MCNC: 31 MG/DL (ref 7–18)
BUN/CREAT SERPL: 18 (ref 12–20)
CALCIUM SERPL-MCNC: 8.7 MG/DL (ref 8.5–10.1)
CALCULATED P AXIS, ECG09: 57 DEGREES
CALCULATED R AXIS, ECG10: -3 DEGREES
CALCULATED T AXIS, ECG11: 75 DEGREES
CHLORIDE SERPL-SCNC: 100 MMOL/L (ref 100–108)
CO2 SERPL-SCNC: 24 MMOL/L (ref 21–32)
COLOR UR: YELLOW
CREAT SERPL-MCNC: 1.69 MG/DL (ref 0.6–1.3)
DIAGNOSIS, 93000: NORMAL
DIFFERENTIAL METHOD BLD: ABNORMAL
EOSINOPHIL # BLD: 0.3 K/UL (ref 0–0.4)
EOSINOPHIL NFR BLD: 4 % (ref 0–5)
EPITH CASTS URNS QL MICRO: ABNORMAL /LPF (ref 0–5)
ERYTHROCYTE [DISTWIDTH] IN BLOOD BY AUTOMATED COUNT: 12.4 % (ref 11.6–14.5)
GLUCOSE SERPL-MCNC: 122 MG/DL (ref 74–99)
GLUCOSE UR STRIP.AUTO-MCNC: NEGATIVE MG/DL
HCT VFR BLD AUTO: 37.1 % (ref 36–48)
HGB BLD-MCNC: 12.8 G/DL (ref 13–16)
HGB UR QL STRIP: NEGATIVE
KETONES UR QL STRIP.AUTO: NEGATIVE MG/DL
LEUKOCYTE ESTERASE UR QL STRIP.AUTO: NEGATIVE
LYMPHOCYTES # BLD: 1.2 K/UL (ref 0.9–3.6)
LYMPHOCYTES NFR BLD: 18 % (ref 21–52)
MCH RBC QN AUTO: 33.1 PG (ref 24–34)
MCHC RBC AUTO-ENTMCNC: 34.5 G/DL (ref 31–37)
MCV RBC AUTO: 95.9 FL (ref 74–97)
MONOCYTES # BLD: 0.8 K/UL (ref 0.05–1.2)
MONOCYTES NFR BLD: 13 % (ref 3–10)
NEUTS SEG # BLD: 4.3 K/UL (ref 1.8–8)
NEUTS SEG NFR BLD: 65 % (ref 40–73)
NITRITE UR QL STRIP.AUTO: NEGATIVE
P-R INTERVAL, ECG05: 172 MS
PH UR STRIP: 5 [PH] (ref 5–8)
PLATELET # BLD AUTO: 200 K/UL (ref 135–420)
PMV BLD AUTO: 8.7 FL (ref 9.2–11.8)
POTASSIUM SERPL-SCNC: 4.7 MMOL/L (ref 3.5–5.5)
PROT UR STRIP-MCNC: NEGATIVE MG/DL
PSA SERPL-MCNC: 13.8 NG/ML (ref 0–4)
Q-T INTERVAL, ECG07: 364 MS
QRS DURATION, ECG06: 100 MS
QTC CALCULATION (BEZET), ECG08: 427 MS
RBC # BLD AUTO: 3.87 M/UL (ref 4.7–5.5)
SODIUM SERPL-SCNC: 133 MMOL/L (ref 136–145)
SP GR UR REFRACTOMETRY: 1.01 (ref 1–1.03)
UROBILINOGEN UR QL STRIP.AUTO: 0.2 EU/DL (ref 0.2–1)
VENTRICULAR RATE, ECG03: 83 BPM
WBC # BLD AUTO: 6.6 K/UL (ref 4.6–13.2)
WBC URNS QL MICRO: ABNORMAL /HPF (ref 0–4)

## 2019-03-08 PROCEDURE — 93005 ELECTROCARDIOGRAM TRACING: CPT

## 2019-03-08 PROCEDURE — 81001 URINALYSIS AUTO W/SCOPE: CPT

## 2019-03-08 PROCEDURE — 84153 ASSAY OF PSA TOTAL: CPT

## 2019-03-08 PROCEDURE — 84403 ASSAY OF TOTAL TESTOSTERONE: CPT

## 2019-03-08 PROCEDURE — 36415 COLL VENOUS BLD VENIPUNCTURE: CPT

## 2019-03-08 PROCEDURE — 80048 BASIC METABOLIC PNL TOTAL CA: CPT

## 2019-03-08 PROCEDURE — 87086 URINE CULTURE/COLONY COUNT: CPT

## 2019-03-08 PROCEDURE — 85025 COMPLETE CBC W/AUTO DIFF WBC: CPT

## 2019-03-09 LAB
BACTERIA SPEC CULT: NORMAL
SERVICE CMNT-IMP: NORMAL
TESTOST SERPL-MCNC: 282 NG/DL (ref 264–916)

## 2019-03-11 ENCOUNTER — HOSPITAL ENCOUNTER (OUTPATIENT)
Dept: RADIATION THERAPY | Age: 69
Discharge: HOME | End: 2019-03-11
Payer: MEDICARE

## 2019-03-11 ENCOUNTER — ANESTHESIA EVENT (OUTPATIENT)
Dept: RADIATION THERAPY | Age: 69
End: 2019-03-11
Payer: MEDICARE

## 2019-03-11 ENCOUNTER — ANESTHESIA (OUTPATIENT)
Dept: RADIATION THERAPY | Age: 69
End: 2019-03-11
Payer: MEDICARE

## 2019-03-11 VITALS
WEIGHT: 218.19 LBS | BODY MASS INDEX: 25.76 KG/M2 | HEART RATE: 71 BPM | TEMPERATURE: 97.5 F | SYSTOLIC BLOOD PRESSURE: 113 MMHG | DIASTOLIC BLOOD PRESSURE: 51 MMHG | HEIGHT: 77 IN | RESPIRATION RATE: 16 BRPM | OXYGEN SATURATION: 100 %

## 2019-03-11 PROCEDURE — 77030036874 HC SPCR RECTAL HYDRGEL SPACEOAR AGMX -I

## 2019-03-11 PROCEDURE — 76060000032 HC ANESTHESIA 0.5 TO 1 HR

## 2019-03-11 PROCEDURE — 77030012510 HC MSK AIRWY LMA TELE -B: Performed by: RADIOLOGY

## 2019-03-11 PROCEDURE — 73290000068 HC RAD ONC TIME 0.5 TO 1 HR

## 2019-03-11 PROCEDURE — A4648 IMPLANTABLE TISSUE MARKER: HCPCS

## 2019-03-11 PROCEDURE — 77030015736 HC BLLN ENDOCAV CIVC -B

## 2019-03-11 PROCEDURE — 74011250636 HC RX REV CODE- 250/636

## 2019-03-11 PROCEDURE — 74011250637 HC RX REV CODE- 250/637: Performed by: RADIOLOGY

## 2019-03-11 PROCEDURE — 51798 US URINE CAPACITY MEASURE: CPT

## 2019-03-11 PROCEDURE — 77030018846 HC SOL IRR STRL H20 ICUM -A

## 2019-03-11 PROCEDURE — 74011250636 HC RX REV CODE- 250/636: Performed by: RADIOLOGY

## 2019-03-11 RX ORDER — LIDOCAINE HYDROCHLORIDE 20 MG/ML
INJECTION, SOLUTION EPIDURAL; INFILTRATION; INTRACAUDAL; PERINEURAL AS NEEDED
Status: DISCONTINUED | OUTPATIENT
Start: 2019-03-11 | End: 2019-03-11 | Stop reason: HOSPADM

## 2019-03-11 RX ORDER — FENTANYL CITRATE 50 UG/ML
INJECTION, SOLUTION INTRAMUSCULAR; INTRAVENOUS AS NEEDED
Status: DISCONTINUED | OUTPATIENT
Start: 2019-03-11 | End: 2019-03-11 | Stop reason: HOSPADM

## 2019-03-11 RX ORDER — DEXAMETHASONE SODIUM PHOSPHATE 4 MG/ML
INJECTION, SOLUTION INTRA-ARTICULAR; INTRALESIONAL; INTRAMUSCULAR; INTRAVENOUS; SOFT TISSUE AS NEEDED
Status: DISCONTINUED | OUTPATIENT
Start: 2019-03-11 | End: 2019-03-11 | Stop reason: HOSPADM

## 2019-03-11 RX ORDER — ONDANSETRON 2 MG/ML
INJECTION INTRAMUSCULAR; INTRAVENOUS AS NEEDED
Status: DISCONTINUED | OUTPATIENT
Start: 2019-03-11 | End: 2019-03-11 | Stop reason: HOSPADM

## 2019-03-11 RX ORDER — FAMOTIDINE 20 MG/50ML
20 INJECTION, SOLUTION INTRAVENOUS ONCE
Status: DISCONTINUED | OUTPATIENT
Start: 2019-03-11 | End: 2019-03-11 | Stop reason: CLARIF

## 2019-03-11 RX ORDER — CEFAZOLIN SODIUM 2 G/50ML
2 SOLUTION INTRAVENOUS ONCE
Status: COMPLETED | OUTPATIENT
Start: 2019-03-11 | End: 2019-03-11

## 2019-03-11 RX ORDER — TAMSULOSIN HYDROCHLORIDE 0.4 MG/1
0.4 CAPSULE ORAL DAILY
COMMUNITY
End: 2019-08-12

## 2019-03-11 RX ORDER — FAMOTIDINE 20 MG/1
20 TABLET, FILM COATED ORAL ONCE
Status: COMPLETED | OUTPATIENT
Start: 2019-03-11 | End: 2019-03-11

## 2019-03-11 RX ORDER — SODIUM CHLORIDE 0.9 % (FLUSH) 0.9 %
10 SYRINGE (ML) INJECTION AS NEEDED
Status: DISCONTINUED | OUTPATIENT
Start: 2019-03-11 | End: 2019-03-15 | Stop reason: HOSPADM

## 2019-03-11 RX ORDER — SODIUM CHLORIDE, SODIUM LACTATE, POTASSIUM CHLORIDE, CALCIUM CHLORIDE 600; 310; 30; 20 MG/100ML; MG/100ML; MG/100ML; MG/100ML
75 INJECTION, SOLUTION INTRAVENOUS CONTINUOUS
Status: DISCONTINUED | OUTPATIENT
Start: 2019-03-11 | End: 2019-03-15 | Stop reason: HOSPADM

## 2019-03-11 RX ORDER — FAMOTIDINE 20 MG/1
TABLET, FILM COATED ORAL
Status: DISPENSED
Start: 2019-03-11 | End: 2019-03-11

## 2019-03-11 RX ORDER — MIDAZOLAM HYDROCHLORIDE 1 MG/ML
INJECTION, SOLUTION INTRAMUSCULAR; INTRAVENOUS AS NEEDED
Status: DISCONTINUED | OUTPATIENT
Start: 2019-03-11 | End: 2019-03-11 | Stop reason: HOSPADM

## 2019-03-11 RX ORDER — PROPOFOL 10 MG/ML
INJECTION, EMULSION INTRAVENOUS AS NEEDED
Status: DISCONTINUED | OUTPATIENT
Start: 2019-03-11 | End: 2019-03-11 | Stop reason: HOSPADM

## 2019-03-11 RX ADMIN — SODIUM CHLORIDE, SODIUM LACTATE, POTASSIUM CHLORIDE, AND CALCIUM CHLORIDE 75 ML/HR: 600; 310; 30; 20 INJECTION, SOLUTION INTRAVENOUS at 10:28

## 2019-03-11 RX ADMIN — ONDANSETRON 4 MG: 2 INJECTION INTRAMUSCULAR; INTRAVENOUS at 11:59

## 2019-03-11 RX ADMIN — CEFAZOLIN SODIUM 2 G: 2 SOLUTION INTRAVENOUS at 11:45

## 2019-03-11 RX ADMIN — MIDAZOLAM HYDROCHLORIDE 2 MG: 1 INJECTION, SOLUTION INTRAMUSCULAR; INTRAVENOUS at 11:40

## 2019-03-11 RX ADMIN — PROPOFOL 150 MG: 10 INJECTION, EMULSION INTRAVENOUS at 11:43

## 2019-03-11 RX ADMIN — DEXAMETHASONE SODIUM PHOSPHATE 4 MG: 4 INJECTION, SOLUTION INTRA-ARTICULAR; INTRALESIONAL; INTRAMUSCULAR; INTRAVENOUS; SOFT TISSUE at 11:44

## 2019-03-11 RX ADMIN — LIDOCAINE HYDROCHLORIDE 40 MG: 20 INJECTION, SOLUTION EPIDURAL; INFILTRATION; INTRACAUDAL; PERINEURAL at 11:43

## 2019-03-11 RX ADMIN — Medication 10 ML: at 10:27

## 2019-03-11 RX ADMIN — FENTANYL CITRATE 50 MCG: 50 INJECTION, SOLUTION INTRAMUSCULAR; INTRAVENOUS at 11:48

## 2019-03-11 RX ADMIN — FENTANYL CITRATE 50 MCG: 50 INJECTION, SOLUTION INTRAMUSCULAR; INTRAVENOUS at 11:43

## 2019-03-11 RX ADMIN — FAMOTIDINE 20 MG: 20 TABLET ORAL at 10:44

## 2019-03-11 NOTE — ANESTHESIA PREPROCEDURE EVALUATION
Anesthetic History   No history of anesthetic complications            Review of Systems / Medical History  Patient summary reviewed, nursing notes reviewed and pertinent labs reviewed    Pulmonary  Within defined limits                 Neuro/Psych   Within defined limits           Cardiovascular    Hypertension: well controlled              Exercise tolerance: >4 METS     GI/Hepatic/Renal  Within defined limits              Endo/Other  Within defined limits           Other Findings              Physical Exam    Airway  Mallampati: II  TM Distance: 4 - 6 cm  Neck ROM: normal range of motion   Mouth opening: Normal     Cardiovascular    Rhythm: regular  Rate: normal         Dental  No notable dental hx       Pulmonary  Breath sounds clear to auscultation               Abdominal  Abdominal exam normal       Other Findings            Anesthetic Plan    ASA: 3  Anesthesia type: general          Induction: Intravenous  Anesthetic plan and risks discussed with: Patient

## 2019-03-11 NOTE — DISCHARGE INSTRUCTIONS
DISCHARGE SUMMARY from Nurse    The following personal items are in your possession at time of discharge:  Clothes, shoes and glassess      PATIENT INSTRUCTIONS:    After general anesthesia or intravenous sedation, for 24 hours or while taking prescription Narcotics:  · Limit your activities  · Do not drive and operate hazardous machinery  · Do not make important personal or business decisions  · Do  not drink alcoholic beverages  · If you have not urinated within 8 hours after discharge, please contact your surgeon on call. Report the following to your surgeon:  · Excessive pain, swelling, redness or odor of or around the surgical area  · Temperature over 100.5F  · Nausea and vomiting lasting longer than 4 hours or if unable to take medications  · Any signs of decreased circulation or nerve impairment to extremity: change in color, persistent  numbness, tingling, coldness or increase pain  · Any questions      What to do at Home:    Recommended activity: Activity as tolerated and no driving for today. *  Please give a list of your current medications to your Primary Care Provider. *  Please update this list whenever your medications are discontinued, doses are      changed, or new medications (including over-the-counter products) are added. *  Please carry medication information at all times in case of emergency situations. These are general instructions for a healthy lifestyle:    No smoking/ No tobacco products/ Avoid exposure to second hand smoke    Surgeon General's Warning:  Quitting smoking now greatly reduces serious risk to your health.     Obesity, smoking, and sedentary lifestyle greatly increases your risk for illness    A healthy diet, regular physical exercise & weight monitoring are important for maintaining a healthy lifestyle    You may be retaining fluid if you have a history of heart failure or if you experience any of the following symptoms:  Weight gain of 3 pounds or more overnight or 5 pounds in a week, increased swelling in our hands or feet or shortness of breath while lying flat in bed. Please call your doctor as soon as you notice any of these symptoms; do not wait until your next office visit. Recognize signs and symptoms of STROKE:    F-face looks uneven    A-arms unable to move or move unevenly    S-speech slurred or non-existent    T-time-call 911 as soon as signs and symptoms begin-DO NOT go       Back to bed or wait to see if you get better-TIME IS BRAIN. Warning Signs of HEART ATTACK     Call 911 if you have these symptoms:   Chest discomfort. Most heart attacks involve discomfort in the center of the chest that lasts more than a few minutes, or that goes away and comes back. It can feel like uncomfortable pressure, squeezing, fullness, or pain.  Discomfort in other areas of the upper body. Symptoms can include pain or discomfort in one or both arms, the back, neck, jaw, or stomach.  Shortness of breath with or without chest discomfort.  Other signs may include breaking out in a cold sweat, nausea, or lightheadedness. Don't wait more than five minutes to call 911 - MINUTES MATTER! Fast action can save your life. Calling 911 is almost always the fastest way to get lifesaving treatment. Emergency Medical Services staff can begin treatment when they arrive -- up to an hour sooner than if someone gets to the hospital by car. The discharge information has been reviewed with the patient and caregiver. The patient and caregiver verbalized understanding. Discharge medications reviewed with the patient and caregiver and appropriate educational materials and side effects teaching were provided. Post-Operative F[employer] Markers and Space Oar Hydrogel Instructions      During the first few days you may have some bruising on the perineum (the place between your anus and your scrotum) or on the scrotum itself.  This is caused by the needles which are used for placement. This will resolve on its own and usually does not cause any discomfort. Rarely a larger hematoma may form on the perineum and this will cause some discomfort with sitting. This should be brought to the doctors attention, but it will resolve on its own. You can alternate between an ice pack and heat pack for 10 minutes interval to assist with the reduction of inflammation and pain to perineum. Side Effects    Immediately post procedure: blood in the urine, bruising/tenderness/discoloration at the implant site, and/or swelling at the implant site. These symptoms should subside after a few days. Some patients will have trouble passing urine after the catheter is removed due to swelling in the prostate. You should call Dr. Zachery Wood or go to Emergency Room if you cannot pass urine within 6 hours of catheter removal or any time if you are having trouble passing your urine. Some patients may require a catheter for 1 week or more for urinary retention. Other: The following side effects are most likely to occur over the first two months following the procedure: Frequency and/or urgency with urination, burning with urination, a weaker urine stream, as well as frequency and /or urgency of bowel movements. After the initial two months, you should notice a steady decline in these symptoms. Your symptoms should subside completely by the end of six month to a year. Diet:    Regular, unless you are on a special diet for other reasons. Activity:     Avoid heavy lifting, nothing >10lbs for 10 days after the procedure. At that time you may return to your normal activity level if the urine is clear and you feel fine. If the urine is still bloody you should rest and drink plenty of fluids until it is clear, and then you may resume normal activity. Avoid strenuous activity for one month. Avoid sitting on a hard seat (such as a bicycle) for 2 months.     Avoid long periods of sitting, such as in a car or on an airplane without taking leg stretching  breaks. Depending on the demands of your job, you may return to work any time during the week after the procedure, noting the precaution about prolonged sitting. You may return to a regular diet as tolerated following the procedure. Do not drink excessive amounts of fluids, as they can make side effects worse. You will experience a decrease in ejaculate following the procedures. This is normal, as the prostate gland is responsible for generating over 80% of the fluid disseminated during  ejaculation. You will most likely experience a reddish color in your ejaculate for a few weeks following the procedure. This is normal and will improve as time  passes, if it persists, see your doctor.     Follow up     Your follow up imaging will be at [address] [place] [address]  at [facility]  on ____03/18/19__________ at _1300__pm **([address])    Please call us if you have any questions: [phone]    Please call Dr Natasha Goltz office this week  @  [phone] a follow up appointment in 2 weeks    Radiation Therapy  DISCHARGE SUMMARY from Nurse    The following personal items are in your possession at time of discharge:

## 2019-03-11 NOTE — ANESTHESIA POSTPROCEDURE EVALUATION
* No procedures listed *.     Anesthesia Post Evaluation      Multimodal analgesia: multimodal analgesia used between 6 hours prior to anesthesia start to PACU discharge  Patient location during evaluation: bedside  Patient participation: complete - patient participated  Level of consciousness: awake  Pain management: adequate  Airway patency: patent  Anesthetic complications: no  Cardiovascular status: stable  Respiratory status: acceptable  Hydration status: acceptable  Post anesthesia nausea and vomiting:  controlled      Visit Vitals  /51   Pulse 81   Temp 36.6 °C (97.8 °F)   Resp 17   Ht 6' 5\" (1.956 m)   Wt 99 kg (218 lb 3 oz)   SpO2 99%   BMI 25.87 kg/m²

## 2019-03-11 NOTE — PROGRESS NOTES
Patient ambulatory AOx3. Placed in treatment area 2 to change into gown. Vitals obatined. Son in waiting room. 1000 Ambulate to pre-procedureal  area B. Assessment done. Consent done. Reviewed procedure with patient and son. IV started. Anxious to get procedure done. 1044 Pepcid 20 mg po has been given. PROCEDURE NOTE    Pt arrived to procedure room at 1140. Pt placed supine, monitors placed. Body aligned. Pt arms on cushioned armboard, head remains aligned Right & Left Leg placed in Yellow Fin Stirrups, eggcrates & gelpads for pressure points    Time-out performed: 1153    Procedure Start: 4300    Procedure stop:1200    8617- Transferred to recovery area B. CRNA monitoring vital signs. 1220 Awake. VSS. Son at bedside. [phone]  Discharge instructions given. Voided 300cc with bladder scan of 42cc. Son verbalized understanding. Documents handed to son. Patient discharged via w/c to car.

## 2019-03-18 ENCOUNTER — HOSPITAL ENCOUNTER (OUTPATIENT)
Dept: MRI IMAGING | Age: 69
Discharge: HOME | End: 2019-03-18
Attending: RADIOLOGY
Payer: MEDICARE

## 2019-03-18 ENCOUNTER — HOSPITAL ENCOUNTER (OUTPATIENT)
Dept: RADIATION THERAPY | Age: 69
Discharge: HOME | End: 2019-03-18
Payer: MEDICARE

## 2019-03-18 DIAGNOSIS — C61 PROSTATE CA (HCC): ICD-10-CM

## 2019-03-18 LAB — CREAT UR-MCNC: 1.5 MG/DL (ref 0.6–1.3)

## 2019-03-18 PROCEDURE — 77334 RADIATION TREATMENT AID(S): CPT

## 2019-03-18 PROCEDURE — 76498 UNLISTED MR PROCEDURE: CPT

## 2019-03-18 PROCEDURE — 82565 ASSAY OF CREATININE: CPT

## 2019-03-19 ENCOUNTER — HOSPITAL ENCOUNTER (OUTPATIENT)
Dept: RADIATION THERAPY | Age: 69
Discharge: HOME | End: 2019-03-19
Payer: MEDICARE

## 2019-03-20 ENCOUNTER — HOSPITAL ENCOUNTER (OUTPATIENT)
Dept: RADIATION THERAPY | Age: 69
Discharge: HOME | End: 2019-03-20
Payer: MEDICARE

## 2019-03-21 ENCOUNTER — HOSPITAL ENCOUNTER (OUTPATIENT)
Dept: RADIATION THERAPY | Age: 69
Discharge: HOME | End: 2019-03-21
Payer: MEDICARE

## 2019-03-22 ENCOUNTER — HOSPITAL ENCOUNTER (OUTPATIENT)
Dept: RADIATION THERAPY | Age: 69
Discharge: HOME | End: 2019-03-22
Payer: MEDICARE

## 2019-03-25 ENCOUNTER — HOSPITAL ENCOUNTER (OUTPATIENT)
Dept: RADIATION THERAPY | Age: 69
Discharge: HOME | End: 2019-03-25
Payer: MEDICARE

## 2019-03-26 ENCOUNTER — HOSPITAL ENCOUNTER (OUTPATIENT)
Dept: RADIATION THERAPY | Age: 69
Discharge: HOME | End: 2019-03-26
Payer: MEDICARE

## 2019-03-26 PROCEDURE — 77300 RADIATION THERAPY DOSE PLAN: CPT

## 2019-03-26 PROCEDURE — 77301 RADIOTHERAPY DOSE PLAN IMRT: CPT

## 2019-03-26 PROCEDURE — 77338 DESIGN MLC DEVICE FOR IMRT: CPT

## 2019-03-27 ENCOUNTER — HOSPITAL ENCOUNTER (OUTPATIENT)
Dept: RADIATION THERAPY | Age: 69
Discharge: HOME | End: 2019-03-27
Payer: MEDICARE

## 2019-03-27 PROCEDURE — 77385 HC IMRT TRMT DLVR SMPL: CPT

## 2019-03-28 ENCOUNTER — HOSPITAL ENCOUNTER (OUTPATIENT)
Dept: RADIATION THERAPY | Age: 69
Discharge: HOME | End: 2019-03-28
Payer: MEDICARE

## 2019-03-28 PROCEDURE — 77385 HC IMRT TRMT DLVR SMPL: CPT

## 2019-03-29 ENCOUNTER — HOSPITAL ENCOUNTER (OUTPATIENT)
Dept: RADIATION THERAPY | Age: 69
Discharge: HOME | End: 2019-03-29
Payer: MEDICARE

## 2019-03-29 PROCEDURE — 77385 HC IMRT TRMT DLVR SMPL: CPT

## 2019-04-01 ENCOUNTER — HOSPITAL ENCOUNTER (OUTPATIENT)
Dept: RADIATION THERAPY | Age: 69
Discharge: HOME | End: 2019-04-01
Payer: MEDICARE

## 2019-04-01 PROCEDURE — 77385 HC IMRT TRMT DLVR SMPL: CPT

## 2019-04-02 ENCOUNTER — HOSPITAL ENCOUNTER (OUTPATIENT)
Dept: RADIATION THERAPY | Age: 69
Discharge: HOME | End: 2019-04-02
Payer: MEDICARE

## 2019-04-02 PROCEDURE — 77336 RADIATION PHYSICS CONSULT: CPT

## 2019-04-02 PROCEDURE — 77385 HC IMRT TRMT DLVR SMPL: CPT

## 2019-04-03 ENCOUNTER — HOSPITAL ENCOUNTER (OUTPATIENT)
Dept: RADIATION THERAPY | Age: 69
Discharge: HOME | End: 2019-04-03
Payer: MEDICARE

## 2019-04-03 PROCEDURE — 77385 HC IMRT TRMT DLVR SMPL: CPT

## 2019-04-03 PROCEDURE — 77300 RADIATION THERAPY DOSE PLAN: CPT

## 2019-04-03 PROCEDURE — 77338 DESIGN MLC DEVICE FOR IMRT: CPT

## 2019-04-04 ENCOUNTER — HOSPITAL ENCOUNTER (OUTPATIENT)
Dept: RADIATION THERAPY | Age: 69
Discharge: HOME | End: 2019-04-04
Payer: MEDICARE

## 2019-04-04 PROCEDURE — 77385 HC IMRT TRMT DLVR SMPL: CPT

## 2019-04-05 ENCOUNTER — HOSPITAL ENCOUNTER (OUTPATIENT)
Dept: RADIATION THERAPY | Age: 69
Discharge: HOME | End: 2019-04-05
Payer: MEDICARE

## 2019-04-05 PROCEDURE — 77385 HC IMRT TRMT DLVR SMPL: CPT

## 2019-04-08 ENCOUNTER — HOSPITAL ENCOUNTER (OUTPATIENT)
Dept: RADIATION THERAPY | Age: 69
Discharge: HOME | End: 2019-04-08
Payer: MEDICARE

## 2019-04-08 PROCEDURE — 77385 HC IMRT TRMT DLVR SMPL: CPT

## 2019-04-09 ENCOUNTER — HOSPITAL ENCOUNTER (OUTPATIENT)
Dept: RADIATION THERAPY | Age: 69
Discharge: HOME | End: 2019-04-09
Payer: MEDICARE

## 2019-04-09 PROCEDURE — 77385 HC IMRT TRMT DLVR SMPL: CPT

## 2019-04-09 PROCEDURE — 77336 RADIATION PHYSICS CONSULT: CPT

## 2019-04-10 ENCOUNTER — HOSPITAL ENCOUNTER (OUTPATIENT)
Dept: RADIATION THERAPY | Age: 69
Discharge: HOME | End: 2019-04-10
Payer: MEDICARE

## 2019-04-10 PROCEDURE — 77385 HC IMRT TRMT DLVR SMPL: CPT

## 2019-04-11 ENCOUNTER — HOSPITAL ENCOUNTER (OUTPATIENT)
Dept: RADIATION THERAPY | Age: 69
Discharge: HOME | End: 2019-04-11
Payer: MEDICARE

## 2019-04-11 PROCEDURE — 77385 HC IMRT TRMT DLVR SMPL: CPT

## 2019-04-12 ENCOUNTER — HOSPITAL ENCOUNTER (OUTPATIENT)
Dept: RADIATION THERAPY | Age: 69
Discharge: HOME | End: 2019-04-12
Payer: MEDICARE

## 2019-04-12 PROCEDURE — 77385 HC IMRT TRMT DLVR SMPL: CPT

## 2019-04-15 ENCOUNTER — HOSPITAL ENCOUNTER (OUTPATIENT)
Dept: RADIATION THERAPY | Age: 69
Discharge: HOME | End: 2019-04-15
Payer: MEDICARE

## 2019-04-15 PROCEDURE — 77385 HC IMRT TRMT DLVR SMPL: CPT

## 2019-04-16 ENCOUNTER — HOSPITAL ENCOUNTER (OUTPATIENT)
Dept: RADIATION THERAPY | Age: 69
Discharge: HOME | End: 2019-04-16
Payer: MEDICARE

## 2019-04-16 PROCEDURE — 77385 HC IMRT TRMT DLVR SMPL: CPT

## 2019-04-16 PROCEDURE — 77336 RADIATION PHYSICS CONSULT: CPT

## 2019-04-17 ENCOUNTER — HOSPITAL ENCOUNTER (OUTPATIENT)
Dept: RADIATION THERAPY | Age: 69
Discharge: HOME | End: 2019-04-17
Payer: MEDICARE

## 2019-04-17 PROCEDURE — 77385 HC IMRT TRMT DLVR SMPL: CPT

## 2019-04-18 ENCOUNTER — HOSPITAL ENCOUNTER (OUTPATIENT)
Dept: RADIATION THERAPY | Age: 69
Discharge: HOME | End: 2019-04-18
Payer: MEDICARE

## 2019-04-18 PROCEDURE — 77385 HC IMRT TRMT DLVR SMPL: CPT

## 2019-04-19 ENCOUNTER — APPOINTMENT (OUTPATIENT)
Dept: RADIATION THERAPY | Age: 69
End: 2019-04-19
Payer: MEDICARE

## 2019-04-22 ENCOUNTER — HOSPITAL ENCOUNTER (OUTPATIENT)
Dept: RADIATION THERAPY | Age: 69
Discharge: HOME | End: 2019-04-22
Payer: MEDICARE

## 2019-04-22 PROCEDURE — 77385 HC IMRT TRMT DLVR SMPL: CPT

## 2019-04-23 ENCOUNTER — HOSPITAL ENCOUNTER (OUTPATIENT)
Dept: RADIATION THERAPY | Age: 69
Discharge: HOME | End: 2019-04-23
Payer: MEDICARE

## 2019-04-23 PROCEDURE — 77385 HC IMRT TRMT DLVR SMPL: CPT

## 2019-04-24 ENCOUNTER — HOSPITAL ENCOUNTER (OUTPATIENT)
Dept: RADIATION THERAPY | Age: 69
Discharge: HOME | End: 2019-04-24
Payer: MEDICARE

## 2019-04-24 PROCEDURE — 77385 HC IMRT TRMT DLVR SMPL: CPT

## 2019-04-25 ENCOUNTER — HOSPITAL ENCOUNTER (OUTPATIENT)
Dept: RADIATION THERAPY | Age: 69
Discharge: HOME | End: 2019-04-25
Payer: MEDICARE

## 2019-04-25 PROCEDURE — 77385 HC IMRT TRMT DLVR SMPL: CPT

## 2019-04-26 ENCOUNTER — HOSPITAL ENCOUNTER (OUTPATIENT)
Dept: RADIATION THERAPY | Age: 69
Discharge: HOME | End: 2019-04-26
Payer: MEDICARE

## 2019-04-26 PROCEDURE — 77385 HC IMRT TRMT DLVR SMPL: CPT

## 2019-04-29 ENCOUNTER — HOSPITAL ENCOUNTER (OUTPATIENT)
Dept: RADIATION THERAPY | Age: 69
Discharge: HOME | End: 2019-04-29
Payer: MEDICARE

## 2019-04-29 PROCEDURE — 77385 HC IMRT TRMT DLVR SMPL: CPT

## 2019-04-30 ENCOUNTER — HOSPITAL ENCOUNTER (OUTPATIENT)
Dept: RADIATION THERAPY | Age: 69
Discharge: HOME | End: 2019-04-30
Payer: MEDICARE

## 2019-04-30 PROCEDURE — 77385 HC IMRT TRMT DLVR SMPL: CPT

## 2019-05-01 ENCOUNTER — HOSPITAL ENCOUNTER (OUTPATIENT)
Dept: RADIATION THERAPY | Age: 69
Discharge: HOME | End: 2019-05-01
Payer: MEDICARE

## 2019-05-01 PROCEDURE — 77336 RADIATION PHYSICS CONSULT: CPT

## 2019-05-01 PROCEDURE — 77385 HC IMRT TRMT DLVR SMPL: CPT

## 2019-05-02 ENCOUNTER — HOSPITAL ENCOUNTER (OUTPATIENT)
Dept: RADIATION THERAPY | Age: 69
Discharge: HOME | End: 2019-05-02
Payer: MEDICARE

## 2019-05-02 PROCEDURE — 77385 HC IMRT TRMT DLVR SMPL: CPT

## 2019-05-03 ENCOUNTER — HOSPITAL ENCOUNTER (OUTPATIENT)
Dept: RADIATION THERAPY | Age: 69
Discharge: HOME | End: 2019-05-03
Payer: MEDICARE

## 2019-05-03 PROCEDURE — 77385 HC IMRT TRMT DLVR SMPL: CPT

## 2019-05-06 ENCOUNTER — HOSPITAL ENCOUNTER (OUTPATIENT)
Dept: RADIATION THERAPY | Age: 69
Discharge: HOME | End: 2019-05-06
Payer: MEDICARE

## 2019-05-06 PROCEDURE — 77385 HC IMRT TRMT DLVR SMPL: CPT

## 2019-05-06 PROCEDURE — 77336 RADIATION PHYSICS CONSULT: CPT

## 2019-05-07 ENCOUNTER — APPOINTMENT (OUTPATIENT)
Dept: RADIATION THERAPY | Age: 69
End: 2019-05-07
Payer: MEDICARE

## 2019-05-08 ENCOUNTER — APPOINTMENT (OUTPATIENT)
Dept: RADIATION THERAPY | Age: 69
End: 2019-05-08
Payer: MEDICARE

## 2019-05-08 ENCOUNTER — OFFICE VISIT (OUTPATIENT)
Dept: INTERNAL MEDICINE CLINIC | Age: 69
End: 2019-05-08

## 2019-05-08 ENCOUNTER — HOSPITAL ENCOUNTER (OUTPATIENT)
Dept: LAB | Age: 69
Discharge: HOME | End: 2019-05-08
Payer: MEDICARE

## 2019-05-08 VITALS
BODY MASS INDEX: 27.08 KG/M2 | SYSTOLIC BLOOD PRESSURE: 100 MMHG | WEIGHT: 211 LBS | OXYGEN SATURATION: 98 % | HEIGHT: 74 IN | RESPIRATION RATE: 16 BRPM | HEART RATE: 83 BPM | DIASTOLIC BLOOD PRESSURE: 62 MMHG | TEMPERATURE: 98 F

## 2019-05-08 DIAGNOSIS — Z95.1 S/P CABG X 2: ICD-10-CM

## 2019-05-08 DIAGNOSIS — I25.10 CAD IN NATIVE ARTERY: ICD-10-CM

## 2019-05-08 DIAGNOSIS — I10 ESSENTIAL HYPERTENSION: Primary | ICD-10-CM

## 2019-05-08 DIAGNOSIS — I10 ESSENTIAL HYPERTENSION: ICD-10-CM

## 2019-05-08 DIAGNOSIS — E78.5 HYPERLIPIDEMIA, UNSPECIFIED HYPERLIPIDEMIA TYPE: ICD-10-CM

## 2019-05-08 DIAGNOSIS — N18.30 STAGE 3 CHRONIC KIDNEY DISEASE (HCC): ICD-10-CM

## 2019-05-08 DIAGNOSIS — Z95.2 S/P AVR (AORTIC VALVE REPLACEMENT): ICD-10-CM

## 2019-05-08 DIAGNOSIS — M1A.09X0 IDIOPATHIC CHRONIC GOUT OF MULTIPLE SITES WITHOUT TOPHUS: Chronic | ICD-10-CM

## 2019-05-08 DIAGNOSIS — E11.22 TYPE 2 DIABETES MELLITUS WITH STAGE 3 CHRONIC KIDNEY DISEASE, WITHOUT LONG-TERM CURRENT USE OF INSULIN (HCC): ICD-10-CM

## 2019-05-08 DIAGNOSIS — N18.30 TYPE 2 DIABETES MELLITUS WITH STAGE 3 CHRONIC KIDNEY DISEASE, WITHOUT LONG-TERM CURRENT USE OF INSULIN (HCC): ICD-10-CM

## 2019-05-08 DIAGNOSIS — C61 PROSTATE CANCER (HCC): ICD-10-CM

## 2019-05-08 DIAGNOSIS — E55.9 VITAMIN D DEFICIENCY: ICD-10-CM

## 2019-05-08 LAB
25(OH)D3 SERPL-MCNC: 33.3 NG/ML (ref 30–100)
ALBUMIN SERPL-MCNC: 4.3 G/DL (ref 3.4–5)
ALBUMIN/GLOB SERPL: 1.4 {RATIO} (ref 0.8–1.7)
ALP SERPL-CCNC: 76 U/L (ref 45–117)
ALT SERPL-CCNC: 20 U/L (ref 16–61)
ANION GAP SERPL CALC-SCNC: 12 MMOL/L (ref 3–18)
APPEARANCE UR: CLEAR
AST SERPL-CCNC: 13 U/L (ref 15–37)
BACTERIA URNS QL MICRO: NEGATIVE /HPF
BASOPHILS # BLD: 0 K/UL (ref 0–0.1)
BASOPHILS NFR BLD: 0 % (ref 0–2)
BILIRUB SERPL-MCNC: 0.9 MG/DL (ref 0.2–1)
BILIRUB UR QL: NEGATIVE
BUN SERPL-MCNC: 24 MG/DL (ref 7–18)
BUN/CREAT SERPL: 14 (ref 12–20)
CALCIUM SERPL-MCNC: 9.1 MG/DL (ref 8.5–10.1)
CHLORIDE SERPL-SCNC: 104 MMOL/L (ref 100–108)
CHOLEST SERPL-MCNC: 127 MG/DL
CO2 SERPL-SCNC: 20 MMOL/L (ref 21–32)
COLOR UR: YELLOW
CREAT SERPL-MCNC: 1.75 MG/DL (ref 0.6–1.3)
DIFFERENTIAL METHOD BLD: ABNORMAL
EOSINOPHIL # BLD: 0.3 K/UL (ref 0–0.4)
EOSINOPHIL NFR BLD: 5 % (ref 0–5)
EPITH CASTS URNS QL MICRO: NORMAL /LPF (ref 0–5)
ERYTHROCYTE [DISTWIDTH] IN BLOOD BY AUTOMATED COUNT: 13.1 % (ref 11.6–14.5)
GLOBULIN SER CALC-MCNC: 3.1 G/DL (ref 2–4)
GLUCOSE SERPL-MCNC: 157 MG/DL (ref 74–99)
GLUCOSE UR STRIP.AUTO-MCNC: NEGATIVE MG/DL
HCT VFR BLD AUTO: 35 % (ref 36–48)
HDLC SERPL-MCNC: 51 MG/DL (ref 40–60)
HDLC SERPL: 2.5 {RATIO} (ref 0–5)
HGB BLD-MCNC: 11.5 G/DL (ref 13–16)
HGB UR QL STRIP: NEGATIVE
KETONES UR QL STRIP.AUTO: NEGATIVE MG/DL
LDLC SERPL CALC-MCNC: 53 MG/DL (ref 0–100)
LEUKOCYTE ESTERASE UR QL STRIP.AUTO: NEGATIVE
LIPID PROFILE,FLP: NORMAL
LYMPHOCYTES # BLD: 0.6 K/UL (ref 0.9–3.6)
LYMPHOCYTES NFR BLD: 10 % (ref 21–52)
MCH RBC QN AUTO: 32.3 PG (ref 24–34)
MCHC RBC AUTO-ENTMCNC: 32.9 G/DL (ref 31–37)
MCV RBC AUTO: 98.3 FL (ref 74–97)
MONOCYTES # BLD: 0.7 K/UL (ref 0.05–1.2)
MONOCYTES NFR BLD: 11 % (ref 3–10)
NEUTS SEG # BLD: 4.6 K/UL (ref 1.8–8)
NEUTS SEG NFR BLD: 74 % (ref 40–73)
NITRITE UR QL STRIP.AUTO: NEGATIVE
PH UR STRIP: 5 [PH] (ref 5–8)
PLATELET # BLD AUTO: 210 K/UL (ref 135–420)
PMV BLD AUTO: 8.5 FL (ref 9.2–11.8)
POTASSIUM SERPL-SCNC: 4.6 MMOL/L (ref 3.5–5.5)
PROT SERPL-MCNC: 7.4 G/DL (ref 6.4–8.2)
PROT UR STRIP-MCNC: NEGATIVE MG/DL
RBC # BLD AUTO: 3.56 M/UL (ref 4.7–5.5)
RBC #/AREA URNS HPF: 0 /HPF (ref 0–5)
SODIUM SERPL-SCNC: 136 MMOL/L (ref 136–145)
SP GR UR REFRACTOMETRY: 1.02 (ref 1–1.03)
TRIGL SERPL-MCNC: 115 MG/DL (ref ?–150)
TSH SERPL DL<=0.05 MIU/L-ACNC: 0.36 UIU/ML (ref 0.36–3.74)
UROBILINOGEN UR QL STRIP.AUTO: 0.2 EU/DL (ref 0.2–1)
VLDLC SERPL CALC-MCNC: 23 MG/DL
WBC # BLD AUTO: 6.2 K/UL (ref 4.6–13.2)
WBC URNS QL MICRO: NORMAL /HPF (ref 0–4)

## 2019-05-08 PROCEDURE — 85025 COMPLETE CBC W/AUTO DIFF WBC: CPT

## 2019-05-08 PROCEDURE — 81001 URINALYSIS AUTO W/SCOPE: CPT

## 2019-05-08 PROCEDURE — 82043 UR ALBUMIN QUANTITATIVE: CPT

## 2019-05-08 PROCEDURE — 84443 ASSAY THYROID STIM HORMONE: CPT

## 2019-05-08 PROCEDURE — 80061 LIPID PANEL: CPT

## 2019-05-08 PROCEDURE — 80053 COMPREHEN METABOLIC PANEL: CPT

## 2019-05-08 PROCEDURE — 82306 VITAMIN D 25 HYDROXY: CPT

## 2019-05-08 RX ORDER — LISINOPRIL 20 MG/1
20 TABLET ORAL DAILY
Qty: 14 TAB | Refills: 0 | Status: SHIPPED | OUTPATIENT
Start: 2019-05-08 | End: 2019-06-05 | Stop reason: SDUPTHER

## 2019-05-08 RX ORDER — LISINOPRIL 20 MG/1
20 TABLET ORAL DAILY
Qty: 90 TAB | Refills: 2 | Status: SHIPPED | OUTPATIENT
Start: 2019-05-08 | End: 2019-06-04 | Stop reason: SDUPTHER

## 2019-05-08 NOTE — PROGRESS NOTES
Chief Complaint Patient presents with  Hypertension 3 month follow up. Patient doesn't know exactly what he is taking and has left his medication list at home. I am unable to go over the medications. Health Maintenance Due Topic Date Due  
 EYE EXAM RETINAL OR DILATED  02/02/1960  GLAUCOMA SCREENING Q2Y  02/02/2014  
 HEMOGLOBIN A1C Q6M  04/04/2019  MICROALBUMIN Q1  06/05/2019 1. Have you been to the ER, urgent care clinic or hospitalized since your last visit? NO.  
 
2. Have you seen or consulted any other health care providers outside of the [address] [place] [address] since your last visit (Include any pap smears or colon screening)?  NO

## 2019-05-08 NOTE — PATIENT INSTRUCTIONS
Discontinue hydrochlorothiazide 12.5 mg.  
 
Drink plenty of fluids. DASH Diet: Care Instructions Your Care Instructions The DASH diet is an eating plan that can help lower your blood pressure. DASH stands for Dietary Approaches to Stop Hypertension. Hypertension is high blood pressure. The DASH diet focuses on eating foods that are high in calcium, potassium, and magnesium. These nutrients can lower blood pressure. The foods that are highest in these nutrients are fruits, vegetables, low-fat dairy products, nuts, seeds, and legumes. But taking calcium, potassium, and magnesium supplements instead of eating foods that are high in those nutrients does not have the same effect. The DASH diet also includes whole grains, fish, and poultry. The DASH diet is one of several lifestyle changes your doctor may recommend to lower your high blood pressure. Your doctor may also want you to decrease the amount of sodium in your diet. Lowering sodium while following the DASH diet can lower blood pressure even further than just the DASH diet alone. Follow-up care is a key part of your treatment and safety. Be sure to make and go to all appointments, and call your doctor if you are having problems. It's also a good idea to know your test results and keep a list of the medicines you take. How can you care for yourself at home? Following the DASH diet · Eat 4 to 5 servings of fruit each day. A serving is 1 medium-sized piece of fruit, ½ cup chopped or canned fruit, 1/4 cup dried fruit, or 4 ounces (½ cup) of fruit juice. Choose fruit more often than fruit juice. · Eat 4 to 5 servings of vegetables each day. A serving is 1 cup of lettuce or raw leafy vegetables, ½ cup of chopped or cooked vegetables, or 4 ounces (½ cup) of vegetable juice. Choose vegetables more often than vegetable juice. · Get 2 to 3 servings of low-fat and fat-free dairy each day.  A serving is 8 ounces of milk, 1 cup of yogurt, or 1 ½ ounces of cheese. · Eat 6 to 8 servings of grains each day. A serving is 1 slice of bread, 1 ounce of dry cereal, or ½ cup of cooked rice, pasta, or cooked cereal. Try to choose whole-grain products as much as possible. · Limit lean meat, poultry, and fish to 2 servings each day. A serving is 3 ounces, about the size of a deck of cards. · Eat 4 to 5 servings of nuts, seeds, and legumes (cooked dried beans, lentils, and split peas) each week. A serving is 1/3 cup of nuts, 2 tablespoons of seeds, or ½ cup of cooked beans or peas. · Limit fats and oils to 2 to 3 servings each day. A serving is 1 teaspoon of vegetable oil or 2 tablespoons of salad dressing. · Limit sweets and added sugars to 5 servings or less a week. A serving is 1 tablespoon jelly or jam, ½ cup sorbet, or 1 cup of lemonade. · Eat less than 2,300 milligrams (mg) of sodium a day. If you limit your sodium to 1,500 mg a day, you can lower your blood pressure even more. Tips for success · Start small. Do not try to make dramatic changes to your diet all at once. You might feel that you are missing out on your favorite foods and then be more likely to not follow the plan. Make small changes, and stick with them. Once those changes become habit, add a few more changes. · Try some of the following: ? Make it a goal to eat a fruit or vegetable at every meal and at snacks. This will make it easy to get the recommended amount of fruits and vegetables each day. ? Try yogurt topped with fruit and nuts for a snack or healthy dessert. ? Add lettuce, tomato, cucumber, and onion to sandwiches. ? Combine a ready-made pizza crust with low-fat mozzarella cheese and lots of vegetable toppings. Try using tomatoes, squash, spinach, broccoli, carrots, cauliflower, and onions. ? Have a variety of cut-up vegetables with a low-fat dip as an appetizer instead of chips and dip. ? Sprinkle sunflower seeds or chopped almonds over salads. Or try adding chopped walnuts or almonds to cooked vegetables. ? Try some vegetarian meals using beans and peas. Add garbanzo or kidney beans to salads. Make burritos and tacos with mashed gaffney beans or black beans. Where can you learn more? Go to http://luis [employer]-er[employer].info/. Enter Z700 in the search box to learn more about \"DASH Diet: Care Instructions. \" Current as of: July 22, 2018 Content Version: 11.9 © 2528-4846 [employer]. Care instructions adapted under license by [employer] (which disclaims liability or warranty for this information). If you have questions about a medical condition or this instruction, always ask your healthcare professional. [address] any warranty or liability for your use of this information.

## 2019-05-09 ENCOUNTER — APPOINTMENT (OUTPATIENT)
Dept: RADIATION THERAPY | Age: 69
End: 2019-05-09
Payer: MEDICARE

## 2019-05-09 LAB
CREAT UR-MCNC: 298 MG/DL (ref 30–125)
MICROALBUMIN UR-MCNC: 5.38 MG/DL (ref 0–3)
MICROALBUMIN/CREAT UR-RTO: 18 MG/G (ref 0–30)

## 2019-05-10 ENCOUNTER — APPOINTMENT (OUTPATIENT)
Dept: RADIATION THERAPY | Age: 69
End: 2019-05-10
Payer: MEDICARE

## 2019-05-12 ENCOUNTER — TELEPHONE (OUTPATIENT)
Dept: INTERNAL MEDICINE CLINIC | Age: 69
End: 2019-05-12

## 2019-05-12 NOTE — TELEPHONE ENCOUNTER
Reviewed labs from visit. Please let the patient know the following: CBC with evidence of mild anemia. Most likely related to radiation therapy for prostate cancer. Will continue to monitor. (Hb 12.8/ Hct 37.0 in 3/2019). Renal function significantly decreased with creatinine 1.75/ eGFR 39. No evidence of microalbuminuria and urinalysis benign. Decline in renal function likely reflecting decrease in volume status and dehydration as exhibited by his mild hypotension during visit. Hydrochlorothiazide discontinued and encouraged to increase fluid intake. Will reassess at next visit. Please confirm that he did discontinue hydrochlorothiazide. Vitamin D level and TSH normal.  
 
Lipid panel excellent with total chol 127, HDL 51 and LDL 53. On simvastatin. Please ask him to have blood drawn a few days prior to his next visit so that results will be available during visit. Please set up lab appointment.

## 2019-05-12 NOTE — PROGRESS NOTES
HPI:  
Nita Arteaga is a 78y.o. year old male who presents for routine visit and for evaluation of hypertension, hyperlipidemia, ASHD s/p CABG, aortic stenosis s/p AVR, diabetes mellitus, chronic renal disease stage 3, prostate cancer, and gout. He reports that he has been receiving EBRT for treatment of his prostate cancer, and completed his last treatment on 5/6/2019. He states that he has tolerated it generally well, although he has noticed some fatigue. His weight has decreased fourteen pounds since his last visit. He is otherwise without complaints and feeling generally well. In 6/2017, he was noted to have an elevated PSA of 6.0, which was confirmed on repeat to be 6.6. He was referred to Dr. Waqar Nathan and PSA was again repeated and found to be increased at 7.24. He underwent TRUS biopsy on 8/17/2017, which showed [id number] [place] Grade 7 (3 + 4) adenocarcinoma of the prostate in 3/12 cores, 2-10% of each core. Options for management were discussed and he selected active surveillance. Plans were for repeat PSA and MRI prostate in 6 months (due 2/2018). However, the patient never had tests performed and he did not follow-up as discussed. On 10/4/2018, at his routine visit, a PSA level was obtained and was found to have increased to 11.3, and he was advised to follow-up with Dr. Waqar Nathan. He underwent a prostate MRI 3T at [facility] on 12/31/2018 showing several peripheral zone PI-RADS 4 observations: right posterolateral peripheral zone at the mid gland/apex and anterior peripheral zone at the apex bilaterally. The former exhibits questionable capsular bulging laterally, but not a definitive appearance for extracapsular extension. He had a follow-up appointment with Dr. Waqar Nathan on 1/11/2019 and decision made to proceed with EBRT.  He had a staging bone scan (1/23/2019) which was negative for osseous metastases, and a CT abdomen and pelvis (1/23/2019) which showed no adenopathy of evidence of metastases, mild hepatic steatosis, aortic atherosclerosis, and cholelithiasis. He met with Dr. Tracee Connell on 1/30/2019 and has decided to proceed with ERBT for definitive treatment. He has a history of hypertension, hyperlipidemia, ASHD, and aortic stenosis. In 10/2014, he presented with progressive chest pain and shortness of breath and an echocardiogram was obtained (10/16/2014) showing normal LV function (EF 55-60%), no RWMA, grade 1 diastolic dysfunction, mild LAE, and severe AS (mean gradient 30 mmHg, peak 67 mmHg, and AV area 0.8 cm2). He was referred to see Dr. Andre Caro and underwent cardiac catheterization (11/6/2014) showing 100% RCA (distal collaterals from left), 70-80% distal LAD, 80% proximal LCx, inferior basal hypokinesis, and EF 55%. On 11/13/2014, he underwent 2 vessel CABG (LIMA to LAD and SVG to OM1) and AV replacement with a bioprosthetic 23 mm Marifer Barbosa Magna pericardial valve by Dr. Lefty Ceja. He has done well since that time and remains asymptomatic. He denies any chest pain, shortness of breath at rest or with exertion, palpitations, lightheadedness, or edema. His current regimen includes aspirin, metoprolol, lisinopril, and moderate intensity dose simvastatin. He is followed by Dr. Andre Caro. He had a repeat echocardiogram (8/21/2017) showing normal LV function (EF 55-60%), no RWMA, mild MIL, and normally functioning bioprosthetic valve with peak gradient 17 mmHg, mean gradient 10 mmHg, and valve area 1.86 cm2. He has a history of diabetes mellitus, which has not required treatment with medication. Review of his record shows that his Hba1c has remained between 5.7-6.3 since 2011. He denies any polyuria, polydipsia, nocturia, or blurry vision, and has no history of retinopathy or neuropathy. He does have evidence of chronic renal disease, stage 3, with baseline creatinine 1.22-1.37/ eGFR 55-59 since 11/2014.  He had been having regular eye exams with Dr. Ricky Lance, although does not recall the date of his last exam and is overdue. He has a history of gout, but has not had a flare in many years since being treated with allopurinol. He has never had a screening colonoscopy. He did undergo Cologuard testing in 11/2017 which was negative. He denies any abdominal pain, nausea, vomiting, melena, hematochezia, or change in bowel movements. Past Medical History:  
Diagnosis Date  Aortic stenosis s/p AVR  Aortic stenosis, severe 10/16/2014 Echocardiogram EF 60% peak gradient 67 mmHg, mean gradient 30 mmHg, MARY 0.8 cm  ASHD (arteriosclerotic heart disease)  Chronic renal disease, stage III ([facility]  DM (diabetes mellitus) ([facility]  Gout  HCD (hypertensive cardiovascular disease)  History of echocardiogram 10/16/2014 EF 55-60%. No RWMA. Mild LVH. Gr 1 DDfx. Mild LAE. Severe AS (mean grad 30 mmHg).  Hyperlipidemia  Hypertension  Prostate cancer ([facility] 08/17/2017  
 [id number] [place] Grade 7 (3 + 4) adenocarcinoma of the prostate in 3/12 cores, 2-10% of each core; PSA 7.24. Dr. Loretta Arroyo.  S/P AVR (aortic valve replacement) 11/13/2014 #23 mm Marifer [facility] pericardial valve. Dr. Joanne Tejeda.  S/P CABG x 2 11/13/2014 LIMA to LAD, SVG to OM1. Dr. Joanne Tejeda.  S/P cardiac catheterization 11/06/2014 RCA dom. mRCA 100%. LM patent. dLAD 75% x 2. pCX 80% (ELENA-3). LVEDP 14 mmHg. EF 50-55%. Severe inferobasal hypk. Severe AS. AV replacement & CABG recommended. Past Surgical History:  
Procedure Laterality Date  HX APPENDECTOMY Current Outpatient Medications Medication Sig  
 lisinopril (PRINIVIL, ZESTRIL) 20 mg tablet Take 1 Tab by mouth daily.  lisinopril (PRINIVIL, ZESTRIL) 20 mg tablet Take 1 Tab by mouth daily.  tamsulosin (FLOMAX) 0.4 mg capsule Take 0.4 mg by mouth daily.  ascorbic acid, vitamin C, (VITAMIN C) 500 mg tablet Take  by mouth.  allopurinol (ZYLOPRIM) 100 mg tablet TAKE 1 TABLET DAILY  docusate sodium (COLACE) 100 mg capsule Take 100 mg by mouth daily.  cholecalciferol, vitamin D3, 2,000 unit tab Take  by mouth daily.  aspirin delayed-release 81 mg tablet Take 1 tablet by mouth daily.  hydroCHLOROthiazide (HYDRODIURIL) 12.5 mg tablet Take 1 Tab by mouth daily.  simvastatin (ZOCOR) 40 mg tablet TAKE 1 TABLET NIGHTLY  metoprolol succinate (TOPROL-XL) 25 mg XL tablet Take 1 Tab by mouth daily.  sildenafil citrate (VIAGRA) 100 mg tablet Take 1 Tab by mouth as needed. No current facility-administered medications for this visit. Allergies and Intolerances:  
No Known Allergies Family History:  
Family History Problem Relation Age of Onset  COPD Father  Other Father   
     pneumoconiosis  Lung Disease Father  Other Mother   
     meigs syndrome Social History: He  reports that he has never smoked. He has never used smokeless tobacco. He reports that he drinks approximately one case of beer per week. He is  with two adult children. He is retired from working in the [employer]. He started as a , and then became an . Social History Substance and Sexual Activity Alcohol Use Yes  Alcohol/week: 7.2 oz  Types: 12 Cans of beer per week Immunization History: 
Immunization History Administered Date(s) Administered  Influenza High Dose Vaccine PF 10/10/2017, 10/26/2018  Influenza Vaccine 10/14/2014  Influenza Vaccine (Tri) Adjuvanted 10/04/2018  Influenza Vaccine Split 09/29/2011  Influenza Vaccine Whole 09/14/2010  Pneumococcal Conjugate (PCV-13) 06/29/2017  Pneumococcal Polysaccharide (PPSV-23) 10/14/2014  Td 01/01/2008  Tdap 01/12/2018 Review of Systems: As above included in HPI.  
Otherwise 11 point review of systems negative including constitutional, skin, HENT, eyes, respiratory, cardiovascular, gastrointestinal, genitourinary, musculoskeletal, endocrine, hematologic, allergy, and neurologic. Physical:  
Vitals:  
BP: 100/62 ; repeat 108/62 left arm HR: 83 
WT: 211 lb (95.7 kg) BMI:  27.08 kg/m2 Exam:  
Pt appears well; alert and oriented x 3; appropriate affect. HEENT: PERRLA, anicteric, oropharynx clear, bilateral cerumen impaction, no JVD, adenopathy or thyromegaly. No carotid bruits or radiated murmur. Lungs: clear to auscultation, no wheezes, rhonchi, or rales. Heart: regular rate and rhythm. No murmur, rubs, gallops Abdomen: soft, nontender, nondistended, normal bowel sounds, no hepatosplenomegaly or masses. Extremities: without edema. Pulses 1-2+ bilaterally. Review of Data: 
Labs: Hospital Outpatient Visit on 03/18/2019 Component Date Value Ref Range Status  Creatinine, POC 03/18/2019 1.5* 0.6 - 1.3 MG/DL Final  
 GFRAA, POC 03/18/2019 56* >60 ml/min/1.73m2 Final  
 GFRNA, POC 03/18/2019 46* >60 ml/min/1.73m2 Final  
 
 
Health Maintenance: 
Screening:  
 Colorectal: Cologuard (11/2/2017) negative. Due 2020. Depression: none DM (HbA1c/FPG): HbA1c 5.9 (10/2018) Hepatitis C: negative (1/2018) Falls: none DEXA: N/A 
 PSA/KIEL: PSA 11.3 (10/2018). Followed by Dr. Rodríguez Channel 
 Glaucoma: regular eye exams with Dr. Patrick Bagley. Overdue for exam. 
 Smoking: none Vitamin D: 27.4 (1/2019) Medicare Wellness: 10/4/2018 Impression: 
Patient Active Problem List  
Diagnosis Code  Hyperlipidemia E78.5  Gout M10.9  Vitamin D deficiency E55.9  CAD in native artery I25.10  Hypertensive heart disease without congestive heart failure I11.9  
 S/P CABG x 2 Z95.1  
 S/P AVR (aortic valve replacement) Z95.2  Rising PSA level R97.20  Type 2 diabetes mellitus with stage 3 chronic kidney disease, without long-term current use of insulin (HCC) E11.22, N18.3  Stage 3 chronic kidney disease (HCC) N18.3  Prostate cancer ([facility] C61  
 Essential hypertension I10 Plan: 1. Hypertension. Blood pressure decreased today no current regimen of hydrochlorothiazide 12.5 mg daily, lisinopril 20 mg daily, and metoprolol succinate 25 mg daily. Weight decreased fourteen pounds while receiving EBRT for prostate cancer. Patient also complaining of fatigue. Instructed to discontinue hydrochlorothiazide today and increase fluid intake. Renal function had been generally stable with creatinine 1.42 / eGFR 49. Will reassess today. 2. Prostate cancer. [id number] [place] Grade 7 (3 + 4) adenocarcinoma. Patient chose active surveillance rather than definitive treatment. Repeat PSA and MRI prostate ordered for 2/2018, but he did not follow-up. Repeat PSA obtained and had increased from 7.24 at diagnosis to 11.3. Referred for follow-up with Dr. Bienvenido Villela, and MRI prostate performed on 12/31/2018 showing several peripheral zone PI-RADS 4 with one area showing questionable capsular bulging laterally, but not a definitive appearance for extracapsular extension. He decided to proceed with EBRT, and underwent fiducial marker and [employer] placement on 3/11/2019 followed by weekly treatments from 3/27-5/6/2019. Tolerated well except for some fatigue. Has a follow-up appointment with radiation oncology next month. Continue to follow. 3. Hyperlipidemia. On moderate intensity dose simvastatin with LDL 84 and HDL 69, indicative of fair control in this patient with goal <70 given history of ASHD and CABG. Continue to follow. Emphasized importance of lifestyle modifications, including diet, exercise, and weight loss. Weight decreased fourteen pounds during prostate cancer treatment. Will reassess today. 4. ASHD s/p 2 vessel CABG. Remains asymptomatic. On aspirin, metoprolol, and statin. Followed by Dr. Nathen Hernandez. 5. Aortic stenosis s/p bioprosthetic AVR. Remains asymptomatic, and repeat echocardiogram in 8/2017 with good functioning valve. Follow. 6. Diabetes mellitus. Patient with diagnosis of diabetes mellitus, but review of record shows HbA1c ranging from 5.7-6.3 since 2011 and -112 during that time period. He did have one glucose reading in 11/2014 at 128, but unclear if fasting and not confirmed. HbA1c now improved to 5.9. On Ace-I and statin. Continue follow-up with Dr. Kamran Rosenthal for annual eye exams. Foot exam normal today. Urine microalbumin/ creatinine ratio without evidence of microalbuminuria, but does have evidence of chronic renal disease stage 3. Emphasized importance of lifestyle modifications, including diet, exercise, and weight loss. 7. Chronic renal disease, stage 3. Most likely secondary to long standing hypertension and prediabetes, although also appears to have developed at time of CABG and AVR so may be related to stress of surgery. On statin and Ace-I. Discussed importance of avoiding NSAIDS and prerenal status. Follow. 8. Gout. Asymptomatic. On allopurinol. 9. Health maintenance. iven script for Shingrix vaccine but not yet obtained. Completed pneumococcal series. Given script for Tdap previously, but patient did not obtain. Other immunizations up to date. Completed Cologuard for colorectal cancer screening. Did not yet have eye exam with Dr. Kamran Rosenthal. Stressed importance of follow-up. Discussed decreasing alcohol consumption. Vitamin D level normal. Continue maintenance dose supplement. Medicare wellness visit up to date. Patient understands recommendations and agrees with plan. Follow-up in 3 weeks to reassess blood pressure. [facility] Outpatient Visit on 05/08/2019 Component Date Value Ref Range Status  LIPID PROFILE 05/08/2019        Final  
 Cholesterol, total 05/08/2019 127  <200 MG/DL Final  
 Triglyceride 05/08/2019 115  <150 MG/DL Final  
 HDL Cholesterol 05/08/2019 51  40 - 60 MG/DL Final  
 LDL, calculated 05/08/2019 53  0 - 100 MG/DL Final  
  VLDL, calculated 05/08/2019 23  MG/DL Final  
 CHOL/HDL Ratio 05/08/2019 2.5  0 - 5.0   Final  
 Microalbumin,urine random 05/08/2019 5.38* 0 - 3.0 MG/DL Final  
 Creatinine, urine 05/08/2019 298.00* 30 - 125 mg/dL Final  
 Microalbumin/Creat ratio (mg/g cre* 05/08/2019 18  0 - 30 mg/g Final  
 Vitamin D 25-Hydroxy 05/08/2019 33.3  30 - 100 ng/mL Final  
 WBC 05/08/2019 6.2  4.6 - 13.2 K/uL Final  
 RBC 05/08/2019 3.56* 4.70 - 5.50 M/uL Final  
 HGB 05/08/2019 11.5* 13.0 - 16.0 g/dL Final  
 HCT 05/08/2019 35.0* 36.0 - 48.0 % Final  
 MCV 05/08/2019 98.3* 74.0 - 97.0 FL Final  
 MCH 05/08/2019 32.3  24.0 - 34.0 PG Final  
 MCHC 05/08/2019 32.9  31.0 - 37.0 g/dL Final  
 RDW 05/08/2019 13.1  11.6 - 14.5 % Final  
 PLATELET 59/82/7847 516  135 - 420 K/uL Final  
 MPV 05/08/2019 8.5* 9.2 - 11.8 FL Final  
 NEUTROPHILS 05/08/2019 74* 40 - 73 % Final  
 LYMPHOCYTES 05/08/2019 10* 21 - 52 % Final  
 MONOCYTES 05/08/2019 11* 3 - 10 % Final  
 EOSINOPHILS 05/08/2019 5  0 - 5 % Final  
 BASOPHILS 05/08/2019 0  0 - 2 % Final  
 ABS. NEUTROPHILS 05/08/2019 4.6  1.8 - 8.0 K/UL Final  
 ABS. LYMPHOCYTES 05/08/2019 0.6* 0.9 - 3.6 K/UL Final  
 ABS. MONOCYTES 05/08/2019 0.7  0.05 - 1.2 K/UL Final  
 ABS. EOSINOPHILS 05/08/2019 0.3  0.0 - 0.4 K/UL Final  
 ABS.  BASOPHILS 05/08/2019 0.0  0.0 - 0.1 K/UL Final  
 DF 05/08/2019 AUTOMATED    Final  
 Sodium 05/08/2019 136  136 - 145 mmol/L Final  
 Potassium 05/08/2019 4.6  3.5 - 5.5 mmol/L Final  
 Chloride 05/08/2019 104  100 - 108 mmol/L Final  
 CO2 05/08/2019 20* 21 - 32 mmol/L Final  
 Anion gap 05/08/2019 12  3.0 - 18 mmol/L Final  
 Glucose 05/08/2019 157* 74 - 99 mg/dL Final  
 BUN 05/08/2019 24* 7.0 - 18 MG/DL Final  
 Creatinine 05/08/2019 1.75* 0.6 - 1.3 MG/DL Final  
 BUN/Creatinine ratio 05/08/2019 14  12 - 20   Final  
 GFR est AA 05/08/2019 47* >60 ml/min/1.73m2 Final  
 GFR est non-AA 05/08/2019 39* >60 ml/min/1.73m2 Final  
  Calcium 05/08/2019 9.1  8.5 - 10.1 MG/DL Final  
 Bilirubin, total 05/08/2019 0.9  0.2 - 1.0 MG/DL Final  
 ALT (SGPT) 05/08/2019 20  16 - 61 U/L Final  
 AST (SGOT) 05/08/2019 13* 15 - 37 U/L Final  
 Alk. phosphatase 05/08/2019 76  45 - 117 U/L Final  
 Protein, total 05/08/2019 7.4  6.4 - 8.2 g/dL Final  
 Albumin 05/08/2019 4.3  3.4 - 5.0 g/dL Final  
 Globulin 05/08/2019 3.1  2.0 - 4.0 g/dL Final  
 A-G Ratio 05/08/2019 1.4  0.8 - 1.7   Final  
 TSH 05/08/2019 0.36  0.36 - 3.74 uIU/mL Final  
 Color 05/08/2019 YELLOW    Final  
 Appearance 05/08/2019 CLEAR    Final  
 Specific gravity 05/08/2019 1.019  1.005 - 1.030   Final  
 pH (UA) 05/08/2019 5.0  5.0 - 8.0   Final  
 Protein 05/08/2019 NEGATIVE   NEG mg/dL Final  
 Glucose 05/08/2019 NEGATIVE   NEG mg/dL Final  
 Ketone 05/08/2019 NEGATIVE   NEG mg/dL Final  
 Bilirubin 05/08/2019 NEGATIVE   NEG   Final  
 Blood 05/08/2019 NEGATIVE   NEG   Final  
 Urobilinogen 05/08/2019 0.2  0.2 - 1.0 EU/dL Final  
 Nitrites 05/08/2019 NEGATIVE   NEG   Final  
 Leukocyte Esterase 05/08/2019 NEGATIVE   NEG   Final  
 WBC 05/08/2019 0 to 1  0 - 4 /hpf Final  
 RBC 05/08/2019 0  0 - 5 /hpf Final  
 Epithelial cells 05/08/2019 FEW  0 - 5 /lpf Final  
 Bacteria 05/08/2019 NEGATIVE   NEG /hpf Final  
 
Reviewed labs from visit. CBC with evidence of mild anemia. Most likely related to radiation therapy for prostate cancer. Will continue to monitor. (Hb 12.8/ Hct 37.0 in 3/2019). Renal function significantly decreased with creatinine 1.75/ eGFR 39. No evidence of microalbuminuria and urinalysis benign. Decline in renal function reflective of decrease in volume status as exhibited by his mild hypotension during visit. Hydrochlorothiazide discontinued and encouraged to increase fluid intake. Will reassess at next visit. HbA1c not drawn. Will assess at next visit with BMP.  
Vitamin D level and TSH normal.  
 Lipid panel excellent with total chol 127, HDL 51 and LDL 53. On simvastatin.

## 2019-05-13 ENCOUNTER — APPOINTMENT (OUTPATIENT)
Dept: RADIATION THERAPY | Age: 69
End: 2019-05-13
Payer: MEDICARE

## 2019-05-14 ENCOUNTER — APPOINTMENT (OUTPATIENT)
Dept: RADIATION THERAPY | Age: 69
End: 2019-05-14
Payer: MEDICARE

## 2019-05-15 ENCOUNTER — APPOINTMENT (OUTPATIENT)
Dept: RADIATION THERAPY | Age: 69
End: 2019-05-15
Payer: MEDICARE

## 2019-05-15 RX ORDER — METOPROLOL SUCCINATE 25 MG/1
25 TABLET, EXTENDED RELEASE ORAL DAILY
Qty: 90 TAB | Refills: 3 | Status: SHIPPED | OUTPATIENT
Start: 2019-05-15 | End: 2019-06-04 | Stop reason: SDUPTHER

## 2019-05-16 ENCOUNTER — APPOINTMENT (OUTPATIENT)
Dept: RADIATION THERAPY | Age: 69
End: 2019-05-16
Payer: MEDICARE

## 2019-05-17 ENCOUNTER — TELEPHONE (OUTPATIENT)
Dept: INTERNAL MEDICINE CLINIC | Age: 69
End: 2019-05-17

## 2019-05-17 ENCOUNTER — APPOINTMENT (OUTPATIENT)
Dept: RADIATION THERAPY | Age: 69
End: 2019-05-17
Payer: MEDICARE

## 2019-05-17 NOTE — TELEPHONE ENCOUNTER
[id number] [facility] office calling, need medication list for patient faxed over. Asked them to fax over a signed release since pt is in their office and release was received. Med list in letter format has been faxed to [phone].

## 2019-05-20 ENCOUNTER — APPOINTMENT (OUTPATIENT)
Dept: RADIATION THERAPY | Age: 69
End: 2019-05-20
Payer: MEDICARE

## 2019-05-21 ENCOUNTER — APPOINTMENT (OUTPATIENT)
Dept: RADIATION THERAPY | Age: 69
End: 2019-05-21
Payer: MEDICARE

## 2019-05-22 ENCOUNTER — APPOINTMENT (OUTPATIENT)
Dept: RADIATION THERAPY | Age: 69
End: 2019-05-22
Payer: MEDICARE

## 2019-05-23 ENCOUNTER — APPOINTMENT (OUTPATIENT)
Dept: RADIATION THERAPY | Age: 69
End: 2019-05-23
Payer: MEDICARE

## 2019-05-24 ENCOUNTER — APPOINTMENT (OUTPATIENT)
Dept: RADIATION THERAPY | Age: 69
End: 2019-05-24
Payer: MEDICARE

## 2019-05-27 ENCOUNTER — APPOINTMENT (OUTPATIENT)
Dept: RADIATION THERAPY | Age: 69
End: 2019-05-27
Payer: MEDICARE

## 2019-05-28 ENCOUNTER — APPOINTMENT (OUTPATIENT)
Dept: RADIATION THERAPY | Age: 69
End: 2019-05-28
Payer: MEDICARE

## 2019-05-29 ENCOUNTER — HOSPITAL ENCOUNTER (OUTPATIENT)
Dept: LAB | Age: 69
Discharge: HOME | End: 2019-05-29
Payer: MEDICARE

## 2019-05-29 ENCOUNTER — OFFICE VISIT (OUTPATIENT)
Dept: INTERNAL MEDICINE CLINIC | Age: 69
End: 2019-05-29

## 2019-05-29 ENCOUNTER — APPOINTMENT (OUTPATIENT)
Dept: INTERNAL MEDICINE CLINIC | Age: 69
End: 2019-05-29

## 2019-05-29 VITALS
RESPIRATION RATE: 14 BRPM | SYSTOLIC BLOOD PRESSURE: 138 MMHG | BODY MASS INDEX: 26.95 KG/M2 | WEIGHT: 210 LBS | TEMPERATURE: 97.2 F | HEIGHT: 74 IN | DIASTOLIC BLOOD PRESSURE: 68 MMHG | OXYGEN SATURATION: 98 % | HEART RATE: 72 BPM

## 2019-05-29 DIAGNOSIS — C61 PROSTATE CANCER (HCC): ICD-10-CM

## 2019-05-29 DIAGNOSIS — E11.22 TYPE 2 DIABETES MELLITUS WITH STAGE 3 CHRONIC KIDNEY DISEASE, WITHOUT LONG-TERM CURRENT USE OF INSULIN (HCC): ICD-10-CM

## 2019-05-29 DIAGNOSIS — I10 ESSENTIAL HYPERTENSION: ICD-10-CM

## 2019-05-29 DIAGNOSIS — N18.30 STAGE 3 CHRONIC KIDNEY DISEASE (HCC): ICD-10-CM

## 2019-05-29 DIAGNOSIS — Z91.09 ENVIRONMENTAL ALLERGIES: Primary | ICD-10-CM

## 2019-05-29 DIAGNOSIS — N18.30 TYPE 2 DIABETES MELLITUS WITH STAGE 3 CHRONIC KIDNEY DISEASE, WITHOUT LONG-TERM CURRENT USE OF INSULIN (HCC): ICD-10-CM

## 2019-05-29 DIAGNOSIS — I25.10 CAD IN NATIVE ARTERY: ICD-10-CM

## 2019-05-29 DIAGNOSIS — M1A.09X0 IDIOPATHIC CHRONIC GOUT OF MULTIPLE SITES WITHOUT TOPHUS: Chronic | ICD-10-CM

## 2019-05-29 DIAGNOSIS — Z95.2 S/P AVR (AORTIC VALVE REPLACEMENT): ICD-10-CM

## 2019-05-29 DIAGNOSIS — E78.5 HYPERLIPIDEMIA, UNSPECIFIED HYPERLIPIDEMIA TYPE: ICD-10-CM

## 2019-05-29 DIAGNOSIS — Z95.1 S/P CABG X 2: ICD-10-CM

## 2019-05-29 LAB
ALBUMIN SERPL-MCNC: 4.1 G/DL (ref 3.4–5)
ANION GAP SERPL CALC-SCNC: 11 MMOL/L (ref 3–18)
BASOPHILS # BLD: 0 K/UL (ref 0–0.1)
BASOPHILS NFR BLD: 0 % (ref 0–2)
BUN SERPL-MCNC: 20 MG/DL (ref 7–18)
BUN/CREAT SERPL: 13 (ref 12–20)
CALCIUM SERPL-MCNC: 9.1 MG/DL (ref 8.5–10.1)
CHLORIDE SERPL-SCNC: 104 MMOL/L (ref 100–108)
CO2 SERPL-SCNC: 20 MMOL/L (ref 21–32)
CREAT SERPL-MCNC: 1.58 MG/DL (ref 0.6–1.3)
DIFFERENTIAL METHOD BLD: ABNORMAL
EOSINOPHIL # BLD: 0.5 K/UL (ref 0–0.4)
EOSINOPHIL NFR BLD: 9 % (ref 0–5)
ERYTHROCYTE [DISTWIDTH] IN BLOOD BY AUTOMATED COUNT: 14.1 % (ref 11.6–14.5)
EST. AVERAGE GLUCOSE BLD GHB EST-MCNC: 128 MG/DL
GLUCOSE SERPL-MCNC: 122 MG/DL (ref 74–99)
HBA1C MFR BLD: 6.1 % (ref 4.2–5.6)
HCT VFR BLD AUTO: 37.1 % (ref 36–48)
HGB BLD-MCNC: 11.8 G/DL (ref 13–16)
LYMPHOCYTES # BLD: 0.6 K/UL (ref 0.9–3.6)
LYMPHOCYTES NFR BLD: 11 % (ref 21–52)
MAGNESIUM SERPL-MCNC: 2.2 MG/DL (ref 1.6–2.6)
MCH RBC QN AUTO: 32.6 PG (ref 24–34)
MCHC RBC AUTO-ENTMCNC: 31.8 G/DL (ref 31–37)
MCV RBC AUTO: 102.5 FL (ref 74–97)
MONOCYTES # BLD: 0.7 K/UL (ref 0.05–1.2)
MONOCYTES NFR BLD: 13 % (ref 3–10)
NEUTS SEG # BLD: 3.5 K/UL (ref 1.8–8)
NEUTS SEG NFR BLD: 67 % (ref 40–73)
PHOSPHATE SERPL-MCNC: 4.2 MG/DL (ref 2.5–4.9)
PLATELET # BLD AUTO: 155 K/UL (ref 135–420)
PMV BLD AUTO: 8.7 FL (ref 9.2–11.8)
POTASSIUM SERPL-SCNC: 4.6 MMOL/L (ref 3.5–5.5)
RBC # BLD AUTO: 3.62 M/UL (ref 4.7–5.5)
SODIUM SERPL-SCNC: 135 MMOL/L (ref 136–145)
WBC # BLD AUTO: 5.2 K/UL (ref 4.6–13.2)

## 2019-05-29 PROCEDURE — 83735 ASSAY OF MAGNESIUM: CPT

## 2019-05-29 PROCEDURE — 36415 COLL VENOUS BLD VENIPUNCTURE: CPT

## 2019-05-29 PROCEDURE — 85025 COMPLETE CBC W/AUTO DIFF WBC: CPT

## 2019-05-29 PROCEDURE — 80069 RENAL FUNCTION PANEL: CPT

## 2019-05-29 PROCEDURE — 83036 HEMOGLOBIN GLYCOSYLATED A1C: CPT

## 2019-05-29 RX ORDER — LORATADINE 10 MG/1
10 TABLET ORAL DAILY
Qty: 90 TAB | Refills: 3 | Status: SHIPPED | OUTPATIENT
Start: 2019-05-29 | End: 2019-08-12

## 2019-05-29 NOTE — PROGRESS NOTES
Juan Burch presents today for   Chief Complaint   Patient presents with    Cold Symptoms     cough, tickle in throat, slight congestion started 5/22/19              Depression Screening:  3 most recent PHQ Screens 5/8/2019   Little interest or pleasure in doing things Not at all   Feeling down, depressed, irritable, or hopeless Not at all   Total Score PHQ 2 0       Learning Assessment:  Learning Assessment 5/8/2019   PRIMARY LEARNER Patient   BARRIERS PRIMARY LEARNER NONE   CO-LEARNER CAREGIVER No   PRIMARY LANGUAGE ENGLISH   LEARNER PREFERENCE PRIMARY DEMONSTRATION     READING   ANSWERED BY patient   RELATIONSHIP SELF       Abuse Screening:  Abuse Screening Questionnaire 5/8/2019   Do you ever feel afraid of your partner? N   Are you in a relationship with someone who physically or mentally threatens you? N   Is it safe for you to go home? Y       Fall Risk  Fall Risk Assessment, last 12 mths 5/8/2019   Able to walk? Yes   Fall in past 12 months? No   Fall with injury? -   Number of falls in past 12 months -   Fall Risk Score -           Coordination of Care:  1. Have you been to the ER, urgent care clinic since your last visit? no  Hospitalized since your last visit? no    2. Have you seen or consulted any other health care providers outside of the [address] [place] [address] [place] since your last visit? Include any pap smears or colon screening.  no

## 2019-05-29 NOTE — PROGRESS NOTES
Maria G Lindsey is a 78 y.o.  male and presents with    Chief Complaint   Patient presents with    Cold Symptoms     cough, tickle in throat, slight congestion started 5/22/19       Subjective:  HPI  He reports with sore throat, night time cough, clearing his throat. Reports some improvement with [employer]. Started last Tuesday or Wednesday. Additional Concerns: none     ROS   Review of Systems   HENT: Positive for sore throat. No Known Allergies    Current Outpatient Medications   Medication Sig Dispense Refill    loratadine (CLARITIN) 10 mg tablet Take 1 Tab by mouth daily. 90 Tab 3    metoprolol succinate (TOPROL-XL) 25 mg XL tablet Take 1 Tab by mouth daily. 90 Tab 3    lisinopril (PRINIVIL, ZESTRIL) 20 mg tablet Take 1 Tab by mouth daily. 90 Tab 2    lisinopril (PRINIVIL, ZESTRIL) 20 mg tablet Take 1 Tab by mouth daily. 14 Tab 0    tamsulosin (FLOMAX) 0.4 mg capsule Take 0.4 mg by mouth daily.  hydroCHLOROthiazide (HYDRODIURIL) 12.5 mg tablet Take 1 Tab by mouth daily. 90 Tab 3    ascorbic acid, vitamin C, (VITAMIN C) 500 mg tablet Take  by mouth.  allopurinol (ZYLOPRIM) 100 mg tablet TAKE 1 TABLET DAILY 90 Tab 3    simvastatin (ZOCOR) 40 mg tablet TAKE 1 TABLET NIGHTLY 90 Tab 3    sildenafil citrate (VIAGRA) 100 mg tablet Take 1 Tab by mouth as needed. 6 Tab 3    docusate sodium (COLACE) 100 mg capsule Take 100 mg by mouth daily.  cholecalciferol, vitamin D3, 2,000 unit tab Take  by mouth daily.  aspirin delayed-release 81 mg tablet Take 1 tablet by mouth daily.  30 tablet 2       Social History     Socioeconomic History    Marital status:      Spouse name: Not on file    Number of children: Not on file    Years of education: Not on file    Highest education level: Not on file   Occupational History    Not on file   Social Needs    Financial resource strain: Not on file    Food insecurity:     Worry: Not on file     Inability: Not on file   Cesar Cabrera Transportation needs:     Medical: Not on file     Non-medical: Not on file   Tobacco Use    Smoking status: Never Smoker    Smokeless tobacco: Never Used   Substance and Sexual Activity    Alcohol use: Yes     Alcohol/week: 7.2 oz     Types: 12 Cans of beer per week    Drug use: No    Sexual activity: Not Currently   Lifestyle    Physical activity:     Days per week: Not on file     Minutes per session: Not on file    Stress: Not on file   Relationships    Social connections:     Talks on phone: Not on file     Gets together: Not on file     Attends Synagogue service: Not on file     Active member of club or organization: Not on file     Attends meetings of clubs or organizations: Not on file     Relationship status: Not on file    Intimate partner violence:     Fear of current or ex partner: Not on file     Emotionally abused: Not on file     Physically abused: Not on file     Forced sexual activity: Not on file   Other Topics Concern    Not on file   Social History Narrative    ** Merged History Encounter **            Past Medical History:   Diagnosis Date    Aortic stenosis     s/p AVR    Aortic stenosis, severe 10/16/2014    Echocardiogram EF 60% peak gradient 67 mmHg, mean gradient 30 mmHg, MARY 0.8 cm    ASHD (arteriosclerotic heart disease)     [facility], [facility]     DM (diabetes mellitus) [facility]     Gout     HCD (hypertensive cardiovascular disease)     History of echocardiogram 10/16/2014    EF 55-60%. No RWMA. Mild LVH. Gr 1 DDfx. Mild LAE. Severe AS (mean grad 30 mmHg).  Hyperlipidemia     Hypertension     Prostate cancer ([facility] [address] [facility] 08/17/2017    [id number] Kaylynn Grade 7 (3 + 4) adenocarcinoma of the prostate in 3/12 cores, 2-10% of each core; PSA 7.24. Dr. Carlos Mendoza.  S/P AVR (aortic valve replacement) 11/13/2014    #23 mm Marifer Barbosa Magna pericardial valve. Dr. Petra Bird.  S/P CABG x 2 11/13/2014    LIMA to LAD, SVG to OM1. Dr. Petra Bird.     S/P cardiac catheterization 11/06/2014    RCA dom. mRCA 100%. LM patent. dLAD 75% x 2. pCX 80% (ELENA-3). LVEDP 14 mmHg. EF 50-55%. Severe inferobasal hypk. Severe AS. AV replacement & CABG recommended. Past Surgical History:   Procedure Laterality Date    HX APPENDECTOMY         Family History   Problem Relation Age of Onset    COPD Father     Other Father         pneumoconiosis    Lung Disease Father     Other Mother         meigs syndrome       Objective:  Vitals:    05/29/19 1117   BP: 138/68   Pulse: 72   Resp: 14   Temp: 97.2 °F (36.2 °C)   TempSrc: Oral   SpO2: 98%   Weight: 210 lb (95.3 kg)   Height: 6' 2.02\" (1.88 m)   PainSc:   0 - No pain       LABS   Results for orders placed or performed during the hospital encounter of 05/08/19   LIPID PANEL   Result Value Ref Range    LIPID PROFILE          Cholesterol, total 127 <200 MG/DL    Triglyceride 115 <150 MG/DL    HDL Cholesterol 51 40 - 60 MG/DL    LDL, calculated 53 0 - 100 MG/DL    VLDL, calculated 23 MG/DL    CHOL/HDL Ratio 2.5 0 - 5.0     MICROALBUMIN, UR, RAND W/ MICROALB/CREAT RATIO   Result Value Ref Range    Microalbumin,urine random 5.38 (H) 0 - 3.0 MG/DL    Creatinine, urine 298.00 (H) 30 - 125 mg/dL    Microalbumin/Creat ratio (mg/g creat) 18 0 - 30 mg/g   VITAMIN D, 25 HYDROXY   Result Value Ref Range    Vitamin D 25-Hydroxy 33.3 30 - 100 ng/mL   CBC WITH AUTOMATED DIFF   Result Value Ref Range    WBC 6.2 4.6 - 13.2 K/uL    RBC 3.56 (L) 4.70 - 5.50 M/uL    HGB 11.5 (L) 13.0 - 16.0 g/dL    HCT 35.0 (L) 36.0 - 48.0 %    MCV 98.3 (H) 74.0 - 97.0 FL    MCH 32.3 24.0 - 34.0 PG    MCHC 32.9 31.0 - 37.0 g/dL    RDW 13.1 11.6 - 14.5 %    PLATELET 433 718 - 922 K/uL    MPV 8.5 (L) 9.2 - 11.8 FL    NEUTROPHILS 74 (H) 40 - 73 %    LYMPHOCYTES 10 (L) 21 - 52 %    MONOCYTES 11 (H) 3 - 10 %    EOSINOPHILS 5 0 - 5 %    BASOPHILS 0 0 - 2 %    ABS. NEUTROPHILS 4.6 1.8 - 8.0 K/UL    ABS. LYMPHOCYTES 0.6 (L) 0.9 - 3.6 K/UL    ABS.  MONOCYTES 0.7 0.05 - 1.2 K/UL    ABS. EOSINOPHILS 0.3 0.0 - 0.4 K/UL    ABS. BASOPHILS 0.0 0.0 - 0.1 K/UL    DF AUTOMATED     METABOLIC PANEL, COMPREHENSIVE   Result Value Ref Range    Sodium 136 136 - 145 mmol/L    Potassium 4.6 3.5 - 5.5 mmol/L    Chloride 104 100 - 108 mmol/L    CO2 20 (L) 21 - 32 mmol/L    Anion gap 12 3.0 - 18 mmol/L    Glucose 157 (H) 74 - 99 mg/dL    BUN 24 (H) 7.0 - 18 MG/DL    Creatinine 1.75 (H) 0.6 - 1.3 MG/DL    BUN/Creatinine ratio 14 12 - 20      GFR est AA 47 (L) >60 ml/min/1.73m2    GFR est non-AA 39 (L) >60 ml/min/1.73m2    Calcium 9.1 8.5 - 10.1 MG/DL    Bilirubin, total 0.9 0.2 - 1.0 MG/DL    ALT (SGPT) 20 16 - 61 U/L    AST (SGOT) 13 (L) 15 - 37 U/L    Alk. phosphatase 76 45 - 117 U/L    Protein, total 7.4 6.4 - 8.2 g/dL    Albumin 4.3 3.4 - 5.0 g/dL    Globulin 3.1 2.0 - 4.0 g/dL    A-G Ratio 1.4 0.8 - 1.7     TSH 3RD GENERATION   Result Value Ref Range    TSH 0.36 0.36 - 3.74 uIU/mL   URINALYSIS W/MICROSCOPIC   Result Value Ref Range    Color YELLOW      Appearance CLEAR      Specific gravity 1.019 1.005 - 1.030      pH (UA) 5.0 5.0 - 8.0      Protein NEGATIVE  NEG mg/dL    Glucose NEGATIVE  NEG mg/dL    Ketone NEGATIVE  NEG mg/dL    Bilirubin NEGATIVE  NEG      Blood NEGATIVE  NEG      Urobilinogen 0.2 0.2 - 1.0 EU/dL    Nitrites NEGATIVE  NEG      Leukocyte Esterase NEGATIVE  NEG      WBC 0 to 1 0 - 4 /hpf    RBC 0 0 - 5 /hpf    Epithelial cells FEW 0 - 5 /lpf    Bacteria NEGATIVE  NEG /hpf       TESTS  none    PE  Physical Exam   Constitutional: He is oriented to person, place, and time. He appears well-developed and well-nourished. No distress. HENT:   Head: Normocephalic and atraumatic. Cardiovascular: Normal rate, regular rhythm, normal heart sounds and intact distal pulses. Pulmonary/Chest: Effort normal and breath sounds normal. No respiratory distress. He has no wheezes. He has no rales. He exhibits no tenderness. Abdominal: Soft.  Bowel sounds are normal.   Musculoskeletal: Normal range of motion. Neurological: He is alert and oriented to person, place, and time. Skin: Skin is warm and dry. He is not diaphoretic. Psychiatric: He has a normal mood and affect. His behavior is normal. Judgment and thought content normal.   Vitals reviewed. Assessment/Plan:    1. Environment allergies- Try Claritin 10 mg QHS. Lab review: no lab studies available for review at time of visit    Today's Visit:   Diagnoses and all orders for this visit:    1. Environmental allergies  -     loratadine (CLARITIN) 10 mg tablet; Take 1 Tab by mouth daily. Health Maintenance: deferred to PcP. I have discussed the diagnosis with the patient and the intended plan as seen in the above orders. The patient has received an after-visit summary and questions were answered concerning future plans. I have discussed medication side effects and warnings with the patient as well. I have reviewed the plan of care with the patient, accepted their input and they are in agreement with the treatment goals. More than 1/2 of this 15 minute visit was spent in counseling and coordination of care, as described above.     PETER Berry  Internist of [address], [address] [place] [address].  Phone: [phone]  Fax: [phone]

## 2019-06-04 NOTE — TELEPHONE ENCOUNTER
Patient came into office to get a refill on Metoprolol 25 mg and Lisinopril 20 mg called into [facility] [address]

## 2019-06-05 ENCOUNTER — OFFICE VISIT (OUTPATIENT)
Dept: INTERNAL MEDICINE CLINIC | Age: 69
End: 2019-06-05

## 2019-06-05 VITALS
RESPIRATION RATE: 18 BRPM | OXYGEN SATURATION: 98 % | HEART RATE: 83 BPM | DIASTOLIC BLOOD PRESSURE: 78 MMHG | SYSTOLIC BLOOD PRESSURE: 128 MMHG | TEMPERATURE: 99.1 F | HEIGHT: 74 IN | WEIGHT: 214 LBS | BODY MASS INDEX: 27.46 KG/M2

## 2019-06-05 DIAGNOSIS — I10 ESSENTIAL HYPERTENSION: Primary | ICD-10-CM

## 2019-06-05 DIAGNOSIS — N18.30 STAGE 3 CHRONIC KIDNEY DISEASE (HCC): ICD-10-CM

## 2019-06-05 DIAGNOSIS — N18.30 TYPE 2 DIABETES MELLITUS WITH STAGE 3 CHRONIC KIDNEY DISEASE, WITHOUT LONG-TERM CURRENT USE OF INSULIN (HCC): ICD-10-CM

## 2019-06-05 DIAGNOSIS — Z95.2 S/P AVR (AORTIC VALVE REPLACEMENT): ICD-10-CM

## 2019-06-05 DIAGNOSIS — I25.10 CAD IN NATIVE ARTERY: ICD-10-CM

## 2019-06-05 DIAGNOSIS — M1A.09X0 IDIOPATHIC CHRONIC GOUT OF MULTIPLE SITES WITHOUT TOPHUS: Chronic | ICD-10-CM

## 2019-06-05 DIAGNOSIS — C61 PROSTATE CANCER (HCC): ICD-10-CM

## 2019-06-05 DIAGNOSIS — D64.9 ANEMIA, UNSPECIFIED TYPE: ICD-10-CM

## 2019-06-05 DIAGNOSIS — E11.22 TYPE 2 DIABETES MELLITUS WITH STAGE 3 CHRONIC KIDNEY DISEASE, WITHOUT LONG-TERM CURRENT USE OF INSULIN (HCC): ICD-10-CM

## 2019-06-05 DIAGNOSIS — R68.89 OTHER GENERAL SYMPTOMS AND SIGNS: ICD-10-CM

## 2019-06-05 DIAGNOSIS — E78.5 HYPERLIPIDEMIA, UNSPECIFIED HYPERLIPIDEMIA TYPE: ICD-10-CM

## 2019-06-05 DIAGNOSIS — Z95.1 S/P CABG X 2: ICD-10-CM

## 2019-06-05 RX ORDER — LISINOPRIL 20 MG/1
20 TABLET ORAL DAILY
Qty: 90 TAB | Refills: 3 | Status: SHIPPED | OUTPATIENT
Start: 2019-06-05 | End: 2019-11-05 | Stop reason: SDUPTHER

## 2019-06-05 RX ORDER — METOPROLOL SUCCINATE 25 MG/1
25 TABLET, EXTENDED RELEASE ORAL DAILY
Qty: 90 TAB | Refills: 3 | Status: SHIPPED | OUTPATIENT
Start: 2019-06-05 | End: 2019-11-05 | Stop reason: SDUPTHER

## 2019-06-05 NOTE — PROGRESS NOTES
Chief Complaint   Patient presents with    Hypertension     1 month follow up with lab results. Health Maintenance Due   Topic Date Due    EYE EXAM RETINAL OR DILATED  02/02/1960    GLAUCOMA SCREENING Q2Y  02/02/2014     1. Have you been to the ER, urgent care clinic or hospitalized since your last visit? NO.     2. Have you seen or consulted any other health care providers outside of the [address] [place] [address] since your last visit (Include any pap smears or colon screening)?  NO

## 2019-06-05 NOTE — PATIENT INSTRUCTIONS
Confirm that you discontinued hydrochlorothiazide. Prediabetes: Care Instructions  Your Care Instructions    Prediabetes is a warning sign that you are at risk for getting type 2 diabetes. It means that your blood sugar is higher than it should be. The food you eat turns into sugar, which your body uses for energy. Normally, an organ called the pancreas makes insulin, which allows the sugar in your blood to get into your body's cells. But when your body can't use insulin the right way, the sugar doesn't move into cells. It stays in your blood instead. This is called insulin resistance. The buildup of sugar in the blood causes prediabetes. The good news is that lifestyle changes may help you get your blood sugar back to normal and help you avoid or delay diabetes. Follow-up care is a key part of your treatment and safety. Be sure to make and go to all appointments, and call your doctor if you are having problems. It's also a good idea to know your test results and keep a list of the medicines you take. How can you care for yourself at home? · Watch your weight. A healthy weight helps your body use insulin properly. · Limit the amount of calories, sweets, and unhealthy fat you eat. Ask your doctor if you should see a dietitian. A registered dietitian can help you create meal plans that fit your lifestyle. · Get at least 30 minutes of exercise on most days of the week. Exercise helps control your blood sugar. It also helps you maintain a healthy weight. Walking is a good choice. You also may want to do other activities, such as running, swimming, cycling, or playing tennis or team sports. · Do not smoke. Smoking can make prediabetes worse. If you need help quitting, talk to your doctor about stop-smoking programs and medicines. These can increase your chances of quitting for good. · If your doctor prescribed medicines, take them exactly as prescribed.  Call your doctor if you think you are having a problem with your medicine. You will get more details on the specific medicines your doctor prescribes. When should you call for help? Watch closely for changes in your health, and be sure to contact your doctor if:    · You have any symptoms of diabetes. These may include:  ? Being thirsty more often. ? Urinating more. ? Being hungrier. ? Losing weight. ? Being very tired. ? Having blurry vision.     · You have a wound that will not heal.     · You have an infection that will not go away.     · You have problems with your blood pressure.     · You want more information about diabetes and how you can keep from getting it. Where can you learn more? Go to http://[employer].info/. Enter I222 in the search box to learn more about \"Prediabetes: Care Instructions. \"  Current as of: July 25, 2018  Content Version: 11.9  © 7185-9337 [employer]. Care instructions adapted under license by [employer] (which disclaims liability or warranty for this information). If you have questions about a medical condition or this instruction, always ask your healthcare professional. [address] any warranty or liability for your use of this information.

## 2019-06-09 PROBLEM — D64.9 ANEMIA: Status: ACTIVE | Noted: 2019-06-09

## 2019-06-09 PROBLEM — R97.20 RISING PSA LEVEL: Status: RESOLVED | Noted: 2017-07-03 | Resolved: 2019-06-09

## 2019-06-09 NOTE — PROGRESS NOTES
HPI:   Chinita Skiff is a 78y.o. year old male who presents for a routine visit and for evaluation of hypertension, hyperlipidemia, ASHD s/p CABG, aortic stenosis s/p AVR, diabetes mellitus, chronic renal disease stage 3, prostate cancer, and gout. He reports that he is doing generally well, and states that his fatigue has been improving. His weight has increased four pounds since his last visit, after decreasing a total of fourteen pounds during EBRT therapy for his prostate cancer. He is otherwise without complaints and feeling generally well. In 6/2017, he was noted to have an elevated PSA of 6.0, which was confirmed on repeat to be 6.6. He was referred to Dr. Elijah Dowd and PSA was again repeated and found to be increased at 7.24. He underwent TRUS biopsy on 8/17/2017, which showed [id number] Kaylynn Grade 7 (3 + 4) adenocarcinoma of the prostate in 3/12 cores, 2-10% of each core. Options for management were discussed and he selected active surveillance. Plans were for repeat PSA and MRI prostate in 6 months (due 2/2018). However, the patient never had tests performed and he did not follow-up as discussed. On 10/4/2018, at his routine visit, a PSA level was obtained and was found to have increased to 11.3, and he was advised to follow-up with Dr. Elijah Dowd. He underwent a prostate MRI 3T at [facility] [place] [facility] on 12/31/2018 showing several peripheral zone PI-RADS 4 observations: right posterolateral peripheral zone at the mid gland/apex and anterior peripheral zone at the apex bilaterally. The former exhibits questionable capsular bulging laterally, but not a definitive appearance for extracapsular extension. He had a follow-up appointment with Dr. Elijah Dowd on 1/11/2019 and decision made to proceed with EBRT.  He had a staging bone scan (1/23/2019) which was negative for osseous metastases, and a CT abdomen and pelvis (1/23/2019) which showed no adenopathy of evidence of metastases, mild hepatic steatosis, aortic atherosclerosis, and cholelithiasis. He met with Dr. Josesito Santiago on 1/30/2019 and decided to proceed with EBRT as definitive treatment for his prostate cancer, receiving his last treatment on 5/6/2019. He has a history of hypertension, hyperlipidemia, ASHD, and aortic stenosis. In 10/2014, he presented with progressive chest pain and shortness of breath and an echocardiogram was obtained (10/16/2014) showing normal LV function (EF 55-60%), no RWMA, grade 1 diastolic dysfunction, mild LAE, and severe AS (mean gradient 30 mmHg, peak 67 mmHg, and AV area 0.8 cm2). He was referred to see Dr. Lazara Mast and underwent cardiac catheterization (11/6/2014) showing 100% RCA (distal collaterals from left), 70-80% distal LAD, 80% proximal LCx, inferior basal hypokinesis, and EF 55%. On 11/13/2014, he underwent 2 vessel CABG (LIMA to LAD and SVG to OM1) and AV replacement with a bioprosthetic 23 mm Marifer Barbosa Magna pericardial valve by Dr. Mercy Schaffer. He has done well since that time and remains asymptomatic. He denies any chest pain, shortness of breath at rest or with exertion, palpitations, lightheadedness, or edema. His current regimen includes aspirin, metoprolol, lisinopril, and moderate intensity dose simvastatin. He is followed by Dr. Lazara Mast. He had a repeat echocardiogram (8/21/2017) showing normal LV function (EF 55-60%), no RWMA, mild MIL, and normally functioning bioprosthetic valve with peak gradient 17 mmHg, mean gradient 10 mmHg, and valve area 1.86 cm2. He has a history of diabetes mellitus, which has not required treatment with medication. Review of his record shows that his Hba1c has remained between 5.7-6.3 since 2011. He denies any polyuria, polydipsia, nocturia, or blurry vision, and has no history of retinopathy or neuropathy. He does have evidence of chronic renal disease, stage 3, with baseline creatinine 1.22-1.37/ eGFR 55-59 since 11/2014.  He had been having regular eye exams with  Moraima Fernandez, although does not recall the date of his last exam and is overdue. He has a history of gout, but has not had a flare in many years since being treated with allopurinol. He has never had a screening colonoscopy. He did undergo Cologuard testing in 11/2017 which was negative. He denies any abdominal pain, nausea, vomiting, melena, hematochezia, or change in bowel movements. Past Medical History:   Diagnosis Date    Aortic stenosis     s/p AVR    Aortic stenosis, severe 10/16/2014    Echocardiogram EF 60% peak gradient 67 mmHg, mean gradient 30 mmHg, MARY 0.8 cm    ASHD (arteriosclerotic heart disease)     Chronic renal disease, stage III (HCC)     DM (diabetes mellitus) ([facility] [address]     Gout     HCD (hypertensive cardiovascular disease)     History of echocardiogram 10/16/2014    EF 55-60%. No RWMA. Mild LVH. Gr 1 DDfx. Mild LAE. Severe AS (mean grad 30 mmHg).  Hyperlipidemia     Hypertension     Prostate cancer ([facility] [address] 08/17/2017    [id number] [place] Grade 7 (3 + 4) adenocarcinoma of the prostate in 3/12 cores, 2-10% of each core; PSA 7.24. Dr. Michael Arana.  S/P AVR (aortic valve replacement) 11/13/2014    #23 mm Marifer Barbosa Magna pericardial valve. Dr. Cookie Bauer.  S/P CABG x 2 11/13/2014    LIMA to LAD, SVG to OM1. Dr. Cookie Bauer.  S/P cardiac catheterization 11/06/2014    RCA dom. mRCA 100%. LM patent. dLAD 75% x 2. pCX 80% (ELENA-3). LVEDP 14 mmHg. EF 50-55%. Severe inferobasal hypk. Severe AS. AV replacement & CABG recommended. Past Surgical History:   Procedure Laterality Date    HX APPENDECTOMY       Current Outpatient Medications   Medication Sig    metoprolol succinate (TOPROL-XL) 25 mg XL tablet Take 1 Tab by mouth daily.  lisinopril (PRINIVIL, ZESTRIL) 20 mg tablet Take 1 Tab by mouth daily.  loratadine (CLARITIN) 10 mg tablet Take 1 Tab by mouth daily.  tamsulosin (FLOMAX) 0.4 mg capsule Take 0.4 mg by mouth daily.     ascorbic acid, vitamin C, (VITAMIN C) 500 mg tablet Take  by mouth.  allopurinol (ZYLOPRIM) 100 mg tablet TAKE 1 TABLET DAILY    simvastatin (ZOCOR) 40 mg tablet TAKE 1 TABLET NIGHTLY    docusate sodium (COLACE) 100 mg capsule Take 100 mg by mouth daily.  cholecalciferol, vitamin D3, 2,000 unit tab Take  by mouth daily.  aspirin delayed-release 81 mg tablet Take 1 tablet by mouth daily.  sildenafil citrate (VIAGRA) 100 mg tablet Take 1 Tab by mouth as needed. No current facility-administered medications for this visit. Allergies and Intolerances:   No Known Allergies     Family History:   Family History   Problem Relation Age of Onset   Nelson Dottie COPD Father     Other Father         pneumoconiosis    Lung Disease Father     Other Mother         meigs syndrome     Social History:   He  reports that he has never smoked. He has never used smokeless tobacco. He reports that he drinks approximately one case of beer per week. He is  with two adult children. He is retired from working in the [employer]. He started as a , and then became an . Social History     Substance and Sexual Activity   Alcohol Use Yes    Alcohol/week: 7.2 oz    Types: 12 Cans of beer per week     Immunization History:  Immunization History   Administered Date(s) Administered    Influenza High Dose Vaccine PF 10/10/2017, 10/26/2018    Influenza Vaccine 10/14/2014    Influenza Vaccine (Tri) Adjuvanted 10/04/2018    Influenza Vaccine Split 09/29/2011    Influenza Vaccine Whole 09/14/2010    Pneumococcal Conjugate (PCV-13) 06/29/2017    Pneumococcal Polysaccharide (PPSV-23) 10/14/2014    Td 01/01/2008    Tdap 01/12/2018       Review of Systems:   As above included in HPI. Otherwise 11 point review of systems negative including constitutional, skin, HENT, eyes, respiratory, cardiovascular, gastrointestinal, genitourinary, musculoskeletal, endocrine, hematologic, allergy, and neurologic.     Physical:   Vitals:   BP: 128/78 HR: 83  WT: 214 lb (97.1 kg)  BMI:  27.46 kg/m2    Exam:   Pt appears well; alert and oriented x 3; appropriate affect. HEENT: PERRLA, anicteric, oropharynx clear, bilateral cerumen impaction, no JVD, adenopathy or thyromegaly. No carotid bruits or radiated murmur. Lungs: clear to auscultation, no wheezes, rhonchi, or rales. Heart: regular rate and rhythm. No murmur, rubs, gallops  Abdomen: soft, nontender, nondistended, normal bowel sounds, no hepatosplenomegaly or masses. Extremities: without edema. Pulses 1-2+ bilaterally. Review of Data:  Labs:  Hospital Outpatient Visit on 05/29/2019   Component Date Value Ref Range Status    WBC 05/29/2019 5.2  4.6 - 13.2 K/uL Final    RBC 05/29/2019 3.62* 4.70 - 5.50 M/uL Final    HGB 05/29/2019 11.8* 13.0 - 16.0 g/dL Final    HCT 05/29/2019 37.1  36.0 - 48.0 % Final    MCV 05/29/2019 102.5* 74.0 - 97.0 FL Final    MCH 05/29/2019 32.6  24.0 - 34.0 PG Final    MCHC 05/29/2019 31.8  31.0 - 37.0 g/dL Final    RDW 05/29/2019 14.1  11.6 - 14.5 % Final    PLATELET 48/81/3773 105  135 - 420 K/uL Final    MPV 05/29/2019 8.7* 9.2 - 11.8 FL Final    NEUTROPHILS 05/29/2019 67  40 - 73 % Final    LYMPHOCYTES 05/29/2019 11* 21 - 52 % Final    MONOCYTES 05/29/2019 13* 3 - 10 % Final    EOSINOPHILS 05/29/2019 9* 0 - 5 % Final    BASOPHILS 05/29/2019 0  0 - 2 % Final    ABS. NEUTROPHILS 05/29/2019 3.5  1.8 - 8.0 K/UL Final    ABS. LYMPHOCYTES 05/29/2019 0.6* 0.9 - 3.6 K/UL Final    ABS. MONOCYTES 05/29/2019 0.7  0.05 - 1.2 K/UL Final    ABS. EOSINOPHILS 05/29/2019 0.5* 0.0 - 0.4 K/UL Final    ABS.  BASOPHILS 05/29/2019 0.0  0.0 - 0.1 K/UL Final    DF 05/29/2019 AUTOMATED    Final    Hemoglobin A1c 05/29/2019 6.1* 4.2 - 5.6 % Final    Est. average glucose 05/29/2019 128  mg/dL Final    Sodium 05/29/2019 135* 136 - 145 mmol/L Final    Potassium 05/29/2019 4.6  3.5 - 5.5 mmol/L Final    Chloride 05/29/2019 104  100 - 108 mmol/L Final    CO2 05/29/2019 20* 21 - 32 mmol/L Final    Anion gap 05/29/2019 11  3.0 - 18 mmol/L Final    Glucose 05/29/2019 122* 74 - 99 mg/dL Final    BUN 05/29/2019 20* 7.0 - 18 MG/DL Final    Creatinine 05/29/2019 1.58* 0.6 - 1.3 MG/DL Final    BUN/Creatinine ratio 05/29/2019 13  12 - 20   Final    GFR est AA 05/29/2019 53* >60 ml/min/1.73m2 Final    GFR est non-AA 05/29/2019 44* >60 ml/min/1.73m2 Final    Calcium 05/29/2019 9.1  8.5 - 10.1 MG/DL Final    Phosphorus 05/29/2019 4.2  2.5 - 4.9 MG/DL Final    Albumin 05/29/2019 4.1  3.4 - 5.0 g/dL Final    Magnesium 05/29/2019 2.2  1.6 - 2.6 mg/dL Final   Hospital Outpatient Visit on 05/08/2019   Component Date Value Ref Range Status    LIPID PROFILE 05/08/2019        Final    Cholesterol, total 05/08/2019 127  <200 MG/DL Final    Triglyceride 05/08/2019 115  <150 MG/DL Final    HDL Cholesterol 05/08/2019 51  40 - 60 MG/DL Final    LDL, calculated 05/08/2019 53  0 - 100 MG/DL Final    VLDL, calculated 05/08/2019 23  MG/DL Final    CHOL/HDL Ratio 05/08/2019 2.5  0 - 5.0   Final    Microalbumin,urine random 05/08/2019 5.38* 0 - 3.0 MG/DL Final    Creatinine, urine 05/08/2019 298.00* 30 - 125 mg/dL Final    Microalbumin/Creat ratio (mg/g cre* 05/08/2019 18  0 - 30 mg/g Final    Vitamin D 25-Hydroxy 05/08/2019 33.3  30 - 100 ng/mL Final    WBC 05/08/2019 6.2  4.6 - 13.2 K/uL Final    RBC 05/08/2019 3.56* 4.70 - 5.50 M/uL Final    HGB 05/08/2019 11.5* 13.0 - 16.0 g/dL Final    HCT 05/08/2019 35.0* 36.0 - 48.0 % Final    MCV 05/08/2019 98.3* 74.0 - 97.0 FL Final    MCH 05/08/2019 32.3  24.0 - 34.0 PG Final    MCHC 05/08/2019 32.9  31.0 - 37.0 g/dL Final    RDW 05/08/2019 13.1  11.6 - 14.5 % Final    PLATELET 49/48/8771 703  135 - 420 K/uL Final    MPV 05/08/2019 8.5* 9.2 - 11.8 FL Final    NEUTROPHILS 05/08/2019 74* 40 - 73 % Final    LYMPHOCYTES 05/08/2019 10* 21 - 52 % Final    MONOCYTES 05/08/2019 11* 3 - 10 % Final    EOSINOPHILS 05/08/2019 5  0 - 5 % Final  BASOPHILS 05/08/2019 0  0 - 2 % Final    ABS. NEUTROPHILS 05/08/2019 4.6  1.8 - 8.0 K/UL Final    ABS. LYMPHOCYTES 05/08/2019 0.6* 0.9 - 3.6 K/UL Final    ABS. MONOCYTES 05/08/2019 0.7  0.05 - 1.2 K/UL Final    ABS. EOSINOPHILS 05/08/2019 0.3  0.0 - 0.4 K/UL Final    ABS. BASOPHILS 05/08/2019 0.0  0.0 - 0.1 K/UL Final    DF 05/08/2019 AUTOMATED    Final    Sodium 05/08/2019 136  136 - 145 mmol/L Final    Potassium 05/08/2019 4.6  3.5 - 5.5 mmol/L Final    Chloride 05/08/2019 104  100 - 108 mmol/L Final    CO2 05/08/2019 20* 21 - 32 mmol/L Final    Anion gap 05/08/2019 12  3.0 - 18 mmol/L Final    Glucose 05/08/2019 157* 74 - 99 mg/dL Final    BUN 05/08/2019 24* 7.0 - 18 MG/DL Final    Creatinine 05/08/2019 1.75* 0.6 - 1.3 MG/DL Final    BUN/Creatinine ratio 05/08/2019 14  12 - 20   Final    GFR est AA 05/08/2019 47* >60 ml/min/1.73m2 Final    GFR est non-AA 05/08/2019 39* >60 ml/min/1.73m2 Final    Calcium 05/08/2019 9.1  8.5 - 10.1 MG/DL Final    Bilirubin, total 05/08/2019 0.9  0.2 - 1.0 MG/DL Final    ALT (SGPT) 05/08/2019 20  16 - 61 U/L Final    AST (SGOT) 05/08/2019 13* 15 - 37 U/L Final    Alk.  phosphatase 05/08/2019 76  45 - 117 U/L Final    Protein, total 05/08/2019 7.4  6.4 - 8.2 g/dL Final    Albumin 05/08/2019 4.3  3.4 - 5.0 g/dL Final    Globulin 05/08/2019 3.1  2.0 - 4.0 g/dL Final    A-G Ratio 05/08/2019 1.4  0.8 - 1.7   Final    TSH 05/08/2019 0.36  0.36 - 3.74 uIU/mL Final    Color 05/08/2019 YELLOW    Final    Appearance 05/08/2019 CLEAR    Final    Specific gravity 05/08/2019 1.019  1.005 - 1.030   Final    pH (UA) 05/08/2019 5.0  5.0 - 8.0   Final    Protein 05/08/2019 NEGATIVE   NEG mg/dL Final    Glucose 05/08/2019 NEGATIVE   NEG mg/dL Final    Ketone 05/08/2019 NEGATIVE   NEG mg/dL Final    Bilirubin 05/08/2019 NEGATIVE   NEG   Final    Blood 05/08/2019 NEGATIVE   NEG   Final    Urobilinogen 05/08/2019 0.2  0.2 - 1.0 EU/dL Final    Nitrites 05/08/2019 NEGATIVE   NEG   Final    Leukocyte Esterase 05/08/2019 NEGATIVE   NEG   Final    WBC 05/08/2019 0 to 1  0 - 4 /hpf Final    RBC 05/08/2019 0  0 - 5 /hpf Final    Epithelial cells 05/08/2019 FEW  0 - 5 /lpf Final    Bacteria 05/08/2019 NEGATIVE   NEG /hpf Final       Health Maintenance:  Screening:    Colorectal: Cologuard (11/2/2017) negative. Due 2020. Depression: none   DM (HbA1c/FPG): HbA1c 6.1 (5/2019)   Hepatitis C: negative (1/2018)   Falls: none   DEXA: N/A   PSA/KIEL: PSA 11.3 (10/2018). Followed by Dr. Bruna Fothergill   Glaucoma: regular eye exams with Dr. Andre Jefferson. Overdue for exam.   Smoking: none   Vitamin D: 33.3 (5/2019)   Medicare Wellness: 10/4/2018    Impression:  Patient Active Problem List   Diagnosis Code    Hyperlipidemia E78.5    Gout M10.9    Vitamin D deficiency E55.9    CAD in native artery I25.10    Hypertensive heart disease without congestive heart failure I11.9    S/P CABG x 2 Z95.1    S/P AVR (aortic valve replacement) Z95.2    Type 2 diabetes mellitus with stage 3 chronic kidney disease, without long-term current use of insulin (HCC) E11.22, N18.3    Stage 3 chronic kidney disease (HCC) N18.3    Prostate cancer ([facility] [address] C61    Essential hypertension I10    Anemia D64.9       Plan:  1. Hypertension. Blood pressure control significantly improved since discontinuing hydrochlorothiazide. Current regimen now includes only lisinopril 20 mg daily and metoprolol succinate 25 mg daily. Weight decreased fourteen pounds while receiving EBRT for prostate cancer. Renal function had worsened last visit to creatinine 1.75 / eGFR 39, likely due to overdiuresis. Improved today to 1.58/ eGFR 44, which remains below baseline, but improved. Encouraged to increase fluid intake. Will reassess prior to next visit. 2. Prostate cancer. [id number] [place]lynn Grade 7 (3 + 4) adenocarcinoma. Patient chose active surveillance rather than definitive treatment.  Repeat PSA and MRI prostate ordered for 2/2018, but he did not follow-up. Repeat PSA obtained and had increased from 7.24 at diagnosis to 11.3. Referred for follow-up with Dr. Asif Pitts, and MRI prostate performed on 12/31/2018 showing several peripheral zone PI-RADS 4 with one area showing questionable capsular bulging laterally, but not a definitive appearance for extracapsular extension. He decided to proceed with EBRT, and underwent fiducial marker and [employer] placement on 3/11/2019 followed by weekly treatments from 3/27-5/6/2019. Tolerated well except for some fatigue. Has a follow-up appointment with radiation oncology in two weeks. Continue to follow. 3. Hyperlipidemia. On moderate intensity dose simvastatin with LDL 53 and HDL 51, indicative of excellent control in this patient with goal <70 given history of ASHD and CABG. Improved levels from previous, likely related to weight loss. Continue to follow. 4. ASHD s/p 2 vessel CABG. Remains asymptomatic. On aspirin, metoprolol, and statin. Followed by Dr. Bernarda Merecdes. 5. Aortic stenosis s/p bioprosthetic AVR. Remains asymptomatic, and repeat echocardiogram in 8/2017 with good functioning valve. Follow. 6. Diabetes mellitus. Patient with diagnosis of diabetes mellitus, but review of record shows HbA1c ranging from 5.7-6.3 since 2011 and -112 during that time period. He did have one glucose reading in 11/2014 at 128, but unclear if fasting and not confirmed. HbA1c slightly increased to 6.1. On Ace-I and statin. Continue follow-up with Dr. Lisa Hinds for annual eye exams. Foot exam normal today. Urine microalbumin/ creatinine ratio without evidence of microalbuminuria, but does have evidence of chronic renal disease stage 3. Emphasized importance of lifestyle modifications, including diet, exercise, and weight loss. 7. Chronic renal disease, stage 3.  Most likely secondary to long standing hypertension and prediabetes, although also appears to have developed at time of CABG and AVR so may be related to hypoperfusion during surgery. On statin and Ace-I. Discussed importance of avoiding NSAIDS and prerenal status. Will need to follow closely given recent slight worsening. 8. Anemia, macrocytosis. Mild anemia developed during radiation therapy. Macrocytosis evident. Will assess B12 and folate next visit. Also advised to decrease alcohol consumption. Will monitor. 9. Gout. Asymptomatic. On allopurinol. 10. Health maintenance. Given script for Shingrix vaccine but not yet obtained. Completed pneumococcal series. Given script for Tdap previously, but patient did not obtain. Other immunizations up to date. Completed Cologuard for colorectal cancer screening. Did not yet have eye exam with Dr. Cheryl Flor. Stressed importance of follow-up. Discussed decreasing alcohol consumption. Vitamin D level normal. Continue maintenance dose supplement. Medicare wellness visit up to date. Patient understands recommendations and agrees with plan. Follow-up in 8 weeks.

## 2019-06-19 ENCOUNTER — HOSPITAL ENCOUNTER (OUTPATIENT)
Dept: RADIATION THERAPY | Age: 69
Discharge: HOME | End: 2019-06-19

## 2019-07-28 RX ORDER — ALLOPURINOL 100 MG/1
TABLET ORAL
Qty: 90 TAB | Refills: 3 | Status: SHIPPED | OUTPATIENT
Start: 2019-07-28 | End: 2019-11-05 | Stop reason: SDUPTHER

## 2019-07-28 RX ORDER — SIMVASTATIN 40 MG/1
TABLET, FILM COATED ORAL
Qty: 90 TAB | Refills: 3 | Status: SHIPPED | OUTPATIENT
Start: 2019-07-28 | End: 2020-02-06 | Stop reason: ALTCHOICE

## 2019-07-30 NOTE — MR AVS SNAPSHOT
Visit Information Date & Time Provider Department Dept. Phone Encounter #  
 8/8/2017  8:00 AM Arlene Flores MD [facility] [id number] Your Appointments 9/26/2017  1:30 PM  
Office Visit with Daniel Hills MD  
Internist of [address] [place] [address]) Appt Note: 3 month follow up  
 [address] [facility], [address] [place] [address] [place]  
  
   
 [address] [facility] Upcoming Health Maintenance Date Due  
 EYE EXAM RETINAL OR DILATED Q1 2/2/1959 COLONOSCOPY 2/2/1967 DTaP/Tdap/Td series (1 - Tdap) 1/2/2008 ZOSTER VACCINE AGE 60> 12/2/2008 GLAUCOMA SCREENING Q2Y 2/2/2014 FOOT EXAM Q1 7/14/2016 INFLUENZA AGE 9 TO ADULT 8/1/2017 HEMOGLOBIN A1C Q6M 12/26/2017 MICROALBUMIN Q1 6/26/2018 LIPID PANEL Q1 6/26/2018 MEDICARE YEARLY EXAM 6/30/2018 Allergies as of 8/8/2017  Review Complete On: 8/4/2017 By: Job Bowers No Known Allergies Current Immunizations  Reviewed on 4/2/2014 Name Date Influenza Vaccine 10/14/2014 Influenza Vaccine Split 9/29/2011 Influenza Vaccine Whole 9/14/2010 Pneumococcal Conjugate (PCV-13) 6/29/2017 Pneumococcal Polysaccharide (PPSV-23) 10/14/2014 Td 1/1/2008 Not reviewed this visit You Were Diagnosed With   
  
 Codes Comments CAD in native artery    -  Primary ICD-10-CM: I25.10 ICD-9-CM: 414.01 Hypertensive heart disease without congestive heart failure     ICD-10-CM: I11.9 ICD-9-CM: 402.90 Hyperlipidemia, unspecified hyperlipidemia type     ICD-10-CM: E78.5 ICD-9-CM: 272.4 S/P AVR (aortic valve replacement)     ICD-10-CM: V44.5 ICD-9-CM: V43.3 Vitals BP Pulse Height(growth percentile) Weight(growth percentile) SpO2 BMI  
 128/76 70 6' 3\" (1.905 m) 220 lb (99.8 kg) 96% 27.5 kg/m2 Smoking Status Never Smoker Vitals History BMI and BSA Data Body Mass Index Body Surface Area  
 27.5 kg/m 2 2.3 m 2 Preferred Pharmacy Pharmacy Name Phone [address] [facility] [phone] Your Updated Medication List  
  
   
This list is accurate as of: 8/8/17  9:20 AM.  Always use your most recent med list.  
  
  
  
  
 allopurinol 100 mg tablet Commonly known as:  Melly Rome Take 1 Tab by mouth daily. aspirin delayed-release 81 mg tablet Take 1 tablet by mouth daily. cholecalciferol (vitamin D3) 2,000 unit Tab Take  by mouth daily. COLACE 100 mg capsule Generic drug:  docusate sodium Take 100 mg by mouth daily. lisinopril 5 mg tablet Commonly known as:  Emily Bills Take 1 Tab by mouth daily. metoprolol succinate 25 mg XL tablet Commonly known as:  TOPROL-XL Take 1 Tab by mouth daily. sildenafil citrate 100 mg tablet Commonly known as:  VIAGRA Take 1 Tab by mouth as needed. simvastatin 40 mg tablet Commonly known as:  ZOCOR Take 1 Tab by mouth nightly. We Performed the Following AMB POC EKG ROUTINE W/ 12 LEADS, INTER & REP [82391 CPT(R)] To-Do List   
 08/08/2017 ECHO:  2D ECHO COMPLETE ADULT (TTE) W OR WO CONTR   
  
 08/21/2017 10:00 AM  
  Appointment with HBV- [id number] MACHINE (WT ) at [facility] NON-INVASIVE CARD ([phone]) Age Limit for ALL Heart procedures @ all [facility] facilities: 18 yrs and older only. Under the age of 25, refer to [facility] ([phone]). Wt Limit: 350lbs. This study requires patient to bring a written physician's order or MD office may fax the order to Central Scheduling at [phone]. Patient needs to bring a current list of all medications. No preparation is required for this study. Patients should report 15 minutes [facility], [address] [place] [address]/[address]. Introducing [facility]! Dear Camron Carter: Thank you for requesting a Yoopay account. Our records indicate that you already have an active Yoopay account. You can access your account anytime at [email or web address] Did you know that you can access your hospital and ER discharge instructions at any time in Yoopay? You can also review all of your test results from your hospital stay or ER visit. Additional Information If you have questions, please visit the Frequently Asked Questions section of the Yoopay website at [email or web address] Remember, Yoopay is NOT to be used for urgent needs. For medical emergencies, dial 911. Now available from your iPhone and Android! Please provide this summary of care documentation to your next provider. Your primary care clinician is listed as Vasyl Larose. If you have any questions after today's visit, please call [phone]. Render Post-Care Instructions In Note?: no Hemostasis: Aluminum Chloride Cryotherapy Text: The wound bed was treated with cryotherapy after the biopsy was performed. Biopsy Method: Dermablade Type Of Destruction Used: Curettage Dressing: bandage Anesthesia Volume In Cc (Will Not Render If 0): 0.5 Consent: Written consent was obtained and risks were reviewed including but not limited to scarring, infection, bleeding, scabbing, incomplete removal, nerve damage and allergy to anesthesia. Post-Care Instructions: I reviewed with the patient in detail post-care instructions. Patient is to keep the biopsy site dry overnight, and then apply bacitracin twice daily until healed. Patient may apply hydrogen peroxide soaks to remove any crusting. Silver Nitrate Text: The wound bed was treated with silver nitrate after the biopsy was performed. Wound Care: Petrolatum Was A Bandage Applied: Yes Notification Instructions: Patient will be notified of biopsy results. However, patient instructed to call the office if not contacted within 2 weeks. Billing Type: Third-Party Bill X Size Of Lesion In Cm: 0 Biopsy Type: H and E Depth Of Biopsy: dermis Electrodesiccation Text: The wound bed was treated with electrodesiccation after the biopsy was performed. Detail Level: Detailed Anesthesia Type: 1% lidocaine with 1:100,000 epinephrine Electrodesiccation And Curettage Text: The wound bed was treated with electrodesiccation and curettage after the biopsy was performed. Curettage Text: The wound bed was treated with curettage after the biopsy was performed.

## 2019-08-01 ENCOUNTER — HOSPITAL ENCOUNTER (OUTPATIENT)
Dept: LAB | Age: 69
Discharge: HOME | End: 2019-08-01
Payer: MEDICARE

## 2019-08-01 ENCOUNTER — APPOINTMENT (OUTPATIENT)
Dept: INTERNAL MEDICINE CLINIC | Age: 69
End: 2019-08-01

## 2019-08-01 DIAGNOSIS — R68.89 OTHER GENERAL SYMPTOMS AND SIGNS: ICD-10-CM

## 2019-08-01 DIAGNOSIS — E78.5 HYPERLIPIDEMIA, UNSPECIFIED HYPERLIPIDEMIA TYPE: ICD-10-CM

## 2019-08-01 DIAGNOSIS — Z95.2 S/P AVR (AORTIC VALVE REPLACEMENT): ICD-10-CM

## 2019-08-01 DIAGNOSIS — M1A.09X0 IDIOPATHIC CHRONIC GOUT OF MULTIPLE SITES WITHOUT TOPHUS: Chronic | ICD-10-CM

## 2019-08-01 DIAGNOSIS — C61 PROSTATE CANCER (HCC): ICD-10-CM

## 2019-08-01 DIAGNOSIS — E11.22 TYPE 2 DIABETES MELLITUS WITH STAGE 3 CHRONIC KIDNEY DISEASE, WITHOUT LONG-TERM CURRENT USE OF INSULIN (HCC): ICD-10-CM

## 2019-08-01 DIAGNOSIS — D64.9 ANEMIA, UNSPECIFIED TYPE: ICD-10-CM

## 2019-08-01 DIAGNOSIS — I10 ESSENTIAL HYPERTENSION: ICD-10-CM

## 2019-08-01 DIAGNOSIS — I25.10 CAD IN NATIVE ARTERY: ICD-10-CM

## 2019-08-01 DIAGNOSIS — Z95.1 S/P CABG X 2: ICD-10-CM

## 2019-08-01 DIAGNOSIS — N18.30 STAGE 3 CHRONIC KIDNEY DISEASE (HCC): ICD-10-CM

## 2019-08-01 DIAGNOSIS — N18.30 TYPE 2 DIABETES MELLITUS WITH STAGE 3 CHRONIC KIDNEY DISEASE, WITHOUT LONG-TERM CURRENT USE OF INSULIN (HCC): ICD-10-CM

## 2019-08-01 LAB
ANION GAP SERPL CALC-SCNC: 8 MMOL/L (ref 3–18)
BASOPHILS # BLD: 0 K/UL (ref 0–0.1)
BASOPHILS NFR BLD: 0 % (ref 0–2)
BUN SERPL-MCNC: 17 MG/DL (ref 7–18)
BUN/CREAT SERPL: 13 (ref 12–20)
CALCIUM SERPL-MCNC: 8.7 MG/DL (ref 8.5–10.1)
CHLORIDE SERPL-SCNC: 106 MMOL/L (ref 100–111)
CHOLEST SERPL-MCNC: 170 MG/DL
CO2 SERPL-SCNC: 23 MMOL/L (ref 21–32)
CREAT SERPL-MCNC: 1.36 MG/DL (ref 0.6–1.3)
DIFFERENTIAL METHOD BLD: ABNORMAL
EOSINOPHIL # BLD: 0.2 K/UL (ref 0–0.4)
EOSINOPHIL NFR BLD: 5 % (ref 0–5)
ERYTHROCYTE [DISTWIDTH] IN BLOOD BY AUTOMATED COUNT: 13.5 % (ref 11.6–14.5)
EST. AVERAGE GLUCOSE BLD GHB EST-MCNC: 126 MG/DL
FERRITIN SERPL-MCNC: 64 NG/ML (ref 8–388)
FOLATE SERPL-MCNC: 10.8 NG/ML (ref 3.1–17.5)
GLUCOSE SERPL-MCNC: 112 MG/DL (ref 74–99)
HBA1C MFR BLD: 6 % (ref 4.2–5.6)
HCT VFR BLD AUTO: 42 % (ref 36–48)
HDLC SERPL-MCNC: 68 MG/DL (ref 40–60)
HDLC SERPL: 2.5 {RATIO} (ref 0–5)
HGB BLD-MCNC: 13.7 G/DL (ref 13–16)
IRON SATN MFR SERPL: 42 %
IRON SERPL-MCNC: 130 UG/DL (ref 50–175)
LDLC SERPL CALC-MCNC: 87.6 MG/DL (ref 0–100)
LIPID PROFILE,FLP: ABNORMAL
LYMPHOCYTES # BLD: 0.8 K/UL (ref 0.9–3.6)
LYMPHOCYTES NFR BLD: 15 % (ref 21–52)
MCH RBC QN AUTO: 33 PG (ref 24–34)
MCHC RBC AUTO-ENTMCNC: 32.6 G/DL (ref 31–37)
MCV RBC AUTO: 101.2 FL (ref 74–97)
MONOCYTES # BLD: 0.5 K/UL (ref 0.05–1.2)
MONOCYTES NFR BLD: 10 % (ref 3–10)
NEUTS SEG # BLD: 3.4 K/UL (ref 1.8–8)
NEUTS SEG NFR BLD: 70 % (ref 40–73)
PLATELET # BLD AUTO: 181 K/UL (ref 135–420)
PMV BLD AUTO: 9.1 FL (ref 9.2–11.8)
POTASSIUM SERPL-SCNC: 4.3 MMOL/L (ref 3.5–5.5)
RBC # BLD AUTO: 4.15 M/UL (ref 4.7–5.5)
SODIUM SERPL-SCNC: 137 MMOL/L (ref 136–145)
TIBC SERPL-MCNC: 313 UG/DL (ref 250–450)
TRIGL SERPL-MCNC: 72 MG/DL (ref ?–150)
VIT B12 SERPL-MCNC: 191 PG/ML (ref 211–911)
VLDLC SERPL CALC-MCNC: 14.4 MG/DL
WBC # BLD AUTO: 4.9 K/UL (ref 4.6–13.2)

## 2019-08-01 PROCEDURE — 83540 ASSAY OF IRON: CPT

## 2019-08-01 PROCEDURE — 82607 VITAMIN B-12: CPT

## 2019-08-01 PROCEDURE — 80061 LIPID PANEL: CPT

## 2019-08-01 PROCEDURE — 82728 ASSAY OF FERRITIN: CPT

## 2019-08-01 PROCEDURE — 83036 HEMOGLOBIN GLYCOSYLATED A1C: CPT

## 2019-08-01 PROCEDURE — 80048 BASIC METABOLIC PNL TOTAL CA: CPT

## 2019-08-01 PROCEDURE — 36415 COLL VENOUS BLD VENIPUNCTURE: CPT

## 2019-08-01 PROCEDURE — 85025 COMPLETE CBC W/AUTO DIFF WBC: CPT

## 2019-08-08 ENCOUNTER — OFFICE VISIT (OUTPATIENT)
Dept: INTERNAL MEDICINE CLINIC | Age: 69
End: 2019-08-08

## 2019-08-08 ENCOUNTER — TELEPHONE (OUTPATIENT)
Dept: INTERNAL MEDICINE CLINIC | Age: 69
End: 2019-08-08

## 2019-08-08 VITALS
DIASTOLIC BLOOD PRESSURE: 60 MMHG | HEART RATE: 67 BPM | HEIGHT: 74 IN | TEMPERATURE: 98.2 F | BODY MASS INDEX: 27.59 KG/M2 | WEIGHT: 215 LBS | RESPIRATION RATE: 16 BRPM | SYSTOLIC BLOOD PRESSURE: 158 MMHG | OXYGEN SATURATION: 97 %

## 2019-08-08 DIAGNOSIS — E78.5 HYPERLIPIDEMIA, UNSPECIFIED HYPERLIPIDEMIA TYPE: ICD-10-CM

## 2019-08-08 DIAGNOSIS — C61 PROSTATE CANCER (HCC): ICD-10-CM

## 2019-08-08 DIAGNOSIS — I10 ESSENTIAL HYPERTENSION: Primary | ICD-10-CM

## 2019-08-08 DIAGNOSIS — M1A.09X0 IDIOPATHIC CHRONIC GOUT OF MULTIPLE SITES WITHOUT TOPHUS: Chronic | ICD-10-CM

## 2019-08-08 DIAGNOSIS — E53.8 VITAMIN B12 DEFICIENCY: ICD-10-CM

## 2019-08-08 DIAGNOSIS — I25.10 CAD IN NATIVE ARTERY: ICD-10-CM

## 2019-08-08 DIAGNOSIS — Z95.2 S/P AVR (AORTIC VALVE REPLACEMENT): ICD-10-CM

## 2019-08-08 DIAGNOSIS — N18.30 STAGE 3 CHRONIC KIDNEY DISEASE (HCC): ICD-10-CM

## 2019-08-08 DIAGNOSIS — N18.30 TYPE 2 DIABETES MELLITUS WITH STAGE 3 CHRONIC KIDNEY DISEASE, WITHOUT LONG-TERM CURRENT USE OF INSULIN (HCC): ICD-10-CM

## 2019-08-08 DIAGNOSIS — Z95.1 S/P CABG X 2: ICD-10-CM

## 2019-08-08 DIAGNOSIS — E11.22 TYPE 2 DIABETES MELLITUS WITH STAGE 3 CHRONIC KIDNEY DISEASE, WITHOUT LONG-TERM CURRENT USE OF INSULIN (HCC): ICD-10-CM

## 2019-08-08 DIAGNOSIS — I49.9 IRREGULAR HEART RHYTHM: ICD-10-CM

## 2019-08-08 RX ORDER — AMLODIPINE BESYLATE 5 MG/1
5 TABLET ORAL DAILY
Qty: 90 TAB | Refills: 2 | Status: SHIPPED | OUTPATIENT
Start: 2019-08-08 | End: 2019-11-19 | Stop reason: SDUPTHER

## 2019-08-08 NOTE — TELEPHONE ENCOUNTER
Please request recent eye exam from Dr. Denver Fuentes . Patient reports being seen in 7/2019 . Thank you.

## 2019-08-08 NOTE — PATIENT INSTRUCTIONS
Begin amlodipine 5 mg daily. Please bring medication bottles to next visit so can confirm what you are taking. DASH Diet: Care Instructions Your Care Instructions The DASH diet is an eating plan that can help lower your blood pressure. DASH stands for Dietary Approaches to Stop Hypertension. Hypertension is high blood pressure. The DASH diet focuses on eating foods that are high in calcium, potassium, and magnesium. These nutrients can lower blood pressure. The foods that are highest in these nutrients are fruits, vegetables, low-fat dairy products, nuts, seeds, and legumes. But taking calcium, potassium, and magnesium supplements instead of eating foods that are high in those nutrients does not have the same effect. The DASH diet also includes whole grains, fish, and poultry. The DASH diet is one of several lifestyle changes your doctor may recommend to lower your high blood pressure. Your doctor may also want you to decrease the amount of sodium in your diet. Lowering sodium while following the DASH diet can lower blood pressure even further than just the DASH diet alone. Follow-up care is a key part of your treatment and safety. Be sure to make and go to all appointments, and call your doctor if you are having problems. It's also a good idea to know your test results and keep a list of the medicines you take. How can you care for yourself at home? Following the DASH diet · Eat 4 to 5 servings of fruit each day. A serving is 1 medium-sized piece of fruit, ½ cup chopped or canned fruit, 1/4 cup dried fruit, or 4 ounces (½ cup) of fruit juice. Choose fruit more often than fruit juice. · Eat 4 to 5 servings of vegetables each day. A serving is 1 cup of lettuce or raw leafy vegetables, ½ cup of chopped or cooked vegetables, or 4 ounces (½ cup) of vegetable juice. Choose vegetables more often than vegetable juice. · Get 2 to 3 servings of low-fat and fat-free dairy each day.  A serving is 8 ounces of milk, 1 cup of yogurt, or 1 ½ ounces of cheese. · Eat 6 to 8 servings of grains each day. A serving is 1 slice of bread, 1 ounce of dry cereal, or ½ cup of cooked rice, pasta, or cooked cereal. Try to choose whole-grain products as much as possible. · Limit lean meat, poultry, and fish to 2 servings each day. A serving is 3 ounces, about the size of a deck of cards. · Eat 4 to 5 servings of nuts, seeds, and legumes (cooked dried beans, lentils, and split peas) each week. A serving is 1/3 cup of nuts, 2 tablespoons of seeds, or ½ cup of cooked beans or peas. · Limit fats and oils to 2 to 3 servings each day. A serving is 1 teaspoon of vegetable oil or 2 tablespoons of salad dressing. · Limit sweets and added sugars to 5 servings or less a week. A serving is 1 tablespoon jelly or jam, ½ cup sorbet, or 1 cup of lemonade. · Eat less than 2,300 milligrams (mg) of sodium a day. If you limit your sodium to 1,500 mg a day, you can lower your blood pressure even more. Tips for success · Start small. Do not try to make dramatic changes to your diet all at once. You might feel that you are missing out on your favorite foods and then be more likely to not follow the plan. Make small changes, and stick with them. Once those changes become habit, add a few more changes. · Try some of the following: ? Make it a goal to eat a fruit or vegetable at every meal and at snacks. This will make it easy to get the recommended amount of fruits and vegetables each day. ? Try yogurt topped with fruit and nuts for a snack or healthy dessert. ? Add lettuce, tomato, cucumber, and onion to sandwiches. ? Combine a ready-made pizza crust with low-fat mozzarella cheese and lots of vegetable toppings. Try using tomatoes, squash, spinach, broccoli, carrots, cauliflower, and onions. ? Have a variety of cut-up vegetables with a low-fat dip as an appetizer instead of chips and dip. ? Sprinkle sunflower seeds or chopped almonds over salads. Or try adding chopped walnuts or almonds to cooked vegetables. ? Try some vegetarian meals using beans and peas. Add garbanzo or kidney beans to salads. Make burritos and tacos with mashed gaffney beans or black beans. Where can you learn more? Go to http://luis e-erica.info/. Enter O713 in the search box to learn more about \"DASH Diet: Care Instructions. \" Current as of: July 22, 2018 Content Version: 12.1 © 8169-5593 [employer]. Care instructions adapted under license by [employer] (which disclaims liability or warranty for this information). If you have questions about a medical condition or this instruction, always ask your healthcare professional. [address] any warranty or liability for your use of this information.

## 2019-08-08 NOTE — PROGRESS NOTES
Ryan Maier presents today for   Chief Complaint   Patient presents with    Hypertension     1 month follow up- labs done              Depression Screening:  3 most recent PHQ Screens 6/5/2019   Little interest or pleasure in doing things Not at all   Feeling down, depressed, irritable, or hopeless Not at all   Total Score PHQ 2 0       Learning Assessment:  Learning Assessment 5/8/2019   PRIMARY LEARNER Patient   BARRIERS PRIMARY LEARNER [address] CAREGIVER No   PRIMARY LANGUAGE ENGLISH   LEARNER PREFERENCE PRIMARY DEMONSTRATION     READING   ANSWERED BY patient   RELATIONSHIP SELF       Abuse Screening:  Abuse Screening Questionnaire 6/5/2019   Do you ever feel afraid of your partner? N   Are you in a relationship with someone who physically or mentally threatens you? N   Is it safe for you to go home? Y       Fall Risk  Fall Risk Assessment, last 12 mths 6/5/2019   Able to walk? Yes   Fall in past 12 months? No   Fall with injury? -   Number of falls in past 12 months -   Fall Risk Score -           Coordination of Care:  1. Have you been to the ER, urgent care clinic since your last visit? Hospitalized since your last visit? no    2. Have you seen or consulted any other health care providers outside of the [address] [place] [address] since your last visit? Include any pap smears or colon screening.  Eye - unsure of name

## 2019-08-12 ENCOUNTER — TELEPHONE (OUTPATIENT)
Dept: INTERNAL MEDICINE CLINIC | Age: 69
End: 2019-08-12

## 2019-08-12 PROBLEM — D64.9 ANEMIA: Status: RESOLVED | Noted: 2019-06-09 | Resolved: 2019-08-12

## 2019-08-12 PROBLEM — E53.8 VITAMIN B12 DEFICIENCY: Status: ACTIVE | Noted: 2019-08-12

## 2019-08-12 RX ORDER — CHOLECALCIFEROL (VITAMIN D3) 25 MCG
1000 TABLET,CHEWABLE ORAL DAILY
Qty: 90 LOZENGE | Refills: 2 | Status: SHIPPED | OUTPATIENT
Start: 2019-08-12

## 2019-08-12 NOTE — TELEPHONE ENCOUNTER
Please let patient know that review of his labs from his visit showed that his Vitamin B12 level was very low. This may be contributing to his fatigue. A prescription for cyanocobalamin (B12) 1000 mcg sublingual lozenges was sent to [place] and he should take this daily.

## 2019-08-12 NOTE — PROGRESS NOTES
HPI:   Ольга Felder is a 78y.o. year old male who presents for a routine visit and for evaluation of hypertension, hyperlipidemia, ASHD s/p CABG, aortic stenosis s/p AVR, diabetes mellitus, chronic renal disease stage 3, prostate cancer, and gout. He reports that he is doing generally well, and states that his fatigue has been improving since completing EBRT for his prostate cancer. His weight decreased a total of fourteen pounds during therapy but has since remained stable. He states that he has noticed some decrease in his memory for some time, but states that it has generally been stable. He has most difficulty in remembering his medications and appointments. He is otherwise without complaints and feeling generally well. In 6/2017, he was noted to have an elevated PSA of 6.0, which was confirmed on repeat to be 6.6. He was referred to Dr. Veronica Bagley and PSA was again repeated and found to be increased at 7.24. He underwent TRUS biopsy on 8/17/2017, which showed [id number] [place] Grade 7 (3 + 4) adenocarcinoma of the prostate in 3/12 cores, 2-10% of each core. Options for management were discussed and he selected active surveillance. Plans were for repeat PSA and MRI prostate in 6 months (due 2/2018). However, the patient never had tests performed and he did not follow-up as discussed. On 10/4/2018, at his routine visit, a PSA level was obtained and was found to have increased to 11.3, and he was advised to follow-up with Dr. Veronica Bagley. He underwent a prostate MRI 3T at [facility] on 12/31/2018 showing several peripheral zone PI-RADS 4 observations: right posterolateral peripheral zone at the mid gland/apex and anterior peripheral zone at the apex bilaterally. The former exhibits questionable capsular bulging laterally, but not a definitive appearance for extracapsular extension. He had a follow-up appointment with Dr. Veronica Bagley on 1/11/2019 and decision made to proceed with EBRT.  He had a staging bone scan (1/23/2019) which was negative for osseous metastases, and a CT abdomen and pelvis (1/23/2019) which showed no adenopathy of evidence of metastases, mild hepatic steatosis, aortic atherosclerosis, and cholelithiasis. He met with Dr. Amaury Martinez on 1/30/2019 and decided to proceed with EBRT as definitive treatment for his prostate cancer, receiving his last treatment on 5/6/2019. He has a history of hypertension, hyperlipidemia, ASHD, and aortic stenosis. In 10/2014, he presented with progressive chest pain and shortness of breath and an echocardiogram was obtained (10/16/2014) showing normal LV function (EF 55-60%), no RWMA, grade 1 diastolic dysfunction, mild LAE, and severe AS (mean gradient 30 mmHg, peak 67 mmHg, and AV area 0.8 cm2). He was referred to see Dr. Paul Larios and underwent cardiac catheterization (11/6/2014) showing 100% RCA (distal collaterals from left), 70-80% distal LAD, 80% proximal LCx, inferior basal hypokinesis, and EF 55%. On 11/13/2014, he underwent 2 vessel CABG (LIMA to LAD and SVG to OM1) and AV replacement with a bioprosthetic 23 mm Marifer Barbosa Magna pericardial valve by Dr. Gustavo Callahan. He has done well since that time and remains asymptomatic. He denies any chest pain, shortness of breath at rest or with exertion, palpitations, lightheadedness, or edema. His current regimen includes aspirin, metoprolol, lisinopril, and moderate intensity dose simvastatin. He is followed by Dr. Paul Larios. He had a repeat echocardiogram (8/21/2017) showing normal LV function (EF 55-60%), no RWMA, mild MIL, and normally functioning bioprosthetic valve with peak gradient 17 mmHg, mean gradient 10 mmHg, and valve area 1.86 cm2. He has a history of diabetes mellitus, which has not required treatment with medication. Review of his record shows that his Hba1c has remained between 5.7-6.3 since 2011.  He denies any polyuria, polydipsia, nocturia, or blurry vision, and has no history of retinopathy or neuropathy. He does have evidence of chronic renal disease, stage 3, with baseline creatinine 1.22-1.37/ eGFR 55-59 since 11/2014. He had been having regular eye exams with Dr. Thea Mnote, although does not recall the date of his last exam and is overdue. He has a history of gout, but has not had a flare in many years since being treated with allopurinol. He has never had a screening colonoscopy. He did undergo Cologuard testing in 11/2017 which was negative. He denies any abdominal pain, nausea, vomiting, melena, hematochezia, or change in bowel movements. Past Medical History:   Diagnosis Date    Aortic stenosis     s/p AVR    Aortic stenosis, severe 10/16/2014    Echocardiogram EF 60% peak gradient 67 mmHg, mean gradient 30 mmHg, MARY 0.8 cm    ASHD (arteriosclerotic heart disease)     Chronic renal disease, stage III (HCC)     DM (diabetes mellitus) ([facility] [address]     Gout     HCD (hypertensive cardiovascular disease)     History of echocardiogram 10/16/2014    EF 55-60%. No RWMA. Mild LVH. Gr 1 DDfx. Mild LAE. Severe AS (mean grad 30 mmHg).  Hyperlipidemia     Hypertension     Prostate cancer ([facility] [address] 08/17/2017    [id number] [place] Grade 7 (3 + 4) adenocarcinoma of the prostate in 3/12 cores, 2-10% of each core; PSA 7.24. Dr. Karina Barriga.  S/P AVR (aortic valve replacement) 11/13/2014    #23 mm Marifer Barbosa Magna pericardial valve. Dr. Cardoso Credit.  S/P CABG x 2 11/13/2014    LIMA to LAD, SVG to OM1. Dr. Cardoso Credit.  S/P cardiac catheterization 11/06/2014    RCA dom. mRCA 100%. LM patent. dLAD 75% x 2. pCX 80% (ELENA-3). LVEDP 14 mmHg. EF 50-55%. Severe inferobasal hypk. Severe AS. AV replacement & CABG recommended. Past Surgical History:   Procedure Laterality Date    HX APPENDECTOMY       Current Outpatient Medications   Medication Sig    amLODIPine (NORVASC) 5 mg tablet Take 1 Tab by mouth daily.     simvastatin (ZOCOR) 40 mg tablet TAKE 1 TABLET NIGHTLY    allopurinol (ZYLOPRIM) 100 mg tablet TAKE 1 TABLET DAILY    metoprolol succinate (TOPROL-XL) 25 mg XL tablet Take 1 Tab by mouth daily.  lisinopril (PRINIVIL, ZESTRIL) 20 mg tablet Take 1 Tab by mouth daily.  ascorbic acid, vitamin C, (VITAMIN C) 500 mg tablet Take  by mouth.  sildenafil citrate (VIAGRA) 100 mg tablet Take 1 Tab by mouth as needed.  docusate sodium (COLACE) 100 mg capsule Take 100 mg by mouth daily as needed.  cholecalciferol, vitamin D3, 2,000 unit tab Take  by mouth daily.  aspirin delayed-release 81 mg tablet Take 1 tablet by mouth daily. No current facility-administered medications for this visit. Allergies and Intolerances:   No Known Allergies     Family History:   Family History   Problem Relation Age of Onset   [facility] COPD Father     Other Father         pneumoconiosis    Lung Disease Father     Other Mother         meigs syndrome     Social History:   He  reports that he has never smoked. He has never used smokeless tobacco. He reports that he drinks approximately one case of beer per week. He is  with two adult children. He is retired from working in the [employer]. He started as a , and then became an . Social History     Substance and Sexual Activity   Alcohol Use Yes    Alcohol/week: 12.0 standard drinks    Types: 12 Cans of beer per week     Immunization History:  Immunization History   Administered Date(s) Administered    Influenza High Dose Vaccine PF 10/10/2017, 10/26/2018    Influenza Vaccine 10/14/2014    Influenza Vaccine (Tri) Adjuvanted 10/04/2018    Influenza Vaccine Split 09/29/2011    Influenza Vaccine Whole 09/14/2010    Pneumococcal Conjugate (PCV-13) 06/29/2017    Pneumococcal Polysaccharide (PPSV-23) 10/14/2014    Td 01/01/2008    Tdap 01/12/2018       Review of Systems:   As above included in HPI.   Otherwise 11 point review of systems negative including constitutional, skin, HENT, eyes, respiratory, cardiovascular, gastrointestinal, genitourinary, musculoskeletal, endocrine, hematologic, allergy, and neurologic. Physical:   Vitals:   BP: 158/60   HR: 67  WT: 215 lb (97.5 kg)  BMI:  27.46 kg/m2    Exam:   Pt appears well; alert and oriented x 3; appropriate affect. HEENT: PERRLA, anicteric, oropharynx clear,  no JVD, adenopathy or thyromegaly. No carotid bruits or radiated murmur. Lungs: clear to auscultation, no wheezes, rhonchi, or rales. Heart: irregular rate and rhythm. No murmur, rubs, gallops  Abdomen: soft, nontender, nondistended, normal bowel sounds, no hepatosplenomegaly or masses. Extremities: without edema. Pulses 1-2+ bilaterally. Review of Data:  Labs:  Hospital Outpatient Visit on 08/01/2019   Component Date Value Ref Range Status    WBC 08/01/2019 4.9  4.6 - 13.2 K/uL Final    RBC 08/01/2019 4.15* 4.70 - 5.50 M/uL Final    HGB 08/01/2019 13.7  13.0 - 16.0 g/dL Final    HCT 08/01/2019 42.0  36.0 - 48.0 % Final    MCV 08/01/2019 101.2* 74.0 - 97.0 FL Final    MCH 08/01/2019 33.0  24.0 - 34.0 PG Final    MCHC 08/01/2019 32.6  31.0 - 37.0 g/dL Final    RDW 08/01/2019 13.5  11.6 - 14.5 % Final    PLATELET 45/67/5379 328  135 - 420 K/uL Final    MPV 08/01/2019 9.1* 9.2 - 11.8 FL Final    NEUTROPHILS 08/01/2019 70  40 - 73 % Final    LYMPHOCYTES 08/01/2019 15* 21 - 52 % Final    MONOCYTES 08/01/2019 10  3 - 10 % Final    EOSINOPHILS 08/01/2019 5  0 - 5 % Final    BASOPHILS 08/01/2019 0  0 - 2 % Final    ABS. NEUTROPHILS 08/01/2019 3.4  1.8 - 8.0 K/UL Final    ABS. LYMPHOCYTES 08/01/2019 0.8* 0.9 - 3.6 K/UL Final    ABS. MONOCYTES 08/01/2019 0.5  0.05 - 1.2 K/UL Final    ABS. EOSINOPHILS 08/01/2019 0.2  0.0 - 0.4 K/UL Final    ABS.  BASOPHILS 08/01/2019 0.0  0.0 - 0.1 K/UL Final    DF 08/01/2019 AUTOMATED    Final    Vitamin B12 08/01/2019 191* 211 - 911 pg/mL Final    Folate 08/01/2019 10.8  3.10 - 17.50 ng/mL Final    Ferritin 08/01/2019 64  8 - 388 NG/ML Final    Iron 08/01/2019 130  50 - 175 ug/dL Final    TIBC 08/01/2019 313  250 - 450 ug/dL Final    Iron % saturation 08/01/2019 42  % Final    Sodium 08/01/2019 137  136 - 145 mmol/L Final    Potassium 08/01/2019 4.3  3.5 - 5.5 mmol/L Final    Chloride 08/01/2019 106  100 - 111 mmol/L Final    CO2 08/01/2019 23  21 - 32 mmol/L Final    Anion gap 08/01/2019 8  3.0 - 18 mmol/L Final    Glucose 08/01/2019 112* 74 - 99 mg/dL Final    BUN 08/01/2019 17  7.0 - 18 MG/DL Final    Creatinine 08/01/2019 1.36* 0.6 - 1.3 MG/DL Final    BUN/Creatinine ratio 08/01/2019 13  12 - 20   Final    GFR est AA 08/01/2019 >60  >60 ml/min/1.73m2 Final    GFR est non-AA 08/01/2019 52* >60 ml/min/1.73m2 Final    Calcium 08/01/2019 8.7  8.5 - 10.1 MG/DL Final    Hemoglobin A1c 08/01/2019 6.0* 4.2 - 5.6 % Final    Est. average glucose 08/01/2019 126  mg/dL Final    LIPID PROFILE 08/01/2019        Final    Cholesterol, total 08/01/2019 170  <200 MG/DL Final    Triglyceride 08/01/2019 72  <150 MG/DL Final    HDL Cholesterol 08/01/2019 68* 40 - 60 MG/DL Final    LDL, calculated 08/01/2019 87.6  0 - 100 MG/DL Final    VLDL, calculated 08/01/2019 14.4  MG/DL Final    CHOL/HDL Ratio 08/01/2019 2.5  0 - 5.0   Final     EKG (8/8/2019) sinus rhythm at 68 bpm. Normal intervals. Lack of R waves in V2 and V3 likely lead placement. No ischemic changes when compared with prior in 8/2018. Health Maintenance:  Screening:    Colorectal: Cologuard (11/2/2017) negative. Due 11/2020. Depression: none   DM (HbA1c/FPG): HbA1c 6.0 (8/2019)   Hepatitis C: negative (1/2018)   Falls: none   DEXA: N/A   PSA/KIEL: PSA 13.9 (3/2019).  Followed by Dr. Jillian Kaiser and Dr. Cathy Tellez   Glaucoma: regular eye exams with Dr. Jose Barillas (last 6/2019)   Smoking: none   Vitamin D: 33.3 (5/2019)   Medicare Wellness: 10/4/2018    Impression:  Patient Active Problem List   Diagnosis Code    Hyperlipidemia E78.5    Gout M10.9    Vitamin D deficiency E55.9    CAD in native artery I25.10    Hypertensive heart disease without congestive heart failure I11.9    S/P CABG x 2 Z95.1    S/P AVR (aortic valve replacement) Z95.2    Type 2 diabetes mellitus with stage 3 chronic kidney disease, without long-term current use of insulin (HCC) E11.22, N18.3    Stage 3 chronic kidney disease (HCC) N18.3    Prostate cancer ([facility] [address] C61    Essential hypertension I10    Anemia D64.9    Vitamin B12 deficiency E53.8       Plan:  1. Hypertension. Blood pressure elevated today. Current regimen includes only lisinopril 20 mg daily and metoprolol succinate 25 mg daily. Discontinued hydrochlorothiazide due to hypotension during EBRT and renal function had worsened to creatinine 1.75 / eGFR 39, likely due to overdiuresis. Improved today to 1.36/ eGFR 52 which is at baseline. Given difficulty with volume status on hydrochlorothiazide, will begin amlodipine 5 mg daily. Does not have home monitor and not willing to purchase. Will reassess blood pressure in 2 weeks. Heart rhythm irregular on exam today, but EKG showed sinus rhythm. 2. Prostate cancer. [id number] [place] Grade 7 (3 + 4) adenocarcinoma. Patient chose active surveillance rather than definitive treatment. Repeat PSA and MRI prostate ordered for 2/2018, but he did not follow-up. Repeat PSA obtained and had increased from 7.24 at diagnosis to 11.3. Referred for follow-up with Dr. Jojo Hwang, and MRI prostate performed on 12/31/2018 showing several peripheral zone PI-RADS 4 with one area showing questionable capsular bulging laterally, but not a definitive appearance for extracapsular extension. He decided to proceed with EBRT, and underwent fiducial marker and [employer] placement on 3/11/2019 followed by weekly treatments from 3/27-5/6/2019. Tolerated well except for some fatigue which has improved. Being followed by Dr. Everton Villalobos and Dr. Jojo Hwang. 3. Hyperlipidemia.  On moderate intensity dose simvastatin with LDL 87 and HDL 68, indicative of good control in this patient. Goal LDL <70 given history of ASHD and CABG. Will continue to follow. 4. ASHD s/p 2 vessel CABG. Remains asymptomatic. On aspirin, metoprolol, and statin. Followed by Dr. German Todd. 5. Aortic stenosis s/p bioprosthetic AVR. Remains asymptomatic, and repeat echocardiogram in 8/2017 with good functioning valve. Follow. 6. Diabetes mellitus. Patient with diagnosis of diabetes mellitus, but review of record shows HbA1c ranging from 5.7-6.3 since 2011 and -112 during that time period. He did have one glucose reading in 11/2014 at 128, but unclear if fasting and not confirmed. HbA1c remains increased but controlled at 6.0. On Ace-I and statin. Continue follow-up with Dr. Angella Edmonds for annual eye exams. Foot exam normal (2/2019). Urine microalbumin/ creatinine ratio without evidence of microalbuminuria, but does have evidence of chronic renal disease stage 3. Emphasized importance of lifestyle modifications, including diet, exercise, and weight loss. 7. Chronic renal disease, stage 3. Most likely secondary to long standing hypertension and prediabetes, although also appears to have developed at time of CABG and AVR so may be related to hypoperfusion during surgery. On statin and Ace-I. Discussed importance of avoiding NSAIDS and prerenal status. Will need to follow. 8. Anemia, macrocytosis. Mild anemia developed during radiation therapy. Macrocytosis evident. Vitamin B12 level very low today. Will begin Vitamin B12 supplement 1000 mcg SL daily. Also advised to decrease alcohol consumption. Will monitor. 9. Gout. Asymptomatic. On allopurinol. 10. Health maintenance. Given script for Shingrix vaccine but not yet obtained. Completed pneumococcal series. Given script for Tdap previously, but patient did not obtain. Other immunizations up to date. Completed Cologuard for colorectal cancer screening. Completed eye exam with Dr. Angella Edmonds.  Discussed decreasing alcohol consumption. Vitamin D level normal. Continue maintenance dose supplement. Medicare wellness visit up to date. Total time: 40 minutes spent with the patient in face-to-face consultation of which greater than 50% was spent on counseling, answering questions and/or coordination of care. Complex medical review and management performed. Patient understands recommendations and agrees with plan. Follow-up in 2 weeks to reevaluate blood pressure.

## 2019-09-03 ENCOUNTER — OFFICE VISIT (OUTPATIENT)
Dept: CARDIOLOGY CLINIC | Age: 69
End: 2019-09-03

## 2019-09-03 ENCOUNTER — TELEPHONE (OUTPATIENT)
Dept: INTERNAL MEDICINE CLINIC | Age: 69
End: 2019-09-03

## 2019-09-03 VITALS
BODY MASS INDEX: 29.03 KG/M2 | HEIGHT: 73 IN | DIASTOLIC BLOOD PRESSURE: 78 MMHG | WEIGHT: 219 LBS | HEART RATE: 68 BPM | OXYGEN SATURATION: 97 % | SYSTOLIC BLOOD PRESSURE: 136 MMHG

## 2019-09-03 DIAGNOSIS — Z95.2 S/P AVR (AORTIC VALVE REPLACEMENT): ICD-10-CM

## 2019-09-03 DIAGNOSIS — I10 ESSENTIAL HYPERTENSION: Primary | ICD-10-CM

## 2019-09-03 DIAGNOSIS — E11.22 TYPE 2 DIABETES MELLITUS WITH STAGE 3 CHRONIC KIDNEY DISEASE, WITHOUT LONG-TERM CURRENT USE OF INSULIN (HCC): ICD-10-CM

## 2019-09-03 DIAGNOSIS — I25.10 CAD IN NATIVE ARTERY: Primary | ICD-10-CM

## 2019-09-03 DIAGNOSIS — N18.30 STAGE 3 CHRONIC KIDNEY DISEASE (HCC): ICD-10-CM

## 2019-09-03 DIAGNOSIS — I10 ESSENTIAL HYPERTENSION: ICD-10-CM

## 2019-09-03 DIAGNOSIS — I11.9 HYPERTENSIVE HEART DISEASE WITHOUT CONGESTIVE HEART FAILURE: ICD-10-CM

## 2019-09-03 DIAGNOSIS — E53.8 VITAMIN B12 DEFICIENCY: ICD-10-CM

## 2019-09-03 DIAGNOSIS — I25.10 CAD IN NATIVE ARTERY: ICD-10-CM

## 2019-09-03 DIAGNOSIS — N18.30 TYPE 2 DIABETES MELLITUS WITH STAGE 3 CHRONIC KIDNEY DISEASE, WITHOUT LONG-TERM CURRENT USE OF INSULIN (HCC): ICD-10-CM

## 2019-09-03 NOTE — PROGRESS NOTES
HISTORY OF PRESENT ILLNESS  Prashant Dumont is a 78 y.o. male. ASSESSMENT and PLAN    Mr. Gurjit Oliva initially presented with chest pains, and significant aortic stenosis with mean gradient of 30 mmHg, peak gradient of 67 mmHg, MARY of 0.8 cm². He ultimately underwent open-heart surgery in November of 2014. He had coronary artery bypass graft surgery with LIMA to LAD and SVG to OM1. He also had aortic valve replacement with #23 Marifer-Barbosa Magna pericardial valve. His echocardiogram from August 2017 showed normal LV function with well-functioning Marifer-Barbosa magna pericardial bioprosthetic aortic valve with mean gradient of 10 mmHg, and MARY of 1.86 cm². · CAD:    Symptomatically stable. · Bioprosthetic AVR: Clinically stable. · BP:   Well controlled. · HR:    Stable. · CHF:    There is no evidence of decompensated CHF noted. · Weight:    His weight today is 219 pounds. His baseline weight is 210 pounds. I have encouraged him to try to lose about 10 pounds. He understands. · Cholesterol:   Target LDL <70. Zocor 40. · Anti-platelet:   Remains on ASA. I will see him back annually. Thank you. Encounter Diagnoses   Name Primary?  CAD in native artery Yes    Hypertensive heart disease without congestive heart failure     Essential hypertension     S/P AVR (aortic valve replacement)      current treatment plan is effective, no change in therapy  lab results and schedule of future lab studies reviewed with patient  reviewed diet, exercise and weight control  cardiovascular risk and specific lipid/LDL goals reviewed  use of aspirin to prevent MI and TIA's discussed        HPI   Today, Mr. Ramu Mcelroy has no complaints of chest pains, increased shortness of breath or decreased exercise capacity. He remains quite active physically. He denies any orthopnea or PND. He denies any palpitations or dizziness. He has been struggling with blood pressure control.   This is being managed by PCP. Today, his blood pressure appears reasonable. Review of Systems   Respiratory: Negative for shortness of breath. Cardiovascular: Negative for chest pain, palpitations, orthopnea, claudication, leg swelling and PND. All other systems reviewed and are negative. Physical Exam   Constitutional: He is oriented to person, place, and time. He appears well-developed and well-nourished. HENT:   Head: Normocephalic. Eyes: Conjunctivae are normal.   Neck: Neck supple. No JVD present. Carotid bruit is not present. No thyromegaly present. Cardiovascular: Normal rate and regular rhythm. Pulmonary/Chest: Breath sounds normal.   Abdominal: Bowel sounds are normal.   Musculoskeletal: He exhibits no edema. Neurological: He is alert and oriented to person, place, and time. Skin: Skin is warm and dry. Nursing note and vitals reviewed. PCP: Maddison Macedo MD    Past Medical History:   Diagnosis Date    Aortic stenosis     s/p AVR    Aortic stenosis, severe 10/16/2014    Echocardiogram EF 60% peak gradient 67 mmHg, mean gradient 30 mmHg, MARY 0.8 cm    ASHD (arteriosclerotic heart disease)     Chronic renal disease, stage III (HCC)     DM (diabetes mellitus) ([facility] [address]     Gout     HCD (hypertensive cardiovascular disease)     History of echocardiogram 10/16/2014    EF 55-60%. No RWMA. Mild LVH. Gr 1 DDfx. Mild LAE. Severe AS (mean grad 30 mmHg).  Hyperlipidemia     Hypertension     Prostate cancer ([facility] [address] 08/17/2017    [id number] Grade 7 (3 + 4) adenocarcinoma of the prostate in 3/12 cores, 2-10% of each core; PSA 7.24. Dr. Nicole Washburn.  S/P AVR (aortic valve replacement) 11/13/2014    #23 mm Marifer Barbosa Magna pericardial valve. Dr. Judi Murillo.  S/P CABG x 2 11/13/2014    LIMA to LAD, SVG to OM1. Dr. Judi Murillo.  S/P cardiac catheterization 11/06/2014    RCA dom. mRCA 100%. LM patent. dLAD 75% x 2. pCX 80% (ELENA-3). LVEDP 14 mmHg. EF 50-55%.   Severe inferobasal hypk.  Severe AS. AV replacement & CABG recommended. Past Surgical History:   Procedure Laterality Date    HX APPENDECTOMY         Current Outpatient Medications   Medication Sig Dispense Refill    cyanocobalamin, vitamin B-12, 1,000 mcg lozg 1,000 mcg by SubLINGual route daily. 90 Lozenge 2    amLODIPine (NORVASC) 5 mg tablet Take 1 Tab by mouth daily. 90 Tab 2    simvastatin (ZOCOR) 40 mg tablet TAKE 1 TABLET NIGHTLY 90 Tab 3    allopurinol (ZYLOPRIM) 100 mg tablet TAKE 1 TABLET DAILY 90 Tab 3    metoprolol succinate (TOPROL-XL) 25 mg XL tablet Take 1 Tab by mouth daily. 90 Tab 3    lisinopril (PRINIVIL, ZESTRIL) 20 mg tablet Take 1 Tab by mouth daily. 90 Tab 3    ascorbic acid, vitamin C, (VITAMIN C) 500 mg tablet Take  by mouth.  sildenafil citrate (VIAGRA) 100 mg tablet Take 1 Tab by mouth as needed. 6 Tab 3    docusate sodium (COLACE) 100 mg capsule Take 100 mg by mouth daily as needed.  cholecalciferol, vitamin D3, 2,000 unit tab Take  by mouth daily.  aspirin delayed-release 81 mg tablet Take 1 tablet by mouth daily. 30 tablet 2       The patient has a family history of    Social History     Tobacco Use    Smoking status: Never Smoker    Smokeless tobacco: Never Used   Substance Use Topics    Alcohol use:  Yes     Alcohol/week: 12.0 standard drinks     Types: 12 Cans of beer per week    Drug use: No       Lab Results   Component Value Date/Time    Cholesterol, total 170 08/01/2019 09:54 AM    HDL Cholesterol 68 (H) 08/01/2019 09:54 AM    LDL, calculated 87.6 08/01/2019 09:54 AM    Triglyceride 72 08/01/2019 09:54 AM    CHOL/HDL Ratio 2.5 08/01/2019 09:54 AM        BP Readings from Last 3 Encounters:   09/03/19 136/78   08/08/19 158/60   06/05/19 128/78        Pulse Readings from Last 3 Encounters:   09/03/19 68   08/08/19 67   06/05/19 83       Wt Readings from Last 3 Encounters:   09/03/19 99.3 kg (219 lb)   08/08/19 97.5 kg (215 lb)   06/05/19 97.1 kg (214 lb)         EKG: normal EKG, normal sinus rhythm, unchanged from previous tracings.

## 2019-09-03 NOTE — TELEPHONE ENCOUNTER
Patient cancelled his appt for tomorrow. His sister is having some health issues and he needs to help her. Stating he saw Dr. German Todd this morning and his Bp was 138/76 and weight is 219/ Please advise if you still want to see him and he will reschedule.

## 2019-09-03 NOTE — PROGRESS NOTES
Rainell Ganser presents today for   Chief Complaint   Patient presents with    Coronary Artery Disease     1 year follow up - no cardiac complaints        Rakesh Subramanian preferred language for health care discussion is english/other. Is someone accompanying this pt? no    Is the patient using any DME equipment during [address]? no    Depression Screening:  3 most recent PHQ Screens 6/5/2019   Little interest or pleasure in doing things Not at all   Feeling down, depressed, irritable, or hopeless Not at all   Total Score PHQ 2 0       Learning Assessment:  Learning Assessment 5/8/2019   PRIMARY LEARNER Patient   BARRIERS PRIMARY LEARNER [address] CAREGIVER No   PRIMARY LANGUAGE ENGLISH   LEARNER PREFERENCE PRIMARY DEMONSTRATION     READING   ANSWERED BY patient   RELATIONSHIP SELF       Abuse Screening:  Abuse Screening Questionnaire 6/5/2019   Do you ever feel afraid of your partner? N   Are you in a relationship with someone who physically or mentally threatens you? N   Is it safe for you to go home? Y       Fall Risk  Fall Risk Assessment, last 12 mths 6/5/2019   Able to walk? Yes   Fall in past 12 months? No   Fall with injury? -   Number of falls in past 12 months -   Fall Risk Score -       Pt currently taking Anticoagulant therapy? ASA 81mg every day   Coordination of Care:  1. Have you been to the ER, urgent care clinic since your last visit? Hospitalized since your last visit? no    2. Have you seen or consulted any other health care providers outside of the [address] [place] [address] since your last visit? Include any pap smears or colon screening.  no Statement Selected

## 2019-09-04 NOTE — TELEPHONE ENCOUNTER
Since blood pressure appears improved with addition of amlodipine 5 mg, would recommend that he continue taking it with his other blood pressure medications and ask that he schedule a follow-up appointment with labs prior in 11/2019.

## 2019-09-05 NOTE — TELEPHONE ENCOUNTER
Pt was given message. However he is not scheduled for any upcoming appointments.   Did you want to see patient in Nov?

## 2019-11-05 RX ORDER — LISINOPRIL 20 MG/1
20 TABLET ORAL DAILY
Qty: 90 TAB | Refills: 3 | Status: SHIPPED | OUTPATIENT
Start: 2019-11-05 | End: 2020-02-13 | Stop reason: SDUPTHER

## 2019-11-05 RX ORDER — ALLOPURINOL 100 MG/1
100 TABLET ORAL DAILY
Qty: 90 TAB | Refills: 3 | Status: SHIPPED | OUTPATIENT
Start: 2019-11-05 | End: 2020-02-13 | Stop reason: SDUPTHER

## 2019-11-05 RX ORDER — METOPROLOL SUCCINATE 25 MG/1
25 TABLET, EXTENDED RELEASE ORAL DAILY
Qty: 90 TAB | Refills: 3 | Status: SHIPPED | OUTPATIENT
Start: 2019-11-05 | End: 2020-02-13 | Stop reason: SDUPTHER

## 2019-11-05 NOTE — TELEPHONE ENCOUNTER
Last Visit: 8/8/19 with MD Génesis Joya Next Appointment: 11/19/19 with MD Génesis Joya Requested Prescriptions Pending Prescriptions Disp Refills  lisinopril (PRINIVIL, ZESTRIL) 20 mg tablet 90 Tab 3 Sig: Take 1 Tab by mouth daily.  metoprolol succinate (TOPROL-XL) 25 mg XL tablet 90 Tab 3 Sig: Take 1 Tab by mouth daily.  allopurinol (ZYLOPRIM) 100 mg tablet 90 Tab 3 Sig: Take 1 Tab by mouth daily.

## 2019-11-12 ENCOUNTER — APPOINTMENT (OUTPATIENT)
Dept: INTERNAL MEDICINE CLINIC | Age: 69
End: 2019-11-12

## 2019-11-12 ENCOUNTER — HOSPITAL ENCOUNTER (OUTPATIENT)
Dept: LAB | Age: 69
Discharge: HOME | End: 2019-11-12
Payer: MEDICARE

## 2019-11-12 DIAGNOSIS — I10 ESSENTIAL HYPERTENSION: ICD-10-CM

## 2019-11-12 DIAGNOSIS — E53.8 VITAMIN B12 DEFICIENCY: ICD-10-CM

## 2019-11-12 DIAGNOSIS — Z95.2 S/P AVR (AORTIC VALVE REPLACEMENT): ICD-10-CM

## 2019-11-12 DIAGNOSIS — N18.30 STAGE 3 CHRONIC KIDNEY DISEASE (HCC): ICD-10-CM

## 2019-11-12 DIAGNOSIS — E11.22 TYPE 2 DIABETES MELLITUS WITH STAGE 3 CHRONIC KIDNEY DISEASE, WITHOUT LONG-TERM CURRENT USE OF INSULIN (HCC): ICD-10-CM

## 2019-11-12 DIAGNOSIS — N18.30 TYPE 2 DIABETES MELLITUS WITH STAGE 3 CHRONIC KIDNEY DISEASE, WITHOUT LONG-TERM CURRENT USE OF INSULIN (HCC): ICD-10-CM

## 2019-11-12 DIAGNOSIS — I25.10 CAD IN NATIVE ARTERY: ICD-10-CM

## 2019-11-12 LAB
ALBUMIN SERPL-MCNC: 4.1 G/DL (ref 3.4–5)
ALBUMIN/GLOB SERPL: 1.2 {RATIO} (ref 0.8–1.7)
ALP SERPL-CCNC: 49 U/L (ref 45–117)
ALT SERPL-CCNC: 29 U/L (ref 16–61)
ANION GAP SERPL CALC-SCNC: 7 MMOL/L (ref 3–18)
AST SERPL-CCNC: 15 U/L (ref 10–38)
BASOPHILS # BLD: 0 K/UL (ref 0–0.1)
BASOPHILS NFR BLD: 0 % (ref 0–2)
BILIRUB SERPL-MCNC: 0.8 MG/DL (ref 0.2–1)
BUN SERPL-MCNC: 19 MG/DL (ref 7–18)
BUN/CREAT SERPL: 14 (ref 12–20)
CALCIUM SERPL-MCNC: 9.1 MG/DL (ref 8.5–10.1)
CHLORIDE SERPL-SCNC: 108 MMOL/L (ref 100–111)
CO2 SERPL-SCNC: 24 MMOL/L (ref 21–32)
CREAT SERPL-MCNC: 1.37 MG/DL (ref 0.6–1.3)
DIFFERENTIAL METHOD BLD: ABNORMAL
EOSINOPHIL # BLD: 0.3 K/UL (ref 0–0.4)
EOSINOPHIL NFR BLD: 5 % (ref 0–5)
ERYTHROCYTE [DISTWIDTH] IN BLOOD BY AUTOMATED COUNT: 13.5 % (ref 11.6–14.5)
EST. AVERAGE GLUCOSE BLD GHB EST-MCNC: 114 MG/DL
GLOBULIN SER CALC-MCNC: 3.3 G/DL (ref 2–4)
GLUCOSE SERPL-MCNC: 117 MG/DL (ref 74–99)
HBA1C MFR BLD: 5.6 % (ref 4.2–5.6)
HCT VFR BLD AUTO: 39.2 % (ref 36–48)
HGB BLD-MCNC: 12.4 G/DL (ref 13–16)
LYMPHOCYTES # BLD: 1.1 K/UL (ref 0.9–3.6)
LYMPHOCYTES NFR BLD: 19 % (ref 21–52)
MAGNESIUM SERPL-MCNC: 2.1 MG/DL (ref 1.6–2.6)
MCH RBC QN AUTO: 33.8 PG (ref 24–34)
MCHC RBC AUTO-ENTMCNC: 31.6 G/DL (ref 31–37)
MCV RBC AUTO: 106.8 FL (ref 74–97)
MONOCYTES # BLD: 0.6 K/UL (ref 0.05–1.2)
MONOCYTES NFR BLD: 10 % (ref 3–10)
NEUTS SEG # BLD: 4.1 K/UL (ref 1.8–8)
NEUTS SEG NFR BLD: 66 % (ref 40–73)
PLATELET # BLD AUTO: 180 K/UL (ref 135–420)
PMV BLD AUTO: 9 FL (ref 9.2–11.8)
POTASSIUM SERPL-SCNC: 4.6 MMOL/L (ref 3.5–5.5)
PROT SERPL-MCNC: 7.4 G/DL (ref 6.4–8.2)
RBC # BLD AUTO: 3.67 M/UL (ref 4.7–5.5)
SODIUM SERPL-SCNC: 139 MMOL/L (ref 136–145)
WBC # BLD AUTO: 6.1 K/UL (ref 4.6–13.2)

## 2019-11-12 PROCEDURE — 36415 COLL VENOUS BLD VENIPUNCTURE: CPT

## 2019-11-12 PROCEDURE — 80053 COMPREHEN METABOLIC PANEL: CPT

## 2019-11-12 PROCEDURE — 85025 COMPLETE CBC W/AUTO DIFF WBC: CPT

## 2019-11-12 PROCEDURE — 82607 VITAMIN B-12: CPT

## 2019-11-12 PROCEDURE — 83036 HEMOGLOBIN GLYCOSYLATED A1C: CPT

## 2019-11-12 PROCEDURE — 83735 ASSAY OF MAGNESIUM: CPT

## 2019-11-12 PROCEDURE — 80061 LIPID PANEL: CPT

## 2019-11-12 PROCEDURE — 84403 ASSAY OF TOTAL TESTOSTERONE: CPT

## 2019-11-12 PROCEDURE — 84154 ASSAY OF PSA FREE: CPT

## 2019-11-13 LAB
CHOLEST SERPL-MCNC: 163 MG/DL
HDLC SERPL-MCNC: 73 MG/DL (ref 40–60)
HDLC SERPL: 2.2 {RATIO} (ref 0–5)
LDLC SERPL CALC-MCNC: 79 MG/DL (ref 0–100)
LIPID PROFILE,FLP: ABNORMAL
TRIGL SERPL-MCNC: 55 MG/DL (ref ?–150)
VIT B12 SERPL-MCNC: 1357 PG/ML (ref 211–911)
VLDLC SERPL CALC-MCNC: 11 MG/DL

## 2019-11-14 LAB
PSA FREE MFR SERPL: 19.1 %
PSA FREE SERPL-MCNC: 0.21 NG/ML
PSA SERPL-MCNC: 1.1 NG/ML (ref 0–4)

## 2019-11-15 LAB
TESTOST FREE SERPL-MCNC: 5 PG/ML (ref 6.6–18.1)
TESTOST SERPL-MCNC: 289 NG/DL (ref 264–916)

## 2019-11-19 ENCOUNTER — OFFICE VISIT (OUTPATIENT)
Dept: INTERNAL MEDICINE CLINIC | Age: 69
End: 2019-11-19

## 2019-11-19 VITALS
HEIGHT: 73 IN | RESPIRATION RATE: 14 BRPM | WEIGHT: 228 LBS | SYSTOLIC BLOOD PRESSURE: 148 MMHG | DIASTOLIC BLOOD PRESSURE: 70 MMHG | TEMPERATURE: 98 F | BODY MASS INDEX: 30.22 KG/M2 | OXYGEN SATURATION: 98 % | HEART RATE: 84 BPM

## 2019-11-19 DIAGNOSIS — I10 ESSENTIAL HYPERTENSION: Primary | ICD-10-CM

## 2019-11-19 DIAGNOSIS — I25.10 CAD IN NATIVE ARTERY: ICD-10-CM

## 2019-11-19 DIAGNOSIS — Z71.89 ACP (ADVANCE CARE PLANNING): ICD-10-CM

## 2019-11-19 DIAGNOSIS — M1A.09X0 IDIOPATHIC CHRONIC GOUT OF MULTIPLE SITES WITHOUT TOPHUS: Chronic | ICD-10-CM

## 2019-11-19 DIAGNOSIS — E11.22 TYPE 2 DIABETES MELLITUS WITH STAGE 3 CHRONIC KIDNEY DISEASE, WITHOUT LONG-TERM CURRENT USE OF INSULIN (HCC): ICD-10-CM

## 2019-11-19 DIAGNOSIS — Z00.00 MEDICARE ANNUAL WELLNESS VISIT, SUBSEQUENT: ICD-10-CM

## 2019-11-19 DIAGNOSIS — N18.30 STAGE 3 CHRONIC KIDNEY DISEASE (HCC): ICD-10-CM

## 2019-11-19 DIAGNOSIS — E53.8 VITAMIN B12 DEFICIENCY: ICD-10-CM

## 2019-11-19 DIAGNOSIS — E55.9 VITAMIN D DEFICIENCY: ICD-10-CM

## 2019-11-19 DIAGNOSIS — I11.9 HYPERTENSIVE HEART DISEASE WITHOUT CONGESTIVE HEART FAILURE: ICD-10-CM

## 2019-11-19 DIAGNOSIS — Z95.1 S/P CABG X 2: ICD-10-CM

## 2019-11-19 DIAGNOSIS — E78.5 HYPERLIPIDEMIA, UNSPECIFIED HYPERLIPIDEMIA TYPE: ICD-10-CM

## 2019-11-19 DIAGNOSIS — Z95.2 S/P AVR (AORTIC VALVE REPLACEMENT): ICD-10-CM

## 2019-11-19 DIAGNOSIS — C61 PROSTATE CANCER (HCC): ICD-10-CM

## 2019-11-19 DIAGNOSIS — N18.30 TYPE 2 DIABETES MELLITUS WITH STAGE 3 CHRONIC KIDNEY DISEASE, WITHOUT LONG-TERM CURRENT USE OF INSULIN (HCC): ICD-10-CM

## 2019-11-19 RX ORDER — AMLODIPINE BESYLATE 5 MG/1
5 TABLET ORAL DAILY
Qty: 90 TAB | Refills: 2 | Status: SHIPPED | OUTPATIENT
Start: 2019-11-19 | End: 2020-01-14

## 2019-11-19 NOTE — ACP (ADVANCE CARE PLANNING)
Advance Care Planning (ACP) Provider Note - Comprehensive     Date of ACP Conversation: 11/19/19  Persons included in Conversation:  patient  Length of ACP Conversation in minutes:  16 minutes    Authorized Decision Maker (if patient is incapable of making informed decisions): wife, son, and daughter  This person is:  Healthcare Agent/Medical Power of  under Advance Directive          General ACP for ALL Patients with Decision Making Capacity:   Importance of advance care planning, including choosing a healthcare agent to communicate patient's healthcare decisions if patient lost the ability to make decisions, such as after a sudden illness or accident  Understanding of the healthcare agent role was assessed and information provided  Exploration of values, goals, and preferences if recovery is not expected, even with continued medical treatment in the event of: Imminent death  Severe, permanent brain injury  \"In these circumstances, what matters most to you? \"  Care focused more on comfort or quality of life. Review of Existing Advance Directive:  Patient has an existing advance directive on file, signed 8/14/2013 . It designates his wife and children  as his healthcare agents and expresses that he does not wish life prolonging procedures for end of life care. It was reviewed with him and still reflects his wishes.        For Serious or Chronic Illness:  Understanding of medical condition      Interventions Provided:  Reviewed existing Advance Directive   Recommended review of completed ACP document annually or upon change in health status

## 2019-11-19 NOTE — PATIENT INSTRUCTIONS
Restart amlodipine 5 mg daily. Please monitor and record your blood pressure every morning and evening at least two hours after taking your medications. Please deliver record of readings to our office for review. Medicare Wellness Visit, Male The best way to improve and maintain good health is to have a healthy lifestyle by eating a well-balanced diet, exercising regularly, limiting alcohol and stopping smoking. Regular visits with your physician or non-physician health care provider also support your good health. Preventive screening tests can find health problems before they become diseases or illnesses. Preventive services such as immunizations prevent serious infections. All people over age 72 should have a Pneumovax and a Prevnar-13 shot to prevent potentially life threatening infections with the pneumococcus bacteria, a common cause of pneumonia. These are once in a lifetime unless you and your provider decide differently. All people over 65 should have a yearly influenza vaccine or \"flu\" shot. This does not prevent infection with cold viruses but has been proven to prevent hospitalization and death from influenza. Although Medicare part B \"regular Medicare\" currently only covers tetanus vaccination in the context of an injury, a tetanus vaccine (Tdap or Td) is recommended every 10 years. A shingles vaccine is recommended once in a lifetime after age 61. The Shingles vaccine is also not covered by Medicare part B. Note, however, that both the Shingles vaccine and Tdap/Td are generally covered by secondary carriers. Please check your coverage and out of pocket expenses. Consider contacting your local health department because it may stock these vaccines for a reasonable charge. We currently have documentation of the following immunization history for you: 
Immunization History Administered Date(s) Administered  Influenza High Dose Vaccine PF 10/10/2017, 10/26/2018, 11/09/2019  Influenza Vaccine 10/14/2014  Influenza Vaccine (Tri) Adjuvanted 10/04/2018  Influenza Vaccine Split 09/29/2011  Influenza Vaccine Whole 09/14/2010  Pneumococcal Conjugate (PCV-13) 06/29/2017  Pneumococcal Polysaccharide (PPSV-23) 10/14/2014  Td 01/01/2008  Tdap 01/12/2018 Screening for infection with Hepatitis C is recommended for anyone born between [birth year] through [address]. The table at the bottom of this document indicates the status of this and other screening services. Screening for diabetes mellitus with a blood sugar test (glucose) should be done at least every 3 years until age 79. You and your health care provider may decide whether to continue screening after age 79. The most recent blood glucose we have on file for you is:  
Lab Results Component Value Date/Time Glucose 117 (H) 11/12/2019 09:51 AM  
 Glucose (POC) 105 11/17/2014 07:18 AM  
 Glucose,  (H) 11/14/2014 04:26 AM  
 
 
Glaucoma is a disease of the eye due to increased ocular pressure that can lead to blindness. People with risk factors for glaucoma ( race, diabetes, family history) should be screened at least every 2 years by an eye professional. This may be covered annually if indicated as determined by you and your doctor. Cardiovascular screening tests that check for elevated lipids or cholesterol (fatty part of blood) which can lead to heart disease and strokes should be done every 4-6 years through age 79. You and your health care provider may decide whether to continue screening after age 79. The most recent lipid panel we have on file for you is:  
Lab Results Component Value Date/Time  Cholesterol, total 163 11/12/2019 09:51 AM  
 HDL Cholesterol 73 (H) 11/12/2019 09:51 AM  
 LDL, calculated 79 11/12/2019 09:51 AM  
 VLDL, calculated 11 11/12/2019 09:51 AM  
 Triglyceride 55 11/12/2019 09:51 AM  
 CHOL/HDL Ratio 2.2 11/12/2019 09:51 AM  
 
 
Colorectal cancer screening that evaluates for blood or polyps in your colon for people with average risk should be done yearly as a stool test, every five years as a flexible sigmoidoscope or every 10 years as a colonoscopy up to age 76. You and your health care provider may decide whether to continue screening after age 76. Men up to age 76 may elect to screen for prostate cancer with a blood test called a PSA at certain intervals, depending on their personal and family history. This decision is between the patient and his provider. The most recent PSA values we have on file for you are: 
Lab Results Component Value Date/Time  
 Prostate Specific Ag 1.1 11/12/2019 09:51 AM  
 Prostate Specific Ag 13.8 (H) 03/08/2019 09:30 AM  
 Prostate Specific Ag 11.3 (H) 10/04/2018 12:28 PM  
 Prostate Specific Ag 7.24 (H) 08/04/2017 03:06 PM  
 Prostate Specific Ag 6.6 (H) 06/29/2017 02:32 PM  
 Prostate Specific Ag 2.9 10/07/2014 07:53 AM  
 
 
If you have been a smoker or had family history of abdominal aortic aneurysms, you and your provider may decide to schedule an ultrasound test of your aorta. Our records show this was done on:  n/a People who have smoked the equivalent of 1 pack per day for 30 years or more may benefit from screening for lung cancer with a yearly low dose CT scan until they have been non smokers for 15 years or competing health conditions render this unlikely to be beneficial. Our records show: n/a Your Medicare Wellness Exam is recommended annually. Here is a list of your current Health Maintenance items with a due date: 
Health Maintenance Topic Date Due  Shingrix Vaccine Age 50> (1 of 2) 11/22/2019 (Originally 2/2/1999)  FOOT EXAM Q1  02/04/2020  MICROALBUMIN Q1  05/08/2020  
 HEMOGLOBIN A1C Q6M  05/12/2020  Cologuard Q 3 Years  11/02/2020  LIPID PANEL Q1  11/12/2020  MEDICARE YEARLY EXAM  11/19/2020  GLAUCOMA SCREENING Q2Y  06/25/2021  
 EYE EXAM RETINAL OR DILATED  06/25/2021  
 DTaP/Tdap/Td series (2 - Td) 01/12/2028  Influenza Age 5 to Adult  Completed  Pneumococcal 65+ years  Completed  Hepatitis C Screening  Addressed

## 2019-11-19 NOTE — PROGRESS NOTES
This is the Subsequent Medicare Annual Wellness Exam, performed 12 months or more after the Initial AWV or the last Subsequent AWV    I have reviewed the patient's medical history in detail and updated the computerized patient record. History     Patient Active Problem List   Diagnosis Code    Hyperlipidemia E78.5    Gout M10.9    Vitamin D deficiency E55.9    CAD in native artery I25.10    Hypertensive heart disease without congestive heart failure I11.9    S/P CABG x 2 Z95.1    S/P AVR (aortic valve replacement) Z95.2    Type 2 diabetes mellitus with stage 3 chronic kidney disease, without long-term current use of insulin (HCC) E11.22, N18.3    Stage 3 chronic kidney disease (HCC) N18.3    Prostate cancer ([facility] C61    Essential hypertension I10    Vitamin B12 deficiency E53.8     Past Medical History:   Diagnosis Date    Aortic stenosis     s/p AVR    Aortic stenosis, severe 10/16/2014    Echocardiogram EF 60% peak gradient 67 mmHg, mean gradient 30 mmHg, MARY 0.8 cm    ASHD (arteriosclerotic heart disease)     Chronic renal disease, stage III ([facility])     DM (diabetes mellitus) ([facility]     Gout     HCD (hypertensive cardiovascular disease)     History of echocardiogram 10/16/2014    EF 55-60%. No RWMA. Mild LVH. Gr 1 DDfx. Mild LAE. Severe AS (mean grad 30 mmHg).  Hyperlipidemia     Hypertension     Prostate cancer ([facility] 08/17/2017    [id number] Kaylynn Grade 7 (3 + 4) adenocarcinoma of the prostate in 3/12 cores, 2-10% of each core; PSA 7.24. Dr. Cristofer Ayala.  S/P AVR (aortic valve replacement) 11/13/2014    #23 mm Marifer Barbosa Magna pericardial valve. Dr. Delmi Lopez.  S/P CABG x 2 11/13/2014    LIMA to LAD, SVG to OM1. Dr. Delmi Lopez.  S/P cardiac catheterization 11/06/2014    RCA dom. mRCA 100%. LM patent. dLAD 75% x 2. pCX 80% (ELENA-3). LVEDP 14 mmHg. EF 50-55%. Severe inferobasal hypk. Severe AS. AV replacement & CABG recommended.       Past Surgical History: Procedure Laterality Date    HX APPENDECTOMY       Current Outpatient Medications   Medication Sig Dispense Refill    amLODIPine (NORVASC) 5 mg tablet Take 1 Tab by mouth daily. 90 Tab 2    lisinopril (PRINIVIL, ZESTRIL) 20 mg tablet Take 1 Tab by mouth daily. 90 Tab 3    metoprolol succinate (TOPROL-XL) 25 mg XL tablet Take 1 Tab by mouth daily. 90 Tab 3    allopurinol (ZYLOPRIM) 100 mg tablet Take 1 Tab by mouth daily. 90 Tab 3    cyanocobalamin, vitamin B-12, 1,000 mcg lozg 1,000 mcg by SubLINGual route daily. 90 Lozenge 2    simvastatin (ZOCOR) 40 mg tablet TAKE 1 TABLET NIGHTLY 90 Tab 3    ascorbic acid, vitamin C, (VITAMIN C) 500 mg tablet Take  by mouth.  sildenafil citrate (VIAGRA) 100 mg tablet Take 1 Tab by mouth as needed. 6 Tab 3    docusate sodium (COLACE) 100 mg capsule Take 100 mg by mouth daily as needed.  cholecalciferol, vitamin D3, 2,000 unit tab Take  by mouth daily.  aspirin delayed-release 81 mg tablet Take 1 tablet by mouth daily. 30 tablet 2     No Known Allergies    Family History   Problem Relation Age of Onset    COPD Father     Other Father         pneumoconiosis    Lung Disease Father     Other Mother         meigs syndrome     Social History     Tobacco Use    Smoking status: Never Smoker    Smokeless tobacco: Never Used   Substance Use Topics    Alcohol use: Yes     Alcohol/week: 12.0 standard drinks     Types: 12 Cans of beer per week       Depression Risk Factor Screening:     3 most recent PHQ Screens 6/5/2019   Little interest or pleasure in doing things Not at all   Feeling down, depressed, irritable, or hopeless Not at all   Total Score PHQ 2 0       Alcohol Risk Factor Screening (MALE > 65):    Do you average more 1 drink per night or more than 7 drinks a week: Yes    In the past three months have you have had more than 4 drinks containing alcohol on one occasion: Yes      Functional Ability and Level of Safety:   Hearing: Hearing is good.    Activities of Daily Living: The home contains: no safety equipment. Patient does total self care    Ambulation: with no difficulty    Fall Risk:  Fall Risk Assessment, last 12 mths 6/5/2019   Able to walk? Yes   Fall in past 12 months? No   Fall with injury? -   Number of falls in past 12 months -   Fall Risk Score -       Abuse Screen:  Patient is not abused    Cognitive Screening   Has your family/caregiver stated any concerns about your memory: yes - patient notices lapses and must write everything down  Cognitive Screening: none    Patient Care Team   Patient Care Team:  Jerrica Rainey MD as PCP - General (Internal Medicine)  Jerrica Rainey MD as PCP - [facility]  Shira Leonard RN as [facility] (Internal Medicine)  LUKE Jackson MD (Cardiology)  Sigrid Covarrubias MD (Ophthalmology)  Adebayo Hooper MD (Urology)  Chele Montemayor MD (Radiation Oncology)    Assessment/Plan   Education and counseling provided:  Are appropriate based on today's review and evaluation  End-of-Life planning (with patient's consent)  Influenza Vaccine  Colorectal cancer screening tests  Cardiovascular screening blood test  Screening for glaucoma  Diabetes screening test  Shingrix vaccine    Diagnoses and all orders for this visit:    1. Essential hypertension    2. CAD in native artery    3. Hypertensive heart disease without congestive heart failure    4. Hyperlipidemia, unspecified hyperlipidemia type    5. Type 2 diabetes mellitus with stage 3 chronic kidney disease, without long-term current use of insulin ([address]    6. Prostate cancer ([address]    7. Stage 3 chronic kidney disease ([address]    8. S/P CABG x 2    9. S/P AVR (aortic valve replacement)    10. Vitamin D deficiency    11. Vitamin B12 deficiency    12. Idiopathic chronic gout of multiple sites without tophus    13. Medicare annual wellness visit, subsequent    14.  ACP (advance care planning)    Other orders  -     amLODIPine (NORVASC) 5 mg tablet; Take 1 Tab by mouth daily.         Health Maintenance Due   Topic Date Due    Shingrix Vaccine Age 49> (1 of 2) 02/02/1999

## 2019-11-19 NOTE — PROGRESS NOTES
Dedrick Madera presents today for   Chief Complaint   Patient presents with    Hypertension     2 month follow up with labs              Depression Screening:  3 most recent PHQ Screens 6/5/2019   Little interest or pleasure in doing things Not at all   Feeling down, depressed, irritable, or hopeless Not at all   Total Score PHQ 2 0       Learning Assessment:  Learning Assessment 5/8/2019   PRIMARY LEARNER Patient   BARRIERS PRIMARY LEARNER [address] CAREGIVER No   PRIMARY LANGUAGE ENGLISH   LEARNER PREFERENCE PRIMARY DEMONSTRATION     READING   ANSWERED BY patient   RELATIONSHIP SELF       Abuse Screening:  Abuse Screening Questionnaire 6/5/2019   Do you ever feel afraid of your partner? N   Are you in a relationship with someone who physically or mentally threatens you? N   Is it safe for you to go home? Y       Fall Risk  Fall Risk Assessment, last 12 mths 6/5/2019   Able to walk? Yes   Fall in past 12 months? No   Fall with injury? -   Number of falls in past 12 months -   Fall Risk Score -           Coordination of Care:  1. Have you been to the ER, urgent care clinic since your last visit? Hospitalized since your last visit? no    2. Have you seen or consulted any other health care providers outside of the [address] [place] [address] since your last visit? Include any pap smears or colon screening.  no

## 2019-11-20 ENCOUNTER — HOSPITAL ENCOUNTER (OUTPATIENT)
Dept: RADIATION THERAPY | Age: 69
Discharge: HOME | End: 2019-11-20
Payer: MEDICARE

## 2019-11-20 PROCEDURE — 99211 OFF/OP EST MAY X REQ PHY/QHP: CPT

## 2019-11-23 NOTE — PROGRESS NOTES
HPI:   Teddy Morin is a 78y.o. year old male who presents for a routine visit and for evaluation of hypertension, hyperlipidemia, ASHD s/p CABG, aortic stenosis s/p AVR, diabetes mellitus, chronic renal disease stage 3, prostate cancer, and gout. He reports that he is doing generally well, and states that his fatigue has been improving since completing EBRT for his prostate cancer. His weight decreased a total of fourteen pounds during therapy but has increased nine pounds since his last visit in 9/2019. He states that he continues to have difficulty with his memory, but states that it has generally been stable. He has most difficulty in remembering his medications and appointments. He reports today that he is no longer taking amlodipine since he thought he was taking too many medications. He has not been monitoring his blood pressure at home. He is otherwise without complaints and feeling generally well. In 6/2017, he was noted to have an elevated PSA of 6.0, which was confirmed on repeat to be 6.6. He was referred to Dr. Lee Kirby and PSA was again repeated and found to be increased at 7.24. He underwent TRUS biopsy on 8/17/2017, which showed [id number] [place] Grade 7 (3 + 4) adenocarcinoma of the prostate in 3/12 cores, 2-10% of each core. Options for management were discussed and he selected active surveillance. Plans were for repeat PSA and MRI prostate in 6 months (due 2/2018). However, the patient never had tests performed and he did not follow-up as discussed. On 10/4/2018, at his routine visit, a PSA level was obtained and was found to have increased to 11.3, and he was advised to follow-up with Dr. Lee Kirby. He underwent a prostate MRI 3T at [facility] on 12/31/2018 showing several peripheral zone PI-RADS 4 observations: right posterolateral peripheral zone at the mid gland/apex and anterior peripheral zone at the apex bilaterally.  The former exhibits questionable capsular bulging laterally, but not a definitive appearance for extracapsular extension. He had a follow-up appointment with Dr. Owen Martinez on 1/11/2019 and decision made to proceed with EBRT. He had a staging bone scan (1/23/2019) which was negative for osseous metastases, and a CT abdomen and pelvis (1/23/2019) which showed no adenopathy of evidence of metastases, mild hepatic steatosis, aortic atherosclerosis, and cholelithiasis. He met with Dr. Tobias Schaeffer on 1/30/2019 and decided to proceed with EBRT as definitive treatment for his prostate cancer, receiving his last treatment on 5/6/2019. He has a history of hypertension, hyperlipidemia, ASHD, and aortic stenosis. In 10/2014, he presented with progressive chest pain and shortness of breath and an echocardiogram was obtained (10/16/2014) showing normal LV function (EF 55-60%), no RWMA, grade 1 diastolic dysfunction, mild LAE, and severe AS (mean gradient 30 mmHg, peak 67 mmHg, and AV area 0.8 cm2). He was referred to see Dr. Jessica Buitrago and underwent cardiac catheterization (11/6/2014) showing 100% RCA (distal collaterals from left), 70-80% distal LAD, 80% proximal LCx, inferior basal hypokinesis, and EF 55%. On 11/13/2014, he underwent 2 vessel CABG (LIMA to LAD and SVG to OM1) and AV replacement with a bioprosthetic 23 mm Marifer Barbosa Magna pericardial valve by Dr. Nguyen Carson. He has done well since that time and remains asymptomatic. He denies any chest pain, shortness of breath at rest or with exertion, palpitations, lightheadedness, or edema. His current regimen includes aspirin, metoprolol, lisinopril, and moderate intensity dose simvastatin. He is followed by Dr. Jessica Buitrago. He had a repeat echocardiogram (8/21/2017) showing normal LV function (EF 55-60%), no RWMA, mild MIL, and normally functioning bioprosthetic valve with peak gradient 17 mmHg, mean gradient 10 mmHg, and valve area 1.86 cm2.      He has a history of diabetes mellitus, which has not required treatment with medication. Review of his record shows that his Hba1c has remained between 5.7-6.3 since 2011. He denies any polyuria, polydipsia, nocturia, or blurry vision, and has no history of retinopathy or neuropathy. He does have evidence of chronic renal disease, stage 3, with baseline creatinine 1.22-1.37/ eGFR 55-59 since 11/2014. He had been having regular eye exams with Dr. Payal Gonzalez, although does not recall the date of his last exam and is overdue. He has a history of gout, but has not had a flare in many years since being treated with allopurinol. He has never had a screening colonoscopy. He did undergo Cologuard testing in 11/2017 which was negative. He denies any abdominal pain, nausea, vomiting, melena, hematochezia, or change in bowel movements. Past Medical History:   Diagnosis Date    Aortic stenosis     s/p AVR    Aortic stenosis, severe 10/16/2014    Echocardiogram EF 60% peak gradient 67 mmHg, mean gradient 30 mmHg, MARY 0.8 cm    ASHD (arteriosclerotic heart disease)     Chronic renal disease, stage III (HCC)     DM [facility]diabetes mellitus) ([facility] [address]     Gout     HCD (hypertensive cardiovascular disease)     History of echocardiogram 10/16/2014    EF 55-60%. No RWMA. Mild LVH. Gr 1 DDfx. Mild LAE. Severe AS (mean grad 30 mmHg).  Hyperlipidemia     Hypertension     [facility]    [id number] [place] Grade 7 (3 + 4) adenocarcinoma of the prostate in 3/12 cores, 2-10% of each core; PSA 7.24. Dr. Nicki Pacheco.  S/P AVR (aortic valve replacement) 11/13/2014    #23 mm Marifer Barbosa Magna pericardial valve. Dr. Genet Gatica.  S/P CABG x 2 11/13/2014    LIMA to LAD, SVG to OM1. Dr. Genet Gatica.  S/P cardiac catheterization 11/06/2014    RCA dom. mRCA 100%. LM patent. dLAD 75% x 2. pCX 80% (ELENA-3). LVEDP 14 mmHg. EF 50-55%. Severe inferobasal hypk. Severe AS. AV replacement & CABG recommended.      Past Surgical History:   Procedure Laterality Date    HX APPENDECTOMY Current Outpatient Medications   Medication Sig    amLODIPine (NORVASC) 5 mg tablet Take 1 Tab by mouth daily.  lisinopril (PRINIVIL, ZESTRIL) 20 mg tablet Take 1 Tab by mouth daily.  metoprolol succinate (TOPROL-XL) 25 mg XL tablet Take 1 Tab by mouth daily.  allopurinol (ZYLOPRIM) 100 mg tablet Take 1 Tab by mouth daily.  cyanocobalamin, vitamin B-12, 1,000 mcg lozg 1,000 mcg by SubLINGual route daily.  simvastatin (ZOCOR) 40 mg tablet TAKE 1 TABLET NIGHTLY    ascorbic acid, vitamin C, (VITAMIN C) 500 mg tablet Take  by mouth.  sildenafil citrate (VIAGRA) 100 mg tablet Take 1 Tab by mouth as needed.  docusate sodium (COLACE) 100 mg capsule Take 100 mg by mouth daily as needed.  cholecalciferol, vitamin D3, 2,000 unit tab Take  by mouth daily.  aspirin delayed-release 81 mg tablet Take 1 tablet by mouth daily. No current facility-administered medications for this visit. Allergies and Intolerances:   No Known Allergies     Family History:   Family History   Problem Relation Age of Onset   [facility] COPD Father     Other Father         pneumoconiosis    Lung Disease Father     Other Mother         meigs syndrome     Social History:   He  reports that he has never smoked. He has never used smokeless tobacco. He reports that he drinks approximately one case of beer per week. He is  with two adult children. He is retired from working in the [employer]. He started as a , and then became an .   Social History     Substance and Sexual Activity   Alcohol Use Yes    Alcohol/week: 12.0 standard drinks    Types: 12 Cans of beer per week     Immunization History:  Immunization History   Administered Date(s) Administered    Influenza High Dose Vaccine PF 10/10/2017, 10/26/2018, 11/09/2019    Influenza Vaccine 10/14/2014    Influenza Vaccine (Tri) Adjuvanted 10/04/2018, 11/09/2019    Influenza Vaccine Split 09/29/2011    Influenza Vaccine Whole 09/14/2010    Pneumococcal Conjugate (PCV-13) 06/29/2017    Pneumococcal Polysaccharide (PPSV-23) 10/14/2014    Td 01/01/2008    Tdap 01/12/2018       Review of Systems:   As above included in HPI. Otherwise 11 point review of systems negative including constitutional, skin, HENT, eyes, respiratory, cardiovascular, gastrointestinal, genitourinary, musculoskeletal, endocrine, hematologic, allergy, and neurologic. Physical:   Vitals:   BP: 148/70   HR: 84  WT: 228 lb (103.4 kg)  BMI:  30.08 kg/m2    Exam:   Pt appears well; alert and oriented x 3; appropriate affect. HEENT: PERRLA, anicteric, oropharynx clear,  no JVD, adenopathy or thyromegaly. No carotid bruits or radiated murmur. Lungs: clear to auscultation, no wheezes, rhonchi, or rales. Heart: irregular rate and rhythm. No murmur, rubs, gallops  Abdomen: soft, nontender, nondistended, normal bowel sounds, no hepatosplenomegaly or masses. Extremities: without edema. Pulses 1-2+ bilaterally. Review of Data:  Labs:  Hospital Outpatient Visit on 11/12/2019   Component Date Value Ref Range Status    WBC 11/12/2019 6.1  4.6 - 13.2 K/uL Final    RBC 11/12/2019 3.67* 4.70 - 5.50 M/uL Final    HGB 11/12/2019 12.4* 13.0 - 16.0 g/dL Final    HCT 11/12/2019 39.2  36.0 - 48.0 % Final    MCV 11/12/2019 106.8* 74.0 - 97.0 FL Final    MCH 11/12/2019 33.8  24.0 - 34.0 PG Final    MCHC 11/12/2019 31.6  31.0 - 37.0 g/dL Final    RDW 11/12/2019 13.5  11.6 - 14.5 % Final    PLATELET 63/90/4049 639  135 - 420 K/uL Final    MPV 11/12/2019 9.0* 9.2 - 11.8 FL Final    NEUTROPHILS 11/12/2019 66  40 - 73 % Final    LYMPHOCYTES 11/12/2019 19* 21 - 52 % Final    MONOCYTES 11/12/2019 10  3 - 10 % Final    EOSINOPHILS 11/12/2019 5  0 - 5 % Final    BASOPHILS 11/12/2019 0  0 - 2 % Final    ABS. NEUTROPHILS 11/12/2019 4.1  1.8 - 8.0 K/UL Final    ABS. LYMPHOCYTES 11/12/2019 1.1  0.9 - 3.6 K/UL Final    ABS. MONOCYTES 11/12/2019 0.6  0.05 - 1.2 K/UL Final    ABS. EOSINOPHILS 11/12/2019 0.3  0.0 - 0.4 K/UL Final    ABS. BASOPHILS 11/12/2019 0.0  0.0 - 0.1 K/UL Final    DF 11/12/2019 AUTOMATED    Final    Vitamin B12 11/12/2019 1,357* 211 - 911 pg/mL Final    Hemoglobin A1c 11/12/2019 5.6  4.2 - 5.6 % Final    Est. average glucose 11/12/2019 114  mg/dL Final    LIPID PROFILE 11/12/2019        Final    Cholesterol, total 11/12/2019 163  <200 MG/DL Final    Triglyceride 11/12/2019 55  <150 MG/DL Final    HDL Cholesterol 11/12/2019 73* 40 - 60 MG/DL Final    LDL, calculated 11/12/2019 79  0 - 100 MG/DL Final    VLDL, calculated 11/12/2019 11  MG/DL Final    CHOL/HDL Ratio 11/12/2019 2.2  0 - 5.0   Final    Magnesium 11/12/2019 2.1  1.6 - 2.6 mg/dL Final    Sodium 11/12/2019 139  136 - 145 mmol/L Final    Potassium 11/12/2019 4.6  3.5 - 5.5 mmol/L Final    Chloride 11/12/2019 108  100 - 111 mmol/L Final    CO2 11/12/2019 24  21 - 32 mmol/L Final    Anion gap 11/12/2019 7  3.0 - 18 mmol/L Final    Glucose 11/12/2019 117* 74 - 99 mg/dL Final    BUN 11/12/2019 19* 7.0 - 18 MG/DL Final    Creatinine 11/12/2019 1.37* 0.6 - 1.3 MG/DL Final    BUN/Creatinine ratio 11/12/2019 14  12 - 20   Final    GFR est AA 11/12/2019 >60  >60 ml/min/1.73m2 Final    GFR est non-AA 11/12/2019 51* >60 ml/min/1.73m2 Final    Calcium 11/12/2019 9.1  8.5 - 10.1 MG/DL Final    Bilirubin, total 11/12/2019 0.8  0.2 - 1.0 MG/DL Final    ALT (SGPT) 11/12/2019 29  16 - 61 U/L Final    AST (SGOT) 11/12/2019 15  10 - 38 U/L Final    Alk.  phosphatase 11/12/2019 49  45 - 117 U/L Final    Protein, total 11/12/2019 7.4  6.4 - 8.2 g/dL Final    Albumin 11/12/2019 4.1  3.4 - 5.0 g/dL Final    Globulin 11/12/2019 3.3  2.0 - 4.0 g/dL Final    A-G Ratio 11/12/2019 1.2  0.8 - 1.7   Final    Prostate Specific Ag 11/12/2019 1.1  0.0 - 4.0 ng/mL Final    PSA, Free 11/12/2019 0.21  N/A ng/mL Final    PSA, % Free 11/12/2019 19.1  % Final    Testosterone 11/12/2019 289  264 - 916 ng/dL Final    Free testosterone (Direct) 11/12/2019 5.0* 6.6 - 18.1 pg/mL Final     EKG (8/8/2019) sinus rhythm at 68 bpm. Normal intervals. Lack of R waves in V2 and V3 likely lead placement. No ischemic changes when compared with prior in 8/2018. Health Maintenance:  Screening:    Colorectal: Cologuard (11/2/2017) negative. Due 11/2020. Depression: none   DM (HbA1c/FPG): HbA1c 5.6 (11/2019)   Hepatitis C: negative (1/2018)   Falls: none   DEXA: N/A   PSA/KIEL: PSA 1.1 (11/2019). Followed by Dr. Maricruz Gutierrez and Dr. Marium Hinojosa   Glaucoma: regular eye exams with Dr. Niharika Nelson (last 6/2019)   Smoking: none   Vitamin D: 33.3 (5/2019)   Medicare Wellness: today    Impression:  Patient Active Problem List   Diagnosis Code    Hyperlipidemia E78.5    Gout M10.9    Vitamin D deficiency E55.9    CAD in native artery I25.10    Hypertensive heart disease without congestive heart failure I11.9    S/P CABG x 2 Z95.1    S/P AVR (aortic valve replacement) Z95.2    Type 2 diabetes mellitus with stage 3 chronic kidney disease, without long-term current use of insulin (HCC) E11.22, N18.3    Stage 3 chronic kidney disease (HCC) N18.3    Prostate cancer ([facility] [address] C61    Essential hypertension I10    Vitamin B12 deficiency E53.8       Plan:  1. Hypertension. Blood pressure elevated today. Current regimen should include lisinopril 20 mg daily, metoprolol succinate 25 mg daily, and amlodipine 5 mg daily. However, he states that he stopped taking amlodipine since he felt he was taking too many medications. Discontinued hydrochlorothiazide due to hypotension during EBRT and renal function had worsened to creatinine 1.75 / eGFR 39, likely due to overdiuresis. Stable today to 1.37/ eGFR 51 which is at baseline. Advised to restart amlodipine 5 mg daily. He states that he now has a home monitor since he was given one by his brother. Given instructions on how to monitor and will reassess blood pressure in 4 weeks. 2. Prostate cancer.  [id number] [place] Grade 7 (3 + 4) adenocarcinoma. Patient chose active surveillance rather than definitive treatment. Repeat PSA and MRI prostate ordered for 2/2018, but he did not follow-up. Repeat PSA obtained and had increased from 7.24 at diagnosis to 11.3. Referred for follow-up with Dr. Racquel Falk, and MRI prostate performed on 12/31/2018 showing several peripheral zone PI-RADS 4 with one area showing questionable capsular bulging laterally, but not a definitive appearance for extracapsular extension. He decided to proceed with EBRT, and underwent fiducial marker and [employer] placement on 3/11/2019 followed by weekly treatments from 3/27-5/6/2019. Tolerated well except for some fatigue which has improved. Being followed by Dr. Jessi Ivy and Dr. Racquel Falk. PSA 1.1 today. 3. Hyperlipidemia. On moderate intensity dose simvastatin with LDL 79 and HDL 73, indicative of good control in this patient. Goal LDL <70 given history of ASHD and CABG. Will continue to follow. 4. ASHD s/p 2 vessel CABG. Remains asymptomatic. On aspirin, metoprolol, and statin. Followed by Dr. Coretta Lee. 5. Aortic stenosis s/p bioprosthetic AVR. Remains asymptomatic, and repeat echocardiogram in 8/2017 with good functioning valve. Follow. 6. Diabetes mellitus. Patient with diagnosis of diabetes mellitus, but review of record shows HbA1c ranging from 5.7-6.3 since 2011 and -112 during that time period. He did have one glucose reading in 11/2014 at 128, but unclear if fasting and not confirmed. HbA1c remained increased but controlled at 6.0, but decreased to normal today at 5.6. On Ace-I and statin. Continue follow-up with Dr. Rayna Florian for annual eye exams. Foot exam normal (2/2019). Urine microalbumin/ creatinine ratio without evidence of microalbuminuria, but does have evidence of chronic renal disease stage 3. Emphasized importance of lifestyle modifications, including diet, exercise, and weight loss. 7. Chronic renal disease, stage 3. Most likely secondary to long standing hypertension and prediabetes, although also appears to have developed at time of CABG and AVR so may be related to hypoperfusion during surgery. On statin and Ace-I. Discussed importance of avoiding NSAIDS and prerenal status. Will need to follow. 8. Anemia, macrocytosis. Mild anemia developed during radiation therapy. Macrocytosis evident. Vitamin B12 level very low last visit and started on Vitamin B12 supplement 1000 mcg SL daily. Level normal today indicative of compliance. Also advised to decrease alcohol consumption. Will monitor. 9. Gout. Asymptomatic. On allopurinol. 10. Health maintenance. Already received influenza vaccine. Given script for Shingrix vaccine but not yet obtained. Completed pneumococcal series. Given script for Tdap previously, but patient did not obtain. Other immunizations up to date. Completed Cologuard for colorectal cancer screening. Completed eye exam with Dr. Chacha Dexter. Discussed decreasing alcohol consumption. Vitamin D level normal. Continue maintenance dose supplement. In addition, an annual Medicare wellness visit was done today. Patient understands recommendations and agrees with plan. Follow-up in 4 weeks to reevaluate blood pressure.

## 2020-01-14 ENCOUNTER — OFFICE VISIT (OUTPATIENT)
Dept: INTERNAL MEDICINE CLINIC | Age: 70
End: 2020-01-14

## 2020-01-14 VITALS
HEIGHT: 73 IN | WEIGHT: 231 LBS | OXYGEN SATURATION: 99 % | DIASTOLIC BLOOD PRESSURE: 62 MMHG | BODY MASS INDEX: 30.62 KG/M2 | TEMPERATURE: 97.6 F | RESPIRATION RATE: 16 BRPM | HEART RATE: 68 BPM | SYSTOLIC BLOOD PRESSURE: 140 MMHG

## 2020-01-14 DIAGNOSIS — M1A.09X0 IDIOPATHIC CHRONIC GOUT OF MULTIPLE SITES WITHOUT TOPHUS: Chronic | ICD-10-CM

## 2020-01-14 DIAGNOSIS — N18.30 TYPE 2 DIABETES MELLITUS WITH STAGE 3 CHRONIC KIDNEY DISEASE, WITHOUT LONG-TERM CURRENT USE OF INSULIN (HCC): ICD-10-CM

## 2020-01-14 DIAGNOSIS — I25.10 CAD IN NATIVE ARTERY: ICD-10-CM

## 2020-01-14 DIAGNOSIS — N18.30 STAGE 3 CHRONIC KIDNEY DISEASE (HCC): ICD-10-CM

## 2020-01-14 DIAGNOSIS — E55.9 VITAMIN D DEFICIENCY: ICD-10-CM

## 2020-01-14 DIAGNOSIS — I10 ESSENTIAL HYPERTENSION: Primary | ICD-10-CM

## 2020-01-14 DIAGNOSIS — C61 PROSTATE CANCER (HCC): ICD-10-CM

## 2020-01-14 DIAGNOSIS — Z95.1 S/P CABG X 2: ICD-10-CM

## 2020-01-14 DIAGNOSIS — E53.8 VITAMIN B12 DEFICIENCY: ICD-10-CM

## 2020-01-14 DIAGNOSIS — E11.22 TYPE 2 DIABETES MELLITUS WITH STAGE 3 CHRONIC KIDNEY DISEASE, WITHOUT LONG-TERM CURRENT USE OF INSULIN (HCC): ICD-10-CM

## 2020-01-14 DIAGNOSIS — E78.5 HYPERLIPIDEMIA, UNSPECIFIED HYPERLIPIDEMIA TYPE: ICD-10-CM

## 2020-01-14 DIAGNOSIS — Z95.2 S/P AVR (AORTIC VALVE REPLACEMENT): ICD-10-CM

## 2020-01-14 RX ORDER — AMLODIPINE BESYLATE 10 MG/1
10 TABLET ORAL DAILY
Qty: 90 TAB | Refills: 1 | Status: SHIPPED | OUTPATIENT
Start: 2020-01-14 | End: 2020-02-13 | Stop reason: SDUPTHER

## 2020-01-14 NOTE — PROGRESS NOTES
Chief Complaint   Patient presents with    Hypertension     2 month follow up for blood pressure check. Patient states he gets numb in his feet if he sits for a while. Health Maintenance Due   Topic Date Due    Shingrix Vaccine Age 50> (1 of 2) 02/02/1999    FOOT EXAM Q1  02/04/2020     1. Have you been to the ER, urgent care clinic or hospitalized since your last visit? NO.     2. Have you seen or consulted any other health care providers outside of the [facility] since your last visit (Include any pap smears or colon screening)? NO      Learning Assessment 5/8/2019   PRIMARY LEARNER Patient   BARRIERS PRIMARY LEARNER NONE   CO-LEARNER CAREGIVER No   PRIMARY LANGUAGE ENGLISH   LEARNER PREFERENCE PRIMARY DEMONSTRATION     READING   ANSWERED BY patient   RELATIONSHIP SELF     Abuse Screening Questionnaire 1/14/2020   Do you ever feel afraid of your partner? N   Are you in a relationship with someone who physically or mentally threatens you? N   Is it safe for you to go home? Y     3 most recent PHQ Screens 1/14/2020   Little interest or pleasure in doing things Not at all   Feeling down, depressed, irritable, or hopeless Not at all   Total Score PHQ 2 0     Fall Risk Assessment, last 12 mths 1/14/2020   Able to walk? Yes   Fall in past 12 months?  No   Fall with injury? -   Number of falls in past 12 months -   Fall Risk Score -

## 2020-01-14 NOTE — PATIENT INSTRUCTIONS
Increase amlodipine to 10 mg daily. Please monitor and record your blood pressure every morning and evening at least two hours after taking your medications. Please deliver record of readings to our office for review.

## 2020-01-19 NOTE — PROGRESS NOTES
HPI:   Maria G Lindsey is a 78y.o. year old male who presents for a routine visit and for evaluation of hypertension, hyperlipidemia, ASHD s/p CABG, aortic stenosis s/p AVR, diabetes mellitus, chronic renal disease stage 3, prostate cancer, and gout. He reports that he is doing generally well,His weight decreased a total of fourteen pounds during therapy but has increased 12 pounds since 9/2019. He states that he continues to have difficulty with his memory, but states that it has generally been stable. He has most difficulty in remembering his medications and appointments. He reports today that he has resumed taking amlodipine 5 mg as instructed. He has been monitoring his blood pressure at home intermittently, and it remains elevated, ranging 140-150/70-80. He is otherwise without new complaints and feeling generally well. In 6/2017, he was noted to have an elevated PSA of 6.0, which was confirmed on repeat to be 6.6. He was referred to Dr. Jo-Ann Sears and PSA was again repeated and found to be increased at 7.24. He underwent TRUS biopsy on 8/17/2017, which showed [id number] Maynard Grade 7 (3 + 4) adenocarcinoma of the prostate in 3/12 cores, 2-10% of each core. Options for management were discussed and he selected active surveillance. Plans were for repeat PSA and MRI prostate in 6 months (due 2/2018). However, the patient never had tests performed and he did not follow-up as discussed. On 10/4/2018, at his routine visit, a PSA level was obtained and was found to have increased to 11.3, and he was advised to follow-up with Dr. Jo-Ann Sears. He underwent a prostate MRI 3T at [facility] on 12/31/2018 showing several peripheral zone PI-RADS 4 observations: right posterolateral peripheral zone at the mid gland/apex and anterior peripheral zone at the apex bilaterally. The former exhibits questionable capsular bulging laterally, but not a definitive appearance for extracapsular extension.  He had a follow-up appointment with Dr. Babak Worrell on 1/11/2019 and decision made to proceed with EBRT. He had a staging bone scan (1/23/2019) which was negative for osseous metastases, and a CT abdomen and pelvis (1/23/2019) which showed no adenopathy of evidence of metastases, mild hepatic steatosis, aortic atherosclerosis, and cholelithiasis. He met with Dr. Kei Crockett on 1/30/2019 and decided to proceed with EBRT as definitive treatment for his prostate cancer, receiving his last treatment on 5/6/2019. He has a history of hypertension, hyperlipidemia, ASHD, and aortic stenosis. In 10/2014, he presented with progressive chest pain and shortness of breath and an echocardiogram was obtained (10/16/2014) showing normal LV function (EF 55-60%), no RWMA, grade 1 diastolic dysfunction, mild LAE, and severe AS (mean gradient 30 mmHg, peak 67 mmHg, and AV area 0.8 cm2). He was referred to see Dr. Nahomy Harris and underwent cardiac catheterization (11/6/2014) showing 100% RCA (distal collaterals from left), 70-80% distal LAD, 80% proximal LCx, inferior basal hypokinesis, and EF 55%. On 11/13/2014, he underwent 2 vessel CABG (LIMA to LAD and SVG to OM1) and AV replacement with a bioprosthetic 23 mm Marifer Barbosa Magna pericardial valve by Dr. Kwame Khan. He has done well since that time and remains asymptomatic. He denies any chest pain, shortness of breath at rest or with exertion, palpitations, lightheadedness, or edema. His current regimen includes aspirin, metoprolol, lisinopril, and moderate intensity dose simvastatin. He is followed by Dr. Nahomy Harris. He had a repeat echocardiogram (8/21/2017) showing normal LV function (EF 55-60%), no RWMA, mild MIL, and normally functioning bioprosthetic valve with peak gradient 17 mmHg, mean gradient 10 mmHg, and valve area 1.86 cm2. He has a history of diabetes mellitus, which has not required treatment with medication.  Review of his record shows that his Hba1c has remained between 5.7-6.3 since 2011. He denies any polyuria, polydipsia, nocturia, or blurry vision, and has no history of retinopathy or neuropathy. He does have evidence of chronic renal disease, stage 3, with baseline creatinine 1.22-1.37/ eGFR 55-59 since 11/2014. He had been having regular eye exams with Dr. Layton Pandey, although does not recall the date of his last exam and is overdue. He has a history of gout, but has not had a flare in many years since being treated with allopurinol. He has never had a screening colonoscopy. He did undergo Cologuard testing in 11/2017 which was negative. He denies any abdominal pain, nausea, vomiting, melena, hematochezia, or change in bowel movements. Past Medical History:   Diagnosis Date    Aortic stenosis     s/p AVR    Aortic stenosis, severe 10/16/2014    Echocardiogram EF 60% peak gradient 67 mmHg, mean gradient 30 mmHg, MARY 0.8 cm    ASHD (arteriosclerotic heart disease)     Chronic renal disease, stage III (HCC)     DM (diabetes mellitus) ([facility] [address]     Gout     HCD (hypertensive cardiovascular disease)     History of echocardiogram 10/16/2014    EF 55-60%. No RWMA. Mild LVH. Gr 1 DDfx. Mild LAE. Severe AS (mean grad 30 mmHg).  Hyperlipidemia     Hypertension     Prostate cancer ([facility] [address] 08/17/2017    [id number] Kaylynn Grade 7 (3 + 4) adenocarcinoma of the prostate in 3/12 cores, 2-10% of each core; PSA 7.24. Dr. Carmen Wise.  S/P AVR (aortic valve replacement) 11/13/2014    #23 mm Marifer Barbosa Magna pericardial valve. Dr. Ramona Du.  S/P CABG x 2 11/13/2014    LIMA to LAD, SVG to OM1. Dr. Ramona Du.  S/P cardiac catheterization 11/06/2014    RCA dom. mRCA 100%. LM patent. dLAD 75% x 2. pCX 80% (ELENA-3). LVEDP 14 mmHg. EF 50-55%. Severe inferobasal hypk. Severe AS. AV replacement & CABG recommended.      Past Surgical History:   Procedure Laterality Date    HX APPENDECTOMY       Current Outpatient Medications   Medication Sig    amLODIPine (NORVASC) 10 mg tablet Take 1 Tab by mouth daily.  lisinopril (PRINIVIL, ZESTRIL) 20 mg tablet Take 1 Tab by mouth daily.  metoprolol succinate (TOPROL-XL) 25 mg XL tablet Take 1 Tab by mouth daily.  allopurinol (ZYLOPRIM) 100 mg tablet Take 1 Tab by mouth daily.  cyanocobalamin, vitamin B-12, 1,000 mcg lozg 1,000 mcg by SubLINGual route daily.  simvastatin (ZOCOR) 40 mg tablet TAKE 1 TABLET NIGHTLY    ascorbic acid, vitamin C, (VITAMIN C) 500 mg tablet Take  by mouth.  docusate sodium (COLACE) 100 mg capsule Take 100 mg by mouth daily as needed.  cholecalciferol, vitamin D3, 2,000 unit tab Take  by mouth daily.  aspirin delayed-release 81 mg tablet Take 1 tablet by mouth daily.  sildenafil citrate (VIAGRA) 100 mg tablet Take 1 Tab by mouth as needed. No current facility-administered medications for this visit. Allergies and Intolerances:   No Known Allergies     Family History:   Family History   Problem Relation Age of Onset   Dulcynea.Durga COPD Father     Other Father         pneumoconiosis    Lung Disease Father     Other Mother         meigs syndrome     Social History:   He  reports that he has never smoked. He has never used smokeless tobacco. He reports that he drinks approximately one case of beer per week. He is  with two adult children. He is retired from working in the [employer]. He started as a , and then became an .   Social History     Substance and Sexual Activity   Alcohol Use Yes    Alcohol/week: 12.0 standard drinks    Types: 12 Cans of beer per week     Immunization History:  Immunization History   Administered Date(s) Administered    Influenza High Dose Vaccine PF 10/10/2017, 10/26/2018, 11/09/2019    Influenza Vaccine 10/14/2014    Influenza Vaccine (Tri) Adjuvanted 10/04/2018, 11/09/2019    Influenza Vaccine Split 09/29/2011    Influenza Vaccine Whole 09/14/2010    Pneumococcal Conjugate (PCV-13) 06/29/2017    Pneumococcal Polysaccharide (PPSV-23) 10/14/2014    Td 01/01/2008    Tdap 01/12/2018       Review of Systems:   As above included in HPI. Otherwise 11 point review of systems negative including constitutional, skin, HENT, eyes, respiratory, cardiovascular, gastrointestinal, genitourinary, musculoskeletal, endocrine, hematologic, allergy, and neurologic. Physical:   Vitals:   BP: 140/62, repeat 138/72 right arm  HR: 68  WT: 231 lb (104.8 kg)  BMI:  30.48 kg/m2    Exam:   Pt appears well; alert and oriented x 3; appropriate affect. HEENT: PERRLA, anicteric, oropharynx clear,  no JVD, adenopathy or thyromegaly. No carotid bruits or radiated murmur. Lungs: clear to auscultation, no wheezes, rhonchi, or rales. Heart: irregular rate and rhythm. No murmur, rubs, gallops  Abdomen: soft, nontender, nondistended, normal bowel sounds, no hepatosplenomegaly or masses. Extremities: without edema. Pulses 1-2+ bilaterally. Review of Data:  Labs:  No visits with results within 2 Month(s) from this visit. Latest known visit with results is:   Hospital Outpatient Visit on 11/12/2019   Component Date Value Ref Range Status    WBC 11/12/2019 6.1  4.6 - 13.2 K/uL Final    RBC 11/12/2019 3.67* 4.70 - 5.50 M/uL Final    HGB 11/12/2019 12.4* 13.0 - 16.0 g/dL Final    HCT 11/12/2019 39.2  36.0 - 48.0 % Final    MCV 11/12/2019 106.8* 74.0 - 97.0 FL Final    MCH 11/12/2019 33.8  24.0 - 34.0 PG Final    MCHC 11/12/2019 31.6  31.0 - 37.0 g/dL Final    RDW 11/12/2019 13.5  11.6 - 14.5 % Final    PLATELET 97/77/1745 013  135 - 420 K/uL Final    MPV 11/12/2019 9.0* 9.2 - 11.8 FL Final    NEUTROPHILS 11/12/2019 66  40 - 73 % Final    LYMPHOCYTES 11/12/2019 19* 21 - 52 % Final    MONOCYTES 11/12/2019 10  3 - 10 % Final    EOSINOPHILS 11/12/2019 5  0 - 5 % Final    BASOPHILS 11/12/2019 0  0 - 2 % Final    ABS. NEUTROPHILS 11/12/2019 4.1  1.8 - 8.0 K/UL Final    ABS. LYMPHOCYTES 11/12/2019 1.1  0.9 - 3.6 K/UL Final    ABS.  MONOCYTES 11/12/2019 0.6 0.05 - 1.2 K/UL Final    ABS. EOSINOPHILS 11/12/2019 0.3  0.0 - 0.4 K/UL Final    ABS. BASOPHILS 11/12/2019 0.0  0.0 - 0.1 K/UL Final    DF 11/12/2019 AUTOMATED    Final    Vitamin B12 11/12/2019 1,357* 211 - 911 pg/mL Final    Hemoglobin A1c 11/12/2019 5.6  4.2 - 5.6 % Final    Est. average glucose 11/12/2019 114  mg/dL Final    LIPID PROFILE 11/12/2019        Final    Cholesterol, total 11/12/2019 163  <200 MG/DL Final    Triglyceride 11/12/2019 55  <150 MG/DL Final    HDL Cholesterol 11/12/2019 73* 40 - 60 MG/DL Final    LDL, calculated 11/12/2019 79  0 - 100 MG/DL Final    VLDL, calculated 11/12/2019 11  MG/DL Final    CHOL/HDL Ratio 11/12/2019 2.2  0 - 5.0   Final    Magnesium 11/12/2019 2.1  1.6 - 2.6 mg/dL Final    Sodium 11/12/2019 139  136 - 145 mmol/L Final    Potassium 11/12/2019 4.6  3.5 - 5.5 mmol/L Final    Chloride 11/12/2019 108  100 - 111 mmol/L Final    CO2 11/12/2019 24  21 - 32 mmol/L Final    Anion gap 11/12/2019 7  3.0 - 18 mmol/L Final    Glucose 11/12/2019 117* 74 - 99 mg/dL Final    BUN 11/12/2019 19* 7.0 - 18 MG/DL Final    Creatinine 11/12/2019 1.37* 0.6 - 1.3 MG/DL Final    BUN/Creatinine ratio 11/12/2019 14  12 - 20   Final    GFR est AA 11/12/2019 >60  >60 ml/min/1.73m2 Final    GFR est non-AA 11/12/2019 51* >60 ml/min/1.73m2 Final    Calcium 11/12/2019 9.1  8.5 - 10.1 MG/DL Final    Bilirubin, total 11/12/2019 0.8  0.2 - 1.0 MG/DL Final    ALT (SGPT) 11/12/2019 29  16 - 61 U/L Final    AST (SGOT) 11/12/2019 15  10 - 38 U/L Final    Alk.  phosphatase 11/12/2019 49  45 - 117 U/L Final    Protein, total 11/12/2019 7.4  6.4 - 8.2 g/dL Final    Albumin 11/12/2019 4.1  3.4 - 5.0 g/dL Final    Globulin 11/12/2019 3.3  2.0 - 4.0 g/dL Final    A-G Ratio 11/12/2019 1.2  0.8 - 1.7   Final    Prostate Specific Ag 11/12/2019 1.1  0.0 - 4.0 ng/mL Final    PSA, Free 11/12/2019 0.21  N/A ng/mL Final    PSA, % Free 11/12/2019 19.1  % Final    Testosterone 11/12/2019 289  264 - 916 ng/dL Final    Free testosterone (Direct) 11/12/2019 5.0* 6.6 - 18.1 pg/mL Final     EKG (8/8/2019) sinus rhythm at 68 bpm. Normal intervals. Lack of R waves in V2 and V3 likely lead placement. No ischemic changes when compared with prior in 8/2018. Health Maintenance:  Screening:    Colorectal: Cologuard (11/2/2017) negative. Due 11/2020. Depression: none   DM (HbA1c/FPG): HbA1c 5.6 (11/2019)   Hepatitis C: negative (1/2018)   Falls: none   DEXA: N/A   PSA/KIEL: PSA 1.1 (11/2019). Followed by Dr. Gavino Mclean and Dr. Aleja Cortes   Glaucoma: regular eye exams with Dr. Lin Newell (last 6/2019)   Smoking: none   Vitamin D: 33.3 (5/2019)   Medicare Wellness: 11/19/2019    Impression:  Patient Active Problem List   Diagnosis Code    Hyperlipidemia E78.5    Gout M10.9    Vitamin D deficiency E55.9    CAD in native artery I25.10    Hypertensive heart disease without congestive heart failure I11.9    S/P CABG x 2 Z95.1    S/P AVR (aortic valve replacement) Z95.2    Type 2 diabetes mellitus with stage 3 chronic kidney disease, without long-term current use of insulin (HCC) E11.22, N18.3    Stage 3 chronic kidney disease (HCC) N18.3    Prostate cancer ([facility] [address] C61    Essential hypertension I10    Vitamin B12 deficiency E53.8       Plan:  1. Hypertension. Blood pressure remains above goal today on current regimen of lisinopril 20 mg daily, metoprolol succinate 25 mg daily, and amlodipine 5 mg daily. Discontinued hydrochlorothiazide due to hypotension during EBRT and renal function had worsened to creatinine 1.75 / eGFR 39, likely due to overdiuresis. Stable at last visit with creatinine 1.37/ eGFR 51 which is at baseline. Advised to increase dose of amlodipine to 10 mg daily. He states that he has a home monitor and will continue to monitor blood pressure. Will reassess blood pressure in 4 weeks and asked to bring readings. .   2. Prostate cancer. [id number] Kaylynn Grade 7 (3 + 4) adenocarcinoma. Patient chose active surveillance rather than definitive treatment. Repeat PSA and MRI prostate ordered for 2/2018, but he did not follow-up. Repeat PSA obtained and had increased from 7.24 at diagnosis to 11.3. Referred for follow-up with Dr. Pat Ware, and MRI prostate performed on 12/31/2018 showing several peripheral zone PI-RADS 4 with one area showing questionable capsular bulging laterally, but not a definitive appearance for extracapsular extension. He decided to proceed with EBRT, and underwent fiducial marker and [employer] placement on 3/11/2019 followed by weekly treatments from 3/27-5/6/2019. Tolerated well except for some fatigue which has improved. Being followed by Dr. Eva Franklin and Dr. Pat Ware. PSA 1.1 (11/2019) and testosterone 289, indicative of no evidence of active disease. Will need repeat PSA and testosterone levels in 5/2020.   3. Hyperlipidemia. On moderate intensity dose simvastatin with LDL 79 and HDL 73 (11/2019), indicative of good control in this patient. Goal LDL <70 given history of ASHD and CABG. Will continue to follow. 4. ASHD s/p 2 vessel CABG. Remains asymptomatic. On aspirin, metoprolol, and statin. Followed by Dr. Ernie Clay. 5. Aortic stenosis s/p bioprosthetic AVR. Remains asymptomatic, and repeat echocardiogram in 8/2017 with good functioning valve. Follow. 6. Diabetes mellitus. Patient with diagnosis of diabetes mellitus, but review of record shows HbA1c ranging from 5.7-6.3 since 2011 and -112 during that time period. He did have one glucose reading in 11/2014 at 128, but unclear if fasting and not confirmed. HbA1c remained increased but controlled at 6.0, but decreased to normal today at 5.6. On Ace-I and statin. Continue follow-up with Dr. Ben Carroll for annual eye exams. Foot exam normal (2/2019). Will repeat next visit. Urine microalbumin/ creatinine ratio without evidence of microalbuminuria, but does have evidence of chronic renal disease stage 3. Emphasized importance of lifestyle modifications, including diet, exercise, and weight loss. 7. Chronic renal disease, stage 3. Most likely secondary to long standing hypertension and prediabetes, although also appears to have developed at time of CABG and AVR so may be related to hypoperfusion during surgery. On statin and Ace-I. Discussed importance of avoiding NSAIDS and prerenal status. Will need to follow. 8. Anemia, macrocytosis. Mild anemia developed during radiation therapy. Macrocytosis evident. Vitamin B12 level very low last visit and started on Vitamin B12 supplement 1000 mcg SL daily. Level normal today indicative of compliance. Also advised to decrease alcohol consumption. Will monitor. 9. Gout. Asymptomatic. On allopurinol. 10. Health maintenance. Already received influenza vaccine. Given script for Shingrix vaccine but not yet obtained. Completed pneumococcal series. Given script for Tdap previously, but patient did not obtain. Other immunizations up to date. Completed Cologuard for colorectal cancer screening. Completed eye exam with Dr. Jersey Montero. Discussed decreasing alcohol consumption. Vitamin D level normal. Continue maintenance dose supplement. Medicare wellness visit up to date. .     Patient understands recommendations and agrees with plan. Follow-up in 4 weeks to reevaluate blood pressure.

## 2020-02-06 ENCOUNTER — APPOINTMENT (OUTPATIENT)
Dept: INTERNAL MEDICINE CLINIC | Age: 70
End: 2020-02-06

## 2020-02-06 ENCOUNTER — HOSPITAL ENCOUNTER (OUTPATIENT)
Dept: LAB | Age: 70
Discharge: HOME | End: 2020-02-06
Payer: MEDICARE

## 2020-02-06 ENCOUNTER — TELEPHONE (OUTPATIENT)
Dept: INTERNAL MEDICINE CLINIC | Age: 70
End: 2020-02-06

## 2020-02-06 DIAGNOSIS — E78.5 HYPERLIPIDEMIA, UNSPECIFIED HYPERLIPIDEMIA TYPE: ICD-10-CM

## 2020-02-06 DIAGNOSIS — E53.8 VITAMIN B12 DEFICIENCY: ICD-10-CM

## 2020-02-06 DIAGNOSIS — E11.22 TYPE 2 DIABETES MELLITUS WITH STAGE 3 CHRONIC KIDNEY DISEASE, WITHOUT LONG-TERM CURRENT USE OF INSULIN (HCC): ICD-10-CM

## 2020-02-06 DIAGNOSIS — I25.10 CAD IN NATIVE ARTERY: ICD-10-CM

## 2020-02-06 DIAGNOSIS — M1A.09X0 IDIOPATHIC CHRONIC GOUT OF MULTIPLE SITES WITHOUT TOPHUS: Chronic | ICD-10-CM

## 2020-02-06 DIAGNOSIS — E55.9 VITAMIN D DEFICIENCY: ICD-10-CM

## 2020-02-06 DIAGNOSIS — C61 PROSTATE CANCER (HCC): ICD-10-CM

## 2020-02-06 DIAGNOSIS — I10 ESSENTIAL HYPERTENSION: ICD-10-CM

## 2020-02-06 DIAGNOSIS — N18.30 TYPE 2 DIABETES MELLITUS WITH STAGE 3 CHRONIC KIDNEY DISEASE, WITHOUT LONG-TERM CURRENT USE OF INSULIN (HCC): ICD-10-CM

## 2020-02-06 DIAGNOSIS — N18.30 STAGE 3 CHRONIC KIDNEY DISEASE (HCC): ICD-10-CM

## 2020-02-06 DIAGNOSIS — Z95.1 S/P CABG X 2: ICD-10-CM

## 2020-02-06 DIAGNOSIS — Z95.2 S/P AVR (AORTIC VALVE REPLACEMENT): ICD-10-CM

## 2020-02-06 LAB
25(OH)D3 SERPL-MCNC: 23.8 NG/ML (ref 30–100)
ALBUMIN SERPL-MCNC: 4 G/DL (ref 3.4–5)
ALBUMIN/GLOB SERPL: 1.1 {RATIO} (ref 0.8–1.7)
ALP SERPL-CCNC: 55 U/L (ref 45–117)
ALT SERPL-CCNC: 28 U/L (ref 16–61)
ANION GAP SERPL CALC-SCNC: 8 MMOL/L (ref 3–18)
AST SERPL-CCNC: 15 U/L (ref 10–38)
BASOPHILS # BLD: 0 K/UL (ref 0–0.1)
BASOPHILS NFR BLD: 0 % (ref 0–2)
BILIRUB SERPL-MCNC: 0.9 MG/DL (ref 0.2–1)
BUN SERPL-MCNC: 17 MG/DL (ref 7–18)
BUN/CREAT SERPL: 13 (ref 12–20)
CALCIUM SERPL-MCNC: 8.8 MG/DL (ref 8.5–10.1)
CHLORIDE SERPL-SCNC: 103 MMOL/L (ref 100–111)
CHOLEST SERPL-MCNC: 173 MG/DL
CO2 SERPL-SCNC: 24 MMOL/L (ref 21–32)
CREAT SERPL-MCNC: 1.35 MG/DL (ref 0.6–1.3)
CREAT UR-MCNC: 63 MG/DL (ref 30–125)
DIFFERENTIAL METHOD BLD: ABNORMAL
EOSINOPHIL # BLD: 0.3 K/UL (ref 0–0.4)
EOSINOPHIL NFR BLD: 5 % (ref 0–5)
ERYTHROCYTE [DISTWIDTH] IN BLOOD BY AUTOMATED COUNT: 13.1 % (ref 11.6–14.5)
EST. AVERAGE GLUCOSE BLD GHB EST-MCNC: 123 MG/DL
GLOBULIN SER CALC-MCNC: 3.6 G/DL (ref 2–4)
GLUCOSE SERPL-MCNC: 119 MG/DL (ref 74–99)
HBA1C MFR BLD: 5.9 % (ref 4.2–5.6)
HCT VFR BLD AUTO: 38.7 % (ref 36–48)
HDLC SERPL-MCNC: 64 MG/DL (ref 40–60)
HDLC SERPL: 2.7 {RATIO} (ref 0–5)
HGB BLD-MCNC: 12.8 G/DL (ref 13–16)
LDLC SERPL CALC-MCNC: 86 MG/DL (ref 0–100)
LIPID PROFILE,FLP: ABNORMAL
LYMPHOCYTES # BLD: 1.3 K/UL (ref 0.9–3.6)
LYMPHOCYTES NFR BLD: 21 % (ref 21–52)
MCH RBC QN AUTO: 33.4 PG (ref 24–34)
MCHC RBC AUTO-ENTMCNC: 33.1 G/DL (ref 31–37)
MCV RBC AUTO: 101 FL (ref 74–97)
MICROALBUMIN UR-MCNC: 1.99 MG/DL (ref 0–3)
MICROALBUMIN/CREAT UR-RTO: 32 MG/G (ref 0–30)
MONOCYTES # BLD: 0.7 K/UL (ref 0.05–1.2)
MONOCYTES NFR BLD: 11 % (ref 3–10)
NEUTS SEG # BLD: 3.7 K/UL (ref 1.8–8)
NEUTS SEG NFR BLD: 63 % (ref 40–73)
PLATELET # BLD AUTO: 204 K/UL (ref 135–420)
PMV BLD AUTO: 9.3 FL (ref 9.2–11.8)
POTASSIUM SERPL-SCNC: 4.2 MMOL/L (ref 3.5–5.5)
PROT SERPL-MCNC: 7.6 G/DL (ref 6.4–8.2)
RBC # BLD AUTO: 3.83 M/UL (ref 4.7–5.5)
SODIUM SERPL-SCNC: 135 MMOL/L (ref 136–145)
TRIGL SERPL-MCNC: 115 MG/DL (ref ?–150)
VIT B12 SERPL-MCNC: 1492 PG/ML (ref 211–911)
VLDLC SERPL CALC-MCNC: 23 MG/DL
WBC # BLD AUTO: 6 K/UL (ref 4.6–13.2)

## 2020-02-06 PROCEDURE — 82306 VITAMIN D 25 HYDROXY: CPT

## 2020-02-06 PROCEDURE — 80053 COMPREHEN METABOLIC PANEL: CPT

## 2020-02-06 PROCEDURE — 85025 COMPLETE CBC W/AUTO DIFF WBC: CPT

## 2020-02-06 PROCEDURE — 82043 UR ALBUMIN QUANTITATIVE: CPT

## 2020-02-06 PROCEDURE — 80061 LIPID PANEL: CPT

## 2020-02-06 PROCEDURE — 82607 VITAMIN B-12: CPT

## 2020-02-06 PROCEDURE — 36415 COLL VENOUS BLD VENIPUNCTURE: CPT

## 2020-02-06 PROCEDURE — 83036 HEMOGLOBIN GLYCOSYLATED A1C: CPT

## 2020-02-06 RX ORDER — ATORVASTATIN CALCIUM 20 MG/1
20 TABLET, FILM COATED ORAL DAILY
Qty: 1 TAB | Refills: 0
Start: 2020-02-06 | End: 2020-05-07 | Stop reason: SDUPTHER

## 2020-02-06 NOTE — TELEPHONE ENCOUNTER
Pt calling says he got Rx for Atorvastatin from mail order. Says he doesn't take that med. Bottle says it is a replacement for Zocor. Wants to be sure this is ok with Dr. Jennifer Pickett.

## 2020-02-06 NOTE — TELEPHONE ENCOUNTER
Yes, he was changed to atorvastatin due to interaction of simvastatin (Zocor) with amlodipine. Please ask him to begin and discontinue simvastatin. Thanks.

## 2020-02-07 ENCOUNTER — TELEPHONE (OUTPATIENT)
Dept: INTERNAL MEDICINE CLINIC | Age: 70
End: 2020-02-07

## 2020-02-07 NOTE — TELEPHONE ENCOUNTER
Called patient back and verified full name and date of birth. Informed patient of Dr. Sharad Tena message and patient verbalized understanding. Patient states that he actually took a Simvastatin already today. I informed the patient, by reiterating Dr. Sharad Tena' message to discontinue the Simvastatin, and stating that he should start using his Atorvastatin tomorrow. Patient verbalized understanding.

## 2020-02-13 ENCOUNTER — OFFICE VISIT (OUTPATIENT)
Dept: INTERNAL MEDICINE CLINIC | Age: 70
End: 2020-02-13

## 2020-02-13 VITALS
HEART RATE: 73 BPM | RESPIRATION RATE: 15 BRPM | DIASTOLIC BLOOD PRESSURE: 60 MMHG | SYSTOLIC BLOOD PRESSURE: 142 MMHG | BODY MASS INDEX: 31.62 KG/M2 | HEIGHT: 73 IN | WEIGHT: 238.6 LBS | TEMPERATURE: 98.1 F | OXYGEN SATURATION: 98 %

## 2020-02-13 DIAGNOSIS — E11.22 TYPE 2 DIABETES MELLITUS WITH STAGE 3 CHRONIC KIDNEY DISEASE, WITHOUT LONG-TERM CURRENT USE OF INSULIN (HCC): ICD-10-CM

## 2020-02-13 DIAGNOSIS — N18.30 TYPE 2 DIABETES MELLITUS WITH STAGE 3 CHRONIC KIDNEY DISEASE, WITHOUT LONG-TERM CURRENT USE OF INSULIN (HCC): ICD-10-CM

## 2020-02-13 DIAGNOSIS — R80.9 MICROALBUMINURIA: ICD-10-CM

## 2020-02-13 DIAGNOSIS — I25.10 CAD IN NATIVE ARTERY: ICD-10-CM

## 2020-02-13 DIAGNOSIS — E55.9 VITAMIN D DEFICIENCY: ICD-10-CM

## 2020-02-13 DIAGNOSIS — I10 ESSENTIAL HYPERTENSION: Primary | ICD-10-CM

## 2020-02-13 DIAGNOSIS — M1A.09X0 IDIOPATHIC CHRONIC GOUT OF MULTIPLE SITES WITHOUT TOPHUS: Chronic | ICD-10-CM

## 2020-02-13 DIAGNOSIS — Z95.2 S/P AVR (AORTIC VALVE REPLACEMENT): ICD-10-CM

## 2020-02-13 DIAGNOSIS — E53.8 VITAMIN B12 DEFICIENCY: ICD-10-CM

## 2020-02-13 DIAGNOSIS — C61 PROSTATE CANCER (HCC): ICD-10-CM

## 2020-02-13 DIAGNOSIS — N18.30 STAGE 3 CHRONIC KIDNEY DISEASE (HCC): ICD-10-CM

## 2020-02-13 DIAGNOSIS — E78.5 HYPERLIPIDEMIA, UNSPECIFIED HYPERLIPIDEMIA TYPE: ICD-10-CM

## 2020-02-13 DIAGNOSIS — Z95.1 S/P CABG X 2: ICD-10-CM

## 2020-02-13 RX ORDER — ATORVASTATIN CALCIUM 20 MG/1
20 TABLET, FILM COATED ORAL DAILY
Qty: 1 TAB | Refills: 0 | Status: CANCELLED | OUTPATIENT
Start: 2020-02-13

## 2020-02-13 RX ORDER — METOPROLOL SUCCINATE 50 MG/1
50 TABLET, EXTENDED RELEASE ORAL DAILY
Qty: 14 TAB | Refills: 0 | Status: SHIPPED | OUTPATIENT
Start: 2020-02-13 | End: 2020-03-11 | Stop reason: SDUPTHER

## 2020-02-13 RX ORDER — METOPROLOL SUCCINATE 50 MG/1
50 TABLET, EXTENDED RELEASE ORAL DAILY
Qty: 90 TAB | Refills: 2 | Status: SHIPPED | OUTPATIENT
Start: 2020-02-13 | End: 2020-10-22

## 2020-02-13 RX ORDER — ALLOPURINOL 100 MG/1
100 TABLET ORAL DAILY
Qty: 90 TAB | Refills: 2 | Status: SHIPPED | OUTPATIENT
Start: 2020-02-13 | End: 2020-12-26

## 2020-02-13 RX ORDER — LISINOPRIL 20 MG/1
20 TABLET ORAL DAILY
Qty: 90 TAB | Refills: 2 | Status: SHIPPED | OUTPATIENT
Start: 2020-02-13 | End: 2020-06-18 | Stop reason: SDUPTHER

## 2020-02-13 RX ORDER — AMLODIPINE BESYLATE 10 MG/1
10 TABLET ORAL DAILY
Qty: 90 TAB | Refills: 2 | Status: SHIPPED | OUTPATIENT
Start: 2020-02-13 | End: 2020-06-18 | Stop reason: SDUPTHER

## 2020-02-13 NOTE — PATIENT INSTRUCTIONS
Increase metoprolol to 50 mg daily. Please monitor and record your blood pressure every morning for the next 4 weeks at least two hours after taking your medications. Please deliver record of readings to our office for review. Take the whole tablet of Vitamin D3. Avoid Alleve or Advil. Take Tylenol as needed for pain. Please bring your medications to your next visit.

## 2020-02-13 NOTE — PROGRESS NOTES
Rasheed Abdalla presents today for   Chief Complaint   Patient presents with    Hypertension     4 week follow up    Cold Symptoms     Patient reports head congestion with some nasal bleeding with blowing the nose. Patient reports coughing more at night. x 2 weeks    Tingling     Patient reports some numbness in both feet. Depression Screening:  3 most recent PHQ Screens 1/14/2020   Little interest or pleasure in doing things Not at all   Feeling down, depressed, irritable, or hopeless Not at all   Total Score PHQ 2 0       Learning Assessment:  Learning Assessment 5/8/2019   PRIMARY LEARNER Patient   BARRIERS PRIMARY LEARNER NONE   CO-LEARNER CAREGIVER No   PRIMARY LANGUAGE ENGLISH   LEARNER PREFERENCE PRIMARY DEMONSTRATION     READING   ANSWERED BY patient   RELATIONSHIP SELF       Abuse Screening:  Abuse Screening Questionnaire 1/14/2020   Do you ever feel afraid of your partner? N   Are you in a relationship with someone who physically or mentally threatens you? N   Is it safe for you to go home? Y       Fall Risk  Fall Risk Assessment, last 12 mths 1/14/2020   Able to walk? Yes   Fall in past 12 months? No   Fall with injury? -   Number of falls in past 12 months -   Fall Risk Score -           Coordination of Care:  1. Have you been to the ER, urgent care clinic since your last visit? Hospitalized since your last visit? no    2. Have you seen or consulted any other health care providers outside of the [address] [place] [address] since your last visit? Include any pap smears or colon screening. No      Advance Directive:  1. Do you have an advance directive in place?  Patient Reply:YES    2.  Per patient no changes to their ACP contact NO.

## 2020-02-17 PROBLEM — R80.9 MICROALBUMINURIA: Status: ACTIVE | Noted: 2020-02-17

## 2020-02-17 NOTE — PROGRESS NOTES
HPI:   Tiffani Dixon is a 69y.o. year old male who presents for a routine visit. He has a history of hypertension, hyperlipidemia, ASHD s/p CABG, aortic stenosis s/p AVR, diabetes mellitus, chronic renal disease stage 3, prostate cancer, and gout. He reports that he is doing generally well. He was last seen on 1/14/2020, and blood pressure was noted to be elevated on his current regimen of amlodipine 5 mg daily, metoprolol succinate 25 mg daily, and lisinopril 20 mg daily. He was instructed to increase his dose of amlodipine to 10 mg daily and returns today for follow-up. He has been monitoring his blood pressure at home intermittently, and it remains elevated, ranging 140-150/70-80. His weight had decreased a total of fourteen pounds during prostate cancer radiation therapy, but today is noted to be increased almost 20 pounds since 9/2019. He states that he continues to have difficulty with his memory, but states that it has generally been stable. He has most difficulty in remembering his medications and appointments. He is otherwise without new complaints and feeling generally well. In 6/2017, he was noted to have an elevated PSA of 6.0, which was confirmed on repeat to be 6.6. He was referred to Dr. Nicki Caldera and PSA was again repeated and found to be increased at 7.24. He underwent TRUS biopsy on 8/17/2017, which showed [id number] [place] Grade 7 (3 + 4) adenocarcinoma of the prostate in 3/12 cores, 2-10% of each core. Options for management were discussed and he selected active surveillance. Plans were for repeat PSA and MRI prostate in 6 months (due 2/2018). However, the patient never had tests performed and he did not follow-up as discussed. On 10/4/2018, at his routine visit, a PSA level was obtained and was found to have increased to 11.3, and he was advised to follow-up with Dr. Nicki Caldera.  He underwent a prostate MRI 3T at [facility] on 12/31/2018 showing several peripheral zone PI-RADS 4 observations: right posterolateral peripheral zone at the mid gland/apex and anterior peripheral zone at the apex bilaterally. The former exhibits questionable capsular bulging laterally, but not a definitive appearance for extracapsular extension. He had a follow-up appointment with Dr. Anyi Gruber on 1/11/2019 and decision made to proceed with EBRT. He had a staging bone scan (1/23/2019) which was negative for osseous metastases, and a CT abdomen and pelvis (1/23/2019) which showed no adenopathy of evidence of metastases, mild hepatic steatosis, aortic atherosclerosis, and cholelithiasis. He met with Dr. Erika Nowak on 1/30/2019 and decided to proceed with EBRT as definitive treatment for his prostate cancer, receiving his last treatment on 5/6/2019. He has a history of hypertension, hyperlipidemia, ASHD, and aortic stenosis. In 10/2014, he presented with progressive chest pain and shortness of breath and an echocardiogram was obtained (10/16/2014) showing normal LV function (EF 55-60%), no RWMA, grade 1 diastolic dysfunction, mild LAE, and severe AS (mean gradient 30 mmHg, peak 67 mmHg, and AV area 0.8 cm2). He was referred to see Dr. Viktoria Jack and underwent cardiac catheterization (11/6/2014) showing 100% RCA (distal collaterals from left), 70-80% distal LAD, 80% proximal LCx, inferior basal hypokinesis, and EF 55%. On 11/13/2014, he underwent 2 vessel CABG (LIMA to LAD and SVG to OM1) and AV replacement with a bioprosthetic 23 mm Marifer Barbosa Magna pericardial valve by Dr. Kenyatta Curtis. He has done well since that time and remains asymptomatic. He denies any chest pain, shortness of breath at rest or with exertion, palpitations, lightheadedness, or edema. His current regimen includes aspirin, metoprolol, lisinopril, and moderate intensity dose simvastatin. He is followed by Dr. Viktoria Jack.  He had a repeat echocardiogram (8/21/2017) showing normal LV function (EF 55-60%), no RWMA, mild MIL, and normally functioning bioprosthetic valve with peak gradient 17 mmHg, mean gradient 10 mmHg, and valve area 1.86 cm2. He has a history of diabetes mellitus, which has not required treatment with medication. Review of his record shows that his Hba1c has remained between 5.7-6.3 since 2011. He denies any polyuria, polydipsia, nocturia, or blurry vision, and has no history of retinopathy or neuropathy. He does have evidence of chronic renal disease, stage 3, with baseline creatinine 1.22-1.37/ eGFR 55-59 since 11/2014. He had been having regular eye exams with Dr. Dash Larson, although does not recall the date of his last exam and is overdue. He has a history of gout, but has not had a flare in many years since being treated with allopurinol. He has never had a screening colonoscopy. He did undergo Cologuard testing in 11/2017 which was negative. He denies any abdominal pain, nausea, vomiting, melena, hematochezia, or change in bowel movements. Past Medical History:   Diagnosis Date    Aortic stenosis     s/p AVR    Aortic stenosis, severe 10/16/2014    Echocardiogram EF 60% peak gradient 67 mmHg, mean gradient 30 mmHg, MARY 0.8 cm    ASHD (arteriosclerotic heart disease)     Chronic renal disease, stage III (HCC)     DM [facility]diabetes mellitus) ([facility] [address]     Gout     HCD (hypertensive cardiovascular disease)     History of echocardiogram 10/16/2014    EF 55-60%. No RWMA. Mild LVH. Gr 1 DDfx. Mild LAE. Severe AS (mean grad 30 mmHg).  Hyperlipidemia     Hypertension     Prostate cancer ([facility] [address] 08/17/2017    [id number] [place] Grade 7 (3 + 4) adenocarcinoma of the prostate in 3/12 cores, 2-10% of each core; PSA 7.24. Dr. Nicki Caldera.  S/P AVR (aortic valve replacement) 11/13/2014    #23 mm Marifer Barbosa Magna pericardial valve. Dr. Demetrios Landau.  S/P CABG x 2 11/13/2014    LIMA to LAD, SVG to OM1. Dr. Demetrios Landau.  S/P cardiac catheterization 11/06/2014    RCA dom. mRCA 100%. LM patent. dLAD 75% x 2. pCX 80% (ELENA-3). LVEDP 14 mmHg. EF 50-55%. Severe inferobasal hypk. Severe AS. AV replacement & CABG recommended. Past Surgical History:   Procedure Laterality Date    HX APPENDECTOMY       Current Outpatient Medications   Medication Sig    allopurinoL (ZYLOPRIM) 100 mg tablet Take 1 Tab by mouth daily.  lisinopril (PRINIVIL, ZESTRIL) 20 mg tablet Take 1 Tab by mouth daily.  metoprolol succinate (TOPROL-XL) 50 mg XL tablet Take 1 Tab by mouth daily.  amLODIPine (NORVASC) 10 mg tablet Take 1 Tab by mouth daily.  metoprolol succinate (TOPROL-XL) 50 mg XL tablet Take 1 Tab by mouth daily.  atorvastatin (LIPITOR) 20 mg tablet Take 1 Tab by mouth daily.  cyanocobalamin, vitamin B-12, 1,000 mcg lozg 1,000 mcg by SubLINGual route daily.  ascorbic acid, vitamin C, (VITAMIN C) 500 mg tablet Take  by mouth.  sildenafil citrate (VIAGRA) 100 mg tablet Take 1 Tab by mouth as needed.  docusate sodium (COLACE) 100 mg capsule Take 100 mg by mouth daily as needed.  cholecalciferol, vitamin D3, 2,000 unit tab Take  by mouth daily.  aspirin delayed-release 81 mg tablet Take 1 tablet by mouth daily. No current facility-administered medications for this visit. Allergies and Intolerances:   No Known Allergies     Family History:   Family History   Problem Relation Age of Onset   [address] [facility] COPD Father     Other Father         pneumoconiosis    Lung Disease Father     Other Mother         meigs syndrome     Social History:   He  reports that he has never smoked. He has never used smokeless tobacco. He reports that he drinks approximately one case of beer per week. He is  with two adult children. He is retired from working in the [employer]. He started as a , and then became an .   Social History     Substance and Sexual Activity   Alcohol Use Yes    Alcohol/week: 12.0 standard drinks    Types: 12 Cans of beer per week     Immunization History:  Immunization History   Administered Date(s) Administered    Influenza High Dose Vaccine PF 10/10/2017, 10/26/2018, 11/09/2019    Influenza Vaccine 10/14/2014    Influenza Vaccine (Tri) Adjuvanted 10/04/2018, 11/09/2019    Influenza Vaccine Split 09/29/2011    Influenza Vaccine Whole 09/14/2010    Pneumococcal Conjugate (PCV-13) 06/29/2017    Pneumococcal Polysaccharide (PPSV-23) 10/14/2014    Td 01/01/2008    Tdap 01/12/2018       Review of Systems:   As above included in HPI. Otherwise 11 point review of systems negative including constitutional, skin, HENT, eyes, respiratory, cardiovascular, gastrointestinal, genitourinary, musculoskeletal, endocrine, hematologic, allergy, and neurologic. Physical:   Vitals:   BP: 142/60 manual    143/79 home monitor  HR: 73  WT: 238 lb 9.6 oz (108.2 kg)  BMI:  31.48 kg/m2    Exam:   Pt appears well; alert and oriented x 3; appropriate affect. HEENT: PERRLA, anicteric, oropharynx clear,  no JVD, adenopathy or thyromegaly. No carotid bruits or radiated murmur. Lungs: clear to auscultation, no wheezes, rhonchi, or rales. Heart: irregular rate and rhythm. No murmur, rubs, gallops  Abdomen: soft, nontender, nondistended, normal bowel sounds, no hepatosplenomegaly or masses. Extremities: without edema. Pulses 1-2+ bilaterally.     Diabetic foot exam:     Left Foot:   Visual Exam: normal    Pulse DP: 2+ (normal)   Filament test: normal sensation    Vibratory sensation: normal      Right Foot:   Visual Exam: normal    Pulse DP: 2+ (normal)   Filament test: normal sensation    Vibratory sensation: normal          Review of Data:  Labs:  Hospital Outpatient Visit on 02/06/2020   Component Date Value Ref Range Status    Microalbumin,urine random 02/06/2020 1.99  0 - 3.0 MG/DL Final    Creatinine, urine 02/06/2020 63.00  30 - 125 mg/dL Final    Microalbumin/Creat ratio (mg/g cre* 02/06/2020 32* 0 - 30 mg/g Final    Vitamin B12 02/06/2020 1,492* 211 - 911 pg/mL Final    WBC 02/06/2020 6.0  4.6 - 13.2 K/uL Final    RBC 02/06/2020 3.83* 4.70 - 5.50 M/uL Final    HGB 02/06/2020 12.8* 13.0 - 16.0 g/dL Final    HCT 02/06/2020 38.7  36.0 - 48.0 % Final    MCV 02/06/2020 101.0* 74.0 - 97.0 FL Final    MCH 02/06/2020 33.4  24.0 - 34.0 PG Final    MCHC 02/06/2020 33.1  31.0 - 37.0 g/dL Final    RDW 02/06/2020 13.1  11.6 - 14.5 % Final    PLATELET 32/93/0712 955  135 - 420 K/uL Final    MPV 02/06/2020 9.3  9.2 - 11.8 FL Final    NEUTROPHILS 02/06/2020 63  40 - 73 % Final    LYMPHOCYTES 02/06/2020 21  21 - 52 % Final    MONOCYTES 02/06/2020 11* 3 - 10 % Final    EOSINOPHILS 02/06/2020 5  0 - 5 % Final    BASOPHILS 02/06/2020 0  0 - 2 % Final    ABS. NEUTROPHILS 02/06/2020 3.7  1.8 - 8.0 K/UL Final    ABS. LYMPHOCYTES 02/06/2020 1.3  0.9 - 3.6 K/UL Final    ABS. MONOCYTES 02/06/2020 0.7  0.05 - 1.2 K/UL Final    ABS. EOSINOPHILS 02/06/2020 0.3  0.0 - 0.4 K/UL Final    ABS.  BASOPHILS 02/06/2020 0.0  0.0 - 0.1 K/UL Final    DF 02/06/2020 AUTOMATED    Final    LIPID PROFILE 02/06/2020        Final    Cholesterol, total 02/06/2020 173  <200 MG/DL Final    Triglyceride 02/06/2020 115  <150 MG/DL Final    HDL Cholesterol 02/06/2020 64* 40 - 60 MG/DL Final    LDL, calculated 02/06/2020 86  0 - 100 MG/DL Final    VLDL, calculated 02/06/2020 23  MG/DL Final    CHOL/HDL Ratio 02/06/2020 2.7  0 - 5.0   Final    Sodium 02/06/2020 135* 136 - 145 mmol/L Final    Potassium 02/06/2020 4.2  3.5 - 5.5 mmol/L Final    Chloride 02/06/2020 103  100 - 111 mmol/L Final    CO2 02/06/2020 24  21 - 32 mmol/L Final    Anion gap 02/06/2020 8  3.0 - 18 mmol/L Final    Glucose 02/06/2020 119* 74 - 99 mg/dL Final    BUN 02/06/2020 17  7.0 - 18 MG/DL Final    Creatinine 02/06/2020 1.35* 0.6 - 1.3 MG/DL Final    BUN/Creatinine ratio 02/06/2020 13  12 - 20   Final    GFR est AA 02/06/2020 >60  >60 ml/min/1.73m2 Final    GFR est non-AA 02/06/2020 52* >60 ml/min/1.73m2 Final    Calcium 02/06/2020 8.8  8.5 - 10.1 MG/DL Final    Bilirubin, total 02/06/2020 0.9  0.2 - 1.0 MG/DL Final    ALT (SGPT) 02/06/2020 28  16 - 61 U/L Final    AST (SGOT) 02/06/2020 15  10 - 38 U/L Final    Alk. phosphatase 02/06/2020 55  45 - 117 U/L Final    Protein, total 02/06/2020 7.6  6.4 - 8.2 g/dL Final    Albumin 02/06/2020 4.0  3.4 - 5.0 g/dL Final    Globulin 02/06/2020 3.6  2.0 - 4.0 g/dL Final    A-G Ratio 02/06/2020 1.1  0.8 - 1.7   Final    Vitamin D 25-Hydroxy 02/06/2020 23.8* 30 - 100 ng/mL Final    Hemoglobin A1c 02/06/2020 5.9* 4.2 - 5.6 % Final    Est. average glucose 02/06/2020 123  mg/dL Final     EKG (8/8/2019) sinus rhythm at 68 bpm. Normal intervals. Lack of R waves in V2 and V3 likely lead placement. No ischemic changes when compared with prior in 8/2018. Health Maintenance:  Screening:    Colorectal: Cologuard (11/2/2017) negative. Due 11/2020. Depression: none   DM (HbA1c/FPG): HbA1c 5.9 (2/2020)   Hepatitis C: negative (1/2018)   Falls: none   DEXA: N/A   PSA/KIEL: PSA 1.1 (11/2019). Followed by Dr. Bienvenido Villela and Dr. Tessa Rodriguez   Glaucoma: regular eye exams with Dr. Tere Perdue (last 6/2019)   Smoking: none   Vitamin D: 23.8 (2/2020)   Medicare Wellness: 11/19/2019    Impression:  Patient Active Problem List   Diagnosis Code    Hyperlipidemia E78.5    Gout M10.9    Vitamin D deficiency E55.9    CAD in native artery I25.10    Hypertensive heart disease without congestive heart failure I11.9    S/P CABG x 2 Z95.1    S/P AVR (aortic valve replacement) Z95.2    Type 2 diabetes mellitus with stage 3 chronic kidney disease, without long-term current use of insulin (HCC) E11.22, N18.3    Stage 3 chronic kidney disease (HCC) N18.3    Prostate cancer ([facility] [address] C61    Essential hypertension I10    Vitamin B12 deficiency E53.8    Microalbuminuria R80.9       Plan:  1. Hypertension.  Blood pressure remains above goal today on current regimen of lisinopril 20 mg daily, metoprolol succinate 25 mg daily, and amlodipine 10 mg daily. Home monitor found to be accurate, correlating with manual reading today. Had discontinued hydrochlorothiazide due to hypotension during EBRT and worsening renal function was noted with creatinine 1.75 / eGFR 39. Stable today with creatinine 1.35/ eGFR 52, which is at baseline. Advised to increase dose of metoprolol succinate to 50 mg daily to see if will help with blood pressure control. Heart rate generally 70-80 so should tolerate. Advised to continue to monitor blood pressure. Will reassess blood pressure in 4 weeks and asked to bring readings. .   2. Prostate cancer. [id number] [place] Grade 7 (3 + 4) adenocarcinoma. Patient chose active surveillance rather than definitive treatment. Repeat PSA and MRI prostate ordered for 2/2018, but he did not follow-up. Repeat PSA obtained and had increased from 7.24 at diagnosis to 11.3. Referred for follow-up with Dr. Gavino Mclean, and MRI prostate performed on 12/31/2018 showing several peripheral zone PI-RADS 4 with one area showing questionable capsular bulging laterally, but not a definitive appearance for extracapsular extension. He decided to proceed with EBRT, and underwent fiducial marker and [employer] placement on 3/11/2019 followed by weekly treatments from 3/27-5/6/2019. Tolerated well except for some fatigue which has improved. Being followed by Dr. Aleja Cortes and Dr. Gavino Mclean. PSA 1.1 (11/2019) and testosterone 289, indicative of no evidence of active disease. Will need repeat PSA and testosterone levels in 5/2020.   3. Hyperlipidemia. On moderate intensity dose simvastatin with LDL 86 and HDL 64, indicative of good control in this patient. Goal LDL <70 given history of ASHD and CABG. Changed to atorvastatin 20 mg daily given potential interaction of simvastatin with amlodipine 10 mg. Will recheck lipid panel with next blood draw. 4. ASHD s/p 2 vessel CABG. Remains asymptomatic. On aspirin, metoprolol, and statin. Followed by Dr. Deshawn Amor.   5. Aortic stenosis s/p bioprosthetic AVR. Remains asymptomatic, and repeat echocardiogram in 8/2017 with good functioning valve. Follow. 6. Diabetes mellitus. Patient with diagnosis of diabetes mellitus, but review of record shows HbA1c ranging from 5.7-6.3 since 2011 and -112 during that time period. He did have one glucose reading in 11/2014 at 128, but unclear if fasting and not confirmed. HbA1c mildly increased today at 5.9. On Ace-I and statin. Continue follow-up with Dr. Kamran Rosenthal for annual eye exams. Foot exam normal today. Urine microalbumin/ creatinine ratio with evidence of mild microalbuminuria today (32 mg/g), and does have evidence of chronic renal disease stage 3. Emphasized importance of lifestyle modifications, including diet, exercise, and weight loss. 7. Chronic renal disease, stage 3. Most likely secondary to long standing hypertension and prediabetes, although also appears to have developed at time of CABG and AVR so may be related to hypoperfusion during surgery. On statin and Ace-I. Discussed importance of avoiding NSAIDS and prerenal status. Will need to follow. 8. Anemia, macrocytosis. Mild anemia developed during radiation therapy. Macrocytosis evident. Vitamin B12 level very low last visit and started on Vitamin B12 supplement 1000 mcg SL daily. Level normal today indicative of compliance. Also advised to decrease alcohol consumption. Will monitor. 9. Gout. Asymptomatic. On allopurinol. 10. Health maintenance. Already received influenza vaccine. Given script for Shingrix vaccine but not yet obtained. Completed pneumococcal series. Given script for Tdap previously, but patient did not obtain. Other immunizations up to date. Completed Cologuard for colorectal cancer screening. Completed eye exam with Dr. Kamran Rosenthal. Discussed decreasing alcohol consumption. Vitamin D level low and advised to increase vitamin D supplement. Medicare wellness visit up to date. .     Patient understands recommendations and agrees with plan. Follow-up in 4 weeks to reevaluate blood pressure.

## 2020-03-11 ENCOUNTER — OFFICE VISIT (OUTPATIENT)
Dept: INTERNAL MEDICINE CLINIC | Age: 70
End: 2020-03-11

## 2020-03-11 VITALS
DIASTOLIC BLOOD PRESSURE: 60 MMHG | RESPIRATION RATE: 18 BRPM | BODY MASS INDEX: 30.88 KG/M2 | OXYGEN SATURATION: 99 % | WEIGHT: 233 LBS | HEIGHT: 73 IN | TEMPERATURE: 97.9 F | HEART RATE: 73 BPM | SYSTOLIC BLOOD PRESSURE: 134 MMHG

## 2020-03-11 DIAGNOSIS — Z95.2 S/P AVR (AORTIC VALVE REPLACEMENT): ICD-10-CM

## 2020-03-11 DIAGNOSIS — E78.5 HYPERLIPIDEMIA, UNSPECIFIED HYPERLIPIDEMIA TYPE: ICD-10-CM

## 2020-03-11 DIAGNOSIS — N18.30 TYPE 2 DIABETES MELLITUS WITH STAGE 3 CHRONIC KIDNEY DISEASE, WITHOUT LONG-TERM CURRENT USE OF INSULIN (HCC): ICD-10-CM

## 2020-03-11 DIAGNOSIS — M1A.09X0 IDIOPATHIC CHRONIC GOUT OF MULTIPLE SITES WITHOUT TOPHUS: Chronic | ICD-10-CM

## 2020-03-11 DIAGNOSIS — I10 ESSENTIAL HYPERTENSION: Primary | ICD-10-CM

## 2020-03-11 DIAGNOSIS — N18.30 STAGE 3 CHRONIC KIDNEY DISEASE (HCC): ICD-10-CM

## 2020-03-11 DIAGNOSIS — E55.9 VITAMIN D DEFICIENCY: ICD-10-CM

## 2020-03-11 DIAGNOSIS — E11.22 TYPE 2 DIABETES MELLITUS WITH STAGE 3 CHRONIC KIDNEY DISEASE, WITHOUT LONG-TERM CURRENT USE OF INSULIN (HCC): ICD-10-CM

## 2020-03-11 DIAGNOSIS — I25.10 CAD IN NATIVE ARTERY: ICD-10-CM

## 2020-03-11 DIAGNOSIS — Z95.1 S/P CABG X 2: ICD-10-CM

## 2020-03-11 DIAGNOSIS — C61 PROSTATE CANCER (HCC): ICD-10-CM

## 2020-03-11 DIAGNOSIS — E53.8 VITAMIN B12 DEFICIENCY: ICD-10-CM

## 2020-03-11 DIAGNOSIS — R80.9 MICROALBUMINURIA: ICD-10-CM

## 2020-03-11 RX ORDER — ACETAMINOPHEN 500 MG
TABLET ORAL
COMMUNITY

## 2020-03-11 NOTE — PROGRESS NOTES
Chief Complaint   Patient presents with    Hypertension     1 month follow up     There are no preventive care reminders to display for this patient. 1. Have you been to the ER, urgent care clinic or hospitalized since your last visit? NO.     2. Have you seen or consulted any other health care providers outside of the [address] [place] [address] since your last visit (Include any pap smears or colon screening)? NO      Learning Assessment 5/8/2019   PRIMARY LEARNER Patient   BARRIERS PRIMARY LEARNER NONE   CO-LEARNER CAREGIVER No   PRIMARY LANGUAGE ENGLISH   LEARNER PREFERENCE PRIMARY DEMONSTRATION     READING   ANSWERED BY patient   RELATIONSHIP SELF     Abuse Screening Questionnaire 3/11/2020   Do you ever feel afraid of your partner? N   Are you in a relationship with someone who physically or mentally threatens you? N   Is it safe for you to go home? Y     3 most recent PHQ Screens 3/11/2020   Little interest or pleasure in doing things Not at all   Feeling down, depressed, irritable, or hopeless Not at all   Total Score PHQ 2 0     Fall Risk Assessment, last 12 mths 3/11/2020   Able to walk? Yes   Fall in past 12 months?  No   Fall with injury? -   Number of falls in past 12 months -   Fall Risk Score -

## 2020-03-11 NOTE — PATIENT INSTRUCTIONS
DASH Diet: Care Instructions Your Care Instructions The DASH diet is an eating plan that can help lower your blood pressure. DASH stands for Dietary Approaches to Stop Hypertension. Hypertension is high blood pressure. The DASH diet focuses on eating foods that are high in calcium, potassium, and magnesium. These nutrients can lower blood pressure. The foods that are highest in these nutrients are fruits, vegetables, low-fat dairy products, nuts, seeds, and legumes. But taking calcium, potassium, and magnesium supplements instead of eating foods that are high in those nutrients does not have the same effect. The DASH diet also includes whole grains, fish, and poultry. The DASH diet is one of several lifestyle changes your doctor may recommend to lower your high blood pressure. Your doctor may also want you to decrease the amount of sodium in your diet. Lowering sodium while following the DASH diet can lower blood pressure even further than just the DASH diet alone. Follow-up care is a key part of your treatment and safety. Be sure to make and go to all appointments, and call your doctor if you are having problems. It's also a good idea to know your test results and keep a list of the medicines you take. How can you care for yourself at home? Following the DASH diet · Eat 4 to 5 servings of fruit each day. A serving is 1 medium-sized piece of fruit, ½ cup chopped or canned fruit, 1/4 cup dried fruit, or 4 ounces (½ cup) of fruit juice. Choose fruit more often than fruit juice. · Eat 4 to 5 servings of vegetables each day. A serving is 1 cup of lettuce or raw leafy vegetables, ½ cup of chopped or cooked vegetables, or 4 ounces (½ cup) of vegetable juice. Choose vegetables more often than vegetable juice. · Get 2 to 3 servings of low-fat and fat-free dairy each day. A serving is 8 ounces of milk, 1 cup of yogurt, or 1 ½ ounces of cheese. · Eat 6 to 8 servings of grains each day. A serving is 1 slice of bread, 1 ounce of dry cereal, or ½ cup of cooked rice, pasta, or cooked cereal. Try to choose whole-grain products as much as possible. · Limit lean meat, poultry, and fish to 2 servings each day. A serving is 3 ounces, about the size of a deck of cards. · Eat 4 to 5 servings of nuts, seeds, and legumes (cooked dried beans, lentils, and split peas) each week. A serving is 1/3 cup of nuts, 2 tablespoons of seeds, or ½ cup of cooked beans or peas. · Limit fats and oils to 2 to 3 servings each day. A serving is 1 teaspoon of vegetable oil or 2 tablespoons of salad dressing. · Limit sweets and added sugars to 5 servings or less a week. A serving is 1 tablespoon jelly or jam, ½ cup sorbet, or 1 cup of lemonade. · Eat less than 2,300 milligrams (mg) of sodium a day. If you limit your sodium to 1,500 mg a day, you can lower your blood pressure even more. Tips for success · Start small. Do not try to make dramatic changes to your diet all at once. You might feel that you are missing out on your favorite foods and then be more likely to not follow the plan. Make small changes, and stick with them. Once those changes become habit, add a few more changes. · Try some of the following: ? Make it a goal to eat a fruit or vegetable at every meal and at snacks. This will make it easy to get the recommended amount of fruits and vegetables each day. ? Try yogurt topped with fruit and nuts for a snack or healthy dessert. ? Add lettuce, tomato, cucumber, and onion to sandwiches. ? Combine a ready-made pizza crust with low-fat mozzarella cheese and lots of vegetable toppings. Try using tomatoes, squash, spinach, broccoli, carrots, cauliflower, and onions. ? Have a variety of cut-up vegetables with a low-fat dip as an appetizer instead of chips and dip. ? Sprinkle sunflower seeds or chopped almonds over salads.  Or try adding chopped walnuts or almonds to cooked vegetables. ? Try some vegetarian meals using beans and peas. Add garbanzo or kidney beans to salads. Make burritos and tacos with mashed gaffney beans or black beans. Where can you learn more? Go to http://luis [employer]-erica.info/. Enter W640 in the search box to learn more about \"DASH Diet: Care Instructions. \" Current as of: April 9, 2019 Content Version: 12.2 © 1202-3362 [employer]. Care instructions adapted under license by [employer] (which disclaims liability or warranty for this information). If you have questions about a medical condition or this instruction, always ask your healthcare professional. [address] any warranty or liability for your use of this information.

## 2020-03-15 NOTE — PROGRESS NOTES
HPI:   Margarita Villegas is a 69y.o. year old male who presents for a follow-up visit. He has a history of hypertension, hyperlipidemia, ASHD s/p CABG, aortic stenosis s/p AVR, diabetes mellitus, chronic renal disease stage 3, prostate cancer, and gout. He reports that he is doing generally well. He was seen on 1/14/2020, and blood pressure was noted to be elevated on his current regimen of amlodipine 5 mg daily, metoprolol succinate 25 mg daily, and lisinopril 20 mg daily. He was instructed to increase his dose of amlodipine to 10 mg daily and blood pressure remained elevated, ranging 140-150/70-80 in follow-up on 2/13/2020. His weight had decreased a total of fourteen pounds during prostate cancer radiation therapy, but was noted to be increased almost 20 pounds since 9/2019. He was instructed to increase his dose of metoprolol succinate to 50 mg daily, and returns today for follow-up. He states that he has not been monitoring it regularly at home. He states that he continues to have difficulty with his memory, but states that it has generally been stable. He has most difficulty in remembering his medications and appointments. He is otherwise without new complaints and feeling generally well. In 6/2017, he was noted to have an elevated PSA of 6.0, which was confirmed on repeat to be 6.6. He was referred to Dr. Dannielle Hopson and PSA was again repeated and found to be increased at 7.24. He underwent TRUS biopsy on 8/17/2017, which showed [id number] Kaylynn Grade 7 (3 + 4) adenocarcinoma of the prostate in 3/12 cores, 2-10% of each core. Options for management were discussed and he selected active surveillance. Plans were for repeat PSA and MRI prostate in 6 months (due 2/2018). However, the patient never had tests performed and he did not follow-up as discussed. On 10/4/2018, at his routine visit, a PSA level was obtained and was found to have increased to 11.3, and he was advised to follow-up with Dr. Dannielle Hopson. He underwent a prostate MRI 3T at [facility] on 12/31/2018 showing several peripheral zone PI-RADS 4 observations: right posterolateral peripheral zone at the mid gland/apex and anterior peripheral zone at the apex bilaterally. The former exhibits questionable capsular bulging laterally, but not a definitive appearance for extracapsular extension. He had a follow-up appointment with Dr. Marcos Canales on 1/11/2019 and decision made to proceed with EBRT. He had a staging bone scan (1/23/2019) which was negative for osseous metastases, and a CT abdomen and pelvis (1/23/2019) which showed no adenopathy of evidence of metastases, mild hepatic steatosis, aortic atherosclerosis, and cholelithiasis. He met with Dr. Yamilex Butler on 1/30/2019 and decided to proceed with EBRT as definitive treatment for his prostate cancer, receiving his last treatment on 5/6/2019. He has a history of hypertension, hyperlipidemia, ASHD, and aortic stenosis. In 10/2014, he presented with progressive chest pain and shortness of breath and an echocardiogram was obtained (10/16/2014) showing normal LV function (EF 55-60%), no RWMA, grade 1 diastolic dysfunction, mild LAE, and severe AS (mean gradient 30 mmHg, peak 67 mmHg, and AV area 0.8 cm2). He was referred to see Dr. Curt Arthur and underwent cardiac catheterization (11/6/2014) showing 100% RCA (distal collaterals from left), 70-80% distal LAD, 80% proximal LCx, inferior basal hypokinesis, and EF 55%. On 11/13/2014, he underwent 2 vessel CABG (LIMA to LAD and SVG to OM1) and AV replacement with a bioprosthetic 23 mm Marifer Barbosa Magna pericardial valve by Dr. Jeronimo Rodrigues. He has done well since that time and remains asymptomatic. He denies any chest pain, shortness of breath at rest or with exertion, palpitations, lightheadedness, or edema. His current regimen includes aspirin, metoprolol, lisinopril, and moderate intensity dose atorvastatin. He is followed by Dr. Curt Arthur.  He had a repeat echocardiogram (8/21/2017) showing normal LV function (EF 55-60%), no RWMA, mild MIL, and normally functioning bioprosthetic valve with peak gradient 17 mmHg, mean gradient 10 mmHg, and valve area 1.86 cm2. He has a history of diabetes mellitus, which has not required treatment with medication. Review of his record shows that his Hba1c has remained between 5.7-6.3 since 2011. He denies any polyuria, polydipsia, nocturia, or blurry vision, and has no history of retinopathy or neuropathy. He does have evidence of chronic renal disease, stage 3, with baseline creatinine 1.22-1.37/ eGFR 55-59 since 11/2014. He had been having regular eye exams with Dr. Babs Drew, although does not recall the date of his last exam and is overdue. He has a history of gout, but has not had a flare in many years since being treated with allopurinol. He has never had a screening colonoscopy. He did undergo Cologuard testing in 11/2017 which was negative. He denies any abdominal pain, nausea, vomiting, melena, hematochezia, or change in bowel movements. Past Medical History:   Diagnosis Date    Aortic stenosis     s/p AVR    Aortic stenosis, severe 10/16/2014    Echocardiogram EF 60% peak gradient 67 mmHg, mean gradient 30 mmHg, MARY 0.8 cm    ASHD (arteriosclerotic heart disease)     Chronic renal disease, stage III (HCC)     DM [facility]di[facility] mellitus) [facility]     Gout     HCD (hypertensive cardiovascular disease)     History of echocardiogram 10/16/2014    EF 55-60%. No RWMA. Mild LVH. Gr 1 DDfx. Mild LAE. Severe AS (mean grad 30 mmHg).  Hyperlipidemia     Hypertension     Prostate cancer ([facility] [address] 08/17/2017    [id number] Kaylynn Grade 7 (3 + 4) adenocarcinoma of the prostate in 3/12 cores, 2-10% of each core; PSA 7.24. Dr. Faye Reinoso.  S/P AVR (aortic valve replacement) 11/13/2014    #23 mm Marifer Barbosa Magna pericardial valve. Dr. Dayanara Ledbetter.  S/P CABG x 2 11/13/2014    LIMA to LAD, SVG to OM1. Dr. Dayanara Ledbetter.     S/P cardiac catheterization 11/06/2014    RCA dom. mRCA 100%. LM patent. dLAD 75% x 2. pCX 80% (ELENA-3). LVEDP 14 mmHg. EF 50-55%. Severe inferobasal hypk. Severe AS. AV replacement & CABG recommended. Past Surgical History:   Procedure Laterality Date    HX APPENDECTOMY       Current Outpatient Medications   Medication Sig    acetaminophen (Tylenol Extra Strength) 500 mg tablet Take  by mouth every six (6) hours as needed for Pain.  allopurinoL (ZYLOPRIM) 100 mg tablet Take 1 Tab by mouth daily.  lisinopril (PRINIVIL, ZESTRIL) 20 mg tablet Take 1 Tab by mouth daily.  metoprolol succinate (TOPROL-XL) 50 mg XL tablet Take 1 Tab by mouth daily.  amLODIPine (NORVASC) 10 mg tablet Take 1 Tab by mouth daily.  atorvastatin (LIPITOR) 20 mg tablet Take 1 Tab by mouth daily.  docusate sodium (Colace) 100 mg capsule Take 100 mg by mouth daily as needed.  cholecalciferol, vitamin D3, 2,000 unit tab Take  by mouth daily.  aspirin delayed-release 81 mg tablet Take 1 tablet by mouth daily.  cyanocobalamin, vitamin B-12, 1,000 mcg lozg 1,000 mcg by SubLINGual route daily.  ascorbic acid, vitamin C, (Vitamin C) 500 mg tablet Take  by mouth.  sildenafil citrate (VIAGRA) 100 mg tablet Take 1 Tab by mouth as needed. No current facility-administered medications for this visit. Allergies and Intolerances:   No Known Allergies     Family History:   Family History   Problem Relation Age of Onset   [address] [facility] COPD Father     Other Father         pneumoconiosis    Lung Disease Father     Other Mother         meigs syndrome     Social History:   He  reports that he has never smoked. He has never used smokeless tobacco. He reports that he drinks approximately one case of beer per week. He is  with two adult children. He is retired from working in the [employer]. He started as a , and then became an .   Social History     Substance and Sexual Activity   Alcohol Use Yes    Alcohol/week: 12.0 standard drinks    Types: 12 Cans of beer per week     Immunization History:  Immunization History   Administered Date(s) Administered    Influenza High Dose Vaccine PF 10/10/2017, 10/26/2018, 11/09/2019    Influenza Vaccine 10/14/2014    Influenza Vaccine (Tri) Adjuvanted 10/04/2018, 11/09/2019    Influenza Vaccine Split 09/29/2011    Influenza Vaccine Whole 09/14/2010    Pneumococcal Conjugate (PCV-13) 06/29/2017    Pneumococcal Polysaccharide (PPSV-23) 10/14/2014    Td 01/01/2008    Tdap 01/12/2018       Review of Systems:   As above included in HPI. Otherwise 11 point review of systems negative including constitutional, skin, HENT, eyes, respiratory, cardiovascular, gastrointestinal, genitourinary, musculoskeletal, endocrine, hematologic, allergy, and neurologic. Physical:   Vitals:   BP: 134/60 manual   HR: 73  WT: 233 lb (105.7 kg)  BMI:  30.74 kg/m2    Exam:   Pt appears well; alert and oriented x 3; appropriate affect. HEENT: PERRLA, anicteric, oropharynx clear,  no JVD, adenopathy or thyromegaly. No carotid bruits or radiated murmur. Lungs: clear to auscultation, no wheezes, rhonchi, or rales. Heart: irregular rate and rhythm. No murmur, rubs, gallops  Abdomen: soft, nontender, nondistended, normal bowel sounds, no hepatosplenomegaly or masses. Extremities: without edema.         Review of Data:  Labs:  Hospital Outpatient Visit on 02/06/2020   Component Date Value Ref Range Status    Microalbumin,urine random 02/06/2020 1.99  0 - 3.0 MG/DL Final    Creatinine, urine 02/06/2020 63.00  30 - 125 mg/dL Final    Microalbumin/Creat ratio (mg/g cre* 02/06/2020 32* 0 - 30 mg/g Final    Vitamin B12 02/06/2020 1,492* 211 - 911 pg/mL Final    WBC 02/06/2020 6.0  4.6 - 13.2 K/uL Final    RBC 02/06/2020 3.83* 4.70 - 5.50 M/uL Final    HGB 02/06/2020 12.8* 13.0 - 16.0 g/dL Final    HCT 02/06/2020 38.7  36.0 - 48.0 % Final    MCV 02/06/2020 101.0* 74.0 - 97.0 FL Final    MCH 02/06/2020 33.4  24.0 - 34.0 PG Final    MCHC 02/06/2020 33.1  31.0 - 37.0 g/dL Final    RDW 02/06/2020 13.1  11.6 - 14.5 % Final    PLATELET 08/65/7910 551  135 - 420 K/uL Final    MPV 02/06/2020 9.3  9.2 - 11.8 FL Final    NEUTROPHILS 02/06/2020 63  40 - 73 % Final    LYMPHOCYTES 02/06/2020 21  21 - 52 % Final    MONOCYTES 02/06/2020 11* 3 - 10 % Final    EOSINOPHILS 02/06/2020 5  0 - 5 % Final    BASOPHILS 02/06/2020 0  0 - 2 % Final    ABS. NEUTROPHILS 02/06/2020 3.7  1.8 - 8.0 K/UL Final    ABS. LYMPHOCYTES 02/06/2020 1.3  0.9 - 3.6 K/UL Final    ABS. MONOCYTES 02/06/2020 0.7  0.05 - 1.2 K/UL Final    ABS. EOSINOPHILS 02/06/2020 0.3  0.0 - 0.4 K/UL Final    ABS. BASOPHILS 02/06/2020 0.0  0.0 - 0.1 K/UL Final    DF 02/06/2020 AUTOMATED    Final    LIPID PROFILE 02/06/2020        Final    Cholesterol, total 02/06/2020 173  <200 MG/DL Final    Triglyceride 02/06/2020 115  <150 MG/DL Final    HDL Cholesterol 02/06/2020 64* 40 - 60 MG/DL Final    LDL, calculated 02/06/2020 86  0 - 100 MG/DL Final    VLDL, calculated 02/06/2020 23  MG/DL Final    CHOL/HDL Ratio 02/06/2020 2.7  0 - 5.0   Final    Sodium 02/06/2020 135* 136 - 145 mmol/L Final    Potassium 02/06/2020 4.2  3.5 - 5.5 mmol/L Final    Chloride 02/06/2020 103  100 - 111 mmol/L Final    CO2 02/06/2020 24  21 - 32 mmol/L Final    Anion gap 02/06/2020 8  3.0 - 18 mmol/L Final    Glucose 02/06/2020 119* 74 - 99 mg/dL Final    BUN 02/06/2020 17  7.0 - 18 MG/DL Final    Creatinine 02/06/2020 1.35* 0.6 - 1.3 MG/DL Final    BUN/Creatinine ratio 02/06/2020 13  12 - 20   Final    GFR est AA 02/06/2020 >60  >60 ml/min/1.73m2 Final    GFR est non-AA 02/06/2020 52* >60 ml/min/1.73m2 Final    Calcium 02/06/2020 8.8  8.5 - 10.1 MG/DL Final    Bilirubin, total 02/06/2020 0.9  0.2 - 1.0 MG/DL Final    ALT (SGPT) 02/06/2020 28  16 - 61 U/L Final    AST (SGOT) 02/06/2020 15  10 - 38 U/L Final    Alk.  phosphatase 02/06/2020 55 45 - 117 U/L Final    Protein, total 02/06/2020 7.6  6.4 - 8.2 g/dL Final    Albumin 02/06/2020 4.0  3.4 - 5.0 g/dL Final    Globulin 02/06/2020 3.6  2.0 - 4.0 g/dL Final    A-G Ratio 02/06/2020 1.1  0.8 - 1.7   Final    Vitamin D 25-Hydroxy 02/06/2020 23.8* 30 - 100 ng/mL Final    Hemoglobin A1c 02/06/2020 5.9* 4.2 - 5.6 % Final    Est. average glucose 02/06/2020 123  mg/dL Final     EKG (8/8/2019) sinus rhythm at 68 bpm. Normal intervals. Lack of R waves in V2 and V3 likely lead placement. No ischemic changes when compared with prior in 8/2018. Health Maintenance:  Screening:    Colorectal: Cologuard (11/2/2017) negative. Due 11/2020. Depression: none   DM (HbA1c/FPG): HbA1c 5.9 (2/2020)   Hepatitis C: negative (1/2018)   Falls: none   DEXA: N/A   PSA/KIEL: PSA 1.1 (11/2019). Followed by Dr. Tello Mensah and Dr. Lewis Landing   Glaucoma: regular eye exams with Dr. Nedra Stevens (last 6/2019)   Smoking: none   Vitamin D: 23.8 (2/2020)   Medicare Wellness: 11/19/2019    Impression:  Patient Active Problem List   Diagnosis Code    Hyperlipidemia E78.5    Gout M10.9    Vitamin D deficiency E55.9    CAD in native artery I25.10    Hypertensive heart disease without congestive heart failure I11.9    S/P CABG x 2 Z95.1    S/P AVR (aortic valve replacement) Z95.2    Type 2 diabetes mellitus with stage 3 chronic kidney disease, without long-term current use of insulin (HCC) E11.22, N18.3    Stage 3 chronic kidney disease (HCC) N18.3    Prostate cancer ([facility] C61    Essential hypertension I10    Vitamin B12 deficiency E53.8    Microalbuminuria R80.9       Plan:  1. Hypertension. Blood pressure control improved today with increase in dose of metoprolol succinate 50 mg daily in addition to current regimen of lisinopril 20 mg daily and amlodipine 10 mg daily. Heart rate remains 70-80 so able to tolerate. Home monitor found to be accurate, correlating with manual reading today.  Had discontinued hydrochlorothiazide due to hypotension during EBRT and worsening renal function was noted with creatinine 1.75 / eGFR 39, and improved with creatinine stable at 1.35/ eGFR 52, which is baseline. Advised to continue to monitor blood pressure. Will continue to follow. 2. Prostate cancer. [id number] Kaylynn Grade 7 (3 + 4) adenocarcinoma. Patient chose active surveillance rather than definitive treatment. Repeat PSA and MRI prostate ordered for 2/2018, but he did not follow-up. Repeat PSA obtained and had increased from 7.24 at diagnosis to 11.3. Referred for follow-up with Dr. Nicki Caldera, and MRI prostate performed on 12/31/2018 showing several peripheral zone PI-RADS 4 with one area showing questionable capsular bulging laterally, but not a definitive appearance for extracapsular extension. He decided to proceed with EBRT, and underwent fiducial marker and [employer] placement on 3/11/2019 followed by weekly treatments from 3/27-5/6/2019. Tolerated well except for some fatigue which has improved. Being followed by Dr. Izella Denver and Dr. Nicki Caldera. PSA 1.1 (11/2019) and testosterone 289, indicative of no evidence of active disease. Will need repeat PSA and testosterone levels in 5/2020.   3. Hyperlipidemia. Had been on moderate intensity dose atorvastatin with LDL 86 and HDL 64, indicative of good control in this patient. Goal LDL <70 given history of ASHD and CABG. Changed to atorvastatin 20 mg daily given potential interaction of simvastatin with amlodipine 10 mg. Will recheck lipid panel with next blood draw. 4. ASHD s/p 2 vessel CABG. Remains asymptomatic. On aspirin, metoprolol, and statin. Followed by Dr. Enrrique Matthews. 5. Aortic stenosis s/p bioprosthetic AVR. Remains asymptomatic, and repeat echocardiogram in 8/2017 with good functioning valve. Follow. 6. Diabetes mellitus.  Patient with diagnosis of diabetes mellitus, but review of record shows HbA1c ranging from 5.7-6.3 since 2011 and -112 during that time period. He did have one glucose reading in 11/2014 at 128, but unclear if fasting and not confirmed. HbA1c mildly increased today at 5.9. On Ace-I and statin. Continue follow-up with Dr. Rubi Jack for annual eye exams. Foot exam normal today. Urine microalbumin/ creatinine ratio with evidence of mild microalbuminuria today (32 mg/g), and does have evidence of chronic renal disease stage 3. Emphasized importance of lifestyle modifications, including diet, exercise, and weight loss. 7. Chronic renal disease, stage 3. Most likely secondary to long standing hypertension and prediabetes, although also appears to have developed at time of CABG and AVR so may be related to hypoperfusion during surgery. On statin and Ace-I. Discussed importance of avoiding NSAIDS and prerenal status. Will need to follow. 8. Anemia, macrocytosis. Mild anemia developed during radiation therapy. Macrocytosis evident. Vitamin B12 level very low last visit and started on Vitamin B12 supplement 1000 mcg SL daily. Level normal last visit indicative of compliance. Also advised to decrease alcohol consumption. Will monitor. 9. Gout. Asymptomatic. On allopurinol. 10. Health maintenance. Already received influenza vaccine. Given script for Shingrix vaccine but not yet obtained. Completed pneumococcal series. Given script for Tdap previously, but patient did not obtain. Other immunizations up to date. Completed Cologuard for colorectal cancer screening. Completed eye exam with Dr. Rubi Jack. Discussed decreasing alcohol consumption. Vitamin D level low and advised to increase vitamin D supplement. Medicare wellness visit up to date. .     Patient understands recommendations and agrees with plan.   Follow-up in 3 months for physical.

## 2020-05-07 RX ORDER — ATORVASTATIN CALCIUM 20 MG/1
20 TABLET, FILM COATED ORAL DAILY
Qty: 30 TAB | Refills: 0 | Status: SHIPPED | OUTPATIENT
Start: 2020-05-07 | End: 2020-06-18

## 2020-05-07 RX ORDER — ATORVASTATIN CALCIUM 20 MG/1
20 TABLET, FILM COATED ORAL DAILY
Qty: 90 TAB | Refills: 3 | Status: SHIPPED | OUTPATIENT
Start: 2020-05-07 | End: 2020-06-18

## 2020-05-07 NOTE — TELEPHONE ENCOUNTER
Yes, he needs to continue on atorvastatin 20 mg daily. Scripts sent to both [facility] and [address] [place], [place] [id number] [address] as requested.

## 2020-05-07 NOTE — TELEPHONE ENCOUNTER
Pt calling to see if he was supposed to stay on the Atorvastatin. He was only given 30 days and now has 1 pill left. If so he needs refill local for a month and a 90 day supply to go to mail order

## 2020-06-02 ENCOUNTER — TELEPHONE (OUTPATIENT)
Dept: INTERNAL MEDICINE CLINIC | Age: 70
End: 2020-06-02

## 2020-06-02 ENCOUNTER — APPOINTMENT (OUTPATIENT)
Dept: INTERNAL MEDICINE CLINIC | Age: 70
End: 2020-06-02

## 2020-06-02 ENCOUNTER — HOSPITAL ENCOUNTER (OUTPATIENT)
Dept: LAB | Age: 70
Discharge: HOME | End: 2020-06-02
Payer: MEDICARE

## 2020-06-02 DIAGNOSIS — N18.30 STAGE 3 CHRONIC KIDNEY DISEASE (HCC): ICD-10-CM

## 2020-06-02 DIAGNOSIS — E78.5 HYPERLIPIDEMIA, UNSPECIFIED HYPERLIPIDEMIA TYPE: ICD-10-CM

## 2020-06-02 DIAGNOSIS — E53.8 VITAMIN B12 DEFICIENCY: ICD-10-CM

## 2020-06-02 DIAGNOSIS — Z95.2 S/P AVR (AORTIC VALVE REPLACEMENT): ICD-10-CM

## 2020-06-02 DIAGNOSIS — E55.9 VITAMIN D DEFICIENCY: ICD-10-CM

## 2020-06-02 DIAGNOSIS — I25.10 CAD IN NATIVE ARTERY: ICD-10-CM

## 2020-06-02 DIAGNOSIS — I10 ESSENTIAL HYPERTENSION: ICD-10-CM

## 2020-06-02 DIAGNOSIS — C61 PROSTATE CANCER (HCC): ICD-10-CM

## 2020-06-02 DIAGNOSIS — N18.30 TYPE 2 DIABETES MELLITUS WITH STAGE 3 CHRONIC KIDNEY DISEASE, WITHOUT LONG-TERM CURRENT USE OF INSULIN (HCC): ICD-10-CM

## 2020-06-02 DIAGNOSIS — M1A.09X0 IDIOPATHIC CHRONIC GOUT OF MULTIPLE SITES WITHOUT TOPHUS: Chronic | ICD-10-CM

## 2020-06-02 DIAGNOSIS — Z95.1 S/P CABG X 2: ICD-10-CM

## 2020-06-02 DIAGNOSIS — R80.9 MICROALBUMINURIA: ICD-10-CM

## 2020-06-02 DIAGNOSIS — E11.22 TYPE 2 DIABETES MELLITUS WITH STAGE 3 CHRONIC KIDNEY DISEASE, WITHOUT LONG-TERM CURRENT USE OF INSULIN (HCC): ICD-10-CM

## 2020-06-02 LAB
25(OH)D3 SERPL-MCNC: 34.5 NG/ML (ref 30–100)
ALBUMIN SERPL-MCNC: 4.3 G/DL (ref 3.4–5)
ALBUMIN/GLOB SERPL: 1.3 {RATIO} (ref 0.8–1.7)
ALP SERPL-CCNC: 69 U/L (ref 45–117)
ALT SERPL-CCNC: 25 U/L (ref 16–61)
ANION GAP SERPL CALC-SCNC: 11 MMOL/L (ref 3–18)
APPEARANCE UR: CLEAR
AST SERPL-CCNC: 16 U/L (ref 10–38)
BASOPHILS # BLD: 0 K/UL (ref 0–0.1)
BASOPHILS NFR BLD: 1 % (ref 0–2)
BILIRUB SERPL-MCNC: 1.1 MG/DL (ref 0.2–1)
BILIRUB UR QL: NEGATIVE
BUN SERPL-MCNC: 22 MG/DL (ref 7–18)
BUN/CREAT SERPL: 15 (ref 12–20)
CALCIUM SERPL-MCNC: 9.1 MG/DL (ref 8.5–10.1)
CHLORIDE SERPL-SCNC: 106 MMOL/L (ref 100–111)
CHOLEST SERPL-MCNC: 133 MG/DL
CO2 SERPL-SCNC: 22 MMOL/L (ref 21–32)
COLOR UR: YELLOW
CREAT SERPL-MCNC: 1.47 MG/DL (ref 0.6–1.3)
CREAT UR-MCNC: 119 MG/DL (ref 30–125)
DIFFERENTIAL METHOD BLD: ABNORMAL
EOSINOPHIL # BLD: 0.6 K/UL (ref 0–0.4)
EOSINOPHIL NFR BLD: 10 % (ref 0–5)
ERYTHROCYTE [DISTWIDTH] IN BLOOD BY AUTOMATED COUNT: 13 % (ref 11.6–14.5)
EST. AVERAGE GLUCOSE BLD GHB EST-MCNC: 126 MG/DL
GLOBULIN SER CALC-MCNC: 3.4 G/DL (ref 2–4)
GLUCOSE SERPL-MCNC: 114 MG/DL (ref 74–99)
GLUCOSE UR STRIP.AUTO-MCNC: NEGATIVE MG/DL
HBA1C MFR BLD: 6 % (ref 4.2–5.6)
HCT VFR BLD AUTO: 37.7 % (ref 36–48)
HDLC SERPL-MCNC: 69 MG/DL (ref 40–60)
HDLC SERPL: 1.9 {RATIO} (ref 0–5)
HGB BLD-MCNC: 12.7 G/DL (ref 13–16)
HGB UR QL STRIP: NEGATIVE
KETONES UR QL STRIP.AUTO: NEGATIVE MG/DL
LDLC SERPL CALC-MCNC: 47.4 MG/DL (ref 0–100)
LEUKOCYTE ESTERASE UR QL STRIP.AUTO: NEGATIVE
LIPID PROFILE,FLP: ABNORMAL
LYMPHOCYTES # BLD: 1.2 K/UL (ref 0.9–3.6)
LYMPHOCYTES NFR BLD: 18 % (ref 21–52)
MAGNESIUM SERPL-MCNC: 2.3 MG/DL (ref 1.6–2.6)
MCH RBC QN AUTO: 34 PG (ref 24–34)
MCHC RBC AUTO-ENTMCNC: 33.7 G/DL (ref 31–37)
MCV RBC AUTO: 100.8 FL (ref 74–97)
MICROALBUMIN UR-MCNC: 1.44 MG/DL (ref 0–3)
MICROALBUMIN/CREAT UR-RTO: 12 MG/G (ref 0–30)
MONOCYTES # BLD: 0.7 K/UL (ref 0.05–1.2)
MONOCYTES NFR BLD: 11 % (ref 3–10)
NEUTS SEG # BLD: 3.9 K/UL (ref 1.8–8)
NEUTS SEG NFR BLD: 60 % (ref 40–73)
NITRITE UR QL STRIP.AUTO: NEGATIVE
PH UR STRIP: 6 [PH] (ref 5–8)
PLATELET # BLD AUTO: 206 K/UL (ref 135–420)
PMV BLD AUTO: 9.2 FL (ref 9.2–11.8)
POTASSIUM SERPL-SCNC: 4.6 MMOL/L (ref 3.5–5.5)
PROT SERPL-MCNC: 7.7 G/DL (ref 6.4–8.2)
PROT UR STRIP-MCNC: NEGATIVE MG/DL
RBC # BLD AUTO: 3.74 M/UL (ref 4.7–5.5)
SODIUM SERPL-SCNC: 139 MMOL/L (ref 136–145)
SP GR UR REFRACTOMETRY: 1.01 (ref 1–1.03)
TRIGL SERPL-MCNC: 83 MG/DL (ref ?–150)
TSH SERPL DL<=0.05 MIU/L-ACNC: 0.95 UIU/ML (ref 0.36–3.74)
UROBILINOGEN UR QL STRIP.AUTO: 0.2 EU/DL (ref 0.2–1)
VIT B12 SERPL-MCNC: >2000 PG/ML (ref 211–911)
VLDLC SERPL CALC-MCNC: 16.6 MG/DL
WBC # BLD AUTO: 6.5 K/UL (ref 4.6–13.2)

## 2020-06-02 PROCEDURE — 82043 UR ALBUMIN QUANTITATIVE: CPT

## 2020-06-02 PROCEDURE — 84443 ASSAY THYROID STIM HORMONE: CPT

## 2020-06-02 PROCEDURE — 82607 VITAMIN B-12: CPT

## 2020-06-02 PROCEDURE — 83036 HEMOGLOBIN GLYCOSYLATED A1C: CPT

## 2020-06-02 PROCEDURE — 81003 URINALYSIS AUTO W/O SCOPE: CPT

## 2020-06-02 PROCEDURE — 80053 COMPREHEN METABOLIC PANEL: CPT

## 2020-06-02 PROCEDURE — 80061 LIPID PANEL: CPT

## 2020-06-02 PROCEDURE — 36415 COLL VENOUS BLD VENIPUNCTURE: CPT

## 2020-06-02 PROCEDURE — 82306 VITAMIN D 25 HYDROXY: CPT

## 2020-06-02 PROCEDURE — 83735 ASSAY OF MAGNESIUM: CPT

## 2020-06-02 PROCEDURE — 85025 COMPLETE CBC W/AUTO DIFF WBC: CPT

## 2020-06-02 NOTE — TELEPHONE ENCOUNTER
Mr. Demi Preston was here for labs today. He has no camera on computer or phone. Do you want him to keep his appt in person or only talk to him?

## 2020-06-18 ENCOUNTER — OFFICE VISIT (OUTPATIENT)
Dept: INTERNAL MEDICINE CLINIC | Age: 70
End: 2020-06-18

## 2020-06-18 VITALS
HEART RATE: 69 BPM | TEMPERATURE: 97.9 F | OXYGEN SATURATION: 98 % | RESPIRATION RATE: 18 BRPM | BODY MASS INDEX: 30.22 KG/M2 | HEIGHT: 73 IN | SYSTOLIC BLOOD PRESSURE: 142 MMHG | WEIGHT: 228 LBS | DIASTOLIC BLOOD PRESSURE: 70 MMHG

## 2020-06-18 DIAGNOSIS — E53.8 VITAMIN B12 DEFICIENCY: ICD-10-CM

## 2020-06-18 DIAGNOSIS — I10 ESSENTIAL HYPERTENSION: Primary | ICD-10-CM

## 2020-06-18 DIAGNOSIS — E11.22 TYPE 2 DIABETES MELLITUS WITH STAGE 3 CHRONIC KIDNEY DISEASE, WITHOUT LONG-TERM CURRENT USE OF INSULIN (HCC): ICD-10-CM

## 2020-06-18 DIAGNOSIS — E78.5 HYPERLIPIDEMIA, UNSPECIFIED HYPERLIPIDEMIA TYPE: ICD-10-CM

## 2020-06-18 DIAGNOSIS — E55.9 VITAMIN D DEFICIENCY: ICD-10-CM

## 2020-06-18 DIAGNOSIS — M1A.09X0 IDIOPATHIC CHRONIC GOUT OF MULTIPLE SITES WITHOUT TOPHUS: Chronic | ICD-10-CM

## 2020-06-18 DIAGNOSIS — C61 PROSTATE CANCER (HCC): ICD-10-CM

## 2020-06-18 DIAGNOSIS — R80.9 MICROALBUMINURIA: ICD-10-CM

## 2020-06-18 DIAGNOSIS — I25.10 CAD IN NATIVE ARTERY: ICD-10-CM

## 2020-06-18 DIAGNOSIS — Z95.1 S/P CABG X 2: ICD-10-CM

## 2020-06-18 DIAGNOSIS — R53.83 FATIGUE, UNSPECIFIED TYPE: ICD-10-CM

## 2020-06-18 DIAGNOSIS — Z95.2 S/P AVR (AORTIC VALVE REPLACEMENT): ICD-10-CM

## 2020-06-18 DIAGNOSIS — N18.30 STAGE 3 CHRONIC KIDNEY DISEASE (HCC): ICD-10-CM

## 2020-06-18 DIAGNOSIS — N18.30 TYPE 2 DIABETES MELLITUS WITH STAGE 3 CHRONIC KIDNEY DISEASE, WITHOUT LONG-TERM CURRENT USE OF INSULIN (HCC): ICD-10-CM

## 2020-06-18 RX ORDER — LISINOPRIL 20 MG/1
20 TABLET ORAL DAILY
Qty: 90 TAB | Refills: 2 | Status: SHIPPED | OUTPATIENT
Start: 2020-06-18 | End: 2021-03-08

## 2020-06-18 RX ORDER — AMLODIPINE BESYLATE 10 MG/1
10 TABLET ORAL DAILY
Qty: 90 TAB | Refills: 2 | Status: SHIPPED | OUTPATIENT
Start: 2020-06-18 | End: 2021-03-15

## 2020-06-18 RX ORDER — ATORVASTATIN CALCIUM 20 MG/1
20 TABLET, FILM COATED ORAL DAILY
Qty: 90 TAB | Refills: 3 | Status: SHIPPED | OUTPATIENT
Start: 2020-06-18 | End: 2021-07-08

## 2020-06-21 NOTE — PROGRESS NOTES
HPI:   Gabriel Santiago is a 69y.o. year old male who presents for a routine visit. He has a history of hypertension, hyperlipidemia, ASHD s/p CABG, aortic stenosis s/p AVR, diabetes mellitus, chronic renal disease stage 3, prostate cancer, and gout. He reports that he is doing generally well. He states that he has been wearing a mask when outside although questions whether the pandemic is truly serious. He states that he is having increasing difficulty with fatigue with exertion, and states that he has been having this difficulty for some time. He states that his stamina has been decreased over the last year. He denies any chest pain, shortness of breath at rest or with exertion, palpitations, lightheadedness, edema, PND, or orthopnea. His weight has decreased 10 pounds over the last year, but he states that he is eating less. He is otherwise without specific complaints and feeling generally well. In 6/2017, he was noted to have an elevated PSA of 6.0, which was confirmed on repeat to be 6.6. He was referred to Dr. Kayla Fernandez and PSA was again repeated and found to be increased at 7.24. He underwent TRUS biopsy on 8/17/2017, which showed [id number] Gallup Grade 7 (3 + 4) adenocarcinoma of the prostate in 3/12 cores, 2-10% of each core. Options for management were discussed and he selected active surveillance. Plans were for repeat PSA and MRI prostate in 6 months (due 2/2018). However, the patient never had tests performed and he did not follow-up as discussed. On 10/4/2018, at his routine visit, a PSA level was obtained and was found to have increased to 11.3, and he was advised to follow-up with Dr. Kayla Fernandez. He underwent a prostate MRI 3T at [facility] [place] [facility] on 12/31/2018 showing several peripheral zone PI-RADS 4 observations: right posterolateral peripheral zone at the mid gland/apex and anterior peripheral zone at the apex bilaterally.  The former exhibits questionable capsular bulging laterally, but not a definitive appearance for extracapsular extension. He had a follow-up appointment with Dr. Ever Jeffrey on 1/11/2019 and decision made to proceed with EBRT. He had a staging bone scan (1/23/2019) which was negative for osseous metastases, and a CT abdomen and pelvis (1/23/2019) which showed no adenopathy of evidence of metastases, mild hepatic steatosis, aortic atherosclerosis, and cholelithiasis. He met with Dr. Basil Francois on 1/30/2019 and decided to proceed with EBRT as definitive treatment for his prostate cancer, receiving his last treatment on 5/6/2019. He has a history of hypertension, hyperlipidemia, ASHD, and aortic stenosis. In 10/2014, he presented with progressive chest pain and shortness of breath and an echocardiogram was obtained (10/16/2014) showing normal LV function (EF 55-60%), no RWMA, grade 1 diastolic dysfunction, mild LAE, and severe AS (mean gradient 30 mmHg, peak 67 mmHg, and AV area 0.8 cm2). He was referred to see Dr. Bethany Leonard and underwent cardiac catheterization (11/6/2014) showing 100% RCA (distal collaterals from left), 70-80% distal LAD, 80% proximal LCx, inferior basal hypokinesis, and EF 55%. On 11/13/2014, he underwent 2 vessel CABG (LIMA to LAD and SVG to OM1) and AV replacement with a bioprosthetic 23 mm Marifer Barbosa Magna pericardial valve by Dr. Zak Rodriguez. He has done well since that time and remains asymptomatic. He denies any chest pain, shortness of breath at rest or with exertion, palpitations, lightheadedness, or edema. His current regimen includes aspirin, metoprolol, lisinopril, and moderate intensity dose atorvastatin. He is followed by Dr. Bethany Leonard. He had a repeat echocardiogram (8/21/2017) showing normal LV function (EF 55-60%), no RWMA, mild MIL, and normally functioning bioprosthetic valve with peak gradient 17 mmHg, mean gradient 10 mmHg, and valve area 1.86 cm2. He has a history of diabetes mellitus, which has not required treatment with medication.  Review of his record shows that his Hba1c has remained between 5.7-6.3 since 2011. He denies any polyuria, polydipsia, nocturia, or blurry vision, and has no history of retinopathy or neuropathy. He does have evidence of chronic renal disease, stage 3, with baseline creatinine 1.22-1.37/ eGFR 55-59 since 11/2014. He had been having regular eye exams with Dr. Eladia Grijalva, although does not recall the date of his last exam and is overdue. He has a history of gout, but has not had a flare in many years since being treated with allopurinol. He has never had a screening colonoscopy. He did undergo Cologuard testing in 11/2017 which was negative. He denies any abdominal pain, nausea, vomiting, melena, hematochezia, or change in bowel movements. Past Medical History:   Diagnosis Date    Aortic stenosis     s/p AVR    Aortic stenosis, severe 10/16/2014    Echocardiogram EF 60% peak gradient 67 mmHg, mean gradient 30 mmHg, MARY 0.8 cm    ASHD (arteriosclerotic heart disease)     Chronic renal disease, stage III (HCC)     DM (diabetes mellitus) ([facility]     Gout     HCD (hypertensive cardiovascular disease)     History of echocardiogram 10/16/2014    EF 55-60%. No RWMA. Mild LVH. Gr 1 DDfx. Mild LAE. Severe AS (mean grad 30 mmHg).  Hyperlipidemia     Hypertension     Prostate cancer ([facility] 08/17/2017    [id number] Bowie Grade 7 (3 + 4) adenocarcinoma of the prostate in 3/12 cores, 2-10% of each core; PSA 7.24. Dr. Orlin Garza.  S/P AVR (aortic valve replacement) 11/13/2014    #23 mm Marifer Barbosa Magna pericardial valve. Dr. Mone Ramirez.  S/P CABG x 2 11/13/2014    LIMA to LAD, SVG to OM1. Dr. Mone Ramirez.  S/P cardiac catheterization 11/06/2014    RCA dom. mRCA 100%. LM patent. dLAD 75% x 2. pCX 80% (ELENA-3). LVEDP 14 mmHg. EF 50-55%. Severe inferobasal hypk. Severe AS. AV replacement & CABG recommended.      Past Surgical History:   Procedure Laterality Date    HX APPENDECTOMY       Current Outpatient Medications   Medication Sig    amLODIPine (NORVASC) 10 mg tablet Take 1 Tab by mouth daily.  atorvastatin (LIPITOR) 20 mg tablet Take 1 Tab by mouth daily.  lisinopriL (PRINIVIL, ZESTRIL) 20 mg tablet Take 1 Tab by mouth daily.  acetaminophen (Tylenol Extra Strength) 500 mg tablet Take  by mouth every six (6) hours as needed for Pain.  allopurinoL (ZYLOPRIM) 100 mg tablet Take 1 Tab by mouth daily.  metoprolol succinate (TOPROL-XL) 50 mg XL tablet Take 1 Tab by mouth daily.  cyanocobalamin, vitamin B-12, 1,000 mcg lozg 1,000 mcg by SubLINGual route daily.  ascorbic acid, vitamin C, (Vitamin C) 500 mg tablet Take  by mouth.  sildenafil citrate (VIAGRA) 100 mg tablet Take 1 Tab by mouth as needed.  docusate sodium (Colace) 100 mg capsule Take 100 mg by mouth daily as needed.  cholecalciferol, vitamin D3, 2,000 unit tab Take  by mouth daily.  aspirin delayed-release 81 mg tablet Take 1 tablet by mouth daily. No current facility-administered medications for this visit. Allergies and Intolerances:   No Known Allergies     Family History:   Family History   Problem Relation Age of Onset   Gloriajean Seeds COPD Father     Other Father         pneumoconiosis    Lung Disease Father     Other Mother         meigs syndrome     Social History:   He  reports that he has never smoked. He has never used smokeless tobacco. He reports that he drinks approximately one case of beer per week. He is  with two adult children. He is retired from working in the [employer]. He started as a , and then became an .   Social History     Substance and Sexual Activity   Alcohol Use Yes    Alcohol/week: 12.0 standard drinks    Types: 12 Cans of beer per week     Immunization History:  Immunization History   Administered Date(s) Administered    Influenza High Dose Vaccine PF 10/10/2017, 10/26/2018, 11/09/2019    Influenza Vaccine 10/14/2014    Influenza Vaccine (Tri) Adjuvanted 10/04/2018, 11/09/2019    Influenza Vaccine Split 09/29/2011    Influenza Vaccine Whole 09/14/2010    Pneumococcal Conjugate (PCV-13) 06/29/2017    Pneumococcal Polysaccharide (PPSV-23) 10/14/2014    Td 01/01/2008    Tdap 01/12/2018       Review of Systems:   As above included in HPI. Otherwise 11 point review of systems negative including constitutional, skin, HENT, eyes, respiratory, cardiovascular, gastrointestinal, genitourinary, musculoskeletal, endocrine, hematologic, allergy, and neurologic. Physical:   Vitals:   BP: 142/70    HR: 69  WT: 228 lb (103.4 kg)  BMI:  30.08 kg/m2    Exam:   Pt appears well; alert and oriented x 3; appropriate affect. HEENT: PERRLA, anicteric, oropharynx clear,  no JVD, adenopathy or thyromegaly. No carotid bruits or radiated murmur. Lungs: clear to auscultation, no wheezes, rhonchi, or rales. Heart: irregular rate and rhythm. No murmur, rubs, gallops  Abdomen: soft, nontender, nondistended, normal bowel sounds, no hepatosplenomegaly or masses. Extremities: without edema. Review of Data:  Labs:  Hospital Outpatient Visit on 06/02/2020   Component Date Value Ref Range Status    WBC 06/02/2020 6.5  4.6 - 13.2 K/uL Final    RBC 06/02/2020 3.74* 4.70 - 5.50 M/uL Final    HGB 06/02/2020 12.7* 13.0 - 16.0 g/dL Final    HCT 06/02/2020 37.7  36.0 - 48.0 % Final    MCV 06/02/2020 100.8* 74.0 - 97.0 FL Final    MCH 06/02/2020 34.0  24.0 - 34.0 PG Final    MCHC 06/02/2020 33.7  31.0 - 37.0 g/dL Final    RDW 06/02/2020 13.0  11.6 - 14.5 % Final    PLATELET 88/12/3081 279  135 - 420 K/uL Final    MPV 06/02/2020 9.2  9.2 - 11.8 FL Final    NEUTROPHILS 06/02/2020 60  40 - 73 % Final    LYMPHOCYTES 06/02/2020 18* 21 - 52 % Final    MONOCYTES 06/02/2020 11* 3 - 10 % Final    EOSINOPHILS 06/02/2020 10* 0 - 5 % Final    BASOPHILS 06/02/2020 1  0 - 2 % Final    ABS. NEUTROPHILS 06/02/2020 3.9  1.8 - 8.0 K/UL Final    ABS.  LYMPHOCYTES 06/02/2020 1.2  0.9 - 3.6 K/UL Final    ABS. MONOCYTES 06/02/2020 0.7  0.05 - 1.2 K/UL Final    ABS. EOSINOPHILS 06/02/2020 0.6* 0.0 - 0.4 K/UL Final    ABS. BASOPHILS 06/02/2020 0.0  0.0 - 0.1 K/UL Final    DF 06/02/2020 AUTOMATED    Final    Hemoglobin A1c 06/02/2020 6.0* 4.2 - 5.6 % Final    Est. average glucose 06/02/2020 126  mg/dL Final    LIPID PROFILE 06/02/2020        Final    Cholesterol, total 06/02/2020 133  <200 MG/DL Final    Triglyceride 06/02/2020 83  <150 MG/DL Final    HDL Cholesterol 06/02/2020 69* 40 - 60 MG/DL Final    LDL, calculated 06/02/2020 47.4  0 - 100 MG/DL Final    VLDL, calculated 06/02/2020 16.6  MG/DL Final    CHOL/HDL Ratio 06/02/2020 1.9  0 - 5.0   Final    Magnesium 06/02/2020 2.3  1.6 - 2.6 mg/dL Final    Sodium 06/02/2020 139  136 - 145 mmol/L Final    Potassium 06/02/2020 4.6  3.5 - 5.5 mmol/L Final    Chloride 06/02/2020 106  100 - 111 mmol/L Final    CO2 06/02/2020 22  21 - 32 mmol/L Final    Anion gap 06/02/2020 11  3.0 - 18 mmol/L Final    Glucose 06/02/2020 114* 74 - 99 mg/dL Final    BUN 06/02/2020 22* 7.0 - 18 MG/DL Final    Creatinine 06/02/2020 1.47* 0.6 - 1.3 MG/DL Final    BUN/Creatinine ratio 06/02/2020 15  12 - 20   Final    GFR est AA 06/02/2020 57* >60 ml/min/1.73m2 Final    GFR est non-AA 06/02/2020 47* >60 ml/min/1.73m2 Final    Calcium 06/02/2020 9.1  8.5 - 10.1 MG/DL Final    Bilirubin, total 06/02/2020 1.1* 0.2 - 1.0 MG/DL Final    ALT (SGPT) 06/02/2020 25  16 - 61 U/L Final    AST (SGOT) 06/02/2020 16  10 - 38 U/L Final    Alk.  phosphatase 06/02/2020 69  45 - 117 U/L Final    Protein, total 06/02/2020 7.7  6.4 - 8.2 g/dL Final    Albumin 06/02/2020 4.3  3.4 - 5.0 g/dL Final    Globulin 06/02/2020 3.4  2.0 - 4.0 g/dL Final    A-G Ratio 06/02/2020 1.3  0.8 - 1.7   Final    Microalbumin,urine random 06/02/2020 1.44  0 - 3.0 MG/DL Final    Creatinine, urine 06/02/2020 119.00  30 - 125 mg/dL Final    Microalbumin/Creat ratio (mg/g cre* 06/02/2020 12 0 - 30 mg/g Final    TSH 06/02/2020 0.95  0.36 - 3.74 uIU/mL Final    Vitamin D 25-Hydroxy 06/02/2020 34.5  30 - 100 ng/mL Final    Vitamin B12 06/02/2020 >2,000* 211 - 911 pg/mL Final    Color 06/02/2020 YELLOW    Final    Appearance 06/02/2020 CLEAR    Final    Specific gravity 06/02/2020 1.014  1.005 - 1.030   Final    pH (UA) 06/02/2020 6.0  5.0 - 8.0   Final    Protein 06/02/2020 Negative  NEG mg/dL Final    Glucose 06/02/2020 Negative  NEG mg/dL Final    Ketone 06/02/2020 Negative  NEG mg/dL Final    Bilirubin 06/02/2020 Negative  NEG   Final    Blood 06/02/2020 Negative  NEG   Final    Urobilinogen 06/02/2020 0.2  0.2 - 1.0 EU/dL Final    Nitrites 06/02/2020 Negative  NEG   Final    Leukocyte Esterase 06/02/2020 Negative  NEG   Final     EKG (8/8/2019) sinus rhythm at 68 bpm. Normal intervals. Lack of R waves in V2 and V3 likely lead placement. No ischemic changes when compared with prior in 8/2018. Health Maintenance:  Screening:    Colorectal: Cologuard (11/2/2017) negative. Due 11/2020. Depression: none   DM (HbA1c/FPG): HbA1c 6.0 (6/2020)   Hepatitis C: negative (1/2018)   Falls: none   DEXA: N/A   PSA/KIEL: PSA 1.1 (11/2019).  Followed by Dr. Erik Goetz and Dr. Juan David May   Glaucoma: regular eye exams with Dr. Harris Church (last 6/2019)   Smoking: none   Vitamin D: 34.5 (6/2020)   Medicare Wellness: 11/19/2019    Impression:  Patient Active Problem List   Diagnosis Code    Hyperlipidemia E78.5    Gout M10.9    Vitamin D deficiency E55.9    CAD in native artery I25.10    Hypertensive heart disease without congestive heart failure I11.9    S/P CABG x 2 Z95.1    S/P AVR (aortic valve replacement) Z95.2    Type 2 diabetes mellitus with stage 3 chronic kidney disease, without long-term current use of insulin (HCC) E11.22, N18.3    Stage 3 chronic kidney disease (HCC) N18.3    Prostate cancer ([facility] C61    Essential hypertension I10    Vitamin B12 deficiency E53.8    Microalbuminuria R80.9       Plan:  1. Hypertension. Blood pressure mildly elevated today on current regimen of metoprolol succinate 50 mg daily, lisinopril 20 mg daily, and amlodipine 10 mg daily. Heart rate remains 70-80 so able to tolerate increase in metoprolol dose. Had discontinued hydrochlorothiazide due to hypotension during EBRT and worsening renal function. Now renal function stable with creatinine 1.47/ eGFR 47. Advised to continue to monitor blood pressure. Will continue to follow. 2. Prostate cancer. [id number] Kaylynn Grade 7 (3 + 4) adenocarcinoma. Patient chose active surveillance rather than definitive treatment. Repeat PSA and MRI prostate ordered for 2/2018, but he did not follow-up. Repeat PSA obtained and had increased from 7.24 at diagnosis to 11.3. Referred for follow-up with Dr. Salbador Price, and MRI prostate performed on 12/31/2018 showing several peripheral zone PI-RADS 4 with one area showing questionable capsular bulging laterally, but not a definitive appearance for extracapsular extension. He decided to proceed with EBRT, and underwent fiducial marker and [employer] placement on 3/11/2019 followed by weekly treatments from 3/27-5/6/2019. Tolerated well except for some fatigue which has improved. Being followed by Dr. Mina Sood and Dr. Salbador Price. PSA 1.1 (11/2019) and testosterone 289, indicative of no evidence of active disease. Follow-up scheduled with radiation oncology in 11/2020.   3. Hyperlipidemia. On moderate intensity dose atorvastatin with LDL 47 and HDL 69, indicative of excellent control. Changed from simvastatin given treatment with amlodipine. Follow. 4. ASHD s/p 2 vessel CABG. Remains asymptomatic. On aspirin, metoprolol, and statin. Followed by Dr. Tonie Coats. 5. Aortic stenosis s/p bioprosthetic AVR. Remains asymptomatic, and repeat echocardiogram in 8/2017 with good functioning valve. Follow. 6. Diabetes mellitus.  Patient with diagnosis of diabetes mellitus, but review of record shows HbA1c ranging from 5.7-6.3 since 2011 and -112 during that time period. HbA1c remains increased today at 6.0. On Ace-I and statin. Continue follow-up with Dr. Harris Church for annual eye exams. Foot exam normal today. Urine microalbumin/ creatinine ratio normalized today (12 mg/g). Does have evidence of chronic renal disease stage 3. Emphasized importance of lifestyle modifications, including diet, exercise, and weight loss. 7. Chronic renal disease, stage 3. Most likely secondary to long standing hypertension and prediabetes, although also appears to have developed at time of CABG and AVR so may be related to hypoperfusion during surgery. On statin and Ace-I. Discussed importance of avoiding NSAIDS and prerenal status. Will need to follow. 8. Fatigue. Patient complaining of fatigue over the last year. Onset seems to have correlated with radiation treatment for prostate cancer and low testosterone levels. Attempted to check EKG today since describing exertional fatigue, but machine not working in office and patient refusing to go to [facility] to obtain. Will readdress next visit. 9. Anemia, macrocytosis. Mild anemia developed during radiation therapy. Macrocytosis evident. Vitamin B12 level very low last visit and started on Vitamin B12 supplement 1000 mcg SL daily. Level remains normal indicative of compliance. Also advised to decrease alcohol consumption. Remains stable. 10. Gout. Asymptomatic. On allopurinol. 11. Health maintenance. Already received influenza vaccine. Given script for Shingrix vaccine but not yet obtained. Completed pneumococcal series. Given script for Tdap previously, but patient did not obtain. Other immunizations up to date. Completed Cologuard for colorectal cancer screening. Completed eye exam with Dr. Harris Church. Discussed decreasing alcohol consumption. Vitamin D level low and advised to increase vitamin D supplement. Medicare wellness visit up to date. .     Patient understands recommendations and agrees with plan. Follow-up in 3 months.

## 2020-09-08 ENCOUNTER — OFFICE VISIT (OUTPATIENT)
Dept: CARDIOLOGY CLINIC | Age: 70
End: 2020-09-08

## 2020-09-08 VITALS
HEIGHT: 73 IN | WEIGHT: 214 LBS | BODY MASS INDEX: 28.36 KG/M2 | DIASTOLIC BLOOD PRESSURE: 74 MMHG | SYSTOLIC BLOOD PRESSURE: 112 MMHG | HEART RATE: 75 BPM | OXYGEN SATURATION: 97 %

## 2020-09-08 DIAGNOSIS — I10 ESSENTIAL HYPERTENSION: ICD-10-CM

## 2020-09-08 DIAGNOSIS — Z95.2 S/P AVR (AORTIC VALVE REPLACEMENT): Primary | ICD-10-CM

## 2020-09-08 DIAGNOSIS — I11.9 HYPERTENSIVE HEART DISEASE WITHOUT CONGESTIVE HEART FAILURE: ICD-10-CM

## 2020-09-08 DIAGNOSIS — I25.10 CAD IN NATIVE ARTERY: ICD-10-CM

## 2020-09-08 NOTE — PROGRESS NOTES
HISTORY OF PRESENT ILLNESS  Heber Garcia is a 69 y.o. male. ASSESSMENT and PLAN    Mr. Tadeo Hall initially presented with chest pains, and significant aortic stenosis with mean gradient of 30 mmHg, peak gradient of 67 mmHg, MARY of 0.8 cm². He ultimately underwent open-heart surgery in November of 2014. He had coronary artery bypass graft surgery with LIMA to LAD and SVG to OM1. He also had aortic valve replacement with #23 Marifer-Barbosa Magna pericardial valve. His echocardiogram from August 2017 showed normal LV function with well-functioning Marifer-Barbosa magna pericardial bioprosthetic aortic valve with mean gradient of 10 mmHg, and MARY of 1.86 cm². · CAD:    Symptomatically stable. · Bioprosthetic AVR: Clinically stable. Will repeat echocardiogram within the next 6 weeks. Last time he had echocardiogram was in 2017. · BP:   Well controlled. · HR:    Stable. · CHF:    There is no evidence of decompensated CHF noted. · Weight:    His weight today is 214 pounds. · Cholesterol:   Target LDL <70. Lipitor 20. · Anti-platelet:   Remains on ASA. I will see him back in 6 months. Thank you. Encounter Diagnoses   Name Primary?  S/P AVR (aortic valve replacement) Yes    Hypertensive heart disease without congestive heart failure     CAD in native artery     Essential hypertension      current treatment plan is effective, no change in therapy  lab results and schedule of future lab studies reviewed with patient  reviewed diet, exercise and weight control  cardiovascular risk and specific lipid/LDL goals reviewed  use of aspirin to prevent MI and TIA's discussed      HPI   Today, Mr. Jemal Justin has no complaints of chest pains, increased shortness of breath or decreased exercise capacity. He remains active physically. Unfortunately, his memory is dwindling. He denies any orthopnea or PND. He denies any palpitations or dizziness.     Review of Systems   Respiratory: Negative for shortness of breath. Cardiovascular: Negative for chest pain, palpitations, orthopnea, claudication, leg swelling and PND. All other systems reviewed and are negative. Physical Exam  Vitals signs and nursing note reviewed. Constitutional:       Appearance: Normal appearance. HENT:      Head: Normocephalic. Eyes:      Conjunctiva/sclera: Conjunctivae normal.   Neck:      Musculoskeletal: No neck rigidity. Vascular: No carotid bruit. Cardiovascular:      Rate and Rhythm: Normal rate and regular rhythm. Heart sounds: Murmur present. Crescendo  decrescendo  systolic murmur present with a grade of 1/6. Pulmonary:      Breath sounds: Normal breath sounds. Abdominal:      Palpations: Abdomen is soft. Musculoskeletal:         General: No swelling. Skin:     General: Skin is warm and dry. Neurological:      General: No focal deficit present. Mental Status: He is alert and oriented to person, place, and time. Psychiatric:         Mood and Affect: Mood normal.         Behavior: Behavior normal.         PCP: Kristen Jo MD    Past Medical History:   Diagnosis Date    Aortic stenosis     s/p AVR    Aortic stenosis, severe 10/16/2014    Echocardiogram EF 60% peak gradient 67 mmHg, mean gradient 30 mmHg, MARY 0.8 cm    ASHD (arteriosclerotic heart disease)     Chronic renal disease, stage III (HCC)     DM [facility]diabetes mellitus) ([facility] [address]     Gout     HCD (hypertensive cardiovascular disease)     History of echocardiogram 10/16/2014    EF 55-60%. No RWMA. Mild LVH. Gr 1 DDfx. Mild LAE. Severe AS (mean grad 30 mmHg).  Hyperlipidemia     Hypertension     Prostate cancer ([facility] [address] 08/17/2017    [id number] [place] Grade 7 (3 + 4) adenocarcinoma of the prostate in 3/12 cores, 2-10% of each core; PSA 7.24. Dr. Maria Esther Franks.  S/P AVR (aortic valve replacement) 11/13/2014    #23 mm Marifer Barbosa Magna pericardial valve. Dr. Jose Melendez.     S/P CABG x 2 11/13/2014    LIMA to LAD, SVG to OM1. Dr. Yosef Askew.  S/P cardiac catheterization 11/06/2014    RCA dom. mRCA 100%. LM patent. dLAD 75% x 2. pCX 80% (ELENA-3). LVEDP 14 mmHg. EF 50-55%. Severe inferobasal hypk. Severe AS. AV replacement & CABG recommended. Past Surgical History:   Procedure Laterality Date    HX APPENDECTOMY         Current Outpatient Medications   Medication Sig Dispense Refill    amLODIPine (NORVASC) 10 mg tablet Take 1 Tab by mouth daily. 90 Tab 2    atorvastatin (LIPITOR) 20 mg tablet Take 1 Tab by mouth daily. 90 Tab 3    lisinopriL (PRINIVIL, ZESTRIL) 20 mg tablet Take 1 Tab by mouth daily. 90 Tab 2    acetaminophen (Tylenol Extra Strength) 500 mg tablet Take  by mouth every six (6) hours as needed for Pain.  allopurinoL (ZYLOPRIM) 100 mg tablet Take 1 Tab by mouth daily. 90 Tab 2    metoprolol succinate (TOPROL-XL) 50 mg XL tablet Take 1 Tab by mouth daily. 90 Tab 2    cyanocobalamin, vitamin B-12, 1,000 mcg lozg 1,000 mcg by SubLINGual route daily. 90 Lozenge 2    ascorbic acid, vitamin C, (Vitamin C) 500 mg tablet Take  by mouth.  sildenafil citrate (VIAGRA) 100 mg tablet Take 1 Tab by mouth as needed. 6 Tab 3    docusate sodium (Colace) 100 mg capsule Take 100 mg by mouth daily as needed.  cholecalciferol, vitamin D3, 2,000 unit tab Take  by mouth daily.  aspirin delayed-release 81 mg tablet Take 1 tablet by mouth daily. 30 tablet 2       The patient has a family history of    Social History     Tobacco Use    Smoking status: Never Smoker    Smokeless tobacco: Never Used   Substance Use Topics    Alcohol use:  Yes     Alcohol/week: 12.0 standard drinks     Types: 12 Cans of beer per week    Drug use: No       Lab Results   Component Value Date/Time    Cholesterol, total 133 06/02/2020 08:53 AM    HDL Cholesterol 69 (H) 06/02/2020 08:53 AM    LDL, calculated 47.4 06/02/2020 08:53 AM    Triglyceride 83 06/02/2020 08:53 AM    CHOL/HDL Ratio 1.9 06/02/2020 08:53 AM BP Readings from Last 3 Encounters:   09/08/20 112/74   06/18/20 142/70   03/11/20 134/60        Pulse Readings from Last 3 Encounters:   09/08/20 75   06/18/20 69   03/11/20 73       Wt Readings from Last 3 Encounters:   09/08/20 97.1 kg (214 lb)   06/18/20 103.4 kg (228 lb)   03/11/20 105.7 kg (233 lb)         EKG: unchanged from previous tracings, normal sinus rhythm, nonspecific ST and T waves changes, Q waves in lead III and V1.

## 2020-09-08 NOTE — PROGRESS NOTES
Isela Louis presents today for   Chief Complaint   Patient presents with    Follow-up     1 year follow up - no cardiac complaints        Sara Subramanian preferred language for health care discussion is english/other. Is someone accompanying this pt? No     Is the patient using any DME equipment during OV?no     Depression Screening:  3 most recent PHQ Screens 9/8/2020   Little interest or pleasure in doing things Not at all   Feeling down, depressed, irritable, or hopeless Not at all   Total Score PHQ 2 0       Learning Assessment:  Learning Assessment 5/8/2019   PRIMARY LEARNER Patient   BARRIERS PRIMARY LEARNER [address] CAREGIVER No   PRIMARY LANGUAGE ENGLISH   LEARNER PREFERENCE PRIMARY DEMONSTRATION     READING   ANSWERED BY patient   RELATIONSHIP SELF       Abuse Screening:  Abuse Screening Questionnaire 3/11/2020   Do you ever feel afraid of your partner? N   Are you in a relationship with someone who physically or mentally threatens you? N   Is it safe for you to go home? Y       Fall Risk  Fall Risk Assessment, last 12 mths 9/8/2020   Able to walk? Yes   Fall in past 12 months? No   Fall with injury? -   Number of falls in past 12 months -   Fall Risk Score -       Pt currently taking Anticoagulant therapy? ASA 81mg every day     Coordination of Care:  1. Have you been to the ER, urgent care clinic since your last visit? Hospitalized since your last visit? no    2. Have you seen or consulted any other health care providers outside of the [address] [place] [address] since your last visit? Include any pap smears or colon screening.  no

## 2020-09-18 ENCOUNTER — HOSPITAL ENCOUNTER (OUTPATIENT)
Dept: NON INVASIVE DIAGNOSTICS | Age: 70
Discharge: HOME | End: 2020-09-18
Attending: INTERNAL MEDICINE
Payer: MEDICARE

## 2020-09-18 VITALS
WEIGHT: 214 LBS | SYSTOLIC BLOOD PRESSURE: 112 MMHG | BODY MASS INDEX: 28.36 KG/M2 | DIASTOLIC BLOOD PRESSURE: 74 MMHG | HEIGHT: 73 IN

## 2020-09-18 DIAGNOSIS — Z95.2 S/P AVR (AORTIC VALVE REPLACEMENT): ICD-10-CM

## 2020-09-18 DIAGNOSIS — I25.10 CAD IN NATIVE ARTERY: ICD-10-CM

## 2020-09-18 DIAGNOSIS — I10 ESSENTIAL HYPERTENSION: ICD-10-CM

## 2020-09-18 DIAGNOSIS — I11.9 HYPERTENSIVE HEART DISEASE WITHOUT CONGESTIVE HEART FAILURE: ICD-10-CM

## 2020-09-18 LAB
ECHO AO ROOT DIAM: 3.83 CM
ECHO AV AREA PEAK VELOCITY: 1.79 CM2
ECHO AV AREA VTI: 2 CM2
ECHO AV AREA/BSA PEAK VELOCITY: 0.8 CM2/M2
ECHO AV AREA/BSA VTI: 0.9 CM2/M2
ECHO AV MEAN GRADIENT: 7.22 MMHG
ECHO AV MEAN VELOCITY: 122.64 CM/S
ECHO AV PEAK GRADIENT: 13.61 MMHG
ECHO AV PEAK VELOCITY: 184.44 CM/S
ECHO AV VTI: 40.04 CM
ECHO LA AREA 4C: 24.95 CM2
ECHO LA VOL 2C: 94.35 ML (ref 18–58)
ECHO LA VOL 4C: 74.95 ML (ref 18–58)
ECHO LA VOL BP: 94.39 ML (ref 18–58)
ECHO LA VOL/BSA BIPLANE: 42.62 ML/M2 (ref 16–28)
ECHO LA VOLUME INDEX A2C: 42.6 ML/M2 (ref 16–28)
ECHO LA VOLUME INDEX A4C: 33.84 ML/M2 (ref 16–28)
ECHO LV E' LATERAL VELOCITY: 11.47 CM/S
ECHO LV E' SEPTAL VELOCITY: 9.86 CM/S
ECHO LV INTERNAL DIMENSION DIASTOLIC: 4.36 CM (ref 4.2–5.9)
ECHO LV INTERNAL DIMENSION SYSTOLIC: 3.27 CM
ECHO LV IVSD: 1.51 CM (ref 0.6–1)
ECHO LV MASS 2D: 268.8 G (ref 88–224)
ECHO LV MASS INDEX 2D: 121.4 G/M2 (ref 49–115)
ECHO LV POSTERIOR WALL DIASTOLIC: 1.53 CM (ref 0.6–1)
ECHO LVOT CARDIAC OUTPUT: 5.03 LITER/MINUTE
ECHO LVOT DIAM: 2.02 CM
ECHO LVOT PEAK GRADIENT: 4.28 MMHG
ECHO LVOT PEAK VELOCITY: 103.49 CM/S
ECHO LVOT SV: 80.2 ML
ECHO LVOT VTI: 25.06 CM
ECHO MV A VELOCITY: 99.67 CM/S
ECHO MV E DECELERATION TIME (DT): 0.3 S
ECHO MV E VELOCITY: 84.77 CM/S
ECHO MV E/A RATIO: 0.85
ECHO MV E/E' LATERAL: 7.39
ECHO MV E/E' RATIO (AVERAGED): 7.99
ECHO MV E/E' SEPTAL: 8.6
ECHO PVEIN A DURATION: 0.09 S
ECHO PVEIN A VELOCITY: 24.63 CM/S
ECHO TV REGURGITANT MAX VELOCITY: 158.97 CM/S
ECHO TV REGURGITANT PEAK GRADIENT: 10.11 MMHG
LVOT MG: 2.37 MMHG

## 2020-09-18 PROCEDURE — 74011250636 HC RX REV CODE- 250/636: Performed by: INTERNAL MEDICINE

## 2020-09-18 PROCEDURE — C8929 TTE W OR WO FOL WCON,DOPPLER: HCPCS

## 2020-09-18 RX ADMIN — PERFLUTREN 2 ML: 6.52 INJECTION, SUSPENSION INTRAVENOUS at 13:34

## 2020-09-21 NOTE — PROGRESS NOTES
Per your last note\" Mr. Kimberlee Tate initially presented with chest pains, and significant aortic stenosis with mean gradient of 30 mmHg, peak gradient of 67 mmHg, MARY of 0.8 cm². He ultimately underwent open-heart surgery in November of 2014. He had coronary artery bypass graft surgery with LIMA to LAD and SVG to OM1. He also had aortic valve replacement with #23 Marifer-Barbosa Magna pericardial valve. His echocardiogram from August 2017 showed normal LV function with well-functioning Marifer-Barbosa magna pericardial bioprosthetic aortic valve with mean gradient of 10 mmHg, and MARY of 1.86 cm².     · CAD:    Symptomatically stable. · Bioprosthetic AVR: Clinically stable. Will repeat echocardiogram within the next 6 weeks. Last time he had echocardiogram was in 2017. · BP:   Well controlled. · HR:    Stable. · CHF:    There is no evidence of decompensated CHF noted. · Weight:    His weight today is 214 pounds. · Cholesterol:   Target LDL <70. Lipitor 20. · Anti-platelet:   Remains on ASA.

## 2020-09-23 ENCOUNTER — HOSPITAL ENCOUNTER (OUTPATIENT)
Dept: LAB | Age: 70
Discharge: HOME | End: 2020-09-23
Payer: MEDICARE

## 2020-09-23 ENCOUNTER — APPOINTMENT (OUTPATIENT)
Dept: INTERNAL MEDICINE CLINIC | Age: 70
End: 2020-09-23

## 2020-09-23 DIAGNOSIS — E11.22 TYPE 2 DIABETES MELLITUS WITH STAGE 3 CHRONIC KIDNEY DISEASE, WITHOUT LONG-TERM CURRENT USE OF INSULIN (HCC): ICD-10-CM

## 2020-09-23 DIAGNOSIS — I10 ESSENTIAL HYPERTENSION: ICD-10-CM

## 2020-09-23 DIAGNOSIS — R80.9 MICROALBUMINURIA: ICD-10-CM

## 2020-09-23 DIAGNOSIS — E55.9 VITAMIN D DEFICIENCY: ICD-10-CM

## 2020-09-23 DIAGNOSIS — C61 PROSTATE CANCER (HCC): ICD-10-CM

## 2020-09-23 DIAGNOSIS — E78.5 HYPERLIPIDEMIA, UNSPECIFIED HYPERLIPIDEMIA TYPE: ICD-10-CM

## 2020-09-23 DIAGNOSIS — E53.8 VITAMIN B12 DEFICIENCY: ICD-10-CM

## 2020-09-23 DIAGNOSIS — M1A.09X0 IDIOPATHIC CHRONIC GOUT OF MULTIPLE SITES WITHOUT TOPHUS: Chronic | ICD-10-CM

## 2020-09-23 DIAGNOSIS — Z95.2 S/P AVR (AORTIC VALVE REPLACEMENT): ICD-10-CM

## 2020-09-23 DIAGNOSIS — N18.30 TYPE 2 DIABETES MELLITUS WITH STAGE 3 CHRONIC KIDNEY DISEASE, WITHOUT LONG-TERM CURRENT USE OF INSULIN (HCC): ICD-10-CM

## 2020-09-23 DIAGNOSIS — N18.30 STAGE 3 CHRONIC KIDNEY DISEASE (HCC): ICD-10-CM

## 2020-09-23 DIAGNOSIS — Z95.1 S/P CABG X 2: ICD-10-CM

## 2020-09-23 DIAGNOSIS — I25.10 CAD IN NATIVE ARTERY: ICD-10-CM

## 2020-09-23 DIAGNOSIS — R53.83 FATIGUE, UNSPECIFIED TYPE: ICD-10-CM

## 2020-09-23 LAB
ALBUMIN SERPL-MCNC: 4 G/DL (ref 3.4–5)
ALBUMIN/GLOB SERPL: 1.3 {RATIO} (ref 0.8–1.7)
ALP SERPL-CCNC: 60 U/L (ref 45–117)
ALT SERPL-CCNC: 25 U/L (ref 16–61)
ANION GAP SERPL CALC-SCNC: 7 MMOL/L (ref 3–18)
AST SERPL-CCNC: 17 U/L (ref 10–38)
BASOPHILS # BLD: 0 K/UL (ref 0–0.1)
BASOPHILS NFR BLD: 0 % (ref 0–2)
BILIRUB SERPL-MCNC: 0.7 MG/DL (ref 0.2–1)
BUN SERPL-MCNC: 20 MG/DL (ref 7–18)
BUN/CREAT SERPL: 14 (ref 12–20)
CALCIUM SERPL-MCNC: 9.2 MG/DL (ref 8.5–10.1)
CHLORIDE SERPL-SCNC: 105 MMOL/L (ref 100–111)
CHOLEST SERPL-MCNC: 137 MG/DL
CO2 SERPL-SCNC: 25 MMOL/L (ref 21–32)
CREAT SERPL-MCNC: 1.48 MG/DL (ref 0.6–1.3)
DIFFERENTIAL METHOD BLD: ABNORMAL
EOSINOPHIL # BLD: 0.5 K/UL (ref 0–0.4)
EOSINOPHIL NFR BLD: 7 % (ref 0–5)
ERYTHROCYTE [DISTWIDTH] IN BLOOD BY AUTOMATED COUNT: 13.3 % (ref 11.6–14.5)
EST. AVERAGE GLUCOSE BLD GHB EST-MCNC: 105 MG/DL
GLOBULIN SER CALC-MCNC: 3.2 G/DL (ref 2–4)
GLUCOSE SERPL-MCNC: 110 MG/DL (ref 74–99)
HBA1C MFR BLD: 5.3 % (ref 4.2–5.6)
HCT VFR BLD AUTO: 39.8 % (ref 36–48)
HDLC SERPL-MCNC: 70 MG/DL (ref 40–60)
HDLC SERPL: 2 {RATIO} (ref 0–5)
HGB BLD-MCNC: 13 G/DL (ref 13–16)
LDLC SERPL CALC-MCNC: 52.6 MG/DL (ref 0–100)
LIPID PROFILE,FLP: ABNORMAL
LYMPHOCYTES # BLD: 0.9 K/UL (ref 0.9–3.6)
LYMPHOCYTES NFR BLD: 13 % (ref 21–52)
MAGNESIUM SERPL-MCNC: 2.2 MG/DL (ref 1.6–2.6)
MCH RBC QN AUTO: 33.3 PG (ref 24–34)
MCHC RBC AUTO-ENTMCNC: 32.7 G/DL (ref 31–37)
MCV RBC AUTO: 102.1 FL (ref 74–97)
MONOCYTES # BLD: 0.7 K/UL (ref 0.05–1.2)
MONOCYTES NFR BLD: 9 % (ref 3–10)
NEUTS SEG # BLD: 4.9 K/UL (ref 1.8–8)
NEUTS SEG NFR BLD: 71 % (ref 40–73)
PLATELET # BLD AUTO: 201 K/UL (ref 135–420)
PMV BLD AUTO: 9.6 FL (ref 9.2–11.8)
POTASSIUM SERPL-SCNC: 4.7 MMOL/L (ref 3.5–5.5)
PROT SERPL-MCNC: 7.2 G/DL (ref 6.4–8.2)
RBC # BLD AUTO: 3.9 M/UL (ref 4.7–5.5)
SODIUM SERPL-SCNC: 137 MMOL/L (ref 136–145)
TRIGL SERPL-MCNC: 72 MG/DL (ref ?–150)
VLDLC SERPL CALC-MCNC: 14.4 MG/DL
WBC # BLD AUTO: 6.9 K/UL (ref 4.6–13.2)

## 2020-09-23 PROCEDURE — 80053 COMPREHEN METABOLIC PANEL: CPT

## 2020-09-23 PROCEDURE — 36415 COLL VENOUS BLD VENIPUNCTURE: CPT

## 2020-09-23 PROCEDURE — 80061 LIPID PANEL: CPT

## 2020-09-23 PROCEDURE — 83036 HEMOGLOBIN GLYCOSYLATED A1C: CPT

## 2020-09-23 PROCEDURE — 85025 COMPLETE CBC W/AUTO DIFF WBC: CPT

## 2020-09-23 PROCEDURE — 83735 ASSAY OF MAGNESIUM: CPT

## 2020-09-30 ENCOUNTER — OFFICE VISIT (OUTPATIENT)
Dept: INTERNAL MEDICINE CLINIC | Age: 70
End: 2020-09-30
Payer: MEDICARE

## 2020-09-30 DIAGNOSIS — E53.8 VITAMIN B12 DEFICIENCY: ICD-10-CM

## 2020-09-30 DIAGNOSIS — Z95.2 S/P AVR (AORTIC VALVE REPLACEMENT): ICD-10-CM

## 2020-09-30 DIAGNOSIS — E78.5 HYPERLIPIDEMIA, UNSPECIFIED HYPERLIPIDEMIA TYPE: ICD-10-CM

## 2020-09-30 DIAGNOSIS — Z95.1 S/P CABG X 2: ICD-10-CM

## 2020-09-30 DIAGNOSIS — Z23 NEEDS FLU SHOT: ICD-10-CM

## 2020-09-30 DIAGNOSIS — I11.9 HYPERTENSIVE HEART DISEASE WITHOUT CONGESTIVE HEART FAILURE: ICD-10-CM

## 2020-09-30 DIAGNOSIS — E11.22 TYPE 2 DIABETES MELLITUS WITH STAGE 3A CHRONIC KIDNEY DISEASE, WITHOUT LONG-TERM CURRENT USE OF INSULIN (HCC): ICD-10-CM

## 2020-09-30 DIAGNOSIS — C61 PROSTATE CANCER (HCC): ICD-10-CM

## 2020-09-30 DIAGNOSIS — N18.31 TYPE 2 DIABETES MELLITUS WITH STAGE 3A CHRONIC KIDNEY DISEASE, WITHOUT LONG-TERM CURRENT USE OF INSULIN (HCC): ICD-10-CM

## 2020-09-30 DIAGNOSIS — M1A.09X0 IDIOPATHIC CHRONIC GOUT OF MULTIPLE SITES WITHOUT TOPHUS: Chronic | ICD-10-CM

## 2020-09-30 DIAGNOSIS — I25.10 CAD IN NATIVE ARTERY: ICD-10-CM

## 2020-09-30 DIAGNOSIS — I10 ESSENTIAL HYPERTENSION: Primary | ICD-10-CM

## 2020-09-30 PROCEDURE — G0463 HOSPITAL OUTPT CLINIC VISIT: HCPCS | Performed by: INTERNAL MEDICINE

## 2020-09-30 PROCEDURE — G8754 DIAS BP LESS 90: HCPCS | Performed by: INTERNAL MEDICINE

## 2020-09-30 PROCEDURE — G8752 SYS BP LESS 140: HCPCS | Performed by: INTERNAL MEDICINE

## 2020-09-30 PROCEDURE — G8510 SCR DEP NEG, NO PLAN REQD: HCPCS | Performed by: INTERNAL MEDICINE

## 2020-09-30 PROCEDURE — 1101F PT FALLS ASSESS-DOCD LE1/YR: CPT | Performed by: INTERNAL MEDICINE

## 2020-09-30 PROCEDURE — 90694 VACC AIIV4 NO PRSRV 0.5ML IM: CPT

## 2020-09-30 PROCEDURE — 99214 OFFICE O/P EST MOD 30 MIN: CPT | Performed by: INTERNAL MEDICINE

## 2020-09-30 PROCEDURE — G8427 DOCREV CUR MEDS BY ELIG CLIN: HCPCS | Performed by: INTERNAL MEDICINE

## 2020-09-30 PROCEDURE — G8417 CALC BMI ABV UP PARAM F/U: HCPCS | Performed by: INTERNAL MEDICINE

## 2020-09-30 PROCEDURE — 3044F HG A1C LEVEL LT 7.0%: CPT | Performed by: INTERNAL MEDICINE

## 2020-09-30 PROCEDURE — 2022F DILAT RTA XM EVC RTNOPTHY: CPT | Performed by: INTERNAL MEDICINE

## 2020-09-30 PROCEDURE — 3017F COLORECTAL CA SCREEN DOC REV: CPT | Performed by: INTERNAL MEDICINE

## 2020-09-30 PROCEDURE — G8536 NO DOC ELDER MAL SCRN: HCPCS | Performed by: INTERNAL MEDICINE

## 2020-09-30 NOTE — PROGRESS NOTES
Francesco Wolf 1950 male who presents for routine immunizations. Patient denies any symptoms , reactions or allergies that would exclude them from being immunized today. Risks and adverse reactions were discussed and the VIS was given to them. All questions were addressed. Order placed for High Dose Influenza,  per Verbal Order from  with read back. Patient was observed for 15 min post injection. There were no reactions observed.     Chinedu Murdock LPN

## 2020-10-03 VITALS
HEIGHT: 73 IN | BODY MASS INDEX: 28.76 KG/M2 | TEMPERATURE: 97.7 F | DIASTOLIC BLOOD PRESSURE: 60 MMHG | RESPIRATION RATE: 16 BRPM | HEART RATE: 60 BPM | WEIGHT: 217 LBS | OXYGEN SATURATION: 98 % | SYSTOLIC BLOOD PRESSURE: 132 MMHG

## 2020-10-03 NOTE — PROGRESS NOTES
HPI:   Vince Espino is a 69y.o. year old male who presents for a routine visit. He has a history of hypertension, hyperlipidemia, ASHD s/p CABG, aortic stenosis s/p AVR, diabetes mellitus, chronic renal disease stage 3, prostate cancer, and gout. He reports that he is doing generally well. He states that he has been wearing a mask when outside although questions whether the pandemic is truly serious. He states that he is doing reasonably well. His weight has decreased 15 pounds since 3/2020, but he states that he is eating less intentionally. He had a follow-up visit with Dr. Jason Snyder and had an echocardiogram (9/18/2020) which showed normal LV size and function (EF 55-60%), AV prosthetic functioning normally with mean gradient of 7.2mmHg and MARY 1.8 cm2. Moderate LAE, and mild MR. He is otherwise without specific complaints and feeling generally well. In 6/2017, he was noted to have an elevated PSA of 6.0, which was confirmed on repeat to be 6.6. He was referred to Dr. Tosha Grimes and PSA was again repeated and found to be increased at 7.24. He underwent TRUS biopsy on 8/17/2017, which showed [id number] Kaylynn Grade 7 (3 + 4) adenocarcinoma of the prostate in 3/12 cores, 2-10% of each core. Options for management were discussed and he selected active surveillance. Plans were for repeat PSA and MRI prostate in 6 months (due 2/2018). However, the patient never had tests performed and he did not follow-up as discussed. On 10/4/2018, at his routine visit, a PSA level was obtained and was found to have increased to 11.3, and he was advised to follow-up with Dr. Tosha Grimes. He underwent a prostate MRI 3T at [facility] on 12/31/2018 showing several peripheral zone PI-RADS 4 observations: right posterolateral peripheral zone at the mid gland/apex and anterior peripheral zone at the apex bilaterally.  The former exhibits questionable capsular bulging laterally, but not a definitive appearance for extracapsular extension. He had a follow-up appointment with Dr. Fanny Navas on 1/11/2019 and decision made to proceed with EBRT. He had a staging bone scan (1/23/2019) which was negative for osseous metastases, and a CT abdomen and pelvis (1/23/2019) which showed no adenopathy of evidence of metastases, mild hepatic steatosis, aortic atherosclerosis, and cholelithiasis. He met with Dr. Luis Summers on 1/30/2019 and decided to proceed with EBRT as definitive treatment for his prostate cancer, receiving his last treatment on 5/6/2019. He has a history of hypertension, hyperlipidemia, ASHD, and aortic stenosis. In 10/2014, he presented with progressive chest pain and shortness of breath and an echocardiogram was obtained (10/16/2014) showing normal LV function (EF 55-60%), no RWMA, grade 1 diastolic dysfunction, mild LAE, and severe AS (mean gradient 30 mmHg, peak 67 mmHg, and AV area 0.8 cm2). He was referred to see Dr. Caleb Mathis and underwent cardiac catheterization (11/6/2014) showing 100% RCA (distal collaterals from left), 70-80% distal LAD, 80% proximal LCx, inferior basal hypokinesis, and EF 55%. On 11/13/2014, he underwent 2 vessel CABG (LIMA to LAD and SVG to OM1) and AV replacement with a bioprosthetic 23 mm Marifer Barbosa Magna pericardial valve by Dr. Donny Lemos. He has done well since that time and remains asymptomatic. He denies any chest pain, shortness of breath at rest or with exertion, palpitations, lightheadedness, or edema. His current regimen includes aspirin, metoprolol, lisinopril, and moderate intensity dose atorvastatin. He is followed by Dr. Caleb Mathis. He had a repeat echocardiogram (8/21/2017) showing normal LV function (EF 55-60%), no RWMA, mild MIL, and normally functioning bioprosthetic valve with peak gradient 17 mmHg, mean gradient 10 mmHg, and valve area 1.86 cm2. He has a history of diabetes mellitus, which has not required treatment with medication.  Review of his record shows that his Hba1c has remained between 5.7-6.3 since 2011. He denies any polyuria, polydipsia, nocturia, or blurry vision, and has no history of retinopathy or neuropathy. He does have evidence of chronic renal disease, stage 3, with baseline creatinine 1.22-1.37/ eGFR 55-59 since 11/2014. He had been having regular eye exams with Dr. Paresh Sheikh, although does not recall the date of his last exam and is overdue. He has a history of gout, but has not had a flare in many years since being treated with allopurinol. He has never had a screening colonoscopy. He did undergo Cologuard testing in 11/2017 which was negative. He denies any abdominal pain, nausea, vomiting, melena, hematochezia, or change in bowel movements. Past Medical History:   Diagnosis Date    Aortic stenosis     s/p AVR    Aortic stenosis, severe 10/16/2014    Echocardiogram EF 60% peak gradient 67 mmHg, mean gradient 30 mmHg, MARY 0.8 cm    ASHD (arteriosclerotic heart disease)     Chronic renal disease, stage III (HCC)     [facility] mellitus) ([facility]     Gout     HCD (hypertensive cardiovascular disease)     History of echocardiogram 10/16/2014    EF 55-60%. No RWMA. Mild LVH. Gr 1 DDfx. Mild LAE. Severe AS (mean grad 30 mmHg).  Hyperlipidemia     Hypertension     Prostate cancer ([facility] [address] 08/17/2017    [id number] Kaylynn Grade 7 (3 + 4) adenocarcinoma of the prostate in 3/12 cores, 2-10% of each core; PSA 7.24. Dr. Ryder Gill.  S/P AVR (aortic valve replacement) 11/13/2014    #23 mm Marifer Barbosa Magna pericardial valve. Dr. Aracely Napier.  S/P CABG x 2 11/13/2014    LIMA to LAD, SVG to OM1. Dr. Aracely Napier.  S/P cardiac catheterization 11/06/2014    RCA dom. mRCA 100%. LM patent. dLAD 75% x 2. pCX 80% (ELENA-3). LVEDP 14 mmHg. EF 50-55%. Severe inferobasal hypk. Severe AS. AV replacement & CABG recommended.      Past Surgical History:   Procedure Laterality Date    HX APPENDECTOMY       Current Outpatient Medications   Medication Sig    amLODIPine (NORVASC) 10 mg tablet Take 1 Tab by mouth daily.  atorvastatin (LIPITOR) 20 mg tablet Take 1 Tab by mouth daily.  lisinopriL (PRINIVIL, ZESTRIL) 20 mg tablet Take 1 Tab by mouth daily.  acetaminophen (Tylenol Extra Strength) 500 mg tablet Take  by mouth every six (6) hours as needed for Pain.  allopurinoL (ZYLOPRIM) 100 mg tablet Take 1 Tab by mouth daily.  metoprolol succinate (TOPROL-XL) 50 mg XL tablet Take 1 Tab by mouth daily.  cyanocobalamin, vitamin B-12, 1,000 mcg lozg 1,000 mcg by SubLINGual route daily.  ascorbic acid, vitamin C, (Vitamin C) 500 mg tablet Take  by mouth.  sildenafil citrate (VIAGRA) 100 mg tablet Take 1 Tab by mouth as needed.  docusate sodium (Colace) 100 mg capsule Take 100 mg by mouth daily as needed.  cholecalciferol, vitamin D3, 2,000 unit tab Take  by mouth daily.  aspirin delayed-release 81 mg tablet Take 1 tablet by mouth daily. No current facility-administered medications for this visit. Allergies and Intolerances:   No Known Allergies     Family History:   Family History   Problem Relation Age of Onset   Toyin Saucedo COPD Father     Other Father         pneumoconiosis    Lung Disease Father     Other Mother         meigs syndrome     Social History:   He  reports that he has never smoked. He has never used smokeless tobacco. He reports that he drinks approximately one case of beer per week. He is  with two adult children. He is retired from working in the [employer]. He started as a , and then became an .   Social History     Substance and Sexual Activity   Alcohol Use Yes    Alcohol/week: 12.0 standard drinks    Types: 12 Cans of beer per week     Immunization History:  Immunization History   Administered Date(s) Administered    Influenza High Dose Vaccine PF 10/10/2017, 10/26/2018, 11/09/2019    Influenza Vaccine 10/14/2014    Influenza Vaccine (Tri) Adjuvanted (>65 Yrs FLUAD TRI [id number]) 10/04/2018, 11/09/2019    Influenza Vaccine Split 09/29/2011    Influenza Vaccine Whole 09/14/2010    Influenza, Quadrivalent, Adjuvanted (>65 Yrs FLUAD QUAD [id number]) 09/30/2020    Pneumococcal Conjugate (PCV-13) 06/29/2017    Pneumococcal Polysaccharide (PPSV-23) 10/14/2014    Td 01/01/2008    Tdap 01/12/2018       Review of Systems:   As above included in HPI. Otherwise 11 point review of systems negative including constitutional, skin, HENT, eyes, respiratory, cardiovascular, gastrointestinal, genitourinary, musculoskeletal, endocrine, hematologic, allergy, and neurologic. Physical:   Vitals:   BP: 132/60    HR: 60  WT: 217 lb (98.4 kg)  BMI:  28.63 kg/m2    Exam:   Pt appears well; alert and oriented x 3; appropriate affect. HEENT: PERRLA, anicteric, oropharynx clear,  no JVD, adenopathy or thyromegaly. No carotid bruits or radiated murmur. Lungs: clear to auscultation, no wheezes, rhonchi, or rales. Heart: irregular rate and rhythm. No murmur, rubs, gallops  Abdomen: soft, nontender, nondistended, normal bowel sounds, no hepatosplenomegaly or masses. Extremities: without edema. Review of Data:  Labs:  Hospital Outpatient Visit on 09/23/2020   Component Date Value Ref Range Status    WBC 09/23/2020 6.9  4.6 - 13.2 K/uL Final    RBC 09/23/2020 3.90* 4.70 - 5.50 M/uL Final    HGB 09/23/2020 13.0  13.0 - 16.0 g/dL Final    HCT 09/23/2020 39.8  36.0 - 48.0 % Final    MCV 09/23/2020 102.1* 74.0 - 97.0 FL Final    MCH 09/23/2020 33.3  24.0 - 34.0 PG Final    MCHC 09/23/2020 32.7  31.0 - 37.0 g/dL Final    RDW 09/23/2020 13.3  11.6 - 14.5 % Final    PLATELET 04/57/8647 218  135 - 420 K/uL Final    MPV 09/23/2020 9.6  9.2 - 11.8 FL Final    NEUTROPHILS 09/23/2020 71  40 - 73 % Final    LYMPHOCYTES 09/23/2020 13* 21 - 52 % Final    MONOCYTES 09/23/2020 9  3 - 10 % Final    EOSINOPHILS 09/23/2020 7* 0 - 5 % Final    BASOPHILS 09/23/2020 0  0 - 2 % Final    ABS.  NEUTROPHILS 09/23/2020 4.9  1.8 - 8.0 K/UL Final    ABS. LYMPHOCYTES 09/23/2020 0.9  0.9 - 3.6 K/UL Final    ABS. MONOCYTES 09/23/2020 0.7  0.05 - 1.2 K/UL Final    ABS. EOSINOPHILS 09/23/2020 0.5* 0.0 - 0.4 K/UL Final    ABS. BASOPHILS 09/23/2020 0.0  0.0 - 0.1 K/UL Final    DF 09/23/2020 AUTOMATED    Final    Hemoglobin A1c 09/23/2020 5.3  4.2 - 5.6 % Final    Est. average glucose 09/23/2020 105  mg/dL Final    LIPID PROFILE 09/23/2020        Final    Cholesterol, total 09/23/2020 137  <200 MG/DL Final    Triglyceride 09/23/2020 72  <150 MG/DL Final    HDL Cholesterol 09/23/2020 70* 40 - 60 MG/DL Final    LDL, calculated 09/23/2020 52.6  0 - 100 MG/DL Final    VLDL, calculated 09/23/2020 14.4  MG/DL Final    CHOL/HDL Ratio 09/23/2020 2.0  0 - 5.0   Final    Magnesium 09/23/2020 2.2  1.6 - 2.6 mg/dL Final    Sodium 09/23/2020 137  136 - 145 mmol/L Final    Potassium 09/23/2020 4.7  3.5 - 5.5 mmol/L Final    Chloride 09/23/2020 105  100 - 111 mmol/L Final    CO2 09/23/2020 25  21 - 32 mmol/L Final    Anion gap 09/23/2020 7  3.0 - 18 mmol/L Final    Glucose 09/23/2020 110* 74 - 99 mg/dL Final    BUN 09/23/2020 20* 7.0 - 18 MG/DL Final    Creatinine 09/23/2020 1.48* 0.6 - 1.3 MG/DL Final    BUN/Creatinine ratio 09/23/2020 14  12 - 20   Final    GFR est AA 09/23/2020 57* >60 ml/min/1.73m2 Final    GFR est non-AA 09/23/2020 47* >60 ml/min/1.73m2 Final    Calcium 09/23/2020 9.2  8.5 - 10.1 MG/DL Final    Bilirubin, total 09/23/2020 0.7  0.2 - 1.0 MG/DL Final    ALT (SGPT) 09/23/2020 25  16 - 61 U/L Final    AST (SGOT) 09/23/2020 17  10 - 38 U/L Final    Alk.  phosphatase 09/23/2020 60  45 - 117 U/L Final    Protein, total 09/23/2020 7.2  6.4 - 8.2 g/dL Final    Albumin 09/23/2020 4.0  3.4 - 5.0 g/dL Final    Globulin 09/23/2020 3.2  2.0 - 4.0 g/dL Final    A-G Ratio 09/23/2020 1.3  0.8 - 1.7   Final   Hospital Outpatient Visit on 09/18/2020   Component Date Value Ref Range Status    IVSd 09/18/2020 1.51* 0.6 - 1.0 cm Final    LVIDd 09/18/2020 4.36  4.2 - 5.9 cm Final    LVIDs 09/18/2020 3.27  cm Final    LVOT d 09/18/2020 2.02  cm Final    LVPWd 09/18/2020 1.53* 0.6 - 1.0 cm Final    LVOT Cardiac Output 09/18/2020 5.03  liter/minute Final    LVOT Peak Gradient 09/18/2020 4.28  mmHg Final    Left Ventricular Outflow Tract Vonnie* 09/18/2020 2.37  mmHg Final    LVOT SV 09/18/2020 80.2  mL Final    LVOT Peak Velocity 09/18/2020 103.49  cm/s Final    LVOT VTI 09/18/2020 25.06  cm Final    LA Volume 09/18/2020 94.39  18 - 58 mL Final    LA Area 4C 09/18/2020 24.95  cm2 Final    LA Vol 2C 09/18/2020 94.35* 18 - 58 mL Final    LA Vol 4C 09/18/2020 74.95* 18 - 58 mL Final    Aortic Valve Area by Continuity of* 09/18/2020 1.79  cm2 Final    Aortic Valve Area by Continuity of* 09/18/2020 2.00  cm2 Final    AoV PG 09/18/2020 13.61  mmHg Final    Aortic Valve Systolic Mean Gradient 48/94/6502 7.22  mmHg Final    Aortic Valve Systolic Peak Velocity 48/98/1406 184.44  cm/s Final    Aortic valve mean velocity 09/18/2020 122.64  cm/s Final    AoV VTI 09/18/2020 40.04  cm Final    MV A Nicola 09/18/2020 99.67  cm/s Final    Mitral Valve E Wave Deceleration T* 09/18/2020 0.30  s Final    MV E Nicola 09/18/2020 84.77  cm/s Final    MV E/A 09/18/2020 0.85   Final    E/E' lateral 09/18/2020 7.39   Final    E/E' septal 09/18/2020 8.60   Final    LV E' Lateral Velocity 09/18/2020 11.47  cm/s Final    LV E' Septal Velocity 09/18/2020 9.86  cm/s Final    Triscuspid Valve Regurgitation Pea* 09/18/2020 10.11  mmHg Final    TR Max Velocity 09/18/2020 158.97  cm/s Final    Ao Root D 09/18/2020 3.83  cm Final    Pulmonary Vein \"A\" Wave Velocity 09/18/2020 24.63  cm/s Final    P Vein A Dur 09/18/2020 0.09  s Final    LV Mass AL 09/18/2020 268.8  88 - 224 g Final    LV Mass AL Index 09/18/2020 121.4  49 - 115 g/m2 Final    E/E' ratio (averaged) 09/18/2020 7.99   Final    LA Vol Index 09/18/2020 42.62  16 - 28 ml/m2 Final    LA Vol Index 09/18/2020 42.60  16 - 28 ml/m2 Final    LA Vol Index 09/18/2020 33.84  16 - 28 ml/m2 Final    MARY/BSA Pk Nicola 09/18/2020 0.8  cm2/m2 Final    MARY/BSA VTI 09/18/2020 0.9  cm2/m2 Final       EKG (8/8/2019) sinus rhythm at 68 bpm. Normal intervals. Lack of R waves in V2 and V3 likely lead placement. No ischemic changes when compared with prior in 8/2018. Health Maintenance:  Screening:    Colorectal: Cologuard (11/2/2017) negative. Due 11/2020. Depression: none   DM (HbA1c/FPG): HbA1c 5.8 (9/2020)   Hepatitis C: negative (1/2018)   Falls: none   DEXA: N/A   PSA/KIEL: PSA 1.1 (11/2019). Followed by Dr. Keshav Cox and Dr. Benitez Enriquez   Glaucoma: regular eye exams with Dr. Kayla Tillman (last 6/2019)   Smoking: none   Vitamin D: 34.5 (6/2020)   Medicare Wellness: 11/19/2019    Impression:  Patient Active Problem List   Diagnosis Code    Hyperlipidemia E78.5    Gout M10.9    Vitamin D deficiency E55.9    CAD in native artery I25.10    Hypertensive heart disease without congestive heart failure I11.9    S/P CABG x 2 Z95.1    S/P AVR (aortic valve replacement) Z95.2    Type 2 diabetes mellitus with stage 3 chronic kidney disease, without long-term current use of insulin (HCC) E11.22, N18.3    Stage 3 chronic kidney disease N18.3    [facility] C61    Essential hypertension I10    Vitamin B12 deficiency E53.8    Microalbuminuria R80.9       Plan:  1. Hypertension. Blood pressure well controlled today on current regimen of metoprolol succinate 50 mg daily, lisinopril 20 mg daily, and amlodipine 10 mg daily. Heart rate remains 70-80 so was able to tolerate increase in metoprolol dose. Had discontinued hydrochlorothiazide due to hypotension during EBRT and worsening renal function. Now renal function improved with creatinine 1.12/ eGFR >60. Advised to continue to monitor blood pressure. Will continue to follow. 2. Prostate cancer. [id number] Kaylynn Grade 7 (3 + 4) adenocarcinoma.  Patient chose active surveillance rather than definitive treatment. Repeat PSA and MRI prostate ordered for 2/2018, but he did not follow-up. Repeat PSA obtained and had increased from 7.24 at diagnosis to 11.3. Referred for follow-up with Dr. Joanna Sharpe, and MRI prostate performed on 12/31/2018 showing several peripheral zone PI-RADS 4 with one area showing questionable capsular bulging laterally, but not a definitive appearance for extracapsular extension. He decided to proceed with EBRT, and underwent fiducial marker and [employer] placement on 3/11/2019 followed by weekly treatments from 3/27-5/6/2019. Tolerated well except for some fatigue which has improved. Being followed by Dr. Leda Merchant and Dr. Joanna Sharpe. PSA 1.1 (11/2019) and testosterone 289, indicative of no evidence of active disease. Follow-up scheduled with radiation oncology in 11/2020. Not wishing to schedule with Dr. Joanna Sharpe currently. Will readdress next visit. 3. Hyperlipidemia. On moderate intensity dose atorvastatin with LDL 35 and HDL 48, indicative of excellent control. Changed from simvastatin given treatment with amlodipine. Follow. 4. ASHD s/p 2 vessel CABG. Remains asymptomatic. On aspirin, metoprolol, and statin. Followed by Dr. Ye Rose. 5. Aortic stenosis s/p bioprosthetic AVR. Remains asymptomatic, and underwent repeat echocardiogram in 9/2020 with good functioning valve. Being followed by Dr. Ye Rose. 6. Diabetes mellitus. Patient with diagnosis of diabetes mellitus, but review of record shows HbA1c ranging from 5.7-6.3 since 2011 and -112 during that time period. HbA1c remains increased today at 6.0. On Ace-I and statin. Continue follow-up with Dr. Waqar Crow for annual eye exams. Foot exam normal today. Urine microalbumin/ creatinine ratio normalized today (12 mg/g). Does have evidence of chronic renal disease stage 3. Emphasized importance of lifestyle modifications, including diet, exercise, and weight loss.   7. Chronic renal disease, stage 3. Most likely secondary to long standing hypertension and prediabetes, although also appears to have developed at time of CABG and AVR so may be related to hypoperfusion during surgery. On statin and Ace-I. Discussed importance of avoiding NSAIDS and prerenal status. Will need to follow. 8. Anemia, macrocytosis. Mild anemia developed during radiation therapy. Macrocytosis evident. Vitamin B12 level very low last visit and started on Vitamin B12 supplement 1000 mcg SL daily. Level remains normal indicative of compliance, and anemia now resolved. Also advised to decrease alcohol consumption. Will monitor. 9. Gout. Asymptomatic. On allopurinol. No recent flares. 10. Health maintenance. Will give influenza vaccine today. Given script for Shingrix vaccine but not yet obtained. Completed pneumococcal series. Given script for Tdap previously, but patient did not obtain. Other immunizations up to date. Completed Cologuard for colorectal cancer screening. Due for repeat in 11/2020. Will order next visit. Completed eye exam with Dr. Grayson Jansen. Discussed decreasing alcohol consumption. Vitamin D level low and advised to increase vitamin D supplement. Medicare wellness visit up to date. .     Patient understands recommendations and agrees with plan. Follow-up in 3 months.

## 2020-10-08 ENCOUNTER — TELEPHONE (OUTPATIENT)
Dept: CARDIOLOGY CLINIC | Age: 70
End: 2020-10-08

## 2020-10-08 NOTE — TELEPHONE ENCOUNTER
----- Message from [id number] Romeo Hines MD sent at 10/6/2020  1:40 PM EDT ----- 
Echo looks fine. No significant changes. ----- Message ----- From: Lydia Ugarte LPN Sent: 9/21/2020   8:28 AM EDT To: [id number] Romeo Hines MD 
 
Per your last note\" Mr. Brittni Waite initially presented with chest pains, and significant aortic stenosis with mean gradient of 30 mmHg, peak gradient of 67 mmHg, MARY of 0.8 cm². He ultimately underwent open-heart surgery in November of 2014. He had coronary artery bypass graft surgery with LIMA to LAD and SVG to OM1. He also had aortic valve replacement with #23 Marifer-Barbosa Magna pericardial valve. His echocardiogram from August 2017 showed normal LV function with well-functioning Marifer-Barbosa magna pericardial bioprosthetic aortic valve with mean gradient of 10 mmHg, and MARY of 1.86 cm². 
  
· CAD:    Symptomatically stable. · Bioprosthetic AVR: Clinically stable. Will repeat echocardiogram within the next 6 weeks. Last time he had echocardiogram was in 2017. · BP:   Well controlled. · HR:    Stable. · CHF:    There is no evidence of decompensated CHF noted. · Weight:    His weight today is 214 pounds. · Cholesterol:   Target LDL <70. Lipitor 20. · Anti-platelet:   Remains on ASA.

## 2020-10-22 RX ORDER — METOPROLOL SUCCINATE 50 MG/1
TABLET, EXTENDED RELEASE ORAL
Qty: 90 TAB | Refills: 3 | Status: SHIPPED | OUTPATIENT
Start: 2020-10-22 | End: 2021-10-18

## 2020-11-11 ENCOUNTER — TRANSCRIBE ORDER (OUTPATIENT)
Dept: RADIATION THERAPY | Age: 70
End: 2020-11-11

## 2020-11-11 DIAGNOSIS — C61 MALIGNANT NEOPLASM OF PROSTATE (HCC): Primary | ICD-10-CM

## 2020-11-16 ENCOUNTER — HOSPITAL ENCOUNTER (OUTPATIENT)
Dept: LAB | Age: 70
Discharge: HOME | End: 2020-11-16
Payer: MEDICARE

## 2020-11-16 DIAGNOSIS — C61 MALIGNANT NEOPLASM OF PROSTATE (HCC): ICD-10-CM

## 2020-11-16 LAB — PSA SERPL-MCNC: 0.4 NG/ML (ref 0–4)

## 2020-11-16 PROCEDURE — 84153 ASSAY OF PSA TOTAL: CPT

## 2020-11-16 PROCEDURE — 84403 ASSAY OF TOTAL TESTOSTERONE: CPT

## 2020-11-16 PROCEDURE — 36415 COLL VENOUS BLD VENIPUNCTURE: CPT

## 2020-11-17 LAB — TESTOST SERPL-MCNC: 303 NG/DL (ref 264–916)

## 2020-11-20 ENCOUNTER — HOSPITAL ENCOUNTER (OUTPATIENT)
Dept: RADIATION THERAPY | Age: 70
Discharge: HOME | End: 2020-11-20
Payer: MEDICARE

## 2020-11-20 PROCEDURE — 99211 OFF/OP EST MAY X REQ PHY/QHP: CPT

## 2020-12-23 ENCOUNTER — HOSPITAL ENCOUNTER (OUTPATIENT)
Dept: LAB | Age: 70
Discharge: HOME | End: 2020-12-23
Payer: MEDICARE

## 2020-12-23 ENCOUNTER — APPOINTMENT (OUTPATIENT)
Dept: INTERNAL MEDICINE CLINIC | Age: 70
End: 2020-12-23

## 2020-12-23 DIAGNOSIS — N18.31 TYPE 2 DIABETES MELLITUS WITH STAGE 3A CHRONIC KIDNEY DISEASE, WITHOUT LONG-TERM CURRENT USE OF INSULIN (HCC): ICD-10-CM

## 2020-12-23 DIAGNOSIS — I25.10 CAD IN NATIVE ARTERY: ICD-10-CM

## 2020-12-23 DIAGNOSIS — I11.9 HYPERTENSIVE HEART DISEASE WITHOUT CONGESTIVE HEART FAILURE: ICD-10-CM

## 2020-12-23 DIAGNOSIS — E78.5 HYPERLIPIDEMIA, UNSPECIFIED HYPERLIPIDEMIA TYPE: ICD-10-CM

## 2020-12-23 DIAGNOSIS — E11.22 TYPE 2 DIABETES MELLITUS WITH STAGE 3A CHRONIC KIDNEY DISEASE, WITHOUT LONG-TERM CURRENT USE OF INSULIN (HCC): ICD-10-CM

## 2020-12-23 DIAGNOSIS — E53.8 VITAMIN B12 DEFICIENCY: ICD-10-CM

## 2020-12-23 DIAGNOSIS — I10 ESSENTIAL HYPERTENSION: ICD-10-CM

## 2020-12-23 DIAGNOSIS — Z95.1 S/P CABG X 2: ICD-10-CM

## 2020-12-23 DIAGNOSIS — Z23 NEEDS FLU SHOT: ICD-10-CM

## 2020-12-23 DIAGNOSIS — C61 PROSTATE CANCER (HCC): ICD-10-CM

## 2020-12-23 DIAGNOSIS — M1A.09X0 IDIOPATHIC CHRONIC GOUT OF MULTIPLE SITES WITHOUT TOPHUS: Chronic | ICD-10-CM

## 2020-12-23 DIAGNOSIS — Z95.2 S/P AVR (AORTIC VALVE REPLACEMENT): ICD-10-CM

## 2020-12-23 LAB
ALBUMIN SERPL-MCNC: 4.1 G/DL (ref 3.4–5)
ALBUMIN/GLOB SERPL: 1.2 {RATIO} (ref 0.8–1.7)
ALP SERPL-CCNC: 68 U/L (ref 45–117)
ALT SERPL-CCNC: 37 U/L (ref 16–61)
ANION GAP SERPL CALC-SCNC: 8 MMOL/L (ref 3–18)
AST SERPL-CCNC: 21 U/L (ref 10–38)
BASOPHILS # BLD: 0 K/UL (ref 0–0.1)
BASOPHILS NFR BLD: 0 % (ref 0–2)
BILIRUB SERPL-MCNC: 1 MG/DL (ref 0.2–1)
BUN SERPL-MCNC: 22 MG/DL (ref 7–18)
BUN/CREAT SERPL: 16 (ref 12–20)
CALCIUM SERPL-MCNC: 9.1 MG/DL (ref 8.5–10.1)
CHLORIDE SERPL-SCNC: 104 MMOL/L (ref 100–111)
CHOLEST SERPL-MCNC: 167 MG/DL
CO2 SERPL-SCNC: 24 MMOL/L (ref 21–32)
CREAT SERPL-MCNC: 1.38 MG/DL (ref 0.6–1.3)
CREAT UR-MCNC: 76 MG/DL (ref 30–125)
DIFFERENTIAL METHOD BLD: ABNORMAL
EOSINOPHIL # BLD: 0.5 K/UL (ref 0–0.4)
EOSINOPHIL NFR BLD: 7 % (ref 0–5)
ERYTHROCYTE [DISTWIDTH] IN BLOOD BY AUTOMATED COUNT: 13.7 % (ref 11.6–14.5)
EST. AVERAGE GLUCOSE BLD GHB EST-MCNC: 120 MG/DL
GLOBULIN SER CALC-MCNC: 3.5 G/DL (ref 2–4)
GLUCOSE SERPL-MCNC: 118 MG/DL (ref 74–99)
HBA1C MFR BLD: 5.8 % (ref 4.2–5.6)
HCT VFR BLD AUTO: 38.8 % (ref 36–48)
HDLC SERPL-MCNC: 70 MG/DL (ref 40–60)
HDLC SERPL: 2.4 {RATIO} (ref 0–5)
HGB BLD-MCNC: 12.5 G/DL (ref 13–16)
LDLC SERPL CALC-MCNC: 78.8 MG/DL (ref 0–100)
LIPID PROFILE,FLP: ABNORMAL
LYMPHOCYTES # BLD: 1.2 K/UL (ref 0.9–3.6)
LYMPHOCYTES NFR BLD: 17 % (ref 21–52)
MCH RBC QN AUTO: 33.2 PG (ref 24–34)
MCHC RBC AUTO-ENTMCNC: 32.2 G/DL (ref 31–37)
MCV RBC AUTO: 102.9 FL (ref 74–97)
MICROALBUMIN UR-MCNC: 0.85 MG/DL (ref 0–3)
MICROALBUMIN/CREAT UR-RTO: 11 MG/G (ref 0–30)
MONOCYTES # BLD: 0.7 K/UL (ref 0.05–1.2)
MONOCYTES NFR BLD: 10 % (ref 3–10)
NEUTS SEG # BLD: 4.4 K/UL (ref 1.8–8)
NEUTS SEG NFR BLD: 66 % (ref 40–73)
PLATELET # BLD AUTO: 188 K/UL (ref 135–420)
PMV BLD AUTO: 9 FL (ref 9.2–11.8)
POTASSIUM SERPL-SCNC: 5.1 MMOL/L (ref 3.5–5.5)
PROT SERPL-MCNC: 7.6 G/DL (ref 6.4–8.2)
RBC # BLD AUTO: 3.77 M/UL (ref 4.7–5.5)
SODIUM SERPL-SCNC: 136 MMOL/L (ref 136–145)
TRIGL SERPL-MCNC: 91 MG/DL (ref ?–150)
VLDLC SERPL CALC-MCNC: 18.2 MG/DL
WBC # BLD AUTO: 6.7 K/UL (ref 4.6–13.2)

## 2020-12-23 PROCEDURE — 83036 HEMOGLOBIN GLYCOSYLATED A1C: CPT

## 2020-12-23 PROCEDURE — 85025 COMPLETE CBC W/AUTO DIFF WBC: CPT

## 2020-12-23 PROCEDURE — 80061 LIPID PANEL: CPT

## 2020-12-23 PROCEDURE — 80053 COMPREHEN METABOLIC PANEL: CPT

## 2020-12-23 PROCEDURE — 82043 UR ALBUMIN QUANTITATIVE: CPT

## 2020-12-23 PROCEDURE — 36415 COLL VENOUS BLD VENIPUNCTURE: CPT

## 2020-12-26 RX ORDER — ALLOPURINOL 100 MG/1
TABLET ORAL
Qty: 90 TAB | Refills: 3 | Status: SHIPPED | OUTPATIENT
Start: 2020-12-26 | End: 2021-12-02

## 2020-12-31 ENCOUNTER — OFFICE VISIT (OUTPATIENT)
Dept: INTERNAL MEDICINE CLINIC | Age: 70
End: 2020-12-31
Payer: MEDICARE

## 2020-12-31 VITALS
HEART RATE: 71 BPM | WEIGHT: 230 LBS | HEIGHT: 73 IN | BODY MASS INDEX: 30.48 KG/M2 | SYSTOLIC BLOOD PRESSURE: 132 MMHG | DIASTOLIC BLOOD PRESSURE: 60 MMHG | OXYGEN SATURATION: 99 % | TEMPERATURE: 97.9 F | RESPIRATION RATE: 16 BRPM

## 2020-12-31 DIAGNOSIS — E11.22 TYPE 2 DIABETES MELLITUS WITH STAGE 3A CHRONIC KIDNEY DISEASE, WITHOUT LONG-TERM CURRENT USE OF INSULIN (HCC): ICD-10-CM

## 2020-12-31 DIAGNOSIS — C61 PROSTATE CANCER (HCC): ICD-10-CM

## 2020-12-31 DIAGNOSIS — E55.9 VITAMIN D DEFICIENCY: ICD-10-CM

## 2020-12-31 DIAGNOSIS — Z95.1 S/P CABG X 2: ICD-10-CM

## 2020-12-31 DIAGNOSIS — I10 ESSENTIAL HYPERTENSION: Primary | ICD-10-CM

## 2020-12-31 DIAGNOSIS — E53.8 VITAMIN B12 DEFICIENCY: ICD-10-CM

## 2020-12-31 DIAGNOSIS — N18.31 TYPE 2 DIABETES MELLITUS WITH STAGE 3A CHRONIC KIDNEY DISEASE, WITHOUT LONG-TERM CURRENT USE OF INSULIN (HCC): ICD-10-CM

## 2020-12-31 DIAGNOSIS — M1A.09X0 IDIOPATHIC CHRONIC GOUT OF MULTIPLE SITES WITHOUT TOPHUS: Chronic | ICD-10-CM

## 2020-12-31 DIAGNOSIS — Z71.89 ADVANCED DIRECTIVES, COUNSELING/DISCUSSION: ICD-10-CM

## 2020-12-31 DIAGNOSIS — N18.31 STAGE 3A CHRONIC KIDNEY DISEASE (HCC): ICD-10-CM

## 2020-12-31 DIAGNOSIS — Z00.00 MEDICARE ANNUAL WELLNESS VISIT, SUBSEQUENT: ICD-10-CM

## 2020-12-31 DIAGNOSIS — Z13.31 SCREENING FOR DEPRESSION: ICD-10-CM

## 2020-12-31 DIAGNOSIS — Z12.11 COLON CANCER SCREENING: ICD-10-CM

## 2020-12-31 DIAGNOSIS — Z95.2 S/P AVR (AORTIC VALVE REPLACEMENT): ICD-10-CM

## 2020-12-31 DIAGNOSIS — R80.9 MICROALBUMINURIA: ICD-10-CM

## 2020-12-31 DIAGNOSIS — I25.10 CAD IN NATIVE ARTERY: ICD-10-CM

## 2020-12-31 DIAGNOSIS — E78.5 HYPERLIPIDEMIA, UNSPECIFIED HYPERLIPIDEMIA TYPE: ICD-10-CM

## 2020-12-31 PROCEDURE — 99497 ADVNCD CARE PLAN 30 MIN: CPT | Performed by: INTERNAL MEDICINE

## 2020-12-31 PROCEDURE — G8417 CALC BMI ABV UP PARAM F/U: HCPCS | Performed by: INTERNAL MEDICINE

## 2020-12-31 PROCEDURE — G8752 SYS BP LESS 140: HCPCS | Performed by: INTERNAL MEDICINE

## 2020-12-31 PROCEDURE — 3017F COLORECTAL CA SCREEN DOC REV: CPT | Performed by: INTERNAL MEDICINE

## 2020-12-31 PROCEDURE — 1101F PT FALLS ASSESS-DOCD LE1/YR: CPT | Performed by: INTERNAL MEDICINE

## 2020-12-31 PROCEDURE — 3044F HG A1C LEVEL LT 7.0%: CPT | Performed by: INTERNAL MEDICINE

## 2020-12-31 PROCEDURE — G8510 SCR DEP NEG, NO PLAN REQD: HCPCS | Performed by: INTERNAL MEDICINE

## 2020-12-31 PROCEDURE — G0463 HOSPITAL OUTPT CLINIC VISIT: HCPCS | Performed by: INTERNAL MEDICINE

## 2020-12-31 PROCEDURE — 2022F DILAT RTA XM EVC RTNOPTHY: CPT | Performed by: INTERNAL MEDICINE

## 2020-12-31 PROCEDURE — G8427 DOCREV CUR MEDS BY ELIG CLIN: HCPCS | Performed by: INTERNAL MEDICINE

## 2020-12-31 PROCEDURE — 99215 OFFICE O/P EST HI 40 MIN: CPT | Performed by: INTERNAL MEDICINE

## 2020-12-31 PROCEDURE — G8536 NO DOC ELDER MAL SCRN: HCPCS | Performed by: INTERNAL MEDICINE

## 2020-12-31 PROCEDURE — G8754 DIAS BP LESS 90: HCPCS | Performed by: INTERNAL MEDICINE

## 2020-12-31 PROCEDURE — G0439 PPPS, SUBSEQ VISIT: HCPCS | Performed by: INTERNAL MEDICINE

## 2020-12-31 NOTE — PROGRESS NOTES
This is the Subsequent Medicare Annual Wellness Exam, performed 12 months or more after the Initial AWV or the last Subsequent AWV    I have reviewed the patient's medical history in detail and updated the computerized patient record. Depression Risk Factor Screening:     3 most recent PHQ Screens 12/31/2020   Little interest or pleasure in doing things Not at all   Feeling down, depressed, irritable, or hopeless Not at all   Total Score PHQ 2 0       Alcohol Risk Screen    Do you average more than 1 drink per night or more than 7 drinks a week: No    In the past three months have you have had more than 4 drinks containing alcohol on one occasion: No        Functional Ability and Level of Safety:    Hearing: Hearing is mildly decreased. Activities of Daily Living: The home contains: no safety equipment. Patient does total self care      Ambulation: with no difficulty     Fall Risk:  Fall Risk Assessment, last 12 mths 12/31/2020   Able to walk? Yes   Fall in past 12 months? 0   Do you feel unsteady? 0   Are you worried about falling 0   Number of falls in past 12 months -   Fall with injury? -      Abuse Screen:  Patient is not abused       Cognitive Screening    Has your family/caregiver stated any concerns about your memory: no     Cognitive Screening: none performed    Assessment/Plan   Education and counseling provided:  Are appropriate based on today's review and evaluation  End-of-Life planning (with patient's consent)  Influenza Vaccine  Colorectal cancer screening tests  Cardiovascular screening blood test  Bone mass measurement (DEXA)  Screening for glaucoma  Diabetes screening test    Diagnoses and all orders for this visit:    1. Essential hypertension  -     CBC WITH AUTOMATED DIFF; Future  -     HEMOGLOBIN A1C WITH EAG; Future  -     LIPID PANEL; Future  -     MAGNESIUM; Future  -     METABOLIC PANEL, COMPREHENSIVE;  Future  -     MICROALBUMIN, UR, RAND W/ MICROALB/CREAT RATIO; Future  - TSH 3RD GENERATION; Future  -     URINALYSIS W/MICROSCOPIC; Future  -     VITAMIN D, 25 HYDROXY; Future  -     VITAMIN B12; Future    2. CAD in native artery  -     CBC WITH AUTOMATED DIFF; Future  -     HEMOGLOBIN A1C WITH EAG; Future  -     LIPID PANEL; Future  -     MAGNESIUM; Future  -     METABOLIC PANEL, COMPREHENSIVE; Future  -     MICROALBUMIN, UR, RAND W/ MICROALB/CREAT RATIO; Future  -     TSH 3RD GENERATION; Future  -     URINALYSIS W/MICROSCOPIC; Future  -     VITAMIN D, 25 HYDROXY; Future  -     VITAMIN B12; Future    3. Hyperlipidemia, unspecified hyperlipidemia type  -     CBC WITH AUTOMATED DIFF; Future  -     HEMOGLOBIN A1C WITH EAG; Future  -     LIPID PANEL; Future  -     MAGNESIUM; Future  -     METABOLIC PANEL, COMPREHENSIVE; Future  -     MICROALBUMIN, UR, RAND W/ MICROALB/CREAT RATIO; Future  -     TSH 3RD GENERATION; Future  -     URINALYSIS W/MICROSCOPIC; Future  -     VITAMIN D, 25 HYDROXY; Future  -     VITAMIN B12; Future    4. Type 2 diabetes mellitus with stage 3a chronic kidney disease, without long-term current use of insulin (HCC)  -     CBC WITH AUTOMATED DIFF; Future  -     HEMOGLOBIN A1C WITH EAG; Future  -     LIPID PANEL; Future  -     MAGNESIUM; Future  -     METABOLIC PANEL, COMPREHENSIVE; Future  -     MICROALBUMIN, UR, RAND W/ MICROALB/CREAT RATIO; Future  -     TSH 3RD GENERATION; Future  -     URINALYSIS W/MICROSCOPIC; Future  -     VITAMIN D, 25 HYDROXY; Future  -     VITAMIN B12; Future    5. Prostate cancer ([facility] [address]  -     CBC WITH AUTOMATED DIFF; Future  -     HEMOGLOBIN A1C WITH EAG; Future  -     LIPID PANEL; Future  -     MAGNESIUM; Future  -     METABOLIC PANEL, COMPREHENSIVE; Future  -     MICROALBUMIN, UR, RAND W/ MICROALB/CREAT RATIO; Future  -     TSH 3RD GENERATION; Future  -     URINALYSIS W/MICROSCOPIC; Future  -     VITAMIN D, 25 HYDROXY; Future  -     VITAMIN B12; Future    6. Stage 3a chronic kidney disease  -     CBC WITH AUTOMATED DIFF;  Future  - HEMOGLOBIN A1C WITH EAG; Future  -     LIPID PANEL; Future  -     MAGNESIUM; Future  -     METABOLIC PANEL, COMPREHENSIVE; Future  -     MICROALBUMIN, UR, RAND W/ MICROALB/CREAT RATIO; Future  -     TSH 3RD GENERATION; Future  -     URINALYSIS W/MICROSCOPIC; Future  -     VITAMIN D, 25 HYDROXY; Future  -     VITAMIN B12; Future    7. S/P AVR (aortic valve replacement)  -     CBC WITH AUTOMATED DIFF; Future  -     HEMOGLOBIN A1C WITH EAG; Future  -     LIPID PANEL; Future  -     MAGNESIUM; Future  -     METABOLIC PANEL, COMPREHENSIVE; Future  -     MICROALBUMIN, UR, RAND W/ MICROALB/CREAT RATIO; Future  -     TSH 3RD GENERATION; Future  -     URINALYSIS W/MICROSCOPIC; Future  -     VITAMIN D, 25 HYDROXY; Future  -     VITAMIN B12; Future    8. S/P CABG x 2  -     CBC WITH AUTOMATED DIFF; Future  -     HEMOGLOBIN A1C WITH EAG; Future  -     LIPID PANEL; Future  -     MAGNESIUM; Future  -     METABOLIC PANEL, COMPREHENSIVE; Future  -     MICROALBUMIN, UR, RAND W/ MICROALB/CREAT RATIO; Future  -     TSH 3RD GENERATION; Future  -     URINALYSIS W/MICROSCOPIC; Future  -     VITAMIN D, 25 HYDROXY; Future  -     VITAMIN B12; Future    9. Vitamin B12 deficiency  -     CBC WITH AUTOMATED DIFF; Future  -     HEMOGLOBIN A1C WITH EAG; Future  -     LIPID PANEL; Future  -     MAGNESIUM; Future  -     METABOLIC PANEL, COMPREHENSIVE; Future  -     MICROALBUMIN, UR, RAND W/ MICROALB/CREAT RATIO; Future  -     TSH 3RD GENERATION; Future  -     URINALYSIS W/MICROSCOPIC; Future  -     VITAMIN D, 25 HYDROXY; Future  -     VITAMIN B12; Future    10. Microalbuminuria  -     CBC WITH AUTOMATED DIFF; Future  -     HEMOGLOBIN A1C WITH EAG; Future  -     LIPID PANEL; Future  -     MAGNESIUM; Future  -     METABOLIC PANEL, COMPREHENSIVE; Future  -     MICROALBUMIN, UR, RAND W/ MICROALB/CREAT RATIO; Future  -     TSH 3RD GENERATION; Future  -     URINALYSIS W/MICROSCOPIC;  Future  -     VITAMIN D, 25 HYDROXY; Future  -     VITAMIN B12; Future    11. Idiopathic chronic gout of multiple sites without tophus  -     CBC WITH AUTOMATED DIFF; Future  -     HEMOGLOBIN A1C WITH EAG; Future  -     LIPID PANEL; Future  -     MAGNESIUM; Future  -     METABOLIC PANEL, COMPREHENSIVE; Future  -     MICROALBUMIN, UR, RAND W/ MICROALB/CREAT RATIO; Future  -     TSH 3RD GENERATION; Future  -     URINALYSIS W/MICROSCOPIC; Future  -     VITAMIN D, 25 HYDROXY; Future  -     VITAMIN B12; Future    12. Colon cancer screening  -     COLOGUARD TEST (FECAL DNA COLORECTAL CANCER SCREENING)  -     CBC WITH AUTOMATED DIFF; Future  -     HEMOGLOBIN A1C WITH EAG; Future  -     LIPID PANEL; Future  -     MAGNESIUM; Future  -     METABOLIC PANEL, COMPREHENSIVE; Future  -     MICROALBUMIN, UR, RAND W/ MICROALB/CREAT RATIO; Future  -     TSH 3RD GENERATION; Future  -     URINALYSIS W/MICROSCOPIC; Future  -     VITAMIN D, 25 HYDROXY; Future  -     VITAMIN B12; Future    13. Vitamin D deficiency  -     CBC WITH AUTOMATED DIFF; Future  -     HEMOGLOBIN A1C WITH EAG; Future  -     LIPID PANEL; Future  -     MAGNESIUM; Future  -     METABOLIC PANEL, COMPREHENSIVE; Future  -     MICROALBUMIN, UR, RAND W/ MICROALB/CREAT RATIO; Future  -     TSH 3RD GENERATION; Future  -     URINALYSIS W/MICROSCOPIC; Future  -     VITAMIN D, 25 HYDROXY; Future  -     VITAMIN B12; Future    14. Medicare annual wellness visit, subsequent  -     ADVANCE CARE PLANNING FIRST [address]    15. Advanced directives, counseling/discussion  -     ADVANCE CARE PLANNING FIRST 27 MINS    16. Screening for depression  -     [facility]   There are no preventive care reminders to display for this patient.     Patient Care Team   Patient Care Team:  Liz Miller MD as PCP - General (Internal Medicine)  Liz Miller MD as PCP - [facility]  LUKE Tian MD (Cardiology)  Fiordaliza Baltazar MD (Ophthalmology)  Reagan Taylor MD (Urology)  Floresita Lopez MD (Radiation Oncology)    History     Patient Active Problem List   Diagnosis Code    Hyperlipidemia E78.5    Gout M10.9    Vitamin D deficiency E55.9    CAD in native artery I25.10    Hypertensive heart disease without congestive heart failure I11.9    S/P CABG x 2 Z95.1    S/P AVR (aortic valve replacement) Z95.2    Type 2 diabetes mellitus with stage 3 chronic kidney disease, without long-term current use of insulin ([facility]) E11.22, N18.30    Stage 3 chronic kidney disease N18.30    [facility] C61    Essential hypertension I10    Vitamin B12 deficiency E53.8    Microalbuminuria R80.9    Class 1 obesity due to excess calories with serious comorbidity and body mass index (BMI) of 30.0 to 30.9 in adult E66.09, Z68.30     Past Medical History:   Diagnosis Date    Aortic stenosis     s/p AVR    Aortic stenosis, severe 10/16/2014    Echocardiogram EF 60% peak gradient 67 mmHg, mean gradient 30 mmHg, MARY 0.8 cm    ASHD (arteriosclerotic heart disease)     Chronic renal disease, stage III     DM (diabetes mellitus) ([facility] [address]     Gout     HCD (hypertensive cardiovascular disease)     History of echocardiogram 10/16/2014    EF 55-60%. No RWMA. Mild LVH. Gr 1 DDfx. Mild LAE. Severe AS (mean grad 30 mmHg).  Hyperlipidemia     Hypertension     Prostate cancer ([facility] 08/17/2017    [id number] [place] Grade 7 (3 + 4) adenocarcinoma of the prostate in 3/12 cores, 2-10% of each core; PSA 7.24. Dr. Bobbi Fregoso.  S/P AVR (aortic valve replacement) 11/13/2014    #23 mm Marifer Barbosa Magna pericardial valve. Dr. Romana Lederer.  S/P CABG x 2 11/13/2014    LIMA to LAD, SVG to OM1. Dr. Romana Lederer.  S/P cardiac catheterization 11/06/2014    RCA dom. mRCA 100%. LM patent. dLAD 75% x 2. pCX 80% (ELENA-3). LVEDP 14 mmHg. EF 50-55%. Severe inferobasal hypk. Severe AS. AV replacement & CABG recommended.       Past Surgical History:   Procedure Laterality Date    HX APPENDECTOMY       Current Outpatient Medications   Medication Sig Dispense Refill    allopurinoL (ZYLOPRIM) 100 mg tablet TAKE 1 TABLET DAILY 90 Tab 3    metoprolol succinate (TOPROL-XL) 50 mg XL tablet TAKE 1 TABLET DAILY 90 Tab 3    amLODIPine (NORVASC) 10 mg tablet Take 1 Tab by mouth daily. 90 Tab 2    atorvastatin (LIPITOR) 20 mg tablet Take 1 Tab by mouth daily. 90 Tab 3    lisinopriL (PRINIVIL, ZESTRIL) 20 mg tablet Take 1 Tab by mouth daily. 90 Tab 2    acetaminophen (Tylenol Extra Strength) 500 mg tablet Take  by mouth every six (6) hours as needed for Pain.  cyanocobalamin, vitamin B-12, 1,000 mcg lozg 1,000 mcg by SubLINGual route daily. 90 Lozenge 2    ascorbic acid, vitamin C, (Vitamin C) 500 mg tablet Take  by mouth.  sildenafil citrate (VIAGRA) 100 mg tablet Take 1 Tab by mouth as needed. 6 Tab 3    docusate sodium (Colace) 100 mg capsule Take 100 mg by mouth daily as needed.  cholecalciferol, vitamin D3, 2,000 unit tab Take  by mouth daily.  aspirin delayed-release 81 mg tablet Take 1 tablet by mouth daily. 30 tablet 2     No Known Allergies    Family History   Problem Relation Age of Onset    COPD Father     Other Father         pneumoconiosis    Lung Disease Father     Other Mother         meigs syndrome     Social History     Tobacco Use    Smoking status: Never Smoker    Smokeless tobacco: Never Used   Substance Use Topics    Alcohol use:  Yes     Alcohol/week: 12.0 standard drinks     Types: 12 Cans of beer per week

## 2020-12-31 NOTE — PROGRESS NOTES
Chief Complaint   Patient presents with    Annual Wellness Visit    Hypertension     labs done 12/23/2020    Diabetes    Cholesterol Problem       1. Have you been to the ER, urgent care clinic since your last visit? Hospitalized since your last visit? No    2. Have you seen or consulted any other health care providers outside of the [address] [place] [address] since your last visit? Include any pap smears or colon screening.  No.

## 2020-12-31 NOTE — ACP (ADVANCE CARE PLANNING)
Advance Care Planning     Advance Care Planning (ACP) Physician/NP/PA Conversation      Date of Conversation: 12/31/2020  Conducted with: Patient with Decision Making Capacity    Healthcare Decision Maker:     Primary Decision Maker: [address] [place] [address]    Secondary Decision Maker: Calvin Campa Son - [phone]    Secondary Decision Maker: Allan Sellers - Daughter - [phone]  Click here to complete [employer] including selection of the Healthcare Decision Maker Relationship (ie \"Primary\")  Today we documented Decision Maker(s) consistent with Legal Next of Kin hierarchy. Care Preferences:    Hospitalization: \"If your health worsens and it becomes clear that your chance of recovery is unlikely, what would be your preference regarding hospitalization? \"  The patient would prefer hospitalization. Ventilation: \"If you were unable to breathe on your own and your chance of recovery was unlikely, what would be your preference about the use of a ventilator (breathing machine) if it was available to you? \"   The patient would desire the use of a ventilator. Resuscitation: \"In the event your heart stopped as a result of an underlying serious health condition, would you want attempts to be made to restart your heart, or would you prefer a natural death? \"   Yes, attempt to resuscitate. Additional topics discussed: treatment goals, benefit/burden of treatment options, ventilation preferences, hospitalization preferences, resuscitation preferences and end of life care preferences (vegetative state/imminent death). Patient has an existing advance directive on file, signed 8/14/2013 . It designates his wife and children  as his healthcare agents and expresses that he does not wish life prolonging procedures for end of life care. It was reviewed with him and still reflects his wishes.  However, with regard to kade COVID-19, he states that he would wish the interventions as outlined above.          Conversation Outcomes / Follow-Up Plan:   ACP complete - no further action today  Reviewed DNR/DNI and patient elects Full Code (Attempt Resuscitation)     Length of Voluntary ACP Conversation in minutes:  16 minutes    Keisha Ortega MD

## 2020-12-31 NOTE — PATIENT INSTRUCTIONS
Medicare Wellness Visit, Male The best way to improve and maintain good health is to have a healthy lifestyle by eating a well-balanced diet, exercising regularly, limiting alcohol and stopping smoking. Regular visits with your physician or non-physician health care provider also support your good health. Preventive screening tests can find health problems before they become diseases or illnesses. Preventive services such as immunizations prevent serious infections. All people over age 72 should have a Pneumovax and a Prevnar-13 shot to prevent potentially life threatening infections with the pneumococcus bacteria, a common cause of pneumonia. These are once in a lifetime unless you and your provider decide differently. All people over 65 should have a yearly influenza vaccine or \"flu\" shot. This does not prevent infection with cold viruses but has been proven to prevent hospitalization and death from influenza. Although Medicare part B \"regular Medicare\" currently only covers tetanus vaccination in the context of an injury, a tetanus vaccine (Tdap or Td) is recommended every 10 years. A shingles vaccine is recommended once in a lifetime after age 61. The Shingles vaccine is also not covered by Medicare part B. Note, however, that both the Shingles vaccine and Tdap/Td are generally covered by secondary carriers. Please check your coverage and out of pocket expenses. Consider contacting your local health department because it may stock these vaccines for a reasonable charge. We currently have documentation of the following immunization history for you: 
Immunization History Administered Date(s) Administered  Influenza High Dose Vaccine PF 10/10/2017, 10/26/2018, 11/09/2019  Influenza Vaccine 10/14/2014  Influenza Vaccine (Tri) Adjuvanted (>65 Yrs FLUAD TRI [id number]) 10/04/2018, 11/09/2019  Influenza Vaccine Split 09/29/2011  Influenza Vaccine Whole 09/14/2010  Influenza, Quadrivalent, Adjuvanted (>65 Yrs FLUAD QUAD [id number]) 09/30/2020  Pneumococcal Conjugate (PCV-13) 06/29/2017  Pneumococcal Polysaccharide (PPSV-23) 10/14/2014  Td 01/01/2008  Tdap 01/12/2018  Zoster Recombinant 11/30/2020 Screening for infection with Hepatitis C is recommended for anyone born between [birth year] through [address]. The table at the bottom of this document indicates the status of this and other screening services. Screening for diabetes mellitus with a blood sugar test (glucose) should be done at least every 3 years until age 79. You and your health care provider may decide whether to continue screening after age 79. The most recent blood glucose we have on file for you is:  
Lab Results Component Value Date/Time Glucose 118 (H) 12/23/2020 09:46 AM  
 Glucose (POC) 105 11/17/2014 07:18 AM  
 Glucose,  (H) 11/14/2014 04:26 AM  
 
 
Glaucoma is a disease of the eye due to increased ocular pressure that can lead to blindness. People with risk factors for glaucoma ( race, diabetes, family history) should be screened at least every 2 years by an eye professional. This may be covered annually if indicated as determined by you and your doctor. Cardiovascular screening tests that check for elevated lipids or cholesterol (fatty part of blood) which can lead to heart disease and strokes should be done every 4-6 years through age 79. You and your health care provider may decide whether to continue screening after age 79. The most recent lipid panel we have on file for you is:  
Lab Results Component Value Date/Time  Cholesterol, total 167 12/23/2020 09:46 AM  
 HDL Cholesterol 70 (H) 12/23/2020 09:46 AM  
 LDL, calculated 78.8 12/23/2020 09:46 AM  
 VLDL, calculated 18.2 12/23/2020 09:46 AM  
 Triglyceride 91 12/23/2020 09:46 AM  
 CHOL/HDL Ratio 2.4 12/23/2020 09:46 AM  
 
 
 Colorectal cancer screening that evaluates for blood or polyps in your colon for people with average risk should be done yearly as a stool test, every five years as a flexible sigmoidoscope or every 10 years as a colonoscopy up to age 76. You and your health care provider may decide whether to continue screening after age 76. Men up to age 76 may elect to screen for prostate cancer with a blood test called a PSA at certain intervals, depending on their personal and family history. This decision is between the patient and his provider. The most recent PSA values we have on file for you are: 
Lab Results Component Value Date/Time  
 Prostate Specific Ag 0.4 11/16/2020 10:34 AM  
 Prostate Specific Ag 1.1 11/12/2019 09:51 AM  
 Prostate Specific Ag 13.8 (H) 03/08/2019 09:30 AM  
 Prostate Specific Ag 11.3 (H) 10/04/2018 12:28 PM  
 
 
If you have been a smoker or had family history of abdominal aortic aneurysms, you and your provider may decide to schedule an ultrasound test of your aorta. Our records show this was done on:  n/a People who have smoked the equivalent of 1 pack per day for 30 years or more may benefit from screening for lung cancer with a yearly low dose CT scan until they have been non smokers for 15 years or competing health conditions render this unlikely to be beneficial. Our records show: n/a Your Medicare Wellness Exam is recommended annually. Here is a list of your current Health Maintenance items with a due date: 
Health Maintenance Topic Date Due  Shingrix Vaccine Age 50> (2 of 2) 01/25/2021  Foot Exam Q1  02/13/2021  GLAUCOMA SCREENING Q2Y  06/25/2021  Eye Exam Retinal or Dilated  06/25/2021  A1C test (Diabetic or Prediabetic)  12/23/2021  MICROALBUMIN Q1  12/23/2021  Lipid Screen  12/23/2021  Medicare Yearly Exam  01/01/2022  Colorectal Cancer Screening Combo  10/26/2027  
 DTaP/Tdap/Td series (2 - Td) 01/12/2028  Hepatitis C Screening  Completed  Flu Vaccine  Completed  Pneumococcal 65+ years  Completed Heart-Healthy Diet: Care Instructions Your Care Instructions A heart-healthy diet has lots of vegetables, fruits, nuts, beans, and whole grains, and is low in salt. It limits foods that are high in saturated fat, such as meats, cheeses, and fried foods. It may be hard to change your diet, but even small changes can lower your risk of heart attack and heart disease. Follow-up care is a key part of your treatment and safety. Be sure to make and go to all appointments, and call your doctor if you are having problems. It's also a good idea to know your test results and keep a list of the medicines you take. How can you care for yourself at home? Watch your portions · Learn what a serving is. A \"serving\" and a \"portion\" are not always the same thing. Make sure that you are not eating larger portions than are recommended. For example, a serving of pasta is ½ cup. A serving size of meat is 2 to 3 ounces. A 3-ounce serving is about the size of a deck of cards. Measure serving sizes until you are good at [place]" them. Keep in mind that restaurants often serve portions that are 2 or 3 times the size of one serving. · To keep your energy level up and keep you from feeling hungry, eat often but in smaller portions. · Eat only the number of calories you need to stay at a healthy weight. If you need to lose weight, eat fewer calories than your body burns (through exercise and other physical activity). Eat more fruits and vegetables · Eat a variety of fruit and vegetables every day. Dark green, deep orange, red, or yellow fruits and vegetables are especially good for you. Examples include spinach, carrots, peaches, and berries. · Keep carrots, celery, and other veggies handy for snacks. Buy fruit that is in season and store it where you can see it so that you will be tempted to eat it. · Cook dishes that have a lot of veggies in them, such as stir-fries and soups. Limit saturated and trans fat · Read food labels, and try to avoid saturated and trans fats. They increase your risk of heart disease. · Use olive or canola oil when you cook. · Bake, broil, grill, or steam foods instead of frying them. · Choose lean meats instead of high-fat meats such as hot dogs and sausages. Cut off all visible fat when you prepare meat. · Eat fish, skinless poultry, and meat alternatives such as soy products instead of high-fat meats. Soy products, such as tofu, may be especially good for your heart. · Choose low-fat or fat-free milk and dairy products. Eat foods high in fiber · Eat a variety of grain products every day. Include whole-grain foods that have lots of fiber and nutrients. Examples of whole-grain foods include oats, whole wheat bread, and brown rice. · Buy whole-grain breads and cereals, instead of white bread or pastries. Limit salt and sodium · Limit how much salt and sodium you eat to help lower your blood pressure. · Taste food before you salt it. Add only a little salt when you think you need it. With time, your taste buds will adjust to less salt. · Eat fewer snack items, fast foods, and other high-salt, processed foods. Check food labels for the amount of sodium in packaged foods. · Choose low-sodium versions of canned goods (such as soups, vegetables, and beans). Limit sugar · Limit drinks and foods with added sugar. These include candy, desserts, and soda pop. Limit alcohol · Limit alcohol to no more than 2 drinks a day for men and 1 drink a day for women. Too much alcohol can cause health problems. When should you call for help? Watch closely for changes in your health, and be sure to contact your doctor if: 
  · You would like help planning heart-healthy meals. Where can you learn more? Go to http://www.[employer]/ Enter V137 in the search box to learn more about \"Heart-Healthy Diet: Care Instructions. \" Current as of: August 22, 2019               Content Version: 12.6 © 5127-7979 [employer]. Care instructions adapted under license by [employer] (which disclaims liability or warranty for this information). If you have questions about a medical condition or this instruction, always ask your healthcare professional. [place] [address] any warranty or liability for your use of this information.

## 2021-01-03 ENCOUNTER — TELEPHONE (OUTPATIENT)
Dept: INTERNAL MEDICINE CLINIC | Age: 71
End: 2021-01-03

## 2021-01-03 PROBLEM — E66.09 CLASS 1 OBESITY DUE TO EXCESS CALORIES WITH SERIOUS COMORBIDITY AND BODY MASS INDEX (BMI) OF 30.0 TO 30.9 IN ADULT: Status: ACTIVE | Noted: 2021-01-03

## 2021-01-03 NOTE — PROGRESS NOTES
HPI:   Tung James is a 69y.o. year old male who presents for a routine visit. He has a history of hypertension, hyperlipidemia, ASHD s/p CABG, aortic stenosis s/p AVR, diabetes mellitus, chronic renal disease stage 3, prostate cancer, and gout. He reports that he is doing generally well. He states that he has been wearing a mask when outside although questions whether the pandemic is truly serious. His weight has increased 13 pounds since 9/2020, which he attributes to the holidays. He is otherwise without specific complaints and feeling generally well. In 6/2017, he was noted to have an elevated PSA of 6.0, which was confirmed on repeat to be 6.6. He was referred to Dr. Ivan Crandall and PSA was again repeated and found to be increased at 7.24. He underwent TRUS biopsy on 8/17/2017, which showed [id number] [place] Grade 7 (3 + 4) adenocarcinoma of the prostate in 3/12 cores, 2-10% of each core. Options for management were discussed and he selected active surveillance. Plans were for repeat PSA and MRI prostate in 6 months (due 2/2018). However, the patient never had tests performed and he did not follow-up as discussed. On 10/4/2018, at his routine visit, a PSA level was obtained and was found to have increased to 11.3, and he was advised to follow-up with Dr. Ivan Crandall. He underwent a prostate MRI 3T at [facility] [place] on 12/31/2018 showing several peripheral zone PI-RADS 4 observations: right posterolateral peripheral zone at the mid gland/apex and anterior peripheral zone at the apex bilaterally. The former exhibits questionable capsular bulging laterally, but not a definitive appearance for extracapsular extension. He had a follow-up appointment with Dr. Ivan Crandall on 1/11/2019 and decision made to proceed with EBRT.  He had a staging bone scan (1/23/2019) which was negative for osseous metastases, and a CT abdomen and pelvis (1/23/2019) which showed no adenopathy of evidence of metastases, mild hepatic steatosis, aortic atherosclerosis, and cholelithiasis. He met with Dr. Sergo Cummins on 1/30/2019 and decided to proceed with EBRT as definitive treatment for his prostate cancer, receiving his last treatment on 5/6/2019. Follow-up PSA level in 11/2020 at 0.4. He continues to follow with Dr. Sergo Cummins every 6 months. He has a history of hypertension, hyperlipidemia, ASHD, and aortic stenosis. In 10/2014, he presented with progressive chest pain and shortness of breath and an echocardiogram was obtained (10/16/2014) showing normal LV function (EF 55-60%), no RWMA, grade 1 diastolic dysfunction, mild LAE, and severe AS (mean gradient 30 mmHg, peak 67 mmHg, and AV area 0.8 cm2). He was referred to see Dr. Emre Jordan and underwent cardiac catheterization (11/6/2014) showing 100% RCA (distal collaterals from left), 70-80% distal LAD, 80% proximal LCx, inferior basal hypokinesis, and EF 55%. On 11/13/2014, he underwent 2 vessel CABG (LIMA to LAD and SVG to OM1) and AV replacement with a bioprosthetic 23 mm Marifer Barbosa Magna pericardial valve by Dr. Adia Gaspar. He has done well since that time and remains asymptomatic. He denies any chest pain, shortness of breath at rest or with exertion, palpitations, lightheadedness, or edema. His current regimen includes aspirin, metoprolol, lisinopril, and moderate intensity dose atorvastatin. He is followed by Dr. Emre Jordan. He had a repeat echocardiogram (8/21/2017) showing normal LV function (EF 55-60%), no RWMA, mild MIL, and normally functioning bioprosthetic valve with peak gradient 17 mmHg, mean gradient 10 mmHg, and valve area 1.86 cm2. Repeat echocardiogram (9/18/2020) showed normal LV size and function (EF 55-60%), AV prosthetic functioning normally with mean gradient of 7.2mmHg and MARY 1.8 cm2. Moderate LAE, and mild MR. He has a history of diabetes mellitus, which has not required treatment with medication.  Review of his record shows that his Hba1c has remained between 5.7-6.3 since 2011. He denies any polyuria, polydipsia, nocturia, or blurry vision, and has no history of retinopathy or neuropathy. He does have evidence of chronic renal disease, stage 3, with baseline creatinine 1.22-1.37/ eGFR 55-59 since 11/2014. He had been having regular eye exams with Dr. Kaykay Calderon, although does not recall the date of his last exam and is overdue. He has a history of gout, but has not had a flare in many years since being treated with allopurinol. He has never had a screening colonoscopy. He did undergo Cologuard testing in 11/2017 which was negative. He denies any abdominal pain, nausea, vomiting, melena, hematochezia, or change in bowel movements. Past Medical History:   Diagnosis Date    Aortic stenosis     s/p AVR    Aortic stenosis, severe 10/16/2014    Echocardiogram EF 60% peak gradient 67 mmHg, mean gradient 30 mmHg, MARY 0.8 cm    ASHD (arteriosclerotic heart disease)     Chronic renal disease, stage III     DM (diabetes mellitus) ([address]     Gout     HCD (hypertensive cardiovascular disease)     History of echocardiogram 10/16/2014    EF 55-60%. No RWMA. Mild LVH. Gr 1 DDfx. Mild LAE. Severe AS (mean grad 30 mmHg).  Hyperlipidemia     Hypertension     Prostate cancer ([address] 08/17/2017    [id number] [place] Grade 7 (3 + 4) adenocarcinoma of the prostate in 3/12 cores, 2-10% of each core; PSA 7.24. Dr. Daniela Kilpatrick.  S/P AVR (aortic valve replacement) 11/13/2014    #23 mm Marifer Barbosa Magna pericardial valve. Dr. Toribio Pettit.  S/P CABG x 2 11/13/2014    LIMA to LAD, SVG to OM1. Dr. Toribio Pettit.  S/P cardiac catheterization 11/06/2014    RCA dom. mRCA 100%. LM patent. dLAD 75% x 2. pCX 80% (ELENA-3). LVEDP 14 mmHg. EF 50-55%. Severe inferobasal hypk. Severe AS. AV replacement & CABG recommended.      Past Surgical History:   Procedure Laterality Date    HX APPENDECTOMY       Current Outpatient Medications   Medication Sig    allopurinoL (ZYLOPRIM) 100 mg tablet TAKE 1 TABLET DAILY    metoprolol succinate (TOPROL-XL) 50 mg XL tablet TAKE 1 TABLET DAILY    amLODIPine (NORVASC) 10 mg tablet Take 1 Tab by mouth daily.  atorvastatin (LIPITOR) 20 mg tablet Take 1 Tab by mouth daily.  lisinopriL (PRINIVIL, ZESTRIL) 20 mg tablet Take 1 Tab by mouth daily.  acetaminophen (Tylenol Extra Strength) 500 mg tablet Take  by mouth every six (6) hours as needed for Pain.  cyanocobalamin, vitamin B-12, 1,000 mcg lozg 1,000 mcg by SubLINGual route daily.  ascorbic acid, vitamin C, (Vitamin C) 500 mg tablet Take  by mouth.  sildenafil citrate (VIAGRA) 100 mg tablet Take 1 Tab by mouth as needed.  docusate sodium (Colace) 100 mg capsule Take 100 mg by mouth daily as needed.  cholecalciferol, vitamin D3, 2,000 unit tab Take  by mouth daily.  aspirin delayed-release 81 mg tablet Take 1 tablet by mouth daily. No current facility-administered medications for this visit. Allergies and Intolerances:   No Known Allergies     Family History:   Family History   Problem Relation Age of Onset   [place] COPD Father     Other Father         pneumoconiosis    Lung Disease Father     Other Mother         meigs syndrome     Social History:   He  reports that he has never smoked. He has never used smokeless tobacco. He reports that he drinks approximately one case of beer per week. He is  with two adult children. He is retired from working in the [employer]. He started as a , and then became an .   Social History     Substance and Sexual Activity   Alcohol Use Yes    Alcohol/week: 12.0 standard drinks    Types: 12 Cans of beer per week     Immunization History:  Immunization History   Administered Date(s) Administered    Influenza High Dose Vaccine PF 10/10/2017, 10/26/2018, 11/09/2019    Influenza Vaccine 10/14/2014    Influenza Vaccine (Tri) Adjuvanted (>65 Yrs FLUAD TRI 51911) 10/04/2018, 11/09/2019    Influenza Vaccine Split 09/29/2011    Influenza Vaccine Whole 09/14/2010    Influenza, Quadrivalent, Adjuvanted (>65 Yrs FLUAD QUAD [id number]) 09/30/2020    Pneumococcal Conjugate (PCV-13) 06/29/2017    Pneumococcal Polysaccharide (PPSV-23) 10/14/2014    Td 01/01/2008    Tdap 01/12/2018    Zoster Recombinant 11/30/2020       Review of Systems:   As above included in HPI. Otherwise 11 point review of systems negative including constitutional, skin, HENT, eyes, respiratory, cardiovascular, gastrointestinal, genitourinary, musculoskeletal, endocrine, hematologic, allergy, and neurologic. Physical:   Vitals:   BP: 132/60    HR: 71  WT: 230 lb (104.3 kg)  BMI:  30.34 kg/m2    Exam:   Pt appears well; alert and oriented x 3; appropriate affect. HEENT: PERRLA, anicteric, oropharynx clear,  no JVD, adenopathy or thyromegaly. No carotid bruits or radiated murmur. Lungs: clear to auscultation, no wheezes, rhonchi, or rales. Heart: irregular rate and rhythm. No murmur, rubs, gallops  Abdomen: soft, nontender, nondistended, normal bowel sounds, no hepatosplenomegaly or masses. Extremities: without edema. Review of Data:  Labs:  Hospital Outpatient Visit on 12/23/2020   Component Date Value Ref Range Status    WBC 12/23/2020 6.7  4.6 - 13.2 K/uL Final    RBC 12/23/2020 3.77* 4.70 - 5.50 M/uL Final    HGB 12/23/2020 12.5* 13.0 - 16.0 g/dL Final    HCT 12/23/2020 38.8  36.0 - 48.0 % Final    MCV 12/23/2020 102.9* 74.0 - 97.0 FL Final    MCH 12/23/2020 33.2  24.0 - 34.0 PG Final    MCHC 12/23/2020 32.2  31.0 - 37.0 g/dL Final    RDW 12/23/2020 13.7  11.6 - 14.5 % Final    PLATELET 34/95/4517 005  135 - 420 K/uL Final    MPV 12/23/2020 9.0* 9.2 - 11.8 FL Final    NEUTROPHILS 12/23/2020 66  40 - 73 % Final    LYMPHOCYTES 12/23/2020 17* 21 - 52 % Final    MONOCYTES 12/23/2020 10  3 - 10 % Final    EOSINOPHILS 12/23/2020 7* 0 - 5 % Final    BASOPHILS 12/23/2020 0  0 - 2 % Final    ABS.  NEUTROPHILS 12/23/2020 4.4  1.8 - 8.0 K/UL Final    ABS. LYMPHOCYTES 12/23/2020 1.2  0.9 - 3.6 K/UL Final    ABS. MONOCYTES 12/23/2020 0.7  0.05 - 1.2 K/UL Final    ABS. EOSINOPHILS 12/23/2020 0.5* 0.0 - 0.4 K/UL Final    ABS. BASOPHILS 12/23/2020 0.0  0.0 - 0.1 K/UL Final    DF 12/23/2020 AUTOMATED    Final    Hemoglobin A1c 12/23/2020 5.8* 4.2 - 5.6 % Final    Est. average glucose 12/23/2020 120  mg/dL Final    LIPID PROFILE 12/23/2020        Final    Cholesterol, total 12/23/2020 167  <200 MG/DL Final    Triglyceride 12/23/2020 91  <150 MG/DL Final    HDL Cholesterol 12/23/2020 70* 40 - 60 MG/DL Final    LDL, calculated 12/23/2020 78.8  0 - 100 MG/DL Final    VLDL, calculated 12/23/2020 18.2  MG/DL Final    CHOL/HDL Ratio 12/23/2020 2.4  0 - 5.0   Final    Sodium 12/23/2020 136  136 - 145 mmol/L Final    Potassium 12/23/2020 5.1  3.5 - 5.5 mmol/L Final    Chloride 12/23/2020 104  100 - 111 mmol/L Final    CO2 12/23/2020 24  21 - 32 mmol/L Final    Anion gap 12/23/2020 8  3.0 - 18 mmol/L Final    Glucose 12/23/2020 118* 74 - 99 mg/dL Final    BUN 12/23/2020 22* 7.0 - 18 MG/DL Final    Creatinine 12/23/2020 1.38* 0.6 - 1.3 MG/DL Final    BUN/Creatinine ratio 12/23/2020 16  12 - 20   Final    GFR est AA 12/23/2020 >60  >60 ml/min/1.73m2 Final    GFR est non-AA 12/23/2020 51* >60 ml/min/1.73m2 Final    Calcium 12/23/2020 9.1  8.5 - 10.1 MG/DL Final    Bilirubin, total 12/23/2020 1.0  0.2 - 1.0 MG/DL Final    ALT (SGPT) 12/23/2020 37  16 - 61 U/L Final    AST (SGOT) 12/23/2020 21  10 - 38 U/L Final    Alk.  phosphatase 12/23/2020 68  45 - 117 U/L Final    Protein, total 12/23/2020 7.6  6.4 - 8.2 g/dL Final    Albumin 12/23/2020 4.1  3.4 - 5.0 g/dL Final    Globulin 12/23/2020 3.5  2.0 - 4.0 g/dL Final    A-G Ratio 12/23/2020 1.2  0.8 - 1.7   Final    Microalbumin,urine random 12/23/2020 0.85  0 - 3.0 MG/DL Final    Creatinine, urine 12/23/2020 76.00  30 - 125 mg/dL Final    Microalbumin/Creat ratio (mg/g cre* 12/23/2020 11  0 - 30 mg/g Final   Hospital Outpatient Visit on 11/16/2020   Component Date Value Ref Range Status    Prostate Specific Ag 11/16/2020 0.4  0.0 - 4.0 ng/mL Final    Testosterone 11/16/2020 303  264 - 916 ng/dL Final       EKG (8/8/2019) sinus rhythm at 68 bpm. Normal intervals. Lack of R waves in V2 and V3 likely lead placement. No ischemic changes when compared with prior in 8/2018. Health Maintenance:  Screening:    Colorectal: Cologuard (11/2/2017) negative. Due 11/2020. Ordered. Depression: none   DM (HbA1c/FPG): HbA1c 5.8 (12/2020)   Hepatitis C: negative (1/2018)   Falls: none   DEXA: N/A   PSA/KIEL: PSA 0.4 (11/2020). Followed by Dr. Roseanna Fonseca   Glaucoma: regular eye exams with Dr. Tay Rubin (last 6/2019)   Smoking: none   Vitamin D: 34.5 (6/2020)   Medicare Wellness: today    Impression:  Patient Active Problem List   Diagnosis Code    Hyperlipidemia E78.5    Gout M10.9    Vitamin D deficiency E55.9    CAD in native artery I25.10    Hypertensive heart disease without congestive heart failure I11.9    S/P CABG x 2 Z95.1    S/P AVR (aortic valve replacement) Z95.2    Type 2 diabetes mellitus with stage 3 chronic kidney disease, without long-term current use of insulin (HCC) E11.22, N18.30    Stage 3 chronic kidney disease N18.30    Prostate cancer ([facility] C61    Essential hypertension I10    Vitamin B12 deficiency E53.8    Microalbuminuria R80.9    Class 1 obesity due to excess calories with serious comorbidity and body mass index (BMI) of 30.0 to 30.9 in adult E66.09, Z68.30       Plan:  1. Hypertension. Blood pressure well controlled today on current regimen of metoprolol succinate 50 mg daily, lisinopril 20 mg daily, and amlodipine 10 mg daily. Heart rate remains 70-80 so was able to tolerate increase in metoprolol dose. Had discontinued hydrochlorothiazide due to hypotension during EBRT and worsening renal function. Now renal function stable with creatinine 1.38/ eGFR 51.  Advised to continue to monitor blood pressure. Will continue to follow. 2. Prostate cancer. [id number] [place] Grade 7 (3 + 4) adenocarcinoma. Patient chose active surveillance rather than definitive treatment. Repeat PSA and MRI prostate ordered for 2/2018, but he did not follow-up. Repeat PSA obtained and had increased from 7.24 at diagnosis to 11.3. Referred for follow-up with Dr. Alexandrea Sanchez, and MRI prostate performed on 12/31/2018 showing several peripheral zone PI-RADS 4 with one area showing questionable capsular bulging laterally, but not a definitive appearance for extracapsular extension. He decided to proceed with EBRT, and underwent fiducial marker and [employer] placement on 3/11/2019 followed by weekly treatments from 3/27-5/6/2019. Tolerated well except for some fatigue which has improved. Being followed by Dr. Nakul Tiwari and Dr. Alexandrea Sanchez. PSA 1.1 (11/2019) and testosterone 289, indicative of no evidence of active disease. Followed-up with radiation oncology in 11/2020 and PSA improved to 0.4. Follow-up in 5/2021. Not wishing to schedule with Dr. Alexandrea Sanchez currently. Will readdress next visit. 3. Hyperlipidemia. On moderate intensity dose atorvastatin with LDL 78 and HDL 70, indicative of good control. Changed from simvastatin given treatment with amlodipine. 4. ASHD s/p 2 vessel CABG. Remains asymptomatic. On aspirin, metoprolol, and statin. Followed by Dr. Shannan Schultz. 5. Aortic stenosis s/p bioprosthetic AVR. Remains asymptomatic, and underwent repeat echocardiogram in 9/2020 with good functioning valve. Being followed by Dr. Shannan Schultz. 6. Diabetes mellitus. Patient with diagnosis of diabetes mellitus, but review of record shows HbA1c ranging from 5.7-6.3 since 2011 and -112 during that time period. HbA1c remains increased today at 5.8. On Ace-I and statin. Continue follow-up with Dr. Mario Cabello for annual eye exams. Foot exam normal (2/2020). Urine microalbumin/ creatinine ratio normal (11 mg/g). Does have evidence of chronic renal disease stage 3. Emphasized importance of lifestyle modifications, including diet, exercise, and weight loss. 7. Chronic renal disease, stage 3. Most likely secondary to long standing hypertension and prediabetes, although also appears to have developed at time of CABG and AVR so may be related to hypoperfusion during surgery. On statin and Ace-I. Discussed importance of avoiding NSAIDS and prerenal status. Will need to follow. 8. Anemia, macrocytosis. Mild anemia developed during radiation therapy. Macrocytosis evident. Vitamin B12 level very low last visit and started on Vitamin B12 supplement 1000 mcg SL daily. Level remains normal indicative of compliance, and anemia improved although macrocytosis persists. Also advised to decrease alcohol consumption. Will monitor. 9. Gout. Asymptomatic. On allopurinol. No recent flares. 10. Obesity. Emphasized importance of lifestyle modifications, including diet, exercise, and weight loss. Will monitor. 11. Health maintenance. Already received influenza vaccine. Received 1/2 doses of Shingrix vaccine . Completed pneumococcal series and Tdap. Other immunizations up to date. Completed Cologuard for colorectal cancer screening. Due for repeat in 11/2020. Will place order. Completed eye exam with Dr. Christy Trotter. Discussed decreasing alcohol consumption. Vitamin D level normal and advised continue maintenance dose supplement. In addition, an annual Medicare wellness visit was done today. Total time: 40 minutes spent with the patient on counseling, answering questions and/or coordination of care. Complex medical review performed, including review of medical history, lab results, and testing. Complicated management plan formulated. Patient understands recommendations and agrees with plan. Follow-up in 6 months.

## 2021-01-13 ENCOUNTER — TELEPHONE (OUTPATIENT)
Dept: INTERNAL MEDICINE CLINIC | Age: 71
End: 2021-01-13

## 2021-01-13 DIAGNOSIS — R53.83 FATIGUE, UNSPECIFIED TYPE: Primary | ICD-10-CM

## 2021-01-13 DIAGNOSIS — R43.2 DECREASED SENSE OF TASTE: ICD-10-CM

## 2021-01-13 NOTE — TELEPHONE ENCOUNTER
Wife tested positive for covid. Wants to get a covid test. Says his allergies have been worse and he has congestion. Wants to know if you can put in an order for him to get tested at [address]?

## 2021-01-13 NOTE — TELEPHONE ENCOUNTER
Please obtain more information on timing of wife's test, onset of his symptoms, and exactly what symptoms he is having so that order can be placed. May provide him with number of [facility] to schedule after test ordered. . 
 
Thanks.

## 2021-01-13 NOTE — TELEPHONE ENCOUNTER
Patient states his wife was tested yesterday. He reports they both started having symptoms 3-4 days ago. The wifes symptoms are a little worse then his. He reports increased fatigue, feeling sluggish and decreased taste.

## 2021-01-13 NOTE — TELEPHONE ENCOUNTER
Phone number provided to [facility]- patient will call to schedule testing first thing tomorrow morning. No further questions or concerns at this time.

## 2021-01-15 ENCOUNTER — HOSPITAL ENCOUNTER (OUTPATIENT)
Dept: LAB | Age: 71
Discharge: HOME | End: 2021-01-15
Payer: MEDICARE

## 2021-01-15 ENCOUNTER — CLINICAL SUPPORT (OUTPATIENT)
Dept: FAMILY MEDICINE CLINIC | Age: 71
End: 2021-01-15

## 2021-01-15 DIAGNOSIS — R53.83 FATIGUE, UNSPECIFIED TYPE: ICD-10-CM

## 2021-01-15 DIAGNOSIS — R43.2 DECREASED SENSE OF TASTE: ICD-10-CM

## 2021-01-15 DIAGNOSIS — R53.83 FATIGUE, UNSPECIFIED TYPE: Primary | ICD-10-CM

## 2021-01-15 PROCEDURE — U0003 INFECTIOUS AGENT DETECTION BY NUCLEIC ACID (DNA OR RNA); SEVERE ACUTE RESPIRATORY SYNDROME CORONAVIRUS 2 (SARS-COV-2) (CORONAVIRUS DISEASE [COVID-19]), AMPLIFIED PROBE TECHNIQUE, MAKING USE OF HIGH THROUGHPUT TECHNOLOGIES AS DESCRIBED BY CMS-2020-01-R: HCPCS

## 2021-01-17 LAB — SARS-COV-2, COV2NT: DETECTED

## 2021-01-18 ENCOUNTER — TRANSCRIBE ORDER (OUTPATIENT)
Dept: RADIATION THERAPY | Age: 71
End: 2021-01-18

## 2021-01-18 ENCOUNTER — TELEPHONE (OUTPATIENT)
Dept: INTERNAL MEDICINE CLINIC | Age: 71
End: 2021-01-18

## 2021-01-18 DIAGNOSIS — C61 MALIGNANT NEOPLASM OF PROSTATE (HCC): Primary | ICD-10-CM

## 2021-01-18 NOTE — TELEPHONE ENCOUNTER
Pt aware of message below and verbalized understanding. Patient states he did have a cough but that has gotten better over the past two days, no SOB, no fever. Patient just reports not having any energy. No further questions or concerns from pt at this time.

## 2021-01-18 NOTE — TELEPHONE ENCOUNTER
No visits with results within 2 Day(s) from this visit. Latest known visit with results is:  
Hospital Outpatient Visit on 01/15/2021 Component Date Value Ref Range Status  SARS-CoV-2 01/15/2021 Detected* Not Detected   Final  
 
 
Please inform the patient that his COVID 19 test is positive. Please advise of the need to isolate for a total of 14 days. Please see how he is feeling, particularly with regard to experiencing fever, cough, or shortness of breath. Jerrod Anderson Also please advise him regarding strict mitigation measures for COVID-19, including wearing a mask, social distancing and diligent hand washing, as recommended by the CDC.

## 2021-03-08 RX ORDER — LISINOPRIL 20 MG/1
TABLET ORAL
Qty: 90 TAB | Refills: 3 | Status: SHIPPED | OUTPATIENT
Start: 2021-03-08 | End: 2022-03-02

## 2021-03-10 ENCOUNTER — OFFICE VISIT (OUTPATIENT)
Dept: INTERNAL MEDICINE CLINIC | Age: 71
End: 2021-03-10
Payer: MEDICARE

## 2021-03-10 VITALS
WEIGHT: 228.8 LBS | HEIGHT: 73 IN | TEMPERATURE: 97.7 F | DIASTOLIC BLOOD PRESSURE: 72 MMHG | HEART RATE: 77 BPM | OXYGEN SATURATION: 100 % | RESPIRATION RATE: 16 BRPM | BODY MASS INDEX: 30.32 KG/M2 | SYSTOLIC BLOOD PRESSURE: 142 MMHG

## 2021-03-10 DIAGNOSIS — H61.23 IMPACTED CERUMEN OF BOTH EARS: Primary | ICD-10-CM

## 2021-03-10 DIAGNOSIS — R09.81 NASAL CONGESTION: ICD-10-CM

## 2021-03-10 DIAGNOSIS — R09.82 POST-NASAL DRIP: ICD-10-CM

## 2021-03-10 DIAGNOSIS — J00 ACUTE RHINITIS: ICD-10-CM

## 2021-03-10 PROCEDURE — 3017F COLORECTAL CA SCREEN DOC REV: CPT | Performed by: NURSE PRACTITIONER

## 2021-03-10 PROCEDURE — 1101F PT FALLS ASSESS-DOCD LE1/YR: CPT | Performed by: NURSE PRACTITIONER

## 2021-03-10 PROCEDURE — G8417 CALC BMI ABV UP PARAM F/U: HCPCS | Performed by: NURSE PRACTITIONER

## 2021-03-10 PROCEDURE — G8432 DEP SCR NOT DOC, RNG: HCPCS | Performed by: NURSE PRACTITIONER

## 2021-03-10 PROCEDURE — G8536 NO DOC ELDER MAL SCRN: HCPCS | Performed by: NURSE PRACTITIONER

## 2021-03-10 PROCEDURE — 99213 OFFICE O/P EST LOW 20 MIN: CPT | Performed by: NURSE PRACTITIONER

## 2021-03-10 PROCEDURE — G8753 SYS BP > OR = 140: HCPCS | Performed by: NURSE PRACTITIONER

## 2021-03-10 PROCEDURE — G8754 DIAS BP LESS 90: HCPCS | Performed by: NURSE PRACTITIONER

## 2021-03-10 PROCEDURE — G0463 HOSPITAL OUTPT CLINIC VISIT: HCPCS | Performed by: NURSE PRACTITIONER

## 2021-03-10 PROCEDURE — G8428 CUR MEDS NOT DOCUMENT: HCPCS | Performed by: NURSE PRACTITIONER

## 2021-03-10 RX ORDER — FLUTICASONE PROPIONATE 50 MCG
2 SPRAY, SUSPENSION (ML) NASAL
Qty: 1 BOTTLE | Refills: 0
Start: 2021-03-10

## 2021-03-10 RX ORDER — SODIUM CHLORIDE 0.65 %
1 AEROSOL, SPRAY (ML) NASAL AS NEEDED
Qty: 30 ML | Refills: 0
Start: 2021-03-10

## 2021-03-10 NOTE — PROGRESS NOTES
Internists of [address] [address] [address] [facility], [address] [place] [address] [place]  [phone] [facility]/[phone] fax        3/10/2021    Patient Ran Fontaine 1950     Primary MD Jorden Hopkins MD    [facility] a 66 y.o. male who presents with a sick visit for   Chief Complaint   Patient presents with    Ear Pain     bilateral ear pain, onset couple days ago     Runny nose, sneezing, no ear pain just muffled sound. Started a few days ago. Used Debrox and removed large amt of cerumen from the right ear. Minimal amt from the left ear. Past Medical History:   Diagnosis Date    Aortic stenosis     s/p AVR    Aortic stenosis, severe 10/16/2014    Echocardiogram EF 60% peak gradient 67 mmHg, mean gradient 30 mmHg, MARY 0.8 cm    ASHD (arteriosclerotic heart disease)     Chronic renal disease, stage III     DM (diabetes mellitus) ([facility] [address]     Gout     HCD (hypertensive cardiovascular disease)     History of echocardiogram 10/16/2014    EF 55-60%. No RWMA. Mild LVH. Gr 1 DDfx. Mild LAE. Severe AS (mean grad 30 mmHg).  Hyperlipidemia     Hypertension     Prostate cancer ([facility] [address] 08/17/2017    [id number] Kaylynn Grade 7 (3 + 4) adenocarcinoma of the prostate in 3/12 cores, 2-10% of each core; PSA 7.24. Dr. Larisa Muller.  S/P AVR (aortic valve replacement) 11/13/2014    #23 mm Marifer Barbosa Magna pericardial valve. Dr. Rafa Martinez.  S/P CABG x 2 11/13/2014    LIMA to LAD, SVG to OM1. Dr. Rafa Martinez.  S/P cardiac catheterization 11/06/2014    RCA dom. mRCA 100%. LM patent. dLAD 75% x 2. pCX 80% (ELENA-3). LVEDP 14 mmHg. EF 50-55%. Severe inferobasal hypk. Severe AS. AV replacement & CABG recommended.        Past Surgical History:   Procedure Laterality Date    HX APPENDECTOMY         Social History     Socioeconomic History    Marital status:      Spouse name: Not on file    Number of children: Not on file    Years of education: Not on file    Highest education level: Not on file   Occupational History    Not on file   Social Needs    Financial resource strain: Not on file    Food insecurity     Worry: Not on file     Inability: Not on file    Transportation needs     Medical: Not on file     Non-medical: Not on file   Tobacco Use    Smoking status: Never Smoker    Smokeless tobacco: Never Used   Substance and Sexual Activity    Alcohol use: Yes     Alcohol/week: 12.0 standard drinks     Types: 12 Cans of beer per week    Drug use: No    Sexual activity: Not Currently   Lifestyle    Physical activity     Days per week: Not on file     Minutes per session: Not on file    Stress: Not on file   Relationships    Social connections     Talks on phone: Not on file     Gets together: Not on file     Attends Restorationist service: Not on file     Active member of club or organization: Not on file     Attends meetings of clubs or organizations: Not on file     Relationship status: Not on file    Intimate partner violence     Fear of current or ex partner: Not on file     Emotionally abused: Not on file     Physically abused: Not on file     Forced sexual activity: Not on file   Other Topics Concern    Not on file   Social History Narrative    ** Merged History Encounter **            Family History   Problem Relation Age of Onset    COPD Father     Other Father         pneumoconiosis    Lung Disease Father     Other Mother         meigs syndrome       Current Outpatient Medications on File Prior to Visit   Medication Sig Dispense Refill    lisinopriL (PRINIVIL, ZESTRIL) 20 mg tablet TAKE 1 TABLET DAILY 90 Tab 3    allopurinoL (ZYLOPRIM) 100 mg tablet TAKE 1 TABLET DAILY 90 Tab 3    metoprolol succinate (TOPROL-XL) 50 mg XL tablet TAKE 1 TABLET DAILY 90 Tab 3    amLODIPine (NORVASC) 10 mg tablet Take 1 Tab by mouth daily. 90 Tab 2    atorvastatin (LIPITOR) 20 mg tablet Take 1 Tab by mouth daily.  90 Tab 3    acetaminophen (Tylenol Extra Strength) 500 mg tablet Take  by mouth every six (6) hours as needed for Pain.  cyanocobalamin, vitamin B-12, 1,000 mcg lozg 1,000 mcg by SubLINGual route daily. 90 Lozenge 2    ascorbic acid, vitamin C, (Vitamin C) 500 mg tablet Take  by mouth.  sildenafil citrate (VIAGRA) 100 mg tablet Take 1 Tab by mouth as needed. 6 Tab 3    docusate sodium (Colace) 100 mg capsule Take 100 mg by mouth daily as needed.  cholecalciferol, vitamin D3, 2,000 unit tab Take  by mouth daily.  aspirin delayed-release 81 mg tablet Take 1 tablet by mouth daily. 30 tablet 2     No current facility-administered medications on file prior to visit. Objective    Visit Vitals  BP (!) 142/72   Pulse 77   Temp 97.7 °F (36.5 °C) (Temporal)   Resp 16   Ht 6' 1\" (1.854 m)   Wt 228 lb 12.8 oz (103.8 kg)   SpO2 100%   BMI 30.19 kg/m²       Physical Exam  HENT:      Head: Normocephalic and atraumatic. Right Ear: Ear canal and external ear normal. Decreased hearing noted. There is impacted cerumen. Left Ear: Ear canal and external ear normal. Decreased hearing noted. There is impacted cerumen. Ears:      Comments: Unable to fully assess TM due to large amt of cerumen present. Nose: Rhinorrhea present. Neck:      Musculoskeletal: Normal range of motion. Cardiovascular:      Rate and Rhythm: Normal rate and regular rhythm. Comments: No edema noted  Pulmonary:      Effort: Pulmonary effort is normal. No respiratory distress. Breath sounds: Normal breath sounds. No wheezing. Musculoskeletal: Normal range of motion. Skin:     General: Skin is warm and dry. Neurological:      General: No focal deficit present. Mental Status: He is alert and oriented to person, place, and time. Psychiatric:         Mood and Affect: Mood normal.         Behavior: Behavior normal.       Assessment    ICD-10-CM ICD-9-CM    1.  Impacted cerumen of both ears  H61.23 380.4 carbamide peroxide (Debrox) 6.5 % otic solution 2. Acute rhinitis  J00 [id number] fluticasone propionate (Flonase Allergy Relief) 50 mcg/actuation nasal spray      sodium chloride (Saline Nasal Mist) 0.65 % nasal squeeze bottle   3. Post-nasal drip  R09.82 784.91 fluticasone propionate (Flonase Allergy Relief) 50 mcg/actuation nasal spray      sodium chloride (Saline Nasal Mist) 0.65 % nasal squeeze bottle   4. Nasal congestion  R09.81 478.19 fluticasone propionate (Flonase Allergy Relief) 50 mcg/actuation nasal spray      sodium chloride (Saline Nasal Mist) 0.65 % nasal squeeze bottle     Instructed pt to avoid placing any objects in the ears (qtips) as this can damage ear canals. Instructed pt to allow water into the ear while showering and then lean head over to drain the water. This will help clean the ears. If build up of wax continues, pt to use debrox and use as directed. Instructed pt to call office if no improvement in a few days, may need to refer to ENT for ear irrigation. Sinus action plan! Instructed pt to: Avoid the allergen (strong smells, animals, cigarettes or carpeted areas)  Start medications as soon as the first sneeze, runny nose, watery eye or tickle in your throat appears and plan to continue it EVERY DAY for the next 2-4 weeks. Antihistamine:   Nasal spray (generic) over the counter, as directed to help turn off the allergic reaction in your sinus. Instructed patient to sit up slowly when getting up from a lying position and then wait a moment before standing. Once standing, pt needs to wait a moment before attempting to walk. This will help prevent the loss of equilibrium when the allergies are active. Patient verbalized understanding. Dr Robbi Gambino. [place], AGNP-C, DNP  Internist of [facility]      I spent 20 minutes with the patient in face-to-face consultation, of which greater than 50% was spent in counseling and coordination of care as described above.

## 2021-03-10 NOTE — PROGRESS NOTES
Chief Complaint   Patient presents with    Ear Pain     bilateral ear pain, onset couple days ago       1. Have you been to the ER, urgent care clinic since your last visit? Hospitalized since your last visit? No    2. Have you seen or consulted any other health care providers outside of the [address] [place] [address] since your last visit? Include any pap smears or colon screening.  No

## 2021-03-15 RX ORDER — AMLODIPINE BESYLATE 10 MG/1
TABLET ORAL
Qty: 90 TAB | Refills: 3 | Status: SHIPPED | OUTPATIENT
Start: 2021-03-15 | End: 2022-02-15

## 2021-04-13 ENCOUNTER — OFFICE VISIT (OUTPATIENT)
Dept: CARDIOLOGY CLINIC | Age: 71
End: 2021-04-13
Payer: MEDICARE

## 2021-04-13 VITALS
HEART RATE: 66 BPM | BODY MASS INDEX: 30.62 KG/M2 | SYSTOLIC BLOOD PRESSURE: 144 MMHG | OXYGEN SATURATION: 98 % | HEIGHT: 73 IN | DIASTOLIC BLOOD PRESSURE: 64 MMHG | WEIGHT: 231 LBS

## 2021-04-13 DIAGNOSIS — I25.10 CAD IN NATIVE ARTERY: Primary | ICD-10-CM

## 2021-04-13 PROCEDURE — 93000 ELECTROCARDIOGRAM COMPLETE: CPT | Performed by: INTERNAL MEDICINE

## 2021-04-13 PROCEDURE — 99214 OFFICE O/P EST MOD 30 MIN: CPT | Performed by: INTERNAL MEDICINE

## 2021-04-13 PROCEDURE — G8536 NO DOC ELDER MAL SCRN: HCPCS | Performed by: INTERNAL MEDICINE

## 2021-04-13 PROCEDURE — G8754 DIAS BP LESS 90: HCPCS | Performed by: INTERNAL MEDICINE

## 2021-04-13 PROCEDURE — G8510 SCR DEP NEG, NO PLAN REQD: HCPCS | Performed by: INTERNAL MEDICINE

## 2021-04-13 PROCEDURE — G8753 SYS BP > OR = 140: HCPCS | Performed by: INTERNAL MEDICINE

## 2021-04-13 PROCEDURE — G8427 DOCREV CUR MEDS BY ELIG CLIN: HCPCS | Performed by: INTERNAL MEDICINE

## 2021-04-13 PROCEDURE — 3017F COLORECTAL CA SCREEN DOC REV: CPT | Performed by: INTERNAL MEDICINE

## 2021-04-13 PROCEDURE — G8417 CALC BMI ABV UP PARAM F/U: HCPCS | Performed by: INTERNAL MEDICINE

## 2021-04-13 PROCEDURE — 1101F PT FALLS ASSESS-DOCD LE1/YR: CPT | Performed by: INTERNAL MEDICINE

## 2021-04-13 NOTE — PROGRESS NOTES
Marko Baldwin presents today for   Chief Complaint   Patient presents with    Follow-up     7 months       Is someone accompanying this pt? no    Is the patient using any DME equipment during OV? no    Depression Screening:  3 most recent PHQ Screens 4/13/2021   Little interest or pleasure in doing things Not at all   Feeling down, depressed, irritable, or hopeless Not at all   Total Score PHQ 2 0       Learning Assessment:  Learning Assessment 5/8/2019   PRIMARY LEARNER Patient   BARRIERS PRIMARY LEARNER [address] CAREGIVER No   PRIMARY LANGUAGE ENGLISH   LEARNER PREFERENCE PRIMARY DEMONSTRATION     READING   ANSWERED BY patient   RELATIONSHIP SELF       Fall Risk  Fall Risk Assessment, last 12 mths 4/13/2021   Able to walk? Yes   Fall in past 12 months? 0   Do you feel unsteady? 0   Are you worried about falling 0   Number of falls in past 12 months -   Fall with injury? -       Pt currently taking Antiplatelet therapy? Yes, ASA    Coordination of Care:  1. Have you been to the ER, urgent care clinic since your last visit? Hospitalized since your last visit? no    2. Have you seen or consulted any other health care providers outside of the [address] [place] [address] since your last visit? Include any pap smears or colon screening.  no

## 2021-04-13 NOTE — PROGRESS NOTES
HISTORY OF PRESENT ILLNESS  Dennise Warren is a 66 y.o. male. ASSESSMENT and PLAN    Mr. Janessa Ordonez initially presented with chest pains, and significant aortic stenosis with mean gradient of 30 mmHg, peak gradient of 67 mmHg, MARY of 0.8 cm². He ultimately underwent open-heart surgery in November of 2014. He had coronary artery bypass graft surgery with LIMA to LAD and SVG to OM1. He also had aortic valve replacement with #23 Marifer-Barbosa Magna pericardial valve. His echocardiogram from August 2017 showed normal LV function with well-functioning Marifer-Barbosa magna pericardial bioprosthetic aortic valve with mean gradient of 10 mmHg, and MARY of 1.86 cm². His echocardiogram September 2020 revealed normal LV function with EF 60%. Prosthetic aortic valve with mean gradient of 7.2 mmHg and MARY of 1.8 cm². He has 23 mm Marifer-Barbosa magna bioprosthetic aortic valve. No significant pulmonary hypertension was noted.  CAD:    Clinically stable.  Bioprosthetic AVR:   Clinically stable.  BP: Upper normal but acceptable.  Rhythm:    Stable sinus. He does have first-degree heart block noted.  CHF:    There is no evidence of decompensated CHF noted.  Weight:    His weight today is 231 pounds. He has put on 15 pounds, with previous baseline weight of 215 pounds. I advised him to try to lose weight that he had recently gained.  Cholesterol:  Target LDL<70. Lipitor 20.  Anticoagulant:  Remains on ASA. I will see him back in 6 months. Thank you. Encounter Diagnoses   Name Primary?     CAD in native artery Yes     current treatment plan is effective, no change in therapy  lab results and schedule of future lab studies reviewed with patient  reviewed diet, exercise and weight control  cardiovascular risk and specific lipid/LDL goals reviewed  use of aspirin to prevent MI and TIA's discussed      HPI   Today, Mr. Juancarlos Rivera has no complaints of chest pains, increased shortness of breath or decreased exercise capacity. He feels that his memory is worsening. He denies any orthopnea or PND. He denies any palpitations or dizziness. Review of Systems   Respiratory: Negative for shortness of breath. Cardiovascular: Negative for chest pain, palpitations, orthopnea, claudication, leg swelling and PND. All other systems reviewed and are negative. Physical Exam  Vitals signs and nursing note reviewed. Constitutional:       Appearance: Normal appearance. HENT:      Head: Normocephalic. Eyes:      Conjunctiva/sclera: Conjunctivae normal.   Neck:      Musculoskeletal: No neck rigidity. Cardiovascular:      Rate and Rhythm: Normal rate and regular rhythm. Pulmonary:      Breath sounds: Normal breath sounds. Abdominal:      Palpations: Abdomen is soft. Musculoskeletal:         General: No swelling. Skin:     General: Skin is warm and dry. Neurological:      General: No focal deficit present. Mental Status: He is alert and oriented to person, place, and time. Psychiatric:         Mood and Affect: Mood normal.         Behavior: Behavior normal.         PCP: Lui Weaver MD    Past Medical History:   Diagnosis Date    Aortic stenosis     s/p AVR    Aortic stenosis, severe 10/16/2014    Echocardiogram EF 60% peak gradient 67 mmHg, mean gradient 30 mmHg, MARY 0.8 cm    ASHD (arteriosclerotic heart disease)     Chronic renal disease, stage III (HCC)     DM [facility]di[facility] mellitus) ([facility] [address]     Gout     HCD (hypertensive cardiovascular disease)     History of echocardiogram 10/16/2014    EF 55-60%. No RWMA. Mild LVH. Gr 1 DDfx. Mild LAE. Severe AS (mean grad 30 mmHg).  Hyperlipidemia     Hypertension     Prostate cancer ([facility] [address] 08/17/2017    [id number] Parks Grade 7 (3 + 4) adenocarcinoma of the prostate in 3/12 cores, 2-10% of each core; PSA 7.24. Dr. Fabiola Bansal.     S/P AVR (aortic valve replacement) 11/13/2014    #23 mm Marifer Barahona pericardial valve. Dr. Jim Sor.  S/P CABG x 2 11/13/2014    LIMA to LAD, SVG to OM1. Dr. Jim Sor.  S/P cardiac catheterization 11/06/2014    RCA dom. mRCA 100%. LM patent. dLAD 75% x 2. pCX 80% (ELENA-3). LVEDP 14 mmHg. EF 50-55%. Severe inferobasal hypk. Severe AS. AV replacement & CABG recommended. Past Surgical History:   Procedure Laterality Date    HX APPENDECTOMY         Current Outpatient Medications   Medication Sig Dispense Refill    amLODIPine (NORVASC) 10 mg tablet TAKE 1 TABLET DAILY 90 Tab 3    fluticasone propionate (Flonase Allergy Relief) 50 mcg/actuation nasal spray 2 Sprays by Both Nostrils route daily as needed for Rhinitis or Allergies. 1 Bottle 0    sodium chloride (Saline Nasal Mist) 0.65 % nasal squeeze bottle 0.05 mL by Both Nostrils route as needed for Congestion. 30 mL 0    carbamide peroxide (Debrox) 6.5 % otic solution Use as directed on instructions  Indications: an earwax blockage 7.5 mL 0    lisinopriL (PRINIVIL, ZESTRIL) 20 mg tablet TAKE 1 TABLET DAILY 90 Tab 3    allopurinoL (ZYLOPRIM) 100 mg tablet TAKE 1 TABLET DAILY 90 Tab 3    metoprolol succinate (TOPROL-XL) 50 mg XL tablet TAKE 1 TABLET DAILY 90 Tab 3    atorvastatin (LIPITOR) 20 mg tablet Take 1 Tab by mouth daily. 90 Tab 3    acetaminophen (Tylenol Extra Strength) 500 mg tablet Take  by mouth every six (6) hours as needed for Pain.  cyanocobalamin, vitamin B-12, 1,000 mcg lozg 1,000 mcg by SubLINGual route daily. 90 Lozenge 2    ascorbic acid, vitamin C, (Vitamin C) 500 mg tablet Take  by mouth.  sildenafil citrate (VIAGRA) 100 mg tablet Take 1 Tab by mouth as needed. 6 Tab 3    docusate sodium (Colace) 100 mg capsule Take 100 mg by mouth daily as needed.  cholecalciferol, vitamin D3, 2,000 unit tab Take  by mouth daily.  aspirin delayed-release 81 mg tablet Take 1 tablet by mouth daily.  30 tablet 2       The patient has a family history of    Social History     Tobacco Use  Smoking status: Never Smoker    Smokeless tobacco: Never Used   Substance Use Topics    Alcohol use: Yes     Alcohol/week: 12.0 standard drinks     Types: 12 Cans of beer per week    Drug use: No       Lab Results   Component Value Date/Time    Cholesterol, total 167 12/23/2020 09:46 AM    HDL Cholesterol 70 (H) 12/23/2020 09:46 AM    LDL, calculated 78.8 12/23/2020 09:46 AM    Triglyceride 91 12/23/2020 09:46 AM    CHOL/HDL Ratio 2.4 12/23/2020 09:46 AM        BP Readings from Last 3 Encounters:   04/13/21 (!) 144/64   03/10/21 (!) 142/72   12/31/20 132/60        Pulse Readings from Last 3 Encounters:   04/13/21 66   03/10/21 77   12/31/20 71       Wt Readings from Last 3 Encounters:   04/13/21 104.8 kg (231 lb)   03/10/21 103.8 kg (228 lb 12.8 oz)   12/31/20 104.3 kg (230 lb)         EKG: unchanged from previous tracings, normal sinus rhythm, 1st degree AV block.

## 2021-07-08 RX ORDER — ATORVASTATIN CALCIUM 20 MG/1
TABLET, FILM COATED ORAL
Qty: 90 TABLET | Refills: 3 | Status: SHIPPED | OUTPATIENT
Start: 2021-07-08 | End: 2022-07-04

## 2021-07-28 ENCOUNTER — HOSPITAL ENCOUNTER (OUTPATIENT)
Dept: LAB | Age: 71
Discharge: HOME | End: 2021-07-28
Payer: MEDICARE

## 2021-07-28 ENCOUNTER — APPOINTMENT (OUTPATIENT)
Dept: INTERNAL MEDICINE CLINIC | Age: 71
End: 2021-07-28

## 2021-07-28 DIAGNOSIS — N18.31 STAGE 3A CHRONIC KIDNEY DISEASE (HCC): ICD-10-CM

## 2021-07-28 DIAGNOSIS — E53.8 VITAMIN B12 DEFICIENCY: ICD-10-CM

## 2021-07-28 DIAGNOSIS — I10 ESSENTIAL HYPERTENSION: ICD-10-CM

## 2021-07-28 DIAGNOSIS — Z95.1 S/P CABG X 2: ICD-10-CM

## 2021-07-28 DIAGNOSIS — Z95.2 S/P AVR (AORTIC VALVE REPLACEMENT): ICD-10-CM

## 2021-07-28 DIAGNOSIS — I25.10 CAD IN NATIVE ARTERY: ICD-10-CM

## 2021-07-28 DIAGNOSIS — R80.9 MICROALBUMINURIA: ICD-10-CM

## 2021-07-28 DIAGNOSIS — C61 PROSTATE CANCER (HCC): ICD-10-CM

## 2021-07-28 DIAGNOSIS — Z12.11 COLON CANCER SCREENING: ICD-10-CM

## 2021-07-28 DIAGNOSIS — E55.9 VITAMIN D DEFICIENCY: ICD-10-CM

## 2021-07-28 DIAGNOSIS — N18.31 TYPE 2 DIABETES MELLITUS WITH STAGE 3A CHRONIC KIDNEY DISEASE, WITHOUT LONG-TERM CURRENT USE OF INSULIN (HCC): ICD-10-CM

## 2021-07-28 DIAGNOSIS — E78.5 HYPERLIPIDEMIA, UNSPECIFIED HYPERLIPIDEMIA TYPE: ICD-10-CM

## 2021-07-28 DIAGNOSIS — E11.22 TYPE 2 DIABETES MELLITUS WITH STAGE 3A CHRONIC KIDNEY DISEASE, WITHOUT LONG-TERM CURRENT USE OF INSULIN (HCC): ICD-10-CM

## 2021-07-28 DIAGNOSIS — M1A.09X0 IDIOPATHIC CHRONIC GOUT OF MULTIPLE SITES WITHOUT TOPHUS: Chronic | ICD-10-CM

## 2021-07-28 LAB
25(OH)D3 SERPL-MCNC: 30 NG/ML (ref 30–100)
ALBUMIN SERPL-MCNC: 4.1 G/DL (ref 3.4–5)
ALBUMIN/GLOB SERPL: 1.2 {RATIO} (ref 0.8–1.7)
ALP SERPL-CCNC: 73 U/L (ref 45–117)
ALT SERPL-CCNC: 25 U/L (ref 16–61)
ANION GAP SERPL CALC-SCNC: 10 MMOL/L (ref 3–18)
APPEARANCE UR: CLEAR
AST SERPL-CCNC: 19 U/L (ref 10–38)
BASOPHILS # BLD: 0.1 K/UL (ref 0–0.1)
BASOPHILS NFR BLD: 1 % (ref 0–2)
BILIRUB SERPL-MCNC: 1.1 MG/DL (ref 0.2–1)
BILIRUB UR QL: NEGATIVE
BUN SERPL-MCNC: 24 MG/DL (ref 7–18)
BUN/CREAT SERPL: 16 (ref 12–20)
CALCIUM SERPL-MCNC: 9.3 MG/DL (ref 8.5–10.1)
CHLORIDE SERPL-SCNC: 109 MMOL/L (ref 100–111)
CHOLEST SERPL-MCNC: 146 MG/DL
CO2 SERPL-SCNC: 19 MMOL/L (ref 21–32)
COLOR UR: YELLOW
CREAT SERPL-MCNC: 1.46 MG/DL (ref 0.6–1.3)
CREAT UR-MCNC: 84 MG/DL (ref 30–125)
DIFFERENTIAL METHOD BLD: ABNORMAL
EOSINOPHIL # BLD: 0.2 K/UL (ref 0–0.4)
EOSINOPHIL NFR BLD: 4 % (ref 0–5)
ERYTHROCYTE [DISTWIDTH] IN BLOOD BY AUTOMATED COUNT: 12.8 % (ref 11.6–14.5)
EST. AVERAGE GLUCOSE BLD GHB EST-MCNC: 123 MG/DL
GLOBULIN SER CALC-MCNC: 3.3 G/DL (ref 2–4)
GLUCOSE SERPL-MCNC: 115 MG/DL (ref 74–99)
GLUCOSE UR STRIP.AUTO-MCNC: NEGATIVE MG/DL
HBA1C MFR BLD: 5.9 % (ref 4.2–5.6)
HCT VFR BLD AUTO: 37.5 % (ref 36–48)
HDLC SERPL-MCNC: 60 MG/DL (ref 40–60)
HDLC SERPL: 2.4 {RATIO} (ref 0–5)
HGB BLD-MCNC: 12.4 G/DL (ref 13–16)
HGB UR QL STRIP: NEGATIVE
KETONES UR QL STRIP.AUTO: NEGATIVE MG/DL
LDLC SERPL CALC-MCNC: 72 MG/DL (ref 0–100)
LEUKOCYTE ESTERASE UR QL STRIP.AUTO: NEGATIVE
LIPID PROFILE,FLP: NORMAL
LYMPHOCYTES # BLD: 1.3 K/UL (ref 0.9–3.6)
LYMPHOCYTES NFR BLD: 19 % (ref 21–52)
MAGNESIUM SERPL-MCNC: 2.2 MG/DL (ref 1.6–2.6)
MCH RBC QN AUTO: 33.5 PG (ref 24–34)
MCHC RBC AUTO-ENTMCNC: 33.1 G/DL (ref 31–37)
MCV RBC AUTO: 101.4 FL (ref 74–97)
MICROALBUMIN UR-MCNC: 0.84 MG/DL (ref 0–3)
MICROALBUMIN/CREAT UR-RTO: 10 MG/G (ref 0–30)
MONOCYTES # BLD: 0.6 K/UL (ref 0.05–1.2)
MONOCYTES NFR BLD: 9 % (ref 3–10)
NEUTS SEG # BLD: 4.5 K/UL (ref 1.8–8)
NEUTS SEG NFR BLD: 68 % (ref 40–73)
NITRITE UR QL STRIP.AUTO: NEGATIVE
PH UR STRIP: 5.5 [PH] (ref 5–8)
PLATELET # BLD AUTO: 198 K/UL (ref 135–420)
PMV BLD AUTO: 9 FL (ref 9.2–11.8)
POTASSIUM SERPL-SCNC: 4.7 MMOL/L (ref 3.5–5.5)
PROT SERPL-MCNC: 7.4 G/DL (ref 6.4–8.2)
PROT UR STRIP-MCNC: NEGATIVE MG/DL
RBC # BLD AUTO: 3.7 M/UL (ref 4.35–5.65)
SODIUM SERPL-SCNC: 138 MMOL/L (ref 136–145)
SP GR UR REFRACTOMETRY: 1.01 (ref 1–1.03)
TRIGL SERPL-MCNC: 70 MG/DL (ref ?–150)
TSH SERPL DL<=0.05 MIU/L-ACNC: 0.78 UIU/ML (ref 0.36–3.74)
UROBILINOGEN UR QL STRIP.AUTO: 0.2 EU/DL (ref 0.2–1)
VIT B12 SERPL-MCNC: 1418 PG/ML (ref 211–911)
VLDLC SERPL CALC-MCNC: 14 MG/DL
WBC # BLD AUTO: 6.6 K/UL (ref 4.6–13.2)

## 2021-07-28 PROCEDURE — 36415 COLL VENOUS BLD VENIPUNCTURE: CPT

## 2021-07-28 PROCEDURE — 85025 COMPLETE CBC W/AUTO DIFF WBC: CPT

## 2021-07-28 PROCEDURE — 81003 URINALYSIS AUTO W/O SCOPE: CPT

## 2021-07-28 PROCEDURE — 83036 HEMOGLOBIN GLYCOSYLATED A1C: CPT

## 2021-07-28 PROCEDURE — 80053 COMPREHEN METABOLIC PANEL: CPT

## 2021-07-28 PROCEDURE — 82306 VITAMIN D 25 HYDROXY: CPT

## 2021-07-28 PROCEDURE — 82607 VITAMIN B-12: CPT

## 2021-07-28 PROCEDURE — 84443 ASSAY THYROID STIM HORMONE: CPT

## 2021-07-28 PROCEDURE — 83735 ASSAY OF MAGNESIUM: CPT

## 2021-07-28 PROCEDURE — 80061 LIPID PANEL: CPT

## 2021-07-28 PROCEDURE — 82043 UR ALBUMIN QUANTITATIVE: CPT

## 2021-08-04 ENCOUNTER — HOSPITAL ENCOUNTER (OUTPATIENT)
Dept: LAB | Age: 71
Discharge: HOME | End: 2021-08-04
Payer: MEDICARE

## 2021-08-04 ENCOUNTER — OFFICE VISIT (OUTPATIENT)
Dept: INTERNAL MEDICINE CLINIC | Age: 71
End: 2021-08-04
Payer: MEDICARE

## 2021-08-04 VITALS
TEMPERATURE: 97.2 F | HEART RATE: 70 BPM | BODY MASS INDEX: 29.55 KG/M2 | WEIGHT: 223 LBS | SYSTOLIC BLOOD PRESSURE: 126 MMHG | HEIGHT: 73 IN | RESPIRATION RATE: 16 BRPM | OXYGEN SATURATION: 98 % | DIASTOLIC BLOOD PRESSURE: 60 MMHG

## 2021-08-04 DIAGNOSIS — E11.22 TYPE 2 DIABETES MELLITUS WITH STAGE 3A CHRONIC KIDNEY DISEASE, WITHOUT LONG-TERM CURRENT USE OF INSULIN (HCC): ICD-10-CM

## 2021-08-04 DIAGNOSIS — Z95.2 S/P AVR (AORTIC VALVE REPLACEMENT): ICD-10-CM

## 2021-08-04 DIAGNOSIS — Z12.11 COLON CANCER SCREENING: ICD-10-CM

## 2021-08-04 DIAGNOSIS — D64.9 ANEMIA, UNSPECIFIED TYPE: ICD-10-CM

## 2021-08-04 DIAGNOSIS — E55.9 VITAMIN D DEFICIENCY: ICD-10-CM

## 2021-08-04 DIAGNOSIS — E53.8 VITAMIN B12 DEFICIENCY: ICD-10-CM

## 2021-08-04 DIAGNOSIS — N18.31 STAGE 3A CHRONIC KIDNEY DISEASE (HCC): ICD-10-CM

## 2021-08-04 DIAGNOSIS — I25.10 CAD IN NATIVE ARTERY: ICD-10-CM

## 2021-08-04 DIAGNOSIS — N18.31 TYPE 2 DIABETES MELLITUS WITH STAGE 3A CHRONIC KIDNEY DISEASE, WITHOUT LONG-TERM CURRENT USE OF INSULIN (HCC): ICD-10-CM

## 2021-08-04 DIAGNOSIS — I10 ESSENTIAL HYPERTENSION: Primary | ICD-10-CM

## 2021-08-04 DIAGNOSIS — E78.5 HYPERLIPIDEMIA, UNSPECIFIED HYPERLIPIDEMIA TYPE: ICD-10-CM

## 2021-08-04 DIAGNOSIS — C61 PROSTATE CANCER (HCC): ICD-10-CM

## 2021-08-04 LAB — PSA SERPL-MCNC: 0.3 NG/ML (ref 0–4)

## 2021-08-04 PROCEDURE — G8417 CALC BMI ABV UP PARAM F/U: HCPCS | Performed by: INTERNAL MEDICINE

## 2021-08-04 PROCEDURE — G0463 HOSPITAL OUTPT CLINIC VISIT: HCPCS | Performed by: INTERNAL MEDICINE

## 2021-08-04 PROCEDURE — 84153 ASSAY OF PSA TOTAL: CPT

## 2021-08-04 PROCEDURE — 2022F DILAT RTA XM EVC RTNOPTHY: CPT | Performed by: INTERNAL MEDICINE

## 2021-08-04 PROCEDURE — G8510 SCR DEP NEG, NO PLAN REQD: HCPCS | Performed by: INTERNAL MEDICINE

## 2021-08-04 PROCEDURE — G8427 DOCREV CUR MEDS BY ELIG CLIN: HCPCS | Performed by: INTERNAL MEDICINE

## 2021-08-04 PROCEDURE — 36415 COLL VENOUS BLD VENIPUNCTURE: CPT

## 2021-08-04 PROCEDURE — 1101F PT FALLS ASSESS-DOCD LE1/YR: CPT | Performed by: INTERNAL MEDICINE

## 2021-08-04 PROCEDURE — G8536 NO DOC ELDER MAL SCRN: HCPCS | Performed by: INTERNAL MEDICINE

## 2021-08-04 PROCEDURE — 3017F COLORECTAL CA SCREEN DOC REV: CPT | Performed by: INTERNAL MEDICINE

## 2021-08-04 PROCEDURE — 99214 OFFICE O/P EST MOD 30 MIN: CPT | Performed by: INTERNAL MEDICINE

## 2021-08-04 PROCEDURE — 3044F HG A1C LEVEL LT 7.0%: CPT | Performed by: INTERNAL MEDICINE

## 2021-08-04 PROCEDURE — G8754 DIAS BP LESS 90: HCPCS | Performed by: INTERNAL MEDICINE

## 2021-08-04 PROCEDURE — G8752 SYS BP LESS 140: HCPCS | Performed by: INTERNAL MEDICINE

## 2021-08-04 NOTE — PROGRESS NOTES
1. Have you been to the ER, urgent care clinic or hospitalized since your last visit? NO.     2. Have you seen or consulted any other health care providers outside of the [address] [place] [address] since your last visit (Include any pap smears or colon screening)?  NO

## 2021-08-04 NOTE — PATIENT INSTRUCTIONS
Schedule eye appointment with Dr. Angeli Nation. Perform Cologuard testing. Heart-Healthy Diet: Care Instructions  Your Care Instructions     A heart-healthy diet has lots of vegetables, fruits, nuts, beans, and whole grains, and is low in salt. It limits foods that are high in saturated fat, such as meats, cheeses, and fried foods. It may be hard to change your diet, but even small changes can lower your risk of heart attack and heart disease. Follow-up care is a key part of your treatment and safety. Be sure to make and go to all appointments, and call your doctor if you are having problems. It's also a good idea to know your test results and keep a list of the medicines you take. How can you care for yourself at home? Watch your portions  · Learn what a serving is. A \"serving\" and a \"portion\" are not always the same thing. Make sure that you are not eating larger portions than are recommended. For example, a serving of pasta is ½ cup. A serving size of meat is 2 to 3 ounces. A 3-ounce serving is about the size of a deck of cards. Measure serving sizes until you are good at [place]" them. Keep in mind that restaurants often serve portions that are 2 or 3 times the size of one serving. · To keep your energy level up and keep you from feeling hungry, eat often but in smaller portions. · Eat only the number of calories you need to stay at a healthy weight. If you need to lose weight, eat fewer calories than your body burns (through exercise and other physical activity). Eat more fruits and vegetables  · Eat a variety of fruit and vegetables every day. Dark green, deep orange, red, or yellow fruits and vegetables are especially good for you. Examples include spinach, carrots, peaches, and berries. · Keep carrots, celery, and other veggies handy for snacks. Buy fruit that is in season and store it where you can see it so that you will be tempted to eat it.   · Cook dishes that have a lot of veggies in them, such as stir-fries and soups. Limit saturated and trans fat  · Read food labels, and try to avoid saturated and trans fats. They increase your risk of heart disease. · Use olive or canola oil when you cook. · Bake, broil, grill, or steam foods instead of frying them. · Choose lean meats instead of high-fat meats such as hot dogs and sausages. Cut off all visible fat when you prepare meat. · Eat fish, skinless poultry, and meat alternatives such as soy products instead of high-fat meats. Soy products, such as tofu, may be especially good for your heart. · Choose low-fat or fat-free milk and dairy products. Eat foods high in fiber  · Eat a variety of grain products every day. Include whole-grain foods that have lots of fiber and nutrients. Examples of whole-grain foods include oats, whole wheat bread, and brown rice. · Buy whole-grain breads and cereals, instead of white bread or pastries. Limit salt and sodium  · Limit how much salt and sodium you eat to help lower your blood pressure. · Taste food before you salt it. Add only a little salt when you think you need it. With time, your taste buds will adjust to less salt. · Eat fewer snack items, fast foods, and other high-salt, processed foods. Check food labels for the amount of sodium in packaged foods. · Choose low-sodium versions of canned goods (such as soups, vegetables, and beans). Limit sugar  · Limit drinks and foods with added sugar. These include candy, desserts, and soda pop. Limit alcohol  · Limit alcohol to no more than 2 drinks a day for men and 1 drink a day for women. Too much alcohol can cause health problems. When should you call for help? Watch closely for changes in your health, and be sure to contact your doctor if:    · You would like help planning heart-healthy meals. Where can you learn more?   Go to http://www.gray.com/  Enter V137 in the search box to learn more about \"Heart-Healthy Diet: Care Instructions. \"  Current as of: August 22, 2019               Content Version: 12.6  © 1494-2025 [employer]. Care instructions adapted under license by [employer] (which disclaims liability or warranty for this information). If you have questions about a medical condition or this instruction, always ask your healthcare professional. [address] any warranty or liability for your use of this information. Learning About Low-Sodium Foods  What foods are low in sodium? The foods you eat contain nutrients, such as vitamins and minerals. Sodium is a nutrient. Your body needs the right amount to stay healthy and work as it should. You can use the list below to help you make choices about which foods to eat. Fruits  · Fresh, frozen, canned, or dried fruit  Vegetables  · Fresh or frozen vegetables, with no added salt  · Canned vegetables, low-sodium or with no added salt  Grains  · Bagels without salted tops  · Cereal with no added salt  · Corn tortillas  · Crackers with no added salt  · Oatmeal, cooked without salt  · Popcorn with no salt  · Pasta and noodles, cooked without salt  · Rice, cooked without salt  · Unsalted pretzels  Dairy and dairy alternatives  · Butter, unsalted  · Cream cheese  · Ice cream  · Milk  · Soy milk  Meats and other protein foods  · Beans and peas, canned with no salt  · Eggs  · Fresh fish (not smoked)  · Fresh meats (not smoked or cured)  · Nuts and nut butter, prepared without salt  · Poultry, not packaged with sodium solution  · Tofu, unseasoned  · Tuna, canned without salt  Seasonings  · Garlic  · Herbs and spices  · Lemon juice  · Mustard  · Olive oil  · Salt-free seasoning mixes  · Vinegar  Work with your doctor to find out how much of this nutrient you need. Depending on your health, you may need more or less of it in your diet. Where can you learn more?   Go to http://www.gray.com/  Enter S460 in the search box to learn more about \"Learning About Low-Sodium Foods. \"  Current as of: August 22, 2019               Content Version: 12.6  © 8646-3340 [employer]. Care instructions adapted under license by [employer] (which disclaims liability or warranty for this information). If you have questions about a medical condition or this instruction, always ask your healthcare professional. [address] any warranty or liability for your use of this information. Low Sodium Diet (2,000 Milligram): Care Instructions  Your Care Instructions     Too much sodium causes your body to hold on to extra water. This can raise your blood pressure and force your heart and kidneys to work harder. In very serious cases, this could cause you to be put in the hospital. It might even be life-threatening. By limiting sodium, you will feel better and lower your risk of serious problems. The most common source of sodium is salt. People get most of the salt in their diet from canned, prepared, and packaged foods. Fast food and restaurant meals also are very high in sodium. Your doctor will probably limit your sodium to less than 2,000 milligrams (mg) a day. This limit counts all the sodium in prepared and packaged foods and any salt you add to your food. Follow-up care is a key part of your treatment and safety. Be sure to make and go to all appointments, and call your doctor if you are having problems. It's also a good idea to know your test results and keep a list of the medicines you take. How can you care for yourself at home? Read food labels  · Read labels on cans and food packages. The labels tell you how much sodium is in each serving. Make sure that you look at the serving size. If you eat more than the serving size, you have eaten more sodium. · Food labels also tell you the Percent Daily Value for sodium. Choose products with low Percent Daily Values for sodium.   · Be aware that sodium can come in forms other than salt, including monosodium glutamate (MSG), sodium citrate, and sodium bicarbonate (baking soda). MSG is often added to Asian food. When you eat out, you can sometimes ask for food without MSG or added salt. Buy low-sodium foods  · Buy foods that are labeled \"unsalted\" (no salt added), \"sodium-free\" (less than 5 mg of sodium per serving), or \"low-sodium\" (less than 140 mg of sodium per serving). Foods labeled \"reduced-sodium\" and \"light sodium\" may still have too much sodium. Be sure to read the label to see how much sodium you are getting. · Buy fresh vegetables, or frozen vegetables without added sauces. Buy low-sodium versions of canned vegetables, soups, and other canned goods. Prepare low-sodium meals  · Cut back on the amount of salt you use in cooking. This will help you adjust to the taste. Do not add salt after cooking. One teaspoon of salt has about 2,300 mg of sodium. · Take the salt shaker off the table. · Flavor your food with garlic, lemon juice, onion, vinegar, herbs, and spices. Do not use soy sauce, lite soy sauce, steak sauce, onion salt, garlic salt, celery salt, mustard, or ketchup on your food. · Use low-sodium salad dressings, sauces, and ketchup. Or make your own salad dressings and sauces without adding salt. · Use less salt (or none) when recipes call for it. You can often use half the salt a recipe calls for without losing flavor. Other foods such as rice, pasta, and grains do not need added salt. · Rinse canned vegetables, and cook them in fresh water. This removes somebut not allof the salt. · Avoid water that is naturally high in sodium or that has been treated with water softeners, which add sodium. Call your local water company to find out the sodium content of your water supply. If you buy bottled water, read the label and choose a sodium-free brand.   Avoid high-sodium foods  · Avoid eating:  ? Smoked, cured, salted, and canned meat, fish, and poultry. ? Ham, jesus, hot dogs, and luncheon meats. ? Regular, hard, and processed cheese and regular peanut butter. ? Crackers with salted tops, and other salted snack foods such as pretzels, chips, and salted popcorn. ? Frozen prepared meals, unless labeled low-sodium. ? Canned and dried soups, broths, and bouillon, unless labeled sodium-free or low-sodium. ? Canned vegetables, unless labeled sodium-free or low-sodium. ? [place] fries, pizza, tacos, and other fast foods. ? Pickles, olives, ketchup, and other condiments, especially soy sauce, unless labeled sodium-free or low-sodium. Where can you learn more? Go to http://www.gray.com/  Enter V843 in the search box to learn more about \"Low Sodium Diet (2,000 Milligram): Care Instructions. \"  Current as of: August 22, 2019               Content Version: 12.6  © 5539-8331 [employer]. Care instructions adapted under license by [employer] (which disclaims liability or warranty for this information). If you have questions about a medical condition or this instruction, always ask your healthcare professional. [address] any warranty or liability for your use of this information. Learning About Diabetes Food Guidelines  Your Care Instructions     Meal planning is important to manage diabetes. It helps keep your blood sugar at a target level (which you set with your doctor). You don't have to eat special foods. You can eat what your family eats, including sweets once in a while. But you do have to pay attention to how often you eat and how much you eat of certain foods. You may want to work with a dietitian or a certified diabetes educator (CDE) to help you plan meals and snacks. A dietitian or CDE can also help you lose weight if that is one of your goals. What should you know about eating carbs?   Managing the amount of carbohydrate (carbs) you eat is an important part of healthy meals when you have diabetes. Carbohydrate is found in many foods. · Learn which foods have carbs. And learn the amounts of carbs in different foods. ? Bread, cereal, pasta, and rice have about 15 grams of carbs in a serving. A serving is 1 slice of bread (1 ounce), ½ cup of cooked cereal, or 1/3 cup of cooked pasta or rice. ? Fruits have 15 grams of carbs in a serving. A serving is 1 small fresh fruit, such as an apple or orange; ½ of a banana; ½ cup of cooked or canned fruit; ½ cup of fruit juice; 1 cup of melon or raspberries; or 2 tablespoons of dried fruit. ? Milk and no-sugar-added yogurt have 15 grams of carbs in a serving. A serving is 1 cup of milk or 2/3 cup of no-sugar-added yogurt. ? Starchy vegetables have 15 grams of carbs in a serving. A serving is ½ cup of mashed potatoes or sweet potato; 1 cup winter squash; ½ of a small baked potato; ½ cup of cooked beans; or ½ cup cooked corn or green peas. · Learn how much carbs to eat each day and at each meal. A dietitian or CDE can teach you how to keep track of the amount of carbs you eat. This is called carbohydrate counting. · If you are not sure how to count carbohydrate grams, use the Plate Method to plan meals. It is a good, quick way to make sure that you have a balanced meal. It also helps you spread carbs throughout the day. ? Divide your plate by types of foods. Put non-starchy vegetables on half the plate, meat or other protein food on one-quarter of the plate, and a grain or starchy vegetable in the final quarter of the plate. To this you can add a small piece of fruit and 1 cup of milk or yogurt, depending on how many carbs you are supposed to eat at a meal.  · Try to eat about the same amount of carbs at each meal. Do not \"save up\" your daily allowance of carbs to eat at one meal.  · Proteins have very little or no carbs per serving.  Examples of proteins are beef, chicken, turkey, fish, eggs, tofu, cheese, cottage cheese, and peanut butter. A serving size of meat is 3 ounces, which is about the size of a deck of cards. Examples of meat substitute serving sizes (equal to 1 ounce of meat) are 1/4 cup of cottage cheese, 1 egg, 1 tablespoon of peanut butter, and ½ cup of tofu. How can you eat out and still eat healthy? · Learn to estimate the serving sizes of foods that have carbohydrate. If you measure food at home, it will be easier to estimate the amount in a serving of restaurant food. · If the meal you order has too much carbohydrate (such as potatoes, corn, or baked beans), ask to have a low-carbohydrate food instead. Ask for a salad or green vegetables. · If you use insulin, check your blood sugar before and after eating out to help you plan how much to eat in the future. · If you eat more carbohydrate at a meal than you had planned, take a walk or do other exercise. This will help lower your blood sugar. What else should you know? · Limit saturated fat, such as the fat from meat and dairy products. This is a healthy choice because people who have diabetes are at higher risk of heart disease. So choose lean cuts of meat and nonfat or low-fat dairy products. Use olive or canola oil instead of butter or shortening when cooking. · Don't skip meals. Your blood sugar may drop too low if you skip meals and take insulin or certain medicines for diabetes. · Check with your doctor before you drink alcohol. Alcohol can cause your blood sugar to drop too low. Alcohol can also cause a bad reaction if you take certain diabetes medicines. Follow-up care is a key part of your treatment and safety. Be sure to make and go to all appointments, and call your doctor if you are having problems. It's also a good idea to know your test results and keep a list of the medicines you take. Where can you learn more?   Go to http://www.gray.com/  Enter I147 in the search box to learn more about \"Learning About Diabetes Food Guidelines. \"  Current as of: December 20, 2019               Content Version: 12.6  © 1718-2298 [employer]. Care instructions adapted under license by [employer] (which disclaims liability or warranty for this information). If you have questions about a medical condition or this instruction, always ask your healthcare professional. [address] any warranty or liability for your use of this information.

## 2021-08-04 NOTE — LETTER
8/4/2021    [address] [facility] [address]      Dear Colin Subramanian:    Please find your most recent results below. Resulted Orders   PSA, DIAGNOSTIC (PROSTATE SPECIFIC AG)   Result Value Ref Range    Prostate Specific Ag 0.3 0.0 - 4.0 ng/mL       RECOMMENDATIONS:  None. Your PSA continues to improve. It is actually decreased from the last level when checked in November 2020 when it was 0.4. Will continue to monitor.     Please call me if you have any questions: [phone]    Sincerely,      Will Aceves MD

## 2021-08-08 PROBLEM — E66.3 OVERWEIGHT (BMI 25.0-29.9): Status: ACTIVE | Noted: 2021-01-03

## 2021-08-09 NOTE — PROGRESS NOTES
HPI:   Mitchel Kwong is a 66y.o. year old male who presents for a physical exam. He has a history of hypertension, hyperlipidemia, ASHD s/p CABG, aortic stenosis s/p AVR, diabetes mellitus, chronic renal disease stage 3, prostate cancer, and gout. He has completed the [employer] COVID-19 vaccine series. He reports that he is doing generally well and is without specific complaints. Summary of prior hospitalizations and medical history:  In 6/2017, he was noted to have an elevated PSA of 6.0, which was confirmed on repeat to be 6.6. He was referred to Dr. Adolfo Christine and PSA was again repeated and found to be increased at 7.24. He underwent TRUS biopsy on 8/17/2017, which showed [id number] Noble Grade 7 (3 + 4) adenocarcinoma of the prostate in 3/12 cores, 2-10% of each core. Options for management were discussed and he selected active surveillance. Plans were for repeat PSA and MRI prostate in 6 months (due 2/2018). However, the patient never had tests performed and he did not follow-up as discussed. On 10/4/2018, at his routine visit, a PSA level was obtained and was found to have increased to 11.3, and he was advised to follow-up with Dr. Adolfo Christine. He underwent a prostate MRI 3T at [facility] on 12/31/2018 showing several peripheral zone PI-RADS 4 observations: right posterolateral peripheral zone at the mid gland/apex and anterior peripheral zone at the apex bilaterally. The former exhibits questionable capsular bulging laterally, but not a definitive appearance for extracapsular extension. He had a follow-up appointment with Dr. Adolfo Christine on 1/11/2019 and decision made to proceed with EBRT. He had a staging bone scan (1/23/2019) which was negative for osseous metastases, and a CT abdomen and pelvis (1/23/2019) which showed no adenopathy of evidence of metastases, mild hepatic steatosis, aortic atherosclerosis, and cholelithiasis.  He met with Dr. Dominique Quinones on 1/30/2019 and decided to proceed with EBRT as definitive treatment for his prostate cancer, receiving his last treatment on 5/6/2019. Follow-up PSA level in 11/2020 at 0.4. He has a history of hypertension, hyperlipidemia, ASHD, and aortic stenosis. In 10/2014, he presented with progressive chest pain and shortness of breath and an echocardiogram was obtained (10/16/2014) showing normal LV function (EF 55-60%), no RWMA, grade 1 diastolic dysfunction, mild LAE, and severe AS (mean gradient 30 mmHg, peak 67 mmHg, and AV area 0.8 cm2). He was referred to see Dr. Ty Low and underwent cardiac catheterization (11/6/2014) showing 100% RCA (distal collaterals from left), 70-80% distal LAD, 80% proximal LCx, inferior basal hypokinesis, and EF 55%. On 11/13/2014, he underwent 2 vessel CABG (LIMA to LAD and SVG to OM1) and AV replacement with a bioprosthetic 23 mm Marifer Barbosa Magna pericardial valve by Dr. Alycia Blizzard. He has done well since that time and remains asymptomatic. He denies any chest pain, shortness of breath at rest or with exertion, palpitations, lightheadedness, or edema. His current regimen includes aspirin, metoprolol, lisinopril, and moderate intensity dose atorvastatin. He is followed by Dr. Ty Low. He had a repeat echocardiogram (8/21/2017) showing normal LV function (EF 55-60%), no RWMA, mild MIL, and normally functioning bioprosthetic valve with peak gradient 17 mmHg, mean gradient 10 mmHg, and valve area 1.86 cm2. Repeat echocardiogram (9/18/2020) showed normal LV size and function (EF 55-60%), AV prosthetic functioning normally with mean gradient of 7.2mmHg and MARY 1.8 cm2. Moderate LAE, and mild MR. He has a history of diabetes mellitus, which has not required treatment with medication. Review of his record shows that his Hba1c has remained between 5.7-6.3 since 2011. He denies any polyuria, polydipsia, nocturia, or blurry vision, and has no history of retinopathy or neuropathy.  He does have evidence of chronic renal disease, stage 3, with baseline creatinine 1.22-1.37/ eGFR 55-59 since 11/2014. He had been having regular eye exams with Dr. Aide Dalton. He has a history of gout, but has not had a flare in many years since being treated with allopurinol. He has never had a screening colonoscopy. He did undergo Cologuard testing in 11/2017 which was negative. He denies any abdominal pain, nausea, vomiting, melena, hematochezia, or change in bowel movements. Past Medical History:   Diagnosis Date    Aortic stenosis     s/p AVR    Aortic stenosis, severe 10/16/2014    Echocardiogram EF 60% peak gradient 67 mmHg, mean gradient 30 mmHg, MARY 0.8 cm    ASHD (arteriosclerotic heart disease)     Chronic renal disease, stage III (HCC)     DM (diabetes mellitus) ([facility]     Gout     HCD (hypertensive cardiovascular disease)     History of echocardiogram 10/16/2014    EF 55-60%. No RWMA. Mild LVH. Gr 1 DDfx. Mild LAE. Severe AS (mean grad 30 mmHg).  Hyperlipidemia     Hypertension     Prostate cancer ([facility] 08/17/2017    [id number] Kaylynn Grade 7 (3 + 4) adenocarcinoma of the prostate in 3/12 cores, 2-10% of each core; PSA 7.24. Dr. Doron Raymond.  S/P AVR (aortic valve replacement) 11/13/2014    #23 mm Marifer Barbosa Magna pericardial valve. Dr. Alden Ureña.  S/P CABG x 2 11/13/2014    LIMA to LAD, SVG to OM1. Dr. Alden Ureña.  S/P cardiac catheterization 11/06/2014    RCA dom. mRCA 100%. LM patent. dLAD 75% x 2. pCX 80% (ELENA-3). LVEDP 14 mmHg. EF 50-55%. Severe inferobasal hypk. Severe AS. AV replacement & CABG recommended.      Past Surgical History:   Procedure Laterality Date    HX APPENDECTOMY       Current Outpatient Medications   Medication Sig    atorvastatin (LIPITOR) 20 mg tablet TAKE 1 TABLET DAILY    amLODIPine (NORVASC) 10 mg tablet TAKE 1 TABLET DAILY    fluticasone propionate (Flonase Allergy Relief) 50 mcg/actuation nasal spray 2 Sprays by Both Nostrils route daily as needed for Rhinitis or Allergies.  sodium chloride (Saline Nasal Mist) 0.65 % nasal squeeze bottle 0.05 mL by Both Nostrils route as needed for Congestion.  carbamide peroxide (Debrox) 6.5 % otic solution Use as directed on instructions  Indications: an earwax blockage    lisinopriL (PRINIVIL, ZESTRIL) 20 mg tablet TAKE 1 TABLET DAILY    allopurinoL (ZYLOPRIM) 100 mg tablet TAKE 1 TABLET DAILY    metoprolol succinate (TOPROL-XL) 50 mg XL tablet TAKE 1 TABLET DAILY    acetaminophen (Tylenol Extra Strength) 500 mg tablet Take  by mouth every six (6) hours as needed for Pain.  cyanocobalamin, vitamin B-12, 1,000 mcg lozg 1,000 mcg by SubLINGual route daily.  ascorbic acid, vitamin C, (Vitamin C) 500 mg tablet Take  by mouth.  sildenafil citrate (VIAGRA) 100 mg tablet Take 1 Tab by mouth as needed.  docusate sodium (Colace) 100 mg capsule Take 100 mg by mouth daily as needed.  cholecalciferol, vitamin D3, 2,000 unit tab Take  by mouth daily.  aspirin delayed-release 81 mg tablet Take 1 tablet by mouth daily. No current facility-administered medications for this visit. Allergies and Intolerances:   No Known Allergies     Family History:   Family History   Problem Relation Age of Onset   Camryn Samson COPD Father     Other Father         pneumoconiosis    Lung Disease Father     Other Mother         meigs syndrome     Social History:   He  reports that he has never smoked. He has never used smokeless tobacco. He reports that he drinks approximately one case of beer per week. He is  with two adult children. He is retired from working in the [employer]. He started as a , and then became an .     Social History     Substance and Sexual Activity   Alcohol Use Yes    Alcohol/week: 12.0 standard drinks    Types: 12 Cans of beer per week     Immunization History:  Immunization History   Administered Date(s) Administered    COVID-19, PFIZER, MRNA, LNP-S, PF, 30MCG/0.3ML DOSE 04/27/2021, 05/18/2021  Influenza High Dose Vaccine PF 10/10/2017, 10/26/2018, 11/09/2019    Influenza Vaccine 10/14/2014    Influenza Vaccine (Tri) Adjuvanted (>65 Yrs FLUAD TRI [id number]) 10/04/2018, 11/09/2019    Influenza Vaccine Split 09/29/2011    Influenza Vaccine Whole 09/14/2010    Influenza, Quadrivalent, Adjuvanted (>65 Yrs FLUAD QUAD [id number]) 09/30/2020    Pneumococcal Conjugate (PCV-13) 06/29/2017    Pneumococcal Polysaccharide (PPSV-23) 10/14/2014    Td 01/01/2008    Tdap 01/12/2018    Zoster Recombinant 11/30/2020, 02/06/2021       Review of Systems:   As above included in HPI. Otherwise 11 point review of systems negative including constitutional, skin, HENT, eyes, respiratory, cardiovascular, gastrointestinal, genitourinary, musculoskeletal, endocrine, hematologic, allergy, and neurologic. Physical:   Visit Vitals  /60 (BP 1 Location: Left arm, BP Patient Position: Sitting)   Pulse 70   Temp 97.2 °F (36.2 °C) (Temporal)   Resp 16   Ht 6' 1\" (1.854 m)   Wt 223 lb (101.2 kg)   SpO2 98%   BMI 29.42 kg/m²         Exam:   Patient appears in no apparent distress. Affect is appropriate. HEENT --Anicteric sclerae, tympanic membranes normal,  ear canals normal.  PERRL, EOMI, conjunctiva and lids normal.  No cervical lymphadenopathy. No thyromegaly, JVD, or bruits. Carotid pulses 2+ with normal upstroke. Lungs --Clear to auscultation. No wheezing or rales. Heart --Regular rate and rhythm, no murmurs, rubs, gallops, or clicks. Abdomen -- Soft and nontender, no hepatosplenomegaly or masses. Extremities -- Without cyanosis, clubbing, edema.  Normal looking digits, ROM intact  Neuro -- CN 2-12 intact, strength 5/5 with intact soft touch in all extremities  Derm  no obvious abnormalities noted, no rash         Review of Data:  Labs:  Hospital Outpatient Visit on 07/28/2021   Component Date Value Ref Range Status    WBC 07/28/2021 6.6  4.6 - 13.2 K/uL Final    RBC 07/28/2021 3.70* 4.35 - 5.65 M/uL Final    HGB 07/28/2021 12.4* 13.0 - 16.0 g/dL Final    HCT 07/28/2021 37.5  36.0 - 48.0 % Final    MCV 07/28/2021 101.4* 74.0 - 97.0 FL Final    MCH 07/28/2021 33.5  24.0 - 34.0 PG Final    MCHC 07/28/2021 33.1  31.0 - 37.0 g/dL Final    RDW 07/28/2021 12.8  11.6 - 14.5 % Final    PLATELET 35/39/1998 317  135 - 420 K/uL Final    MPV 07/28/2021 9.0* 9.2 - 11.8 FL Final    NEUTROPHILS 07/28/2021 68  40 - 73 % Final    LYMPHOCYTES 07/28/2021 19* 21 - 52 % Final    MONOCYTES 07/28/2021 9  3 - 10 % Final    EOSINOPHILS 07/28/2021 4  0 - 5 % Final    BASOPHILS 07/28/2021 1  0 - 2 % Final    ABS. NEUTROPHILS 07/28/2021 4.5  1.8 - 8.0 K/UL Final    ABS. LYMPHOCYTES 07/28/2021 1.3  0.9 - 3.6 K/UL Final    ABS. MONOCYTES 07/28/2021 0.6  0.05 - 1.2 K/UL Final    ABS. EOSINOPHILS 07/28/2021 0.2  0.0 - 0.4 K/UL Final    ABS.  BASOPHILS 07/28/2021 0.1  0.0 - 0.1 K/UL Final    DF 07/28/2021 AUTOMATED    Final    Hemoglobin A1c 07/28/2021 5.9* 4.2 - 5.6 % Final    Est. average glucose 07/28/2021 123  mg/dL Final    LIPID PROFILE 07/28/2021        Final    Cholesterol, total 07/28/2021 146  <200 MG/DL Final    Triglyceride 07/28/2021 70  <150 MG/DL Final    HDL Cholesterol 07/28/2021 60  40 - 60 MG/DL Final    LDL, calculated 07/28/2021 72  0 - 100 MG/DL Final    VLDL, calculated 07/28/2021 14  MG/DL Final    CHOL/HDL Ratio 07/28/2021 2.4  0 - 5.0   Final    Magnesium 07/28/2021 2.2  1.6 - 2.6 mg/dL Final    Sodium 07/28/2021 138  136 - 145 mmol/L Final    Potassium 07/28/2021 4.7  3.5 - 5.5 mmol/L Final    Chloride 07/28/2021 109  100 - 111 mmol/L Final    CO2 07/28/2021 19* 21 - 32 mmol/L Final    Anion gap 07/28/2021 10  3.0 - 18 mmol/L Final    Glucose 07/28/2021 115* 74 - 99 mg/dL Final    BUN 07/28/2021 24* 7.0 - 18 MG/DL Final    Creatinine 07/28/2021 1.46* 0.6 - 1.3 MG/DL Final    BUN/Creatinine ratio 07/28/2021 16  12 - 20   Final    GFR est AA 07/28/2021 58* >60 ml/min/1.73m2 Final    GFR est non-AA 07/28/2021 47* >60 ml/min/1.73m2 Final    Calcium 07/28/2021 9.3  8.5 - 10.1 MG/DL Final    Bilirubin, total 07/28/2021 1.1* 0.2 - 1.0 MG/DL Final    ALT (SGPT) 07/28/2021 25  16 - 61 U/L Final    AST (SGOT) 07/28/2021 19  10 - 38 U/L Final    Alk. phosphatase 07/28/2021 73  45 - 117 U/L Final    Protein, total 07/28/2021 7.4  6.4 - 8.2 g/dL Final    Albumin 07/28/2021 4.1  3.4 - 5.0 g/dL Final    Globulin 07/28/2021 3.3  2.0 - 4.0 g/dL Final    A-G Ratio 07/28/2021 1.2  0.8 - 1.7   Final    Microalbumin,urine random 07/28/2021 0.84  0 - 3.0 MG/DL Final    Creatinine, urine 07/28/2021 84.00  30 - 125 mg/dL Final    Microalbumin/Creat ratio (mg/g cre* 07/28/2021 10  0 - 30 mg/g Final    TSH 07/28/2021 0.78  0.36 - 3.74 uIU/mL Final    Vitamin D 25-Hydroxy 07/28/2021 30.0  30 - 100 ng/mL Final    Vitamin B12 07/28/2021 1,418* 211 - 911 pg/mL Final    Color 07/28/2021 YELLOW    Final    Appearance 07/28/2021 CLEAR    Final    Specific gravity 07/28/2021 1.013  1.005 - 1.030   Final    pH (UA) 07/28/2021 5.5  5.0 - 8.0   Final    Protein 07/28/2021 Negative  NEG mg/dL Final    Glucose 07/28/2021 Negative  NEG mg/dL Final    Ketone 07/28/2021 Negative  NEG mg/dL Final    Bilirubin 07/28/2021 Negative  NEG   Final    Blood 07/28/2021 Negative  NEG   Final    Urobilinogen 07/28/2021 0.2  0.2 - 1.0 EU/dL Final    Nitrites 07/28/2021 Negative  NEG   Final    Leukocyte Esterase 07/28/2021 Negative  NEG   Final     Lab Results   Component Value Date/Time    Prostate Specific Ag 0.3 08/04/2021 01:40 PM    Prostate Specific Ag 0.4 11/16/2020 10:34 AM    Prostate Specific Ag 1.1 11/12/2019 09:51 AM    Prostate Specific Ag 13.8 (H) 03/08/2019 09:30 AM           EKG (8/8/2019) sinus rhythm at 68 bpm. Normal intervals. Lack of R waves in V2 and V3 likely lead placement. No ischemic changes when compared with prior in 8/2018.     Health Maintenance:  Screening:    Colorectal: [place] (11/2/2017) negative. Due 11/2020. Ordered. Depression: none   DM (HbA1c/FPG): HbA1c 5.9 (7/2021)   Hepatitis C: negative (1/2018)   Falls: none   DEXA: N/A   PSA/KIEL: PSA 0.3 (8/2021). Dr. Salmon Monday   Glaucoma: regular eye exams with Dr. Nelsy Barker (last 6/2019)   Smoking: none   Vitamin D: 30.0 (7/2021)   Medicare Wellness: 12/31/2020    Impression:  Patient Active Problem List   Diagnosis Code    Hyperlipidemia E78.5    Gout M10.9    Vitamin D deficiency E55.9    CAD in native artery I25.10    Hypertensive heart disease without congestive heart failure I11.9    S/P CABG x 2 Z95.1    S/P AVR (aortic valve replacement) Z95.2    Type 2 diabetes mellitus with stage 3 chronic kidney disease, without long-term current use of insulin (HCC) E11.22, N18.30    Stage 3 chronic kidney disease (HCC) N18.30    Prostate cancer ([facility] C61    Essential hypertension I10    Anemia D64.9    Vitamin B12 deficiency E53.8    Microalbuminuria R80.9    Overweight (BMI 25.0-29. 9) E66.3       Plan:  1. Hypertension. Blood pressure well controlled today on current regimen of metoprolol succinate 50 mg daily, lisinopril 20 mg daily, and amlodipine 10 mg daily. Heart rate remains 70-80 so was able to tolerate increase in metoprolol dose. Had discontinued hydrochlorothiazide due to hypotension during EBRT and worsening renal function. Now renal function stable with creatinine 1.46/ eGFR 47. Advised to continue to monitor blood pressure. Will continue to follow. 2. Prostate cancer. [id number] [place] Grade 7 (3 + 4) adenocarcinoma. Patient initially chose active surveillance rather than definitive treatment. Repeat PSA and MRI prostate ordered for 2/2018, but he did not follow-up. Repeat PSA obtained and had increased from 7.24 at diagnosis to 11.3.  Referred for follow-up with Dr. Marci Damon, and MRI prostate performed on 12/31/2018 showing several peripheral zone PI-RADS 4 with one area showing questionable capsular bulging laterally, but not a definitive appearance for extracapsular extension. He decided to proceed with EBRT, and underwent fiducial marker and [employer] placement on 3/11/2019 followed by weekly treatments from 3/27-5/6/2019. Tolerated well except for some fatigue which has improved. Previously followed by Dr. Jeremy Powell and Dr. Rekha Edwards. PSA 1.1 (11/2019) and testosterone 289, indicative of no evidence of active disease. Followed-up with radiation oncology in 11/2020 and PSA improved to 0.4. Not wishing to schedule with Dr. Rekha Edwards currently. Repeat PSA today improved further to 0.3. Urged to schedule follow-up appointment with Dr. Rekha Edwards. .  3. Hyperlipidemia. On moderate intensity dose atorvastatin with LDL 72 and HDL 60, indicative of good control. Changed from simvastatin given treatment with amlodipine. Will continue to monitor. 4. ASHD s/p 2 vessel CABG. Remains asymptomatic. On aspirin, metoprolol, and statin. Followed by Dr. Marleen Choudhary. 5. Aortic stenosis s/p bioprosthetic AVR. Remains asymptomatic, and underwent repeat echocardiogram in 9/2020 with good functioning valve. Being followed by Dr. Marleen Choudhary. 6. Diabetes mellitus. Patient with diagnosis of diabetes mellitus, but review of record shows HbA1c ranging from 5.7-6.3 since 2011 and -112 during that time period. HbA1c remains increased today at 5.9. On Ace-I and statin. Continue follow-up with Dr. Brandon Lugo for annual eye exams. Foot exam normal (2/2020). Urine microalbumin/ creatinine ratio normal (10 mg/g). Does have evidence of chronic renal disease stage 3. Emphasized importance of lifestyle modifications, including diet, exercise, and weight loss. 7. Chronic renal disease, stage 3. Most likely secondary to long standing hypertension and prediabetes, although also appears to have developed at time of CABG and AVR so may be related to hypoperfusion during surgery. On statin and Ace-I.  Discussed importance of avoiding NSAIDS and prerenal status. Remained stable today. Will need to follow. 8. Anemia, macrocytosis. Mild anemia developed during radiation therapy. Macrocytosis evident. Vitamin B12 level very low last visit and started on Vitamin B12 supplement 1000 mcg SL daily. Level remains normal indicative of compliance, and anemia improved although macrocytosis persists. Also advised to decrease alcohol consumption as reports having 1 case of beer a week. Will continue to monitor. 9. Gout. Asymptomatic. On allopurinol. No recent flares. 10. Overweight. Weight decreased 7 pounds since last visit and patient reports eating better. Emphasized importance of lifestyle modifications, including diet, exercise, and weight loss. Will monitor. 11. Health maintenance. Completed the [employer] COVID-19 vaccine series. Completed 2/2 doses of Shingrix vaccine. Other immunizations up to date. Completed Cologuard for colorectal cancer screening. Overdue for repeat (due 11/2020). Patient did not return the kit but agreeable to reorder and perform. Completed eye exam with Dr. Lydia Martinez. Discussed decreasing alcohol consumption as currently drinking a case of beer per week. Vitamin D level normal and advised continue maintenance dose supplement. Medicare wellness visit up-to-date. Patient understands recommendations and agrees with plan. Follow-up in 3 months.

## 2021-10-12 ENCOUNTER — OFFICE VISIT (OUTPATIENT)
Dept: CARDIOLOGY CLINIC | Age: 71
End: 2021-10-12
Payer: MEDICARE

## 2021-10-12 VITALS
OXYGEN SATURATION: 98 % | BODY MASS INDEX: 30.3 KG/M2 | SYSTOLIC BLOOD PRESSURE: 152 MMHG | DIASTOLIC BLOOD PRESSURE: 62 MMHG | HEART RATE: 78 BPM | WEIGHT: 228.6 LBS | HEIGHT: 73 IN

## 2021-10-12 DIAGNOSIS — I11.9 HYPERTENSIVE HEART DISEASE WITHOUT CONGESTIVE HEART FAILURE: ICD-10-CM

## 2021-10-12 DIAGNOSIS — I25.10 CAD IN NATIVE ARTERY: Primary | ICD-10-CM

## 2021-10-12 PROCEDURE — 93000 ELECTROCARDIOGRAM COMPLETE: CPT | Performed by: INTERNAL MEDICINE

## 2021-10-12 PROCEDURE — G8417 CALC BMI ABV UP PARAM F/U: HCPCS | Performed by: INTERNAL MEDICINE

## 2021-10-12 PROCEDURE — G8536 NO DOC ELDER MAL SCRN: HCPCS | Performed by: INTERNAL MEDICINE

## 2021-10-12 PROCEDURE — G8427 DOCREV CUR MEDS BY ELIG CLIN: HCPCS | Performed by: INTERNAL MEDICINE

## 2021-10-12 PROCEDURE — 1101F PT FALLS ASSESS-DOCD LE1/YR: CPT | Performed by: INTERNAL MEDICINE

## 2021-10-12 PROCEDURE — G8754 DIAS BP LESS 90: HCPCS | Performed by: INTERNAL MEDICINE

## 2021-10-12 PROCEDURE — 3017F COLORECTAL CA SCREEN DOC REV: CPT | Performed by: INTERNAL MEDICINE

## 2021-10-12 PROCEDURE — 99214 OFFICE O/P EST MOD 30 MIN: CPT | Performed by: INTERNAL MEDICINE

## 2021-10-12 PROCEDURE — G8753 SYS BP > OR = 140: HCPCS | Performed by: INTERNAL MEDICINE

## 2021-10-12 PROCEDURE — G8432 DEP SCR NOT DOC, RNG: HCPCS | Performed by: INTERNAL MEDICINE

## 2021-10-12 NOTE — PROGRESS NOTES
HISTORY OF PRESENT ILLNESS  Manda Geronimo is a 66 y.o. male. ASSESSMENT and PLAN    Mr. Noel Yang initially presented with chest pains, and significant aortic stenosis with mean gradient of 30 mmHg, peak gradient of 67 mmHg, MARY of 0.8 cm². He ultimately underwent open-heart surgery in November of 2014. He had coronary artery bypass graft surgery with LIMA to LAD and SVG to OM1. He also had aortic valve replacement with #23 Marifer-Barbosa Magna pericardial valve. His echocardiogram from August 2017 showed normal LV function with well-functioning Marifer-Barbosa magna pericardial bioprosthetic aortic valve with mean gradient of 10 mmHg, and MARY of 1.86 cm². His echocardiogram September 2020 revealed normal LV function with EF 60%. Prosthetic aortic valve with mean gradient of 7.2 mmHg and MARY of 1.8 cm². He has 23 mm Marifer-Barbosa magna bioprosthetic aortic valve. No significant pulmonary hypertension was noted. · CAD:    Clinically stable. · Bioprosthetic AVR:   Clinically stable. · BP:    Mildly elevated. If he remains hypertensive, I would increase his lisinopril to 20 mg twice daily as tolerated. Also, consideration can be given for initiating gentle diuretic regimen. · Rhythm:    Stable sinus. · CHF:    There is no evidence of decompensated CHF noted. · Weight:     His weight today is 228 pounds. His baseline weight is 215 pounds. Again, strong encouragement was given for weight control. Obviously, if he is able to lose weight, his blood pressure would be more easily controlled. · Cholesterol:   Target LDL <70. Lipitor 20. · Anti-platelet:   Remains on ASA. I will see him back in 6 months. Thank you. Encounter Diagnoses   Name Primary?     CAD in native artery Yes    Hypertensive heart disease without congestive heart failure      current treatment plan is effective, no change in therapy  lab results and schedule of future lab studies reviewed with patient  reviewed diet, exercise and weight control  cardiovascular risk and specific lipid/LDL goals reviewed  use of aspirin to prevent MI and TIA's discussed      HPI   Today, Mr. Catherine Meredith has no complaints of chest pains, increased shortness of breath or decreased exercise capacity. His memory appears to be gradually worsening. He denies any changes in his activity levels. He compensates well. He denies any orthopnea or PND. He denies any palpitations or dizziness. Review of Systems   Respiratory: Negative for shortness of breath. Cardiovascular: Negative for chest pain, palpitations, orthopnea, claudication, leg swelling and PND. All other systems reviewed and are negative. Physical Exam  Vitals and nursing note reviewed. Constitutional:       Appearance: Normal appearance. HENT:      Head: Normocephalic. Eyes:      Conjunctiva/sclera: Conjunctivae normal.   Neck:      Vascular: No carotid bruit. Cardiovascular:      Rate and Rhythm: Normal rate and regular rhythm. Pulmonary:      Breath sounds: Normal breath sounds. Abdominal:      Palpations: Abdomen is soft. Musculoskeletal:         General: No swelling. Cervical back: No rigidity. Skin:     General: Skin is warm and dry. Neurological:      General: No focal deficit present. Mental Status: He is alert and oriented to person, place, and time. Psychiatric:         Mood and Affect: Mood normal.         Behavior: Behavior normal.         PCP: Cayla Thao MD    Past Medical History:   Diagnosis Date    Aortic stenosis     s/p AVR    Aortic stenosis, severe 10/16/2014    Echocardiogram EF 60% peak gradient 67 mmHg, mean gradient 30 mmHg, MARY 0.8 cm    ASHD (arteriosclerotic heart disease)     Chronic renal disease, stage III (HCC)     DM [facility]di[facility] mellitus[facility] ([facility] [address]     Gout     HCD (hypertensive cardiovascular disease)     History of echocardiogram 10/16/2014    EF 55-60%. No RWMA. Mild LVH. Gr 1 DDfx. Mild LAE. Severe AS (mean grad 30 mmHg).  Hyperlipidemia     Hypertension     Prostate cancer ([facility] [address] 08/17/2017    [id number] [place] Grade 7 (3 + 4) adenocarcinoma of the prostate in 3/12 cores, 2-10% of each core; PSA 7.24. Dr. Matias Sanford.  S/P AVR (aortic valve replacement) 11/13/2014    #23 mm Marifer Barbosa Magna pericardial valve. Dr. Nilo Gomez.  S/P CABG x 2 11/13/2014    LIMA to LAD, SVG to OM1. Dr. Nilo Gomez.  S/P cardiac catheterization 11/06/2014    RCA dom. mRCA 100%. LM patent. dLAD 75% x 2. pCX 80% (ELENA-3). LVEDP 14 mmHg. EF 50-55%. Severe inferobasal hypk. Severe AS. AV replacement & CABG recommended. Past Surgical History:   Procedure Laterality Date    HX APPENDECTOMY         Current Outpatient Medications   Medication Sig Dispense Refill    atorvastatin (LIPITOR) 20 mg tablet TAKE 1 TABLET DAILY 90 Tablet 3    amLODIPine (NORVASC) 10 mg tablet TAKE 1 TABLET DAILY 90 Tab 3    fluticasone propionate (Flonase Allergy Relief) 50 mcg/actuation nasal spray 2 Sprays by Both Nostrils route daily as needed for Rhinitis or Allergies. 1 Bottle 0    sodium chloride (Saline Nasal Mist) 0.65 % nasal squeeze bottle 0.05 mL by Both Nostrils route as needed for Congestion. 30 mL 0    carbamide peroxide (Debrox) 6.5 % otic solution Use as directed on instructions  Indications: an earwax blockage 7.5 mL 0    lisinopriL (PRINIVIL, ZESTRIL) 20 mg tablet TAKE 1 TABLET DAILY 90 Tab 3    allopurinoL (ZYLOPRIM) 100 mg tablet TAKE 1 TABLET DAILY 90 Tab 3    metoprolol succinate (TOPROL-XL) 50 mg XL tablet TAKE 1 TABLET DAILY 90 Tab 3    acetaminophen (Tylenol Extra Strength) 500 mg tablet Take  by mouth every six (6) hours as needed for Pain.  cyanocobalamin, vitamin B-12, 1,000 mcg lozg 1,000 mcg by SubLINGual route daily. 90 Lozenge 2    ascorbic acid, vitamin C, (Vitamin C) 500 mg tablet Take  by mouth.       sildenafil citrate (VIAGRA) 100 mg tablet Take 1 Tab by mouth as needed. 6 Tab 3    docusate sodium (Colace) 100 mg capsule Take 100 mg by mouth daily as needed.  cholecalciferol, vitamin D3, 2,000 unit tab Take  by mouth daily.  aspirin delayed-release 81 mg tablet Take 1 tablet by mouth daily. 30 tablet 2       The patient has a family history of    Social History     Tobacco Use    Smoking status: Never Smoker    Smokeless tobacco: Never Used   Substance Use Topics    Alcohol use: Yes     Alcohol/week: 12.0 standard drinks     Types: 12 Cans of beer per week    Drug use: No       Lab Results   Component Value Date/Time    Cholesterol, total 146 07/28/2021 11:11 AM    HDL Cholesterol 60 07/28/2021 11:11 AM    LDL, calculated 72 07/28/2021 11:11 AM    Triglyceride 70 07/28/2021 11:11 AM    CHOL/HDL Ratio 2.4 07/28/2021 11:11 AM        BP Readings from Last 3 Encounters:   10/12/21 (!) 152/62   08/04/21 126/60   04/13/21 (!) 144/64        Pulse Readings from Last 3 Encounters:   10/12/21 78   08/04/21 70   04/13/21 66       Wt Readings from Last 3 Encounters:   10/12/21 103.7 kg (228 lb 9.6 oz)   08/04/21 101.2 kg (223 lb)   04/13/21 104.8 kg (231 lb)         EKG: unchanged from previous tracings, normal sinus rhythm, nonspecific ST and T waves changes, 1st degree AV block.

## 2021-10-18 RX ORDER — METOPROLOL SUCCINATE 50 MG/1
TABLET, EXTENDED RELEASE ORAL
Qty: 90 TABLET | Refills: 3 | Status: SHIPPED | OUTPATIENT
Start: 2021-10-18 | End: 2022-10-12

## 2021-11-05 ENCOUNTER — APPOINTMENT (OUTPATIENT)
Dept: INTERNAL MEDICINE CLINIC | Age: 71
End: 2021-11-05

## 2021-11-05 ENCOUNTER — HOSPITAL ENCOUNTER (OUTPATIENT)
Dept: LAB | Age: 71
Discharge: HOME | End: 2021-11-05
Payer: MEDICARE

## 2021-11-05 DIAGNOSIS — N18.31 STAGE 3A CHRONIC KIDNEY DISEASE (HCC): ICD-10-CM

## 2021-11-05 DIAGNOSIS — Z12.11 COLON CANCER SCREENING: ICD-10-CM

## 2021-11-05 DIAGNOSIS — N18.31 TYPE 2 DIABETES MELLITUS WITH STAGE 3A CHRONIC KIDNEY DISEASE, WITHOUT LONG-TERM CURRENT USE OF INSULIN (HCC): ICD-10-CM

## 2021-11-05 DIAGNOSIS — E11.22 TYPE 2 DIABETES MELLITUS WITH STAGE 3A CHRONIC KIDNEY DISEASE, WITHOUT LONG-TERM CURRENT USE OF INSULIN (HCC): ICD-10-CM

## 2021-11-05 DIAGNOSIS — C61 PROSTATE CANCER (HCC): ICD-10-CM

## 2021-11-05 DIAGNOSIS — Z95.2 S/P AVR (AORTIC VALVE REPLACEMENT): ICD-10-CM

## 2021-11-05 DIAGNOSIS — I10 ESSENTIAL HYPERTENSION: ICD-10-CM

## 2021-11-05 DIAGNOSIS — I25.10 CAD IN NATIVE ARTERY: ICD-10-CM

## 2021-11-05 DIAGNOSIS — E78.5 HYPERLIPIDEMIA, UNSPECIFIED HYPERLIPIDEMIA TYPE: ICD-10-CM

## 2021-11-05 DIAGNOSIS — E53.8 VITAMIN B12 DEFICIENCY: ICD-10-CM

## 2021-11-05 DIAGNOSIS — D64.9 ANEMIA, UNSPECIFIED TYPE: ICD-10-CM

## 2021-11-05 DIAGNOSIS — E55.9 VITAMIN D DEFICIENCY: ICD-10-CM

## 2021-11-05 LAB
25(OH)D3 SERPL-MCNC: 32.1 NG/ML (ref 30–100)
ALBUMIN SERPL-MCNC: 4.2 G/DL (ref 3.4–5)
ALBUMIN/GLOB SERPL: 1.2 {RATIO} (ref 0.8–1.7)
ALP SERPL-CCNC: 61 U/L (ref 45–117)
ALT SERPL-CCNC: 30 U/L (ref 16–61)
ANION GAP SERPL CALC-SCNC: 8 MMOL/L (ref 3–18)
AST SERPL-CCNC: 16 U/L (ref 10–38)
BASOPHILS # BLD: 0 K/UL (ref 0–0.1)
BASOPHILS NFR BLD: 1 % (ref 0–2)
BILIRUB SERPL-MCNC: 1 MG/DL (ref 0.2–1)
BUN SERPL-MCNC: 20 MG/DL (ref 7–18)
BUN/CREAT SERPL: 14 (ref 12–20)
CALCIUM SERPL-MCNC: 9.3 MG/DL (ref 8.5–10.1)
CHLORIDE SERPL-SCNC: 106 MMOL/L (ref 100–111)
CHOLEST SERPL-MCNC: 157 MG/DL
CO2 SERPL-SCNC: 24 MMOL/L (ref 21–32)
CREAT SERPL-MCNC: 1.4 MG/DL (ref 0.6–1.3)
CREAT UR-MCNC: 153 MG/DL (ref 30–125)
DIFFERENTIAL METHOD BLD: ABNORMAL
EOSINOPHIL # BLD: 0.4 K/UL (ref 0–0.4)
EOSINOPHIL NFR BLD: 6 % (ref 0–5)
ERYTHROCYTE [DISTWIDTH] IN BLOOD BY AUTOMATED COUNT: 13 % (ref 11.6–14.5)
EST. AVERAGE GLUCOSE BLD GHB EST-MCNC: 120 MG/DL
FERRITIN SERPL-MCNC: 81 NG/ML (ref 8–388)
GLOBULIN SER CALC-MCNC: 3.4 G/DL (ref 2–4)
GLUCOSE SERPL-MCNC: 107 MG/DL (ref 74–99)
HBA1C MFR BLD: 5.8 % (ref 4.2–5.6)
HCT VFR BLD AUTO: 38.6 % (ref 36–48)
HDLC SERPL-MCNC: 71 MG/DL (ref 40–60)
HDLC SERPL: 2.2 {RATIO} (ref 0–5)
HGB BLD-MCNC: 12.6 G/DL (ref 13–16)
IRON SATN MFR SERPL: 38 % (ref 20–50)
IRON SERPL-MCNC: 135 UG/DL (ref 50–175)
LDLC SERPL CALC-MCNC: 68 MG/DL (ref 0–100)
LIPID PROFILE,FLP: ABNORMAL
LYMPHOCYTES # BLD: 1.1 K/UL (ref 0.9–3.6)
LYMPHOCYTES NFR BLD: 18 % (ref 21–52)
MAGNESIUM SERPL-MCNC: 2.2 MG/DL (ref 1.6–2.6)
MCH RBC QN AUTO: 33.9 PG (ref 24–34)
MCHC RBC AUTO-ENTMCNC: 32.6 G/DL (ref 31–37)
MCV RBC AUTO: 103.8 FL (ref 78–100)
MICROALBUMIN UR-MCNC: 1.3 MG/DL (ref 0–3)
MICROALBUMIN/CREAT UR-RTO: 8 MG/G (ref 0–30)
MONOCYTES # BLD: 0.6 K/UL (ref 0.05–1.2)
MONOCYTES NFR BLD: 9 % (ref 3–10)
NEUTS SEG # BLD: 4.3 K/UL (ref 1.8–8)
NEUTS SEG NFR BLD: 67 % (ref 40–73)
PLATELET # BLD AUTO: 208 K/UL (ref 135–420)
PMV BLD AUTO: 8.8 FL (ref 9.2–11.8)
POTASSIUM SERPL-SCNC: 5 MMOL/L (ref 3.5–5.5)
PROT SERPL-MCNC: 7.6 G/DL (ref 6.4–8.2)
RBC # BLD AUTO: 3.72 M/UL (ref 4.35–5.65)
SODIUM SERPL-SCNC: 138 MMOL/L (ref 136–145)
TIBC SERPL-MCNC: 351 UG/DL (ref 250–450)
TRIGL SERPL-MCNC: 90 MG/DL (ref ?–150)
VLDLC SERPL CALC-MCNC: 18 MG/DL
WBC # BLD AUTO: 6.5 K/UL (ref 4.6–13.2)

## 2021-11-05 PROCEDURE — 82728 ASSAY OF FERRITIN: CPT

## 2021-11-05 PROCEDURE — 83036 HEMOGLOBIN GLYCOSYLATED A1C: CPT

## 2021-11-05 PROCEDURE — 82306 VITAMIN D 25 HYDROXY: CPT

## 2021-11-05 PROCEDURE — 82043 UR ALBUMIN QUANTITATIVE: CPT

## 2021-11-05 PROCEDURE — 85025 COMPLETE CBC W/AUTO DIFF WBC: CPT

## 2021-11-05 PROCEDURE — 83540 ASSAY OF IRON: CPT

## 2021-11-05 PROCEDURE — 80061 LIPID PANEL: CPT

## 2021-11-05 PROCEDURE — 80053 COMPREHEN METABOLIC PANEL: CPT

## 2021-11-05 PROCEDURE — 83735 ASSAY OF MAGNESIUM: CPT

## 2021-11-11 ENCOUNTER — OFFICE VISIT (OUTPATIENT)
Dept: INTERNAL MEDICINE CLINIC | Age: 71
End: 2021-11-11
Payer: MEDICARE

## 2021-11-11 VITALS
BODY MASS INDEX: 30.62 KG/M2 | HEART RATE: 86 BPM | DIASTOLIC BLOOD PRESSURE: 68 MMHG | WEIGHT: 231 LBS | HEIGHT: 73 IN | SYSTOLIC BLOOD PRESSURE: 146 MMHG | OXYGEN SATURATION: 97 % | TEMPERATURE: 98.1 F | RESPIRATION RATE: 16 BRPM

## 2021-11-11 DIAGNOSIS — I25.10 CAD IN NATIVE ARTERY: ICD-10-CM

## 2021-11-11 DIAGNOSIS — I10 ESSENTIAL HYPERTENSION: Primary | ICD-10-CM

## 2021-11-11 DIAGNOSIS — Z12.11 COLON CANCER SCREENING: ICD-10-CM

## 2021-11-11 DIAGNOSIS — E78.5 HYPERLIPIDEMIA, UNSPECIFIED HYPERLIPIDEMIA TYPE: ICD-10-CM

## 2021-11-11 DIAGNOSIS — N18.31 TYPE 2 DIABETES MELLITUS WITH STAGE 3A CHRONIC KIDNEY DISEASE, WITHOUT LONG-TERM CURRENT USE OF INSULIN (HCC): ICD-10-CM

## 2021-11-11 DIAGNOSIS — E53.8 VITAMIN B12 DEFICIENCY: ICD-10-CM

## 2021-11-11 DIAGNOSIS — D64.9 ANEMIA, UNSPECIFIED TYPE: ICD-10-CM

## 2021-11-11 DIAGNOSIS — N18.31 STAGE 3A CHRONIC KIDNEY DISEASE (HCC): ICD-10-CM

## 2021-11-11 DIAGNOSIS — C61 PROSTATE CANCER (HCC): ICD-10-CM

## 2021-11-11 DIAGNOSIS — E11.22 TYPE 2 DIABETES MELLITUS WITH STAGE 3A CHRONIC KIDNEY DISEASE, WITHOUT LONG-TERM CURRENT USE OF INSULIN (HCC): ICD-10-CM

## 2021-11-11 PROCEDURE — G8417 CALC BMI ABV UP PARAM F/U: HCPCS | Performed by: INTERNAL MEDICINE

## 2021-11-11 PROCEDURE — G8753 SYS BP > OR = 140: HCPCS | Performed by: INTERNAL MEDICINE

## 2021-11-11 PROCEDURE — G0463 HOSPITAL OUTPT CLINIC VISIT: HCPCS | Performed by: INTERNAL MEDICINE

## 2021-11-11 PROCEDURE — 99214 OFFICE O/P EST MOD 30 MIN: CPT | Performed by: INTERNAL MEDICINE

## 2021-11-11 PROCEDURE — G8510 SCR DEP NEG, NO PLAN REQD: HCPCS | Performed by: INTERNAL MEDICINE

## 2021-11-11 PROCEDURE — 3044F HG A1C LEVEL LT 7.0%: CPT | Performed by: INTERNAL MEDICINE

## 2021-11-11 PROCEDURE — G8427 DOCREV CUR MEDS BY ELIG CLIN: HCPCS | Performed by: INTERNAL MEDICINE

## 2021-11-11 PROCEDURE — 2022F DILAT RTA XM EVC RTNOPTHY: CPT | Performed by: INTERNAL MEDICINE

## 2021-11-11 PROCEDURE — G8754 DIAS BP LESS 90: HCPCS | Performed by: INTERNAL MEDICINE

## 2021-11-11 PROCEDURE — G8536 NO DOC ELDER MAL SCRN: HCPCS | Performed by: INTERNAL MEDICINE

## 2021-11-11 PROCEDURE — 1101F PT FALLS ASSESS-DOCD LE1/YR: CPT | Performed by: INTERNAL MEDICINE

## 2021-11-11 PROCEDURE — 3017F COLORECTAL CA SCREEN DOC REV: CPT | Performed by: INTERNAL MEDICINE

## 2021-11-11 RX ORDER — HYDROCHLOROTHIAZIDE 25 MG/1
25 TABLET ORAL DAILY
Qty: 90 TABLET | Refills: 3 | Status: SHIPPED | OUTPATIENT
Start: 2021-11-11 | End: 2022-03-11 | Stop reason: SDUPTHER

## 2021-11-11 RX ORDER — HYDROCHLOROTHIAZIDE 25 MG/1
25 TABLET ORAL DAILY
Qty: 30 TABLET | Refills: 1 | Status: SHIPPED | OUTPATIENT
Start: 2021-11-11 | End: 2021-12-20 | Stop reason: SDUPTHER

## 2021-11-11 NOTE — PATIENT INSTRUCTIONS
Heart-Healthy Diet: Care Instructions  Your Care Instructions     A heart-healthy diet has lots of vegetables, fruits, nuts, beans, and whole grains, and is low in salt. It limits foods that are high in saturated fat, such as meats, cheeses, and fried foods. It may be hard to change your diet, but even small changes can lower your risk of heart attack and heart disease. Follow-up care is a key part of your treatment and safety. Be sure to make and go to all appointments, and call your doctor if you are having problems. It's also a good idea to know your test results and keep a list of the medicines you take. How can you care for yourself at home? Watch your portions  · Learn what a serving is. A \"serving\" and a \"portion\" are not always the same thing. Make sure that you are not eating larger portions than are recommended. For example, a serving of pasta is ½ cup. A serving size of meat is 2 to 3 ounces. A 3-ounce serving is about the size of a deck of cards. Measure serving sizes until you are good at [place]" them. Keep in mind that restaurants often serve portions that are 2 or 3 times the size of one serving. · To keep your energy level up and keep you from feeling hungry, eat often but in smaller portions. · Eat only the number of calories you need to stay at a healthy weight. If you need to lose weight, eat fewer calories than your body burns (through exercise and other physical activity). Eat more fruits and vegetables  · Eat a variety of fruit and vegetables every day. Dark green, deep orange, red, or yellow fruits and vegetables are especially good for you. Examples include spinach, carrots, peaches, and berries. · Keep carrots, celery, and other veggies handy for snacks. Buy fruit that is in season and store it where you can see it so that you will be tempted to eat it. · Cook dishes that have a lot of veggies in them, such as stir-fries and soups.   Limit saturated and trans fat  · Read food labels, and try to avoid saturated and trans fats. They increase your risk of heart disease. · Use olive or canola oil when you cook. · Bake, broil, grill, or steam foods instead of frying them. · Choose lean meats instead of high-fat meats such as hot dogs and sausages. Cut off all visible fat when you prepare meat. · Eat fish, skinless poultry, and meat alternatives such as soy products instead of high-fat meats. Soy products, such as tofu, may be especially good for your heart. · Choose low-fat or fat-free milk and dairy products. Eat foods high in fiber  · Eat a variety of grain products every day. Include whole-grain foods that have lots of fiber and nutrients. Examples of whole-grain foods include oats, whole wheat bread, and brown rice. · Buy whole-grain breads and cereals, instead of white bread or pastries. Limit salt and sodium  · Limit how much salt and sodium you eat to help lower your blood pressure. · Taste food before you salt it. Add only a little salt when you think you need it. With time, your taste buds will adjust to less salt. · Eat fewer snack items, fast foods, and other high-salt, processed foods. Check food labels for the amount of sodium in packaged foods. · Choose low-sodium versions of canned goods (such as soups, vegetables, and beans). Limit sugar  · Limit drinks and foods with added sugar. These include candy, desserts, and soda pop. Limit alcohol  · Limit alcohol to no more than 2 drinks a day for men and 1 drink a day for women. Too much alcohol can cause health problems. When should you call for help? Watch closely for changes in your health, and be sure to contact your doctor if:    · You would like help planning heart-healthy meals. Where can you learn more? Go to http://www.ZikBit.com/  Enter V137 in the search box to learn more about \"Heart-Healthy Diet: Care Instructions. \"  Current as of: August 22, 2019               Content Version: 12.6  © 9471-3298 [employer]. Care instructions adapted under license by [employer] (which disclaims liability or warranty for this information). If you have questions about a medical condition or this instruction, always ask your healthcare professional. [address] any warranty or liability for your use of this information. Learning About Low-Sodium Foods  What foods are low in sodium? The foods you eat contain nutrients, such as vitamins and minerals. Sodium is a nutrient. Your body needs the right amount to stay healthy and work as it should. You can use the list below to help you make choices about which foods to eat. Fruits  · Fresh, frozen, canned, or dried fruit  Vegetables  · Fresh or frozen vegetables, with no added salt  · Canned vegetables, low-sodium or with no added salt  Grains  · Bagels without salted tops  · Cereal with no added salt  · Corn tortillas  · Crackers with no added salt  · Oatmeal, cooked without salt  · Popcorn with no salt  · Pasta and noodles, cooked without salt  · Rice, cooked without salt  · Unsalted pretzels  Dairy and dairy alternatives  · Butter, unsalted  · Cream cheese  · Ice cream  · Milk  · Soy milk  Meats and other protein foods  · Beans and peas, canned with no salt  · Eggs  · Fresh fish (not smoked)  · Fresh meats (not smoked or cured)  · Nuts and nut butter, prepared without salt  · Poultry, not packaged with sodium solution  · Tofu, unseasoned  · Tuna, canned without salt  Seasonings  · Garlic  · Herbs and spices  · Lemon juice  · Mustard  · Olive oil  · Salt-free seasoning mixes  · Vinegar  Work with your doctor to find out how much of this nutrient you need. Depending on your health, you may need more or less of it in your diet. Where can you learn more?   Go to http://www.gray.com/  Enter S460 in the search box to learn more about \"Learning About Low-Sodium Foods.\"  Current as of: August 22, 2019               Content Version: 12.6  © 1618-8282 [employer]. Care instructions adapted under license by [employer] (which disclaims liability or warranty for this information). If you have questions about a medical condition or this instruction, always ask your healthcare professional. [address] any warranty or liability for your use of this information. Low Sodium Diet (2,000 Milligram): Care Instructions  Your Care Instructions     Too much sodium causes your body to hold on to extra water. This can raise your blood pressure and force your heart and kidneys to work harder. In very serious cases, this could cause you to be put in the hospital. It might even be life-threatening. By limiting sodium, you will feel better and lower your risk of serious problems. The most common source of sodium is salt. People get most of the salt in their diet from canned, prepared, and packaged foods. Fast food and restaurant meals also are very high in sodium. Your doctor will probably limit your sodium to less than 2,000 milligrams (mg) a day. This limit counts all the sodium in prepared and packaged foods and any salt you add to your food. Follow-up care is a key part of your treatment and safety. Be sure to make and go to all appointments, and call your doctor if you are having problems. It's also a good idea to know your test results and keep a list of the medicines you take. How can you care for yourself at home? Read food labels  · Read labels on cans and food packages. The labels tell you how much sodium is in each serving. Make sure that you look at the serving size. If you eat more than the serving size, you have eaten more sodium. · Food labels also tell you the Percent Daily Value for sodium. Choose products with low Percent Daily Values for sodium.   · Be aware that sodium can come in forms other than salt, including monosodium glutamate (MSG), sodium citrate, and sodium bicarbonate (baking soda). MSG is often added to Asian food. When you eat out, you can sometimes ask for food without MSG or added salt. Buy low-sodium foods  · Buy foods that are labeled \"unsalted\" (no salt added), \"sodium-free\" (less than 5 mg of sodium per serving), or \"low-sodium\" (less than 140 mg of sodium per serving). Foods labeled \"reduced-sodium\" and \"light sodium\" may still have too much sodium. Be sure to read the label to see how much sodium you are getting. · Buy fresh vegetables, or frozen vegetables without added sauces. Buy low-sodium versions of canned vegetables, soups, and other canned goods. Prepare low-sodium meals  · Cut back on the amount of salt you use in cooking. This will help you adjust to the taste. Do not add salt after cooking. One teaspoon of salt has about 2,300 mg of sodium. · Take the salt shaker off the table. · Flavor your food with garlic, lemon juice, onion, vinegar, herbs, and spices. Do not use soy sauce, lite soy sauce, steak sauce, onion salt, garlic salt, celery salt, mustard, or ketchup on your food. · Use low-sodium salad dressings, sauces, and ketchup. Or make your own salad dressings and sauces without adding salt. · Use less salt (or none) when recipes call for it. You can often use half the salt a recipe calls for without losing flavor. Other foods such as rice, pasta, and grains do not need added salt. · Rinse canned vegetables, and cook them in fresh water. This removes somebut not allof the salt. · Avoid water that is naturally high in sodium or that has been treated with water softeners, which add sodium. Call your local water company to find out the sodium content of your water supply. If you buy bottled water, read the label and choose a sodium-free brand. Avoid high-sodium foods  · Avoid eating:  ? Smoked, cured, salted, and canned meat, fish, and poultry.   ? Ham, jesus, hot dogs, and luncheon meats.  ? Regular, hard, and processed cheese and regular peanut butter. ? Crackers with salted tops, and other salted snack foods such as pretzels, chips, and salted popcorn. ? Frozen prepared meals, unless labeled low-sodium. ? Canned and dried soups, broths, and bouillon, unless labeled sodium-free or low-sodium. ? Canned vegetables, unless labeled sodium-free or low-sodium. ? [place] fries, pizza, tacos, and other fast foods. ? Pickles, olives, ketchup, and other condiments, especially soy sauce, unless labeled sodium-free or low-sodium. Where can you learn more? Go to http://www.gray.com/  Enter V843 in the search box to learn more about \"Low Sodium Diet (2,000 Milligram): Care Instructions. \"  Current as of: August 22, 2019               Content Version: 12.6  © 6052-2226 [employer]. Care instructions adapted under license by [employer] (which disclaims liability or warranty for this information). If you have questions about a medical condition or this instruction, always ask your healthcare professional. [place] [address] any warranty or liability for your use of this information.

## 2021-11-11 NOTE — PROGRESS NOTES
1. Have you been to the ER, urgent care clinic or hospitalized since your last visit? NO.     2. Have you seen or consulted any other health care providers outside of the [address] [place] [address] since your last visit (Include any pap smears or colon screening)?  NO

## 2021-11-15 PROBLEM — R80.9 MICROALBUMINURIA: Status: RESOLVED | Noted: 2020-02-17 | Resolved: 2021-11-15

## 2021-11-15 NOTE — PROGRESS NOTES
HPI:   Suhas Garcia is a 66y.o. year old male who presents for a routine visit. He has a history of hypertension, hyperlipidemia, ASHD s/p CABG, aortic stenosis s/p AVR, diabetes mellitus, chronic renal disease stage 3, prostate cancer, and gout. He has completed the [employer] COVID-19 vaccine series. He reports that he is doing generally well and is without specific complaints. Summary of prior hospitalizations and medical history:  In 6/2017, he was noted to have an elevated PSA of 6.0, which was confirmed on repeat to be 6.6. He was referred to Dr. Breann Levine and PSA was again repeated and found to be increased at 7.24. He underwent TRUS biopsy on 8/17/2017, which showed [id number] Kaylynn Grade 7 (3 + 4) adenocarcinoma of the prostate in 3/12 cores, 2-10% of each core. Options for management were discussed and he selected active surveillance. Plans were for repeat PSA and MRI prostate in 6 months (due 2/2018). However, the patient never had tests performed and he did not follow-up as discussed. On 10/4/2018, at his routine visit, a PSA level was obtained and was found to have increased to 11.3, and he was advised to follow-up with Dr. Breann Levine. He underwent a prostate MRI 3T at [facility] on 12/31/2018 showing several peripheral zone PI-RADS 4 observations: right posterolateral peripheral zone at the mid gland/apex and anterior peripheral zone at the apex bilaterally. The former exhibits questionable capsular bulging laterally, but not a definitive appearance for extracapsular extension. He had a follow-up appointment with Dr. Breann Levine on 1/11/2019 and decision made to proceed with EBRT. He had a staging bone scan (1/23/2019) which was negative for osseous metastases, and a CT abdomen and pelvis (1/23/2019) which showed no adenopathy of evidence of metastases, mild hepatic steatosis, aortic atherosclerosis, and cholelithiasis.  He met with Dr. Juliette Johnston on 1/30/2019 and decided to proceed with EBRT as definitive treatment for his prostate cancer, receiving his last treatment on 5/6/2019. Follow-up PSA level in 11/2020 at 0.4. He has a history of hypertension, hyperlipidemia, ASHD, and aortic stenosis. In 10/2014, he presented with progressive chest pain and shortness of breath and an echocardiogram was obtained (10/16/2014) showing normal LV function (EF 55-60%), no RWMA, grade 1 diastolic dysfunction, mild LAE, and severe AS (mean gradient 30 mmHg, peak 67 mmHg, and AV area 0.8 cm2). He was referred to see Dr. Ludwig Fothergill and underwent cardiac catheterization (11/6/2014) showing 100% RCA (distal collaterals from left), 70-80% distal LAD, 80% proximal LCx, inferior basal hypokinesis, and EF 55%. On 11/13/2014, he underwent 2 vessel CABG (LIMA to LAD and SVG to OM1) and AV replacement with a bioprosthetic 23 mm Marifer Barbosa Magna pericardial valve by Dr. Linsey Navarro. He has done well since that time and remains asymptomatic. He denies any chest pain, shortness of breath at rest or with exertion, palpitations, lightheadedness, or edema. His current regimen includes aspirin, metoprolol, lisinopril, and moderate intensity dose atorvastatin. He is followed by Dr. Ludwig Fothergill. He had a repeat echocardiogram (8/21/2017) showing normal LV function (EF 55-60%), no RWMA, mild MIL, and normally functioning bioprosthetic valve with peak gradient 17 mmHg, mean gradient 10 mmHg, and valve area 1.86 cm2. Repeat echocardiogram (9/18/2020) showed normal LV size and function (EF 55-60%), AV prosthetic functioning normally with mean gradient of 7.2mmHg and MARY 1.8 cm2. Moderate LAE, and mild MR. He has a history of diabetes mellitus, which has not required treatment with medication. Review of his record shows that his Hba1c has remained between 5.7-6.3 since 2011. He denies any polyuria, polydipsia, nocturia, or blurry vision, and has no history of retinopathy or neuropathy.  He does have evidence of chronic renal disease, stage 3, with baseline creatinine 1.22-1.37/ eGFR 55-59 since 11/2014. He had been having regular eye exams with Dr. Nigel Harmon. He has a history of gout, but has not had a flare in many years since being treated with allopurinol. He has never had a screening colonoscopy. He did undergo Cologuard testing in 11/2017 which was negative. He denies any abdominal pain, nausea, vomiting, melena, hematochezia, or change in bowel movements. Past Medical History:   Diagnosis Date    Aortic stenosis     s/p AVR    Aortic stenosis, severe 10/16/2014    Echocardiogram EF 60% peak gradient 67 mmHg, mean gradient 30 mmHg, MARY 0.8 cm    ASHD (arteriosclerotic heart disease)     Chronic renal disease, stage III (HCC)     DM (diabetes mellitus) ([facility]     Gout     HCD (hypertensive cardiovascular disease)     History of echocardiogram 10/16/2014    EF 55-60%. No RWMA. Mild LVH. Gr 1 DDfx. Mild LAE. Severe AS (mean grad 30 mmHg).  Hyperlipidemia     Hypertension     Prostate cancer ([facility] 08/17/2017    [id number] Kaylynn Grade 7 (3 + 4) adenocarcinoma of the prostate in 3/12 cores, 2-10% of each core; PSA 7.24. Dr. Isha Guevara.  S/P AVR (aortic valve replacement) 11/13/2014    #23 mm Marifer Barbosa Magna pericardial valve. Dr. Agustina Dumont.  S/P CABG x 2 11/13/2014    LIMA to LAD, SVG to OM1. Dr. Agustina Dumnot.  S/P cardiac catheterization 11/06/2014    RCA dom. mRCA 100%. LM patent. dLAD 75% x 2. pCX 80% (ELENA-3). LVEDP 14 mmHg. EF 50-55%. Severe inferobasal hypk. Severe AS. AV replacement & CABG recommended. Past Surgical History:   Procedure Laterality Date    HX APPENDECTOMY       Current Outpatient Medications   Medication Sig    hydroCHLOROthiazide (HYDRODIURIL) 25 mg tablet Take 1 Tablet by mouth daily. Indications: high blood pressure    hydroCHLOROthiazide (HYDRODIURIL) 25 mg tablet Take 1 Tablet by mouth daily.  Indications: high blood pressure    metoprolol succinate (TOPROL-XL) 50 mg XL tablet TAKE 1 TABLET DAILY    atorvastatin (LIPITOR) 20 mg tablet TAKE 1 TABLET DAILY    amLODIPine (NORVASC) 10 mg tablet TAKE 1 TABLET DAILY    fluticasone propionate (Flonase Allergy Relief) 50 mcg/actuation nasal spray 2 Sprays by Both Nostrils route daily as needed for Rhinitis or Allergies.  sodium chloride (Saline Nasal Mist) 0.65 % nasal squeeze bottle 0.05 mL by Both Nostrils route as needed for Congestion.  carbamide peroxide (Debrox) 6.5 % otic solution Use as directed on instructions  Indications: an earwax blockage    lisinopriL (PRINIVIL, ZESTRIL) 20 mg tablet TAKE 1 TABLET DAILY    allopurinoL (ZYLOPRIM) 100 mg tablet TAKE 1 TABLET DAILY    acetaminophen (Tylenol Extra Strength) 500 mg tablet Take  by mouth every six (6) hours as needed for Pain.  cyanocobalamin, vitamin B-12, 1,000 mcg lozg 1,000 mcg by SubLINGual route daily.  ascorbic acid, vitamin C, (Vitamin C) 500 mg tablet Take  by mouth.  sildenafil citrate (VIAGRA) 100 mg tablet Take 1 Tab by mouth as needed.  docusate sodium (Colace) 100 mg capsule Take 100 mg by mouth daily as needed.  cholecalciferol, vitamin D3, 2,000 unit tab Take  by mouth daily.  aspirin delayed-release 81 mg tablet Take 1 tablet by mouth daily. No current facility-administered medications for this visit. Allergies and Intolerances:   No Known Allergies     Family History:   Family History   Problem Relation Age of Onset   Donna Me COPD Father     Other Father         pneumoconiosis    Lung Disease Father     Other Mother         meigs syndrome     Social History:   He  reports that he has never smoked. He has never used smokeless tobacco. He reports that he drinks approximately one case of beer per week. He is  with two adult children. He is retired from working in the [employer]. He started as a , and then became an .     Social History     Substance and Sexual Activity   Alcohol Use Yes    Alcohol/week: 12.0 standard drinks    Types: 12 Cans of beer per week     Immunization History:  Immunization History   Administered Date(s) Administered    COVID-19, PFIZER, MRNA, LNP-S, PF, 30MCG/0.3ML DOSE 04/27/2021, 05/18/2021    Influenza High Dose Vaccine PF 10/10/2017, 10/26/2018, 11/09/2019    Influenza Vaccine 10/14/2014    Influenza Vaccine (Tri) Adjuvanted (>65 Yrs FLUAD TRI 43288) 10/04/2018, 11/09/2019    Influenza Vaccine Split 09/29/2011    Influenza Vaccine Whole 09/14/2010    Influenza, Quadrivalent, Adjuvanted (>65 Yrs FLUAD QUAD 73099) 09/30/2020, 10/20/2021    Pneumococcal Conjugate (PCV-13) 06/29/2017    Pneumococcal Polysaccharide (PPSV-23) 10/14/2014    Td 01/01/2008    Tdap 01/12/2018    Zoster Recombinant 11/30/2020, 02/06/2021       Review of Systems:   As above included in HPI. Otherwise 11 point review of systems negative including constitutional, skin, HENT, eyes, respiratory, cardiovascular, gastrointestinal, genitourinary, musculoskeletal, endocrine, hematologic, allergy, and neurologic. Physical:   Visit Vitals  BP (!) 146/68   Pulse 86   Temp 98.1 °F (36.7 °C) (Temporal)   Resp 16   Ht 6' 1\" (1.854 m)   Wt 231 lb (104.8 kg)   SpO2 97%   BMI 30.48 kg/m²         Exam:   Patient appears in no apparent distress. Affect is appropriate. HEENT: PERRLA, anicteric, oropharynx clear, no JVD, adenopathy or thyromegaly. No carotid bruits or radiated murmur. Lungs: clear to auscultation, no wheezes, rhonchi, or rales. Heart: regular rate and rhythm. No murmur, rubs, gallops  Abdomen: soft, nontender, nondistended, normal bowel sounds, no hepatosplenomegaly or masses. Extremities: without edema.       Diabetic foot exam:     Left Foot:   Visual Exam: normal    Pulse DP: 2+ (normal)   Filament test: normal sensation    Vibratory sensation: normal      Right Foot:   Visual Exam: normal    Pulse DP: 2+ (normal)   Filament test: normal sensation    Vibratory sensation: normal Review of Data:  Labs:  Hospital Outpatient Visit on 11/05/2021   Component Date Value Ref Range Status    WBC 11/05/2021 6.5  4.6 - 13.2 K/uL Final    RBC 11/05/2021 3.72* 4.35 - 5.65 M/uL Final    HGB 11/05/2021 12.6* 13.0 - 16.0 g/dL Final    HCT 11/05/2021 38.6  36.0 - 48.0 % Final    MCV 11/05/2021 103.8* 78.0 - 100.0 FL Final    MCH 11/05/2021 33.9  24.0 - 34.0 PG Final    MCHC 11/05/2021 32.6  31.0 - 37.0 g/dL Final    RDW 11/05/2021 13.0  11.6 - 14.5 % Final    PLATELET 13/67/0438 391  135 - 420 K/uL Final    MPV 11/05/2021 8.8* 9.2 - 11.8 FL Final    NEUTROPHILS 11/05/2021 67  40 - 73 % Final    LYMPHOCYTES 11/05/2021 18* 21 - 52 % Final    MONOCYTES 11/05/2021 9  3 - 10 % Final    EOSINOPHILS 11/05/2021 6* 0 - 5 % Final    BASOPHILS 11/05/2021 1  0 - 2 % Final    ABS. NEUTROPHILS 11/05/2021 4.3  1.8 - 8.0 K/UL Final    ABS. LYMPHOCYTES 11/05/2021 1.1  0.9 - 3.6 K/UL Final    ABS. MONOCYTES 11/05/2021 0.6  0.05 - 1.2 K/UL Final    ABS. EOSINOPHILS 11/05/2021 0.4  0.0 - 0.4 K/UL Final    ABS.  BASOPHILS 11/05/2021 0.0  0.0 - 0.1 K/UL Final    DF 11/05/2021 AUTOMATED    Final    Ferritin 11/05/2021 81  8 - 388 NG/ML Final    Hemoglobin A1c 11/05/2021 5.8* 4.2 - 5.6 % Final    Est. average glucose 11/05/2021 120  mg/dL Final    LIPID PROFILE 11/05/2021        Final    Cholesterol, total 11/05/2021 157  <200 MG/DL Final    Triglyceride 11/05/2021 90  <150 MG/DL Final    HDL Cholesterol 11/05/2021 71* 40 - 60 MG/DL Final    LDL, calculated 11/05/2021 68  0 - 100 MG/DL Final    VLDL, calculated 11/05/2021 18  MG/DL Final    CHOL/HDL Ratio 11/05/2021 2.2  0 - 5.0   Final    Magnesium 11/05/2021 2.2  1.6 - 2.6 mg/dL Final    Sodium 11/05/2021 138  136 - 145 mmol/L Final    Potassium 11/05/2021 5.0  3.5 - 5.5 mmol/L Final    Chloride 11/05/2021 106  100 - 111 mmol/L Final    CO2 11/05/2021 24  21 - 32 mmol/L Final    Anion gap 11/05/2021 8  3.0 - 18 mmol/L Final    Glucose 11/05/2021 107* 74 - 99 mg/dL Final    BUN 11/05/2021 20* 7.0 - 18 MG/DL Final    Creatinine 11/05/2021 1.40* 0.6 - 1.3 MG/DL Final    BUN/Creatinine ratio 11/05/2021 14  12 - 20   Final    GFR est AA 11/05/2021 >60  >60 ml/min/1.73m2 Final    GFR est non-AA 11/05/2021 50* >60 ml/min/1.73m2 Final    Calcium 11/05/2021 9.3  8.5 - 10.1 MG/DL Final    Bilirubin, total 11/05/2021 1.0  0.2 - 1.0 MG/DL Final    ALT (SGPT) 11/05/2021 30  16 - 61 U/L Final    AST (SGOT) 11/05/2021 16  10 - 38 U/L Final    Alk. phosphatase 11/05/2021 61  45 - 117 U/L Final    Protein, total 11/05/2021 7.6  6.4 - 8.2 g/dL Final    Albumin 11/05/2021 4.2  3.4 - 5.0 g/dL Final    Globulin 11/05/2021 3.4  2.0 - 4.0 g/dL Final    A-G Ratio 11/05/2021 1.2  0.8 - 1.7   Final    Microalbumin,urine random 11/05/2021 1.30  0 - 3.0 MG/DL Final    Creatinine, urine 11/05/2021 153.00* 30 - 125 mg/dL Final    Microalbumin/Creat ratio (mg/g cre* 11/05/2021 8  0 - 30 mg/g Final    Vitamin D 25-Hydroxy 11/05/2021 32.1  30 - 100 ng/mL Final    Iron 11/05/2021 135  50 - 175 ug/dL Final    TIBC 11/05/2021 351  250 - 450 ug/dL Final    Iron % saturation 11/05/2021 38  20 - 50 % Final     Lab Results   Component Value Date/Time    Prostate Specific Ag 0.3 08/04/2021 01:40 PM    Prostate Specific Ag 0.4 11/16/2020 10:34 AM    Prostate Specific Ag 1.1 11/12/2019 09:51 AM    Prostate Specific Ag 13.8 (H) 03/08/2019 09:30 AM           EKG (8/8/2019) sinus rhythm at 68 bpm. Normal intervals. Lack of R waves in V2 and V3 likely lead placement. No ischemic changes when compared with prior in 8/2018. Health Maintenance:  Screening:    Colorectal: Cologuard (11/2/2017) negative. Due 11/2020. Overdue   Depression: none   DM (HbA1c/FPG): HbA1c 5.8 (11/2021)   Hepatitis C: negative (1/2018)   Falls: none   DEXA: N/A   PSA/KIEL: PSA 0.3 (8/2021).  Dr. Lee Augustin   Glaucoma: regular eye exams with Dr. Harris Church (last 6/2019) Overdue   Smoking: none   Vitamin D: 32.1 (11/2021)   Medicare Wellness: 12/31/2020    Impression:  Patient Active Problem List   Diagnosis Code    Hyperlipidemia E78.5    Gout M10.9    Vitamin D deficiency E55.9    CAD in native artery I25.10    Hypertensive heart disease without congestive heart failure I11.9    S/P CABG x 2 Z95.1    S/P AVR (aortic valve replacement) Z95.2    Type 2 diabetes mellitus with stage 3 chronic kidney disease, without long-term current use of insulin (HCC) E11.22, N18.30    Stage 3 chronic kidney disease (HCC) N18.30    Prostate cancer ([facility] C61    Essential hypertension I10    Anemia D64.9    Vitamin B12 deficiency E53.8    Microalbuminuria R80.9    Overweight (BMI 25.0-29. 9) E66.3       Plan:  1. Hypertension. Blood pressure elevated today on current regimen of metoprolol succinate 50 mg daily, lisinopril 20 mg daily, and amlodipine 10 mg daily. Will restart hydrochlorothiazide 25 mg daily and reassess blood pressure and renal function in 4 weeks. Renal function has been stable with creatinine 1.40/ eGFR 50. Not monitoring blood pressure at home. Will continue to follow. 2. Prostate cancer. [id number] Arbuckle Grade 7 (3 + 4) adenocarcinoma. Patient initially chose active surveillance rather than definitive treatment. Repeat PSA and MRI prostate ordered for 2/2018, but he did not follow-up. Repeat PSA obtained and had increased from 7.24 at diagnosis to 11.3. Referred for follow-up with Dr. Daniela Kilpatrick, and MRI prostate performed on 12/31/2018 showing several peripheral zone PI-RADS 4 with one area showing questionable capsular bulging laterally, but not a definitive appearance for extracapsular extension. He decided to proceed with EBRT, and underwent fiducial marker and [employer] placement on 3/11/2019 followed by weekly treatments from 3/27-5/6/2019. Tolerated well except for some fatigue which has improved.   Previously followed by Dr. Jinny Viera and  [facility]. PSA 1.1 (11/2019) and testosterone 289, indicative of no evidence of active disease. Followed-up with radiation oncology in 11/2020 and PSA improved to 0.4. Not wishing to schedule with Dr. Rajan Celis currently. Repeat PSA (8/2021) improved further to 0.3. Will obtain repeat PSA at next visit. 3. Hyperlipidemia. On moderate intensity dose atorvastatin with LDL 68 and HDL 71, indicative of good control. Changed from simvastatin given treatment with amlodipine. Will continue to monitor. 4. ASHD s/p 2 vessel CABG. Remains asymptomatic. On aspirin, metoprolol, and statin. Followed by Dr. Lobo Dykes, last visit 10/2021 with 6-month follow-up. 5. Aortic stenosis s/p bioprosthetic AVR. Remains asymptomatic, and underwent repeat echocardiogram in 9/2020 with good functioning valve. Being followed by Dr. Lobo Dykes. 6. Diabetes mellitus. Patient with diagnosis of diabetes mellitus, but review of record shows HbA1c ranging from 5.7-6.3 since 2011 and -112 during that time period. HbA1c remains increased today at 5.8. On Ace-I and statin. Continue follow-up with Dr. Nano Hedrick for annual eye exams. Foot exam normal today. Urine microalbumin/ creatinine ratio normal (8 mg/g). Does have evidence of chronic renal disease stage 3. Emphasized importance of lifestyle modifications, including diet, exercise, and weight loss. 7. Chronic renal disease, stage 3. Most likely secondary to long standing hypertension and prediabetes, although also appears to have developed at time of CABG and AVR so may be related to hypoperfusion during surgery. On statin and Ace-I. Discussed importance of avoiding NSAIDS and prerenal status. Remaining stable today. Will continue to follow. 8. Macrocytic anemia. Mild anemia developed during radiation therapy. Macrocytosis evident. Vitamin B12 level very low in 8/2019 at 191 started on cyanocobalamin 1000 mcg SL daily.  Level remains normal indicative of compliance, and anemia improved although macrocytosis persists. Also advised to decrease alcohol consumption as reports having 1 case of beer a week. Will continue to monitor. 9. Gout. Asymptomatic. On allopurinol. No recent flares. 10. Overweight. Weight relatively unchanged since 4/2021. Emphasized importance of lifestyle modifications, including diet, exercise, and weight loss. Will monitor. 11. Health maintenance. Completed the [employer] COVID-19 vaccine series. Advised to proceed with the [address] booster dose when eligible after 11/18/2021. Completed 2/2 doses of Shingrix vaccine. Already received the influenza vaccine. Other immunizations up to date. Completed Cologuard for colorectal cancer screening. Overdue for repeat (due 11/2020). Patient did not return the kit but agreeable to perform. Will reorder today. Continue regular eye exams with Dr. Eduin Elizabeth. Overdue and urged to schedule. Discussed decreasing alcohol consumption as currently drinking a case of beer per week. Vitamin D level remains normal on maintenance dose supplement. Medicare wellness visit up-to-date. Patient understands recommendations and agrees with plan. Follow-up in 4 weeks.

## 2021-12-02 RX ORDER — ALLOPURINOL 100 MG/1
TABLET ORAL
Qty: 90 TABLET | Refills: 3 | Status: SHIPPED | OUTPATIENT
Start: 2021-12-02

## 2021-12-07 ENCOUNTER — APPOINTMENT (OUTPATIENT)
Dept: INTERNAL MEDICINE CLINIC | Age: 71
End: 2021-12-07

## 2021-12-07 ENCOUNTER — HOSPITAL ENCOUNTER (OUTPATIENT)
Dept: LAB | Age: 71
Discharge: HOME | End: 2021-12-07
Payer: MEDICARE

## 2021-12-07 DIAGNOSIS — C61 PROSTATE CANCER (HCC): ICD-10-CM

## 2021-12-07 DIAGNOSIS — I10 ESSENTIAL HYPERTENSION: ICD-10-CM

## 2021-12-07 LAB
ANION GAP SERPL CALC-SCNC: 7 MMOL/L (ref 3–18)
BUN SERPL-MCNC: 26 MG/DL (ref 7–18)
BUN/CREAT SERPL: 16 (ref 12–20)
CALCIUM SERPL-MCNC: 9.2 MG/DL (ref 8.5–10.1)
CHLORIDE SERPL-SCNC: 100 MMOL/L (ref 100–111)
CO2 SERPL-SCNC: 25 MMOL/L (ref 21–32)
CREAT SERPL-MCNC: 1.62 MG/DL (ref 0.6–1.3)
GLUCOSE SERPL-MCNC: 119 MG/DL (ref 74–99)
MAGNESIUM SERPL-MCNC: 2.2 MG/DL (ref 1.6–2.6)
POTASSIUM SERPL-SCNC: 5.2 MMOL/L (ref 3.5–5.5)
PSA SERPL-MCNC: 0.2 NG/ML (ref 0–4)
SODIUM SERPL-SCNC: 132 MMOL/L (ref 136–145)

## 2021-12-07 PROCEDURE — 36415 COLL VENOUS BLD VENIPUNCTURE: CPT

## 2021-12-07 PROCEDURE — 80048 BASIC METABOLIC PNL TOTAL CA: CPT

## 2021-12-07 PROCEDURE — 84153 ASSAY OF PSA TOTAL: CPT

## 2021-12-07 PROCEDURE — 83735 ASSAY OF MAGNESIUM: CPT

## 2021-12-14 ENCOUNTER — OFFICE VISIT (OUTPATIENT)
Dept: INTERNAL MEDICINE CLINIC | Age: 71
End: 2021-12-14
Payer: MEDICARE

## 2021-12-14 VITALS
BODY MASS INDEX: 30.35 KG/M2 | DIASTOLIC BLOOD PRESSURE: 60 MMHG | HEIGHT: 73 IN | HEART RATE: 77 BPM | RESPIRATION RATE: 16 BRPM | WEIGHT: 229 LBS | OXYGEN SATURATION: 98 % | SYSTOLIC BLOOD PRESSURE: 126 MMHG | TEMPERATURE: 97.9 F

## 2021-12-14 DIAGNOSIS — E11.22 TYPE 2 DIABETES MELLITUS WITH STAGE 3A CHRONIC KIDNEY DISEASE, WITHOUT LONG-TERM CURRENT USE OF INSULIN (HCC): ICD-10-CM

## 2021-12-14 DIAGNOSIS — E55.9 VITAMIN D DEFICIENCY: ICD-10-CM

## 2021-12-14 DIAGNOSIS — E78.5 HYPERLIPIDEMIA, UNSPECIFIED HYPERLIPIDEMIA TYPE: ICD-10-CM

## 2021-12-14 DIAGNOSIS — I10 ESSENTIAL HYPERTENSION: Primary | ICD-10-CM

## 2021-12-14 DIAGNOSIS — E66.3 OVERWEIGHT (BMI 25.0-29.9): ICD-10-CM

## 2021-12-14 DIAGNOSIS — N18.31 TYPE 2 DIABETES MELLITUS WITH STAGE 3A CHRONIC KIDNEY DISEASE, WITHOUT LONG-TERM CURRENT USE OF INSULIN (HCC): ICD-10-CM

## 2021-12-14 DIAGNOSIS — E53.8 VITAMIN B12 DEFICIENCY: ICD-10-CM

## 2021-12-14 DIAGNOSIS — C61 PROSTATE CANCER (HCC): ICD-10-CM

## 2021-12-14 DIAGNOSIS — N18.31 STAGE 3A CHRONIC KIDNEY DISEASE (HCC): ICD-10-CM

## 2021-12-14 PROCEDURE — G8427 DOCREV CUR MEDS BY ELIG CLIN: HCPCS | Performed by: INTERNAL MEDICINE

## 2021-12-14 PROCEDURE — 3044F HG A1C LEVEL LT 7.0%: CPT | Performed by: INTERNAL MEDICINE

## 2021-12-14 PROCEDURE — G8752 SYS BP LESS 140: HCPCS | Performed by: INTERNAL MEDICINE

## 2021-12-14 PROCEDURE — G8536 NO DOC ELDER MAL SCRN: HCPCS | Performed by: INTERNAL MEDICINE

## 2021-12-14 PROCEDURE — 2022F DILAT RTA XM EVC RTNOPTHY: CPT | Performed by: INTERNAL MEDICINE

## 2021-12-14 PROCEDURE — G0463 HOSPITAL OUTPT CLINIC VISIT: HCPCS | Performed by: INTERNAL MEDICINE

## 2021-12-14 PROCEDURE — 3017F COLORECTAL CA SCREEN DOC REV: CPT | Performed by: INTERNAL MEDICINE

## 2021-12-14 PROCEDURE — 1101F PT FALLS ASSESS-DOCD LE1/YR: CPT | Performed by: INTERNAL MEDICINE

## 2021-12-14 PROCEDURE — 99214 OFFICE O/P EST MOD 30 MIN: CPT | Performed by: INTERNAL MEDICINE

## 2021-12-14 PROCEDURE — G8417 CALC BMI ABV UP PARAM F/U: HCPCS | Performed by: INTERNAL MEDICINE

## 2021-12-14 PROCEDURE — G8754 DIAS BP LESS 90: HCPCS | Performed by: INTERNAL MEDICINE

## 2021-12-14 PROCEDURE — G8510 SCR DEP NEG, NO PLAN REQD: HCPCS | Performed by: INTERNAL MEDICINE

## 2021-12-14 NOTE — PATIENT INSTRUCTIONS
TO DO list:    [address] [place],9D 19 booster dose  Schedule eye exam with Dr. Hamida Mccarthy in [facility] stool testing. Heart-Healthy Diet: Care Instructions  Your Care Instructions     A heart-healthy diet has lots of vegetables, fruits, nuts, beans, and whole grains, and is low in salt. It limits foods that are high in saturated fat, such as meats, cheeses, and fried foods. It may be hard to change your diet, but even small changes can lower your risk of heart attack and heart disease. Follow-up care is a key part of your treatment and safety. Be sure to make and go to all appointments, and call your doctor if you are having problems. It's also a good idea to know your test results and keep a list of the medicines you take. How can you care for yourself at home? Watch your portions  · Learn what a serving is. A \"serving\" and a \"portion\" are not always the same thing. Make sure that you are not eating larger portions than are recommended. For example, a serving of pasta is ½ cup. A serving size of meat is 2 to 3 ounces. A 3-ounce serving is about the size of a deck of cards. Measure serving sizes until you are good at Oliver" them. Keep in mind that restaurants often serve portions that are 2 or 3 times the size of one serving. · To keep your energy level up and keep you from feeling hungry, eat often but in smaller portions. · Eat only the number of calories you need to stay at a healthy weight. If you need to lose weight, eat fewer calories than your body burns (through exercise and other physical activity). Eat more fruits and vegetables  · Eat a variety of fruit and vegetables every day. Dark green, deep orange, red, or yellow fruits and vegetables are especially good for you. Examples include spinach, carrots, peaches, and berries. · Keep carrots, celery, and other veggies handy for snacks.  Buy fruit that is in season and store it where you can see it so that you will be tempted to eat it. · Cook dishes that have a lot of veggies in them, such as stir-fries and soups. Limit saturated and trans fat  · Read food labels, and try to avoid saturated and trans fats. They increase your risk of heart disease. · Use olive or canola oil when you cook. · Bake, broil, grill, or steam foods instead of frying them. · Choose lean meats instead of high-fat meats such as hot dogs and sausages. Cut off all visible fat when you prepare meat. · Eat fish, skinless poultry, and meat alternatives such as soy products instead of high-fat meats. Soy products, such as tofu, may be especially good for your heart. · Choose low-fat or fat-free milk and dairy products. Eat foods high in fiber  · Eat a variety of grain products every day. Include whole-grain foods that have lots of fiber and nutrients. Examples of whole-grain foods include oats, whole wheat bread, and brown rice. · Buy whole-grain breads and cereals, instead of white bread or pastries. Limit salt and sodium  · Limit how much salt and sodium you eat to help lower your blood pressure. · Taste food before you salt it. Add only a little salt when you think you need it. With time, your taste buds will adjust to less salt. · Eat fewer snack items, fast foods, and other high-salt, processed foods. Check food labels for the amount of sodium in packaged foods. · Choose low-sodium versions of canned goods (such as soups, vegetables, and beans). Limit sugar  · Limit drinks and foods with added sugar. These include candy, desserts, and soda pop. Limit alcohol  · Limit alcohol to no more than 2 drinks a day for men and 1 drink a day for women. Too much alcohol can cause health problems. When should you call for help? Watch closely for changes in your health, and be sure to contact your doctor if:    · You would like help planning heart-healthy meals. Where can you learn more?   Go to http://www.gray.com/  Enter O271 in the search box to learn more about \"Heart-Healthy Diet: Care Instructions. \"  Current as of: August 22, 2019               Content Version: 12.6  © 7574-1022 [employer]. Care instructions adapted under license by [employer] (which disclaims liability or warranty for this information). If you have questions about a medical condition or this instruction, always ask your healthcare professional. [address] any warranty or liability for your use of this information. Learning About Low-Sodium Foods  What foods are low in sodium? The foods you eat contain nutrients, such as vitamins and minerals. Sodium is a nutrient. Your body needs the right amount to stay healthy and work as it should. You can use the list below to help you make choices about which foods to eat. Fruits  · Fresh, frozen, canned, or dried fruit  Vegetables  · Fresh or frozen vegetables, with no added salt  · Canned vegetables, low-sodium or with no added salt  Grains  · Bagels without salted tops  · Cereal with no added salt  · Corn tortillas  · Crackers with no added salt  · Oatmeal, cooked without salt  · Popcorn with no salt  · Pasta and noodles, cooked without salt  · Rice, cooked without salt  · Unsalted pretzels  Dairy and dairy alternatives  · Butter, unsalted  · Cream cheese  · Ice cream  · Milk  · Soy milk  Meats and other protein foods  · Beans and peas, canned with no salt  · Eggs  · Fresh fish (not smoked)  · Fresh meats (not smoked or cured)  · Nuts and nut butter, prepared without salt  · Poultry, not packaged with sodium solution  · Tofu, unseasoned  · Tuna, canned without salt  Seasonings  · Garlic  · Herbs and spices  · Lemon juice  · Mustard  · Olive oil  · Salt-free seasoning mixes  · Vinegar  Work with your doctor to find out how much of this nutrient you need. Depending on your health, you may need more or less of it in your diet. Where can you learn more?   Go to http://www.gray.com/  Enter H234 in the search box to learn more about \"Learning About Low-Sodium Foods. \"  Current as of: August 22, 2019               Content Version: 12.6  © 1948-4549 [employer]. Care instructions adapted under license by [employer] (which disclaims liability or warranty for this information). If you have questions about a medical condition or this instruction, always ask your healthcare professional. [address] any warranty or liability for your use of this information. Low Sodium Diet (2,000 Milligram): Care Instructions  Your Care Instructions     Too much sodium causes your body to hold on to extra water. This can raise your blood pressure and force your heart and kidneys to work harder. In very serious cases, this could cause you to be put in the hospital. It might even be life-threatening. By limiting sodium, you will feel better and lower your risk of serious problems. The most common source of sodium is salt. People get most of the salt in their diet from canned, prepared, and packaged foods. Fast food and restaurant meals also are very high in sodium. Your doctor will probably limit your sodium to less than 2,000 milligrams (mg) a day. This limit counts all the sodium in prepared and packaged foods and any salt you add to your food. Follow-up care is a key part of your treatment and safety. Be sure to make and go to all appointments, and call your doctor if you are having problems. It's also a good idea to know your test results and keep a list of the medicines you take. How can you care for yourself at home? Read food labels  · Read labels on cans and food packages. The labels tell you how much sodium is in each serving. Make sure that you look at the serving size. If you eat more than the serving size, you have eaten more sodium. · Food labels also tell you the Percent Daily Value for sodium.  Choose products with low Percent Daily Values for sodium. · Be aware that sodium can come in forms other than salt, including monosodium glutamate (MSG), sodium citrate, and sodium bicarbonate (baking soda). MSG is often added to Asian food. When you eat out, you can sometimes ask for food without MSG or added salt. Buy low-sodium foods  · Buy foods that are labeled \"unsalted\" (no salt added), \"sodium-free\" (less than 5 mg of sodium per serving), or \"low-sodium\" (less than 140 mg of sodium per serving). Foods labeled \"reduced-sodium\" and \"light sodium\" may still have too much sodium. Be sure to read the label to see how much sodium you are getting. · Buy fresh vegetables, or frozen vegetables without added sauces. Buy low-sodium versions of canned vegetables, soups, and other canned goods. Prepare low-sodium meals  · Cut back on the amount of salt you use in cooking. This will help you adjust to the taste. Do not add salt after cooking. One teaspoon of salt has about 2,300 mg of sodium. · Take the salt shaker off the table. · Flavor your food with garlic, lemon juice, onion, vinegar, herbs, and spices. Do not use soy sauce, lite soy sauce, steak sauce, onion salt, garlic salt, celery salt, mustard, or ketchup on your food. · Use low-sodium salad dressings, sauces, and ketchup. Or make your own salad dressings and sauces without adding salt. · Use less salt (or none) when recipes call for it. You can often use half the salt a recipe calls for without losing flavor. Other foods such as rice, pasta, and grains do not need added salt. · Rinse canned vegetables, and cook them in fresh water. This removes somebut not allof the salt. · Avoid water that is naturally high in sodium or that has been treated with water softeners, which add sodium. Call your local water company to find out the sodium content of your water supply. If you buy bottled water, read the label and choose a sodium-free brand.   Avoid high-sodium foods  · Avoid eating:  ? Smoked, cured, salted, and canned meat, fish, and poultry. ? Ham, jesus, hot dogs, and luncheon meats. ? Regular, hard, and processed cheese and regular peanut butter. ? Crackers with salted tops, and other salted snack foods such as pretzels, chips, and salted popcorn. ? Frozen prepared meals, unless labeled low-sodium. ? Canned and dried soups, broths, and bouillon, unless labeled sodium-free or low-sodium. ? Canned vegetables, unless labeled sodium-free or low-sodium. ? [place] fries, pizza, tacos, and other fast foods. ? Pickles, olives, ketchup, and other condiments, especially soy sauce, unless labeled sodium-free or low-sodium. Where can you learn more? Go to http://www.gray.com/  Enter V843 in the search box to learn more about \"Low Sodium Diet (2,000 Milligram): Care Instructions. \"  Current as of: August 22, 2019               Content Version: 12.6  © 0011-9840 [employer]. Care instructions adapted under license by [employer] (which disclaims liability or warranty for this information). If you have questions about a medical condition or this instruction, always ask your healthcare professional. [place] [address] any warranty or liability for your use of this information.

## 2021-12-14 NOTE — PROGRESS NOTES
1. Have you been to the ER, urgent care clinic or hospitalized since your last visit? NO.     2. Have you seen or consulted any other health care providers outside of the [address] [place] [address] since your last visit (Include any pap smears or colon screening)?  NO

## 2021-12-20 NOTE — PROGRESS NOTES
HPI:   Little Payan is a 66y.o. year old male who presents for a follow-up visit. He has a history of hypertension, hyperlipidemia, ASHD s/p CABG, aortic stenosis s/p AVR, diabetes mellitus, chronic renal disease stage 3, prostate cancer, and gout. He has completed the [employer] COVID-19 vaccine series, but has not yet received [employer] booster dose. He was last seen on 11/11/2021 and his blood pressure was noted to be elevated on his regimen of metoprolol succinate 50 mg daily, amlodipine 10 mg daily and lisinopril 20 mg daily. He was restarted on hydrochlorothiazide 25 mg daily and returns today for follow-up. He is not monitoring his blood pressure at home. He reports that he is otherwise doing generally well and is without specific complaints. Summary of prior hospitalizations and medical history:  In 6/2017, he was noted to have an elevated PSA of 6.0, which was confirmed on repeat to be 6.6. He was referred to Dr. Boy Mobley and PSA was again repeated and found to be increased at 7.24. He underwent TRUS biopsy on 8/17/2017, which showed [id number] Grade 7 (3 + 4) adenocarcinoma of the prostate in 3/12 cores, 2-10% of each core. Options for management were discussed and he selected active surveillance. Plans were for repeat PSA and MRI prostate in 6 months (due 2/2018). However, the patient never had tests performed and he did not follow-up as discussed. On 10/4/2018, at his routine visit, a PSA level was obtained and was found to have increased to 11.3, and he was advised to follow-up with Dr. Boy Mobley. He underwent a prostate MRI 3T at [facility] on 12/31/2018 showing several peripheral zone PI-RADS 4 observations: right posterolateral peripheral zone at the mid gland/apex and anterior peripheral zone at the apex bilaterally. The former exhibits questionable capsular bulging laterally, but not a definitive appearance for extracapsular extension.  He had a follow-up appointment with Dr. Luo Pea on 1/11/2019 and decision made to proceed with EBRT. He had a staging bone scan (1/23/2019) which was negative for osseous metastases, and a CT abdomen and pelvis (1/23/2019) which showed no adenopathy of evidence of metastases, mild hepatic steatosis, aortic atherosclerosis, and cholelithiasis. He met with Dr. Sergo Cummins on 1/30/2019 and decided to proceed with EBRT as definitive treatment for his prostate cancer, receiving his last treatment on 5/6/2019. Follow-up PSA level in 11/2020 at 0.4. He has a history of hypertension, hyperlipidemia, ASHD, and aortic stenosis. In 10/2014, he presented with progressive chest pain and shortness of breath and an echocardiogram was obtained (10/16/2014) showing normal LV function (EF 55-60%), no RWMA, grade 1 diastolic dysfunction, mild LAE, and severe AS (mean gradient 30 mmHg, peak 67 mmHg, and AV area 0.8 cm2). He was referred to see Dr. Emre Jordan and underwent cardiac catheterization (11/6/2014) showing 100% RCA (distal collaterals from left), 70-80% distal LAD, 80% proximal LCx, inferior basal hypokinesis, and EF 55%. On 11/13/2014, he underwent 2 vessel CABG (LIMA to LAD and SVG to OM1) and AV replacement with a bioprosthetic 23 mm Marifer Barbosa Magna pericardial valve by Dr. Adia Gaspar. He has done well since that time and remains asymptomatic. He denies any chest pain, shortness of breath at rest or with exertion, palpitations, lightheadedness, or edema. His current regimen includes aspirin, metoprolol, lisinopril, and moderate intensity dose atorvastatin. He is followed by Dr. Emre Jordan. He had a repeat echocardiogram (8/21/2017) showing normal LV function (EF 55-60%), no RWMA, mild MIL, and normally functioning bioprosthetic valve with peak gradient 17 mmHg, mean gradient 10 mmHg, and valve area 1.86 cm2.  Repeat echocardiogram (9/18/2020) showed normal LV size and function (EF 55-60%), AV prosthetic functioning normally with mean gradient of 7.2mmHg and MARY 1.8 cm2. Moderate LAE, and mild MR. He has a history of diabetes mellitus, which has not required treatment with medication. Review of his record shows that his Hba1c has remained between 5.7-6.3 since 2011. He denies any polyuria, polydipsia, nocturia, or blurry vision, and has no history of retinopathy or neuropathy. He does have evidence of chronic renal disease, stage 3, with baseline creatinine 1.22-1.37/ eGFR 55-59 since 11/2014. He had been having regular eye exams with Dr. Aric Sanchez. He has a history of gout, but has not had a flare in many years since being treated with allopurinol. He has never had a screening colonoscopy. He did undergo Cologuard testing in 11/2017 which was negative. He denies any abdominal pain, nausea, vomiting, melena, hematochezia, or change in bowel movements. Past Medical History:   Diagnosis Date    Aortic stenosis     s/p AVR    Aortic stenosis, severe 10/16/2014    Echocardiogram EF 60% peak gradient 67 mmHg, mean gradient 30 mmHg, MARY 0.8 cm    ASHD (arteriosclerotic heart disease)     Chronic renal disease, stage III (HCC)     DM [facility]diabetes mellitus) ([facility] [address]     Gout     HCD (hypertensive cardiovascular disease)     History of echocardiogram 10/16/2014    EF 55-60%. No RWMA. Mild LVH. Gr 1 DDfx. Mild LAE. Severe AS (mean grad 30 mmHg).  Hyperlipidemia     Hypertension     Prostate cancer ([facility] [address] 08/17/2017    [id number] Grade 7 (3 + 4) adenocarcinoma of the prostate in 3/12 cores, 2-10% of each core; PSA 7.24. Dr. Larisa Muller.  S/P AVR (aortic valve replacement) 11/13/2014    #23 mm Marifer Barbosa Magna pericardial valve. Dr. Rafa Martinez.  S/P CABG x 2 11/13/2014    LIMA to LAD, SVG to OM1. Dr. Rafa Martinez.  S/P cardiac catheterization 11/06/2014    RCA dom. mRCA 100%. LM patent. dLAD 75% x 2. pCX 80% (ELENA-3). LVEDP 14 mmHg. EF 50-55%. Severe inferobasal hypk. Severe AS. AV replacement & CABG recommended.      Past Surgical History:   Procedure Laterality Date    HX APPENDECTOMY       Current Outpatient Medications   Medication Sig    allopurinoL (ZYLOPRIM) 100 mg tablet TAKE 1 TABLET DAILY    hydroCHLOROthiazide (HYDRODIURIL) 25 mg tablet Take 1 Tablet by mouth daily. Indications: high blood pressure    hydroCHLOROthiazide (HYDRODIURIL) 25 mg tablet Take 1 Tablet by mouth daily. Indications: high blood pressure    metoprolol succinate (TOPROL-XL) 50 mg XL tablet TAKE 1 TABLET DAILY    atorvastatin (LIPITOR) 20 mg tablet TAKE 1 TABLET DAILY    amLODIPine (NORVASC) 10 mg tablet TAKE 1 TABLET DAILY    fluticasone propionate (Flonase Allergy Relief) 50 mcg/actuation nasal spray 2 Sprays by Both Nostrils route daily as needed for Rhinitis or Allergies.  sodium chloride (Saline Nasal Mist) 0.65 % nasal squeeze bottle 0.05 mL by Both Nostrils route as needed for Congestion.  carbamide peroxide (Debrox) 6.5 % otic solution Use as directed on instructions  Indications: an earwax blockage    lisinopriL (PRINIVIL, ZESTRIL) 20 mg tablet TAKE 1 TABLET DAILY    acetaminophen (Tylenol Extra Strength) 500 mg tablet Take  by mouth every six (6) hours as needed for Pain.  cyanocobalamin, vitamin B-12, 1,000 mcg lozg 1,000 mcg by SubLINGual route daily.  ascorbic acid, vitamin C, (Vitamin C) 500 mg tablet Take  by mouth.  sildenafil citrate (VIAGRA) 100 mg tablet Take 1 Tab by mouth as needed.  docusate sodium (Colace) 100 mg capsule Take 100 mg by mouth daily as needed.  cholecalciferol, vitamin D3, 2,000 unit tab Take  by mouth daily.  aspirin delayed-release 81 mg tablet Take 1 tablet by mouth daily. No current facility-administered medications for this visit.      Allergies and Intolerances:   No Known Allergies     Family History:   Family History   Problem Relation Age of Onset    COPD Father     Other Father         pneumoconiosis    Lung Disease Father     Other Mother         meigs syndrome     Social History:   He  reports that he has never smoked. He has never used smokeless tobacco. He reports that he drinks approximately one case of beer per week. He is  with two adult children. He is retired from working in the [employer]. He started as a , and then became an . Social History     Substance and Sexual Activity   Alcohol Use Yes    Alcohol/week: 12.0 standard drinks    Types: 12 Cans of beer per week     Immunization History:  Immunization History   Administered Date(s) Administered    COVID-19, PFIZER, MRNA, LNP-S, PF, 30MCG/0.3ML DOSE 04/27/2021, 05/18/2021    Influenza High Dose Vaccine PF 10/10/2017, 10/26/2018, 11/09/2019    Influenza Vaccine 10/14/2014    Influenza Vaccine (Tri) Adjuvanted (>65 Yrs FLUAD TRI 84402) 10/04/2018, 11/09/2019    Influenza Vaccine Split 09/29/2011    Influenza Vaccine Whole 09/14/2010    Influenza, Quadrivalent, Adjuvanted (>65 Yrs FLUAD QUAD 09106) 09/30/2020, 10/20/2021    Pneumococcal Conjugate (PCV-13) 06/29/2017    Pneumococcal Polysaccharide (PPSV-23) 10/14/2014    Td 01/01/2008    Tdap 01/12/2018    Zoster Recombinant 11/30/2020, 02/06/2021       Review of Systems:   As above included in HPI. Otherwise 11 point review of systems negative including constitutional, skin, HENT, eyes, respiratory, cardiovascular, gastrointestinal, genitourinary, musculoskeletal, endocrine, hematologic, allergy, and neurologic. Physical:   Visit Vitals  /60   Pulse 77   Temp 97.9 °F (36.6 °C) (Temporal)   Resp 16   Ht 6' 1\" (1.854 m)   Wt 229 lb (103.9 kg)   SpO2 98%   BMI 30.21 kg/m²         Exam:   Patient appears in no apparent distress. Affect is appropriate. HEENT: PERRLA, anicteric, no JVD, adenopathy or thyromegaly. No carotid bruits or radiated murmur. Lungs: clear to auscultation, no wheezes, rhonchi, or rales. Heart: regular rate and rhythm.  No murmur, rubs, gallops  Abdomen: soft, nontender, nondistended, normal bowel sounds, no hepatosplenomegaly or masses. Extremities: without edema. Review of Data:  Labs:  No visits with results within 2 Day(s) from this visit. Latest known visit with results is:   Hospital Outpatient Visit on 12/07/2021   Component Date Value Ref Range Status    Magnesium 12/07/2021 2.2  1.6 - 2.6 mg/dL Final    Sodium 12/07/2021 132* 136 - 145 mmol/L Final    Potassium 12/07/2021 5.2  3.5 - 5.5 mmol/L Final    Chloride 12/07/2021 100  100 - 111 mmol/L Final    CO2 12/07/2021 25  21 - 32 mmol/L Final    Anion gap 12/07/2021 7  3.0 - 18 mmol/L Final    Glucose 12/07/2021 119* 74 - 99 mg/dL Final    BUN 12/07/2021 26* 7.0 - 18 MG/DL Final    Creatinine 12/07/2021 1.62* 0.6 - 1.3 MG/DL Final    BUN/Creatinine ratio 12/07/2021 16  12 - 20   Final    GFR est AA 12/07/2021 51* >60 ml/min/1.73m2 Final    GFR est non-AA 12/07/2021 42* >60 ml/min/1.73m2 Final    Calcium 12/07/2021 9.2  8.5 - 10.1 MG/DL Final    Prostate Specific Ag 12/07/2021 0.2  0.0 - 4.0 ng/mL Final       Lab Results   Component Value Date/Time    Prostate Specific Ag 0.2 12/07/2021 10:52 AM    Prostate Specific Ag 0.3 08/04/2021 01:40 PM    Prostate Specific Ag 0.4 11/16/2020 10:34 AM       EKG (8/8/2019) sinus rhythm at 68 bpm. Normal intervals. Lack of R waves in V2 and V3 likely lead placement. No ischemic changes when compared with prior in 8/2018. Health Maintenance:  Screening:    Colorectal: Cologuard (11/2/2017) negative. Due 11/2020. Overdue   Depression: none   DM (HbA1c/FPG): HbA1c 5.8 (11/2021)   Hepatitis C: negative (1/2018)   Falls: none   DEXA: N/A   PSA/KIEL: PSA 0.2 (12/2021).  Dr. Christy Garcia   Glaucoma: regular eye exams with Dr. Maritza Cazares (last 6/2019) Overdue   Smoking: none   Vitamin D: 32.1 (11/2021)   Medicare Wellness: 12/31/2020  DUE    Impression:  Patient Active Problem List   Diagnosis Code    Hyperlipidemia E78.5    Gout M10.9    Vitamin D deficiency E55.9    CAD in native artery I25.10    Hypertensive heart disease without congestive heart failure I11.9    S/P CABG x 2 Z95.1    S/P AVR (aortic valve replacement) Z95.2    Type 2 diabetes mellitus with stage 3 chronic kidney disease, without long-term current use of insulin (HCC) E11.22, N18.30    Stage 3 chronic kidney disease (HCC) N18.30    Prostate cancer ([facility] [address] C61    Essential hypertension I10    Anemia D64.9    Vitamin B12 deficiency E53.8    Overweight (BMI 25.0-29. 9) E66.3       Plan:  1. Hypertension. Blood pressure control significantly improved today with addition of hydrochlorothiazide 25 mg daily to current regimen of metoprolol succinate 50 mg daily, lisinopril 20 mg daily, and amlodipine 10 mg daily. Renal function mildly decreased to creatinine 1.62/ eGFR 42. Also with evidence of mild hyponatremia with Na 132. Advised to increase fluid intake. Will continue to monitor. 2. Prostate cancer. [id number] Grade 7 (3 + 4) adenocarcinoma. Patient initially chose active surveillance rather than definitive treatment. Repeat PSA and MRI prostate ordered for 2/2018, but he did not follow-up. Repeat PSA obtained and had increased from 7.24 at diagnosis to 11.3. Referred for follow-up with Dr. Jackie Dowd, and MRI prostate performed on 12/31/2018 showing several peripheral zone PI-RADS 4 with one area showing questionable capsular bulging laterally, but not a definitive appearance for extracapsular extension. He decided to proceed with EBRT, and underwent fiducial marker and [employer] placement on 3/11/2019 followed by weekly treatments from 3/27-5/6/2019. Tolerated well except for some fatigue which has improved. [facility] [place] [facility]. Jackie Dowd. PSA 1.1 (11/2019) and testosterone 289, indicative of no evidence of active disease. Followed-up with radiation oncology in 11/2020 and PSA improved to 0.4. Not wishing to schedule with Dr. Jackie Dowd currently.   Repeat PSA (8/2021) improved further to 0.3 and repeat PSA today at 0.2. Will continue to monitor. 3. Hyperlipidemia. On moderate intensity dose atorvastatin with LDL 68 and HDL 71 (11/2021), indicative of good control. Changed from simvastatin given treatment with amlodipine. Will continue to monitor. 4. ASHD s/p 2 vessel CABG. Remains asymptomatic. On aspirin, metoprolol, and statin. Followed by Dr. Krunal Kramer, last visit 10/2021 with 6-month follow-up. 5. Aortic stenosis s/p bioprosthetic AVR. Remains asymptomatic, and underwent repeat echocardiogram in 9/2020 with good functioning valve. Being followed by Dr. Krunal Kramer. 6. Diabetes mellitus. Patient with diagnosis of diabetes mellitus, but review of record shows HbA1c ranging from 5.7-6.3 since 2011 and -112 during that time period. HbA1c remains increased at 5.8 (11/2021). On Ace-I and statin. Continue follow-up with Dr. Uma Feng for annual eye exams. Foot exam normal (11/2021). Urine microalbumin/ creatinine ratio normal (8 mg/g 11/2021). Does have evidence of chronic renal disease stage 3. Emphasized importance of lifestyle modifications, including diet, exercise, and weight loss. 7. Chronic renal disease, stage 3. Most likely secondary to long standing hypertension and prediabetes, although also appears to have developed at time of CABG and AVR so may be related to hypoperfusion during surgery. On statin and Ace-I. Discussed importance of avoiding NSAIDS and prerenal status. Slight decreased today with creatinine 1.62/eGFR 42, likely related to initiation of diuretic. Advised to increase fluid intake. Will continue to follow. 8. Macrocytic anemia. Mild anemia developed during radiation therapy. Macrocytosis evident. Vitamin B12 level very low in 8/2019 at 191 started on cyanocobalamin 1000 mcg SL daily. Level remains normal indicative of compliance, and anemia improved although macrocytosis persists.  Also advised to decrease alcohol consumption as reports having 1 case of beer a week. Will continue to monitor. 9. Gout. Asymptomatic. On allopurinol. No recent flares. 10. Overweight. Weight relatively unchanged since 4/2021. Emphasized importance of lifestyle modifications, including diet, exercise, and weight loss. Will monitor. 11. Health maintenance. Completed the [employer] COVID-19 vaccine series. Advised to proceed with the [address] booster dose given eligibility. Already received the influenza vaccine. Completed 2/2 doses of Shingrix vaccine. Already received the influenza vaccine. Other immunizations up to date. Completed [place] for colorectal cancer screening. Overdue for repeat (due 11/2020). Patient did not return kit but reordered last visit. Urged to perform. Continue regular eye exams with Dr. Evelyn Fan. Overdue and urged to schedule. Discussed decreasing alcohol consumption as currently drinking a case of beer per week. Vitamin D level remains normal on maintenance dose supplement. Medicare wellness visit due later this month and will perform at next visit. Patient understands recommendations and agrees with plan. Follow-up in 3 months.

## 2022-02-09 ENCOUNTER — TELEPHONE (OUTPATIENT)
Dept: INTERNAL MEDICINE CLINIC | Age: 72
End: 2022-02-09

## 2022-02-09 NOTE — TELEPHONE ENCOUNTER
Pt is requesting medication for phlegm in his throat. Started 3 days ago. Upon questioning him, he stated he has a cough and sometimes brings up yellowish \"pus\" he called it. No fever, no sore throat.  Wants it sent to [facility] [address]    Please advise him at [phone]

## 2022-02-09 NOTE — TELEPHONE ENCOUNTER
Please get more information. Would recommend evaluation at urgent care if any symptoms concerning for COVID.

## 2022-02-10 NOTE — TELEPHONE ENCOUNTER
Please advise patient that would recommend continuing with Mucinex DM. Also may try Flonase to help with the congestion. If symptoms persist for more than a week, or he develops fever/chills or other symptoms, please ask him to call back as he may at that point need an antibiotic. Also please advise that would recommend he consider taking a home COVID-19 test as symptoms would be consistent with that as well. Please find out if he has had his COVID-19 booster.

## 2022-02-10 NOTE — TELEPHONE ENCOUNTER
Pt aware of message below and verbalized understanding. Patient had booster-updated chart. No further questions or concerns from pt at this time.

## 2022-02-10 NOTE — TELEPHONE ENCOUNTER
Spoke with patient he reports symptoms consistent with post nasal drainage- yellow discoloration x 4 days. Today symptoms seem slightly better. He has been taking OTC Mucinex. Denies cough fever or chest congestion. Advised him to continue mucinex and we we wiill call back with further directions from .

## 2022-02-10 NOTE — TELEPHONE ENCOUNTER
Pt states he doesn't feel it is covid. He has his shots and had been tested earlier. Wants to know what he can take for the yellow mucus?

## 2022-02-15 RX ORDER — AMLODIPINE BESYLATE 10 MG/1
TABLET ORAL
Qty: 90 TABLET | Refills: 3 | Status: SHIPPED | OUTPATIENT
Start: 2022-02-15

## 2022-02-16 NOTE — TELEPHONE ENCOUNTER
Spoke with patient and wife. Wife reports patients symptoms are not getting better. Patient is still having cold symptoms and post nasal drainage has been taking mucinex with no relief. Patient now reports feeling very dizzy when he stands, he is also having headaches and body aches. They are requesting an antibiotic. Patient did not get tested for COVID.

## 2022-02-16 NOTE — TELEPHONE ENCOUNTER
Patient's wife is calling stating he is not getting better and needs something prescribed or recommended OTC. She was not able to explain his symptoms to me. She stated she doesn't know. He just has a cold.

## 2022-02-16 NOTE — TELEPHONE ENCOUNTER
Spoke with patient gave him 's recommendations. Patient says overall he is feeling better. The phlegm is better- he ate some food today and has had minimal dizziness. Patient states he does not want to go to Urgent care- he said it takes too long and he would be sitting up there around other sick people. I stressed 's message and that if his symptoms have been covid all along then it could be worsening to pneumonia ect. .Patient said he would be willing to come here for an appointment and I explained with his symptoms he could not come in to the office and that [facility] does not have any availability. Patient states since he is feeling better he will continue to monitor at home. He will call us back it symptoms do not improve.

## 2022-03-02 RX ORDER — LISINOPRIL 20 MG/1
TABLET ORAL
Qty: 90 TABLET | Refills: 3 | Status: SHIPPED | OUTPATIENT
Start: 2022-03-02

## 2022-03-11 ENCOUNTER — TELEPHONE (OUTPATIENT)
Dept: INTERNAL MEDICINE CLINIC | Age: 72
End: 2022-03-11

## 2022-03-11 RX ORDER — HYDROCHLOROTHIAZIDE 25 MG/1
25 TABLET ORAL DAILY
Qty: 14 TABLET | Refills: 0 | Status: SHIPPED | OUTPATIENT
Start: 2022-03-11 | End: 2022-03-25

## 2022-03-11 NOTE — TELEPHONE ENCOUNTER
2 wks of HCTZ sent to his local pharmacy.     Dr. Merlin Holman  [facility], [address] [place] [address], [address] [place] [address] Str.  Phone: [phone]  Fax: [phone]

## 2022-03-11 NOTE — TELEPHONE ENCOUNTER
Patient has run out of the Hydrochlorothiazide. He is going to call [facility] because he has 3 refills left but he needs about 2 weeks worth to be sent to [facility] to cover him until he receives it.

## 2022-03-18 PROBLEM — C61 PROSTATE CANCER (HCC): Status: ACTIVE | Noted: 2017-09-30

## 2022-03-19 PROBLEM — N18.30 TYPE 2 DIABETES MELLITUS WITH STAGE 3 CHRONIC KIDNEY DISEASE, WITHOUT LONG-TERM CURRENT USE OF INSULIN (HCC): Status: ACTIVE | Noted: 2017-07-03

## 2022-03-19 PROBLEM — E53.8 VITAMIN B12 DEFICIENCY: Status: ACTIVE | Noted: 2019-08-12

## 2022-03-19 PROBLEM — N18.30 STAGE 3 CHRONIC KIDNEY DISEASE (HCC): Status: ACTIVE | Noted: 2017-07-03

## 2022-03-19 PROBLEM — E66.3 OVERWEIGHT (BMI 25.0-29.9): Status: ACTIVE | Noted: 2021-01-03

## 2022-03-19 PROBLEM — E11.22 TYPE 2 DIABETES MELLITUS WITH STAGE 3 CHRONIC KIDNEY DISEASE, WITHOUT LONG-TERM CURRENT USE OF INSULIN (HCC): Status: ACTIVE | Noted: 2017-07-03

## 2022-03-19 PROBLEM — I10 ESSENTIAL HYPERTENSION: Status: ACTIVE | Noted: 2017-09-30

## 2022-03-20 PROBLEM — D64.9 ANEMIA: Status: ACTIVE | Noted: 2019-06-09

## 2022-03-23 ENCOUNTER — PATIENT MESSAGE (OUTPATIENT)
Dept: INTERNAL MEDICINE CLINIC | Age: 72
End: 2022-03-23

## 2022-03-24 ENCOUNTER — APPOINTMENT (OUTPATIENT)
Dept: INTERNAL MEDICINE CLINIC | Age: 72
End: 2022-03-24

## 2022-03-24 ENCOUNTER — HOSPITAL ENCOUNTER (OUTPATIENT)
Dept: LAB | Age: 72
Discharge: HOME | End: 2022-03-24
Payer: MEDICARE

## 2022-03-24 DIAGNOSIS — E53.8 VITAMIN B12 DEFICIENCY: ICD-10-CM

## 2022-03-24 DIAGNOSIS — E78.5 HYPERLIPIDEMIA, UNSPECIFIED HYPERLIPIDEMIA TYPE: ICD-10-CM

## 2022-03-24 DIAGNOSIS — N18.31 STAGE 3A CHRONIC KIDNEY DISEASE (HCC): ICD-10-CM

## 2022-03-24 DIAGNOSIS — I10 ESSENTIAL HYPERTENSION: ICD-10-CM

## 2022-03-24 DIAGNOSIS — E11.22 TYPE 2 DIABETES MELLITUS WITH STAGE 3A CHRONIC KIDNEY DISEASE, WITHOUT LONG-TERM CURRENT USE OF INSULIN (HCC): ICD-10-CM

## 2022-03-24 DIAGNOSIS — E55.9 VITAMIN D DEFICIENCY: ICD-10-CM

## 2022-03-24 DIAGNOSIS — N18.31 TYPE 2 DIABETES MELLITUS WITH STAGE 3A CHRONIC KIDNEY DISEASE, WITHOUT LONG-TERM CURRENT USE OF INSULIN (HCC): ICD-10-CM

## 2022-03-24 LAB
25(OH)D3 SERPL-MCNC: 45.7 NG/ML (ref 30–100)
ALBUMIN SERPL-MCNC: 3.9 G/DL (ref 3.4–5)
ALBUMIN/GLOB SERPL: 1.2 {RATIO} (ref 0.8–1.7)
ALP SERPL-CCNC: 74 U/L (ref 45–117)
ALT SERPL-CCNC: 23 U/L (ref 16–61)
ANION GAP SERPL CALC-SCNC: 8 MMOL/L (ref 3–18)
AST SERPL-CCNC: 23 U/L (ref 10–38)
BASOPHILS # BLD: 0 K/UL (ref 0–0.1)
BASOPHILS NFR BLD: 0 % (ref 0–2)
BILIRUB SERPL-MCNC: 1 MG/DL (ref 0.2–1)
BUN SERPL-MCNC: 30 MG/DL (ref 7–18)
BUN/CREAT SERPL: 18 (ref 12–20)
CALCIUM SERPL-MCNC: 9.5 MG/DL (ref 8.5–10.1)
CHLORIDE SERPL-SCNC: 106 MMOL/L (ref 100–111)
CHOLEST SERPL-MCNC: 132 MG/DL
CO2 SERPL-SCNC: 21 MMOL/L (ref 21–32)
CREAT SERPL-MCNC: 1.68 MG/DL (ref 0.6–1.3)
CREAT UR-MCNC: 156 MG/DL (ref 30–125)
DIFFERENTIAL METHOD BLD: ABNORMAL
EOSINOPHIL # BLD: 0.4 K/UL (ref 0–0.4)
EOSINOPHIL NFR BLD: 6 % (ref 0–5)
ERYTHROCYTE [DISTWIDTH] IN BLOOD BY AUTOMATED COUNT: 13.6 % (ref 11.6–14.5)
EST. AVERAGE GLUCOSE BLD GHB EST-MCNC: 123 MG/DL
GLOBULIN SER CALC-MCNC: 3.2 G/DL (ref 2–4)
GLUCOSE SERPL-MCNC: 115 MG/DL (ref 74–99)
HBA1C MFR BLD: 5.9 % (ref 4.2–5.6)
HCT VFR BLD AUTO: 36.2 % (ref 36–48)
HDLC SERPL-MCNC: 41 MG/DL (ref 40–60)
HDLC SERPL: 3.2 {RATIO} (ref 0–5)
HGB BLD-MCNC: 11.9 G/DL (ref 13–16)
IMM GRANULOCYTES # BLD AUTO: 0 K/UL (ref 0–0.04)
IMM GRANULOCYTES NFR BLD AUTO: 0 % (ref 0–0.5)
LDLC SERPL CALC-MCNC: 69 MG/DL (ref 0–100)
LIPID PROFILE,FLP: NORMAL
LYMPHOCYTES # BLD: 1.1 K/UL (ref 0.9–3.6)
LYMPHOCYTES NFR BLD: 16 % (ref 21–52)
MAGNESIUM SERPL-MCNC: 2.1 MG/DL (ref 1.6–2.6)
MCH RBC QN AUTO: 33.8 PG (ref 24–34)
MCHC RBC AUTO-ENTMCNC: 32.9 G/DL (ref 31–37)
MCV RBC AUTO: 102.8 FL (ref 78–100)
MICROALBUMIN UR-MCNC: 0.95 MG/DL (ref 0–3)
MICROALBUMIN/CREAT UR-RTO: 6 MG/G (ref 0–30)
MONOCYTES # BLD: 0.6 K/UL (ref 0.05–1.2)
MONOCYTES NFR BLD: 8 % (ref 3–10)
NEUTS SEG # BLD: 4.8 K/UL (ref 1.8–8)
NEUTS SEG NFR BLD: 69 % (ref 40–73)
NRBC # BLD: 0 K/UL (ref 0–0.01)
NRBC BLD-RTO: 0 PER 100 WBC
PLATELET # BLD AUTO: 193 K/UL (ref 135–420)
PMV BLD AUTO: 9.4 FL (ref 9.2–11.8)
POTASSIUM SERPL-SCNC: 4.9 MMOL/L (ref 3.5–5.5)
PROT SERPL-MCNC: 7.1 G/DL (ref 6.4–8.2)
RBC # BLD AUTO: 3.52 M/UL (ref 4.35–5.65)
SODIUM SERPL-SCNC: 135 MMOL/L (ref 136–145)
TRIGL SERPL-MCNC: 110 MG/DL (ref ?–150)
VLDLC SERPL CALC-MCNC: 22 MG/DL
WBC # BLD AUTO: 6.9 K/UL (ref 4.6–13.2)

## 2022-03-24 PROCEDURE — 36415 COLL VENOUS BLD VENIPUNCTURE: CPT

## 2022-03-24 PROCEDURE — 80061 LIPID PANEL: CPT

## 2022-03-24 PROCEDURE — 85025 COMPLETE CBC W/AUTO DIFF WBC: CPT

## 2022-03-24 PROCEDURE — 83735 ASSAY OF MAGNESIUM: CPT

## 2022-03-24 PROCEDURE — 82043 UR ALBUMIN QUANTITATIVE: CPT

## 2022-03-24 PROCEDURE — 83036 HEMOGLOBIN GLYCOSYLATED A1C: CPT

## 2022-03-24 PROCEDURE — 80053 COMPREHEN METABOLIC PANEL: CPT

## 2022-03-24 PROCEDURE — 82306 VITAMIN D 25 HYDROXY: CPT

## 2022-03-31 ENCOUNTER — OFFICE VISIT (OUTPATIENT)
Dept: INTERNAL MEDICINE CLINIC | Age: 72
End: 2022-03-31
Payer: MEDICARE

## 2022-03-31 VITALS
HEIGHT: 73 IN | TEMPERATURE: 98.1 F | BODY MASS INDEX: 29.29 KG/M2 | RESPIRATION RATE: 16 BRPM | DIASTOLIC BLOOD PRESSURE: 64 MMHG | WEIGHT: 221 LBS | OXYGEN SATURATION: 98 % | HEART RATE: 73 BPM | SYSTOLIC BLOOD PRESSURE: 122 MMHG

## 2022-03-31 DIAGNOSIS — E66.3 OVERWEIGHT (BMI 25.0-29.9): ICD-10-CM

## 2022-03-31 DIAGNOSIS — Z13.31 SCREENING FOR DEPRESSION: ICD-10-CM

## 2022-03-31 DIAGNOSIS — E78.5 HYPERLIPIDEMIA, UNSPECIFIED HYPERLIPIDEMIA TYPE: ICD-10-CM

## 2022-03-31 DIAGNOSIS — E53.8 VITAMIN B12 DEFICIENCY: ICD-10-CM

## 2022-03-31 DIAGNOSIS — Z95.2 S/P AVR (AORTIC VALVE REPLACEMENT): ICD-10-CM

## 2022-03-31 DIAGNOSIS — Z00.00 MEDICARE ANNUAL WELLNESS VISIT, SUBSEQUENT: ICD-10-CM

## 2022-03-31 DIAGNOSIS — I10 ESSENTIAL HYPERTENSION: Primary | ICD-10-CM

## 2022-03-31 DIAGNOSIS — E11.22 TYPE 2 DIABETES MELLITUS WITH STAGE 3A CHRONIC KIDNEY DISEASE, WITHOUT LONG-TERM CURRENT USE OF INSULIN (HCC): ICD-10-CM

## 2022-03-31 DIAGNOSIS — E55.9 VITAMIN D DEFICIENCY: ICD-10-CM

## 2022-03-31 DIAGNOSIS — N18.31 STAGE 3A CHRONIC KIDNEY DISEASE (HCC): ICD-10-CM

## 2022-03-31 DIAGNOSIS — I25.10 CAD IN NATIVE ARTERY: ICD-10-CM

## 2022-03-31 DIAGNOSIS — Z71.89 ADVANCED DIRECTIVES, COUNSELING/DISCUSSION: ICD-10-CM

## 2022-03-31 DIAGNOSIS — D64.9 ANEMIA, UNSPECIFIED TYPE: ICD-10-CM

## 2022-03-31 DIAGNOSIS — C61 PROSTATE CANCER (HCC): ICD-10-CM

## 2022-03-31 DIAGNOSIS — N18.31 TYPE 2 DIABETES MELLITUS WITH STAGE 3A CHRONIC KIDNEY DISEASE, WITHOUT LONG-TERM CURRENT USE OF INSULIN (HCC): ICD-10-CM

## 2022-03-31 PROCEDURE — G8417 CALC BMI ABV UP PARAM F/U: HCPCS | Performed by: INTERNAL MEDICINE

## 2022-03-31 PROCEDURE — 99497 ADVNCD CARE PLAN 30 MIN: CPT | Performed by: INTERNAL MEDICINE

## 2022-03-31 PROCEDURE — G8754 DIAS BP LESS 90: HCPCS | Performed by: INTERNAL MEDICINE

## 2022-03-31 PROCEDURE — 3017F COLORECTAL CA SCREEN DOC REV: CPT | Performed by: INTERNAL MEDICINE

## 2022-03-31 PROCEDURE — G0463 HOSPITAL OUTPT CLINIC VISIT: HCPCS | Performed by: INTERNAL MEDICINE

## 2022-03-31 PROCEDURE — G0439 PPPS, SUBSEQ VISIT: HCPCS | Performed by: INTERNAL MEDICINE

## 2022-03-31 PROCEDURE — G8427 DOCREV CUR MEDS BY ELIG CLIN: HCPCS | Performed by: INTERNAL MEDICINE

## 2022-03-31 PROCEDURE — 99214 OFFICE O/P EST MOD 30 MIN: CPT | Performed by: INTERNAL MEDICINE

## 2022-03-31 PROCEDURE — 1101F PT FALLS ASSESS-DOCD LE1/YR: CPT | Performed by: INTERNAL MEDICINE

## 2022-03-31 PROCEDURE — 2022F DILAT RTA XM EVC RTNOPTHY: CPT | Performed by: INTERNAL MEDICINE

## 2022-03-31 PROCEDURE — 3044F HG A1C LEVEL LT 7.0%: CPT | Performed by: INTERNAL MEDICINE

## 2022-03-31 PROCEDURE — G8510 SCR DEP NEG, NO PLAN REQD: HCPCS | Performed by: INTERNAL MEDICINE

## 2022-03-31 PROCEDURE — G8752 SYS BP LESS 140: HCPCS | Performed by: INTERNAL MEDICINE

## 2022-03-31 PROCEDURE — G8536 NO DOC ELDER MAL SCRN: HCPCS | Performed by: INTERNAL MEDICINE

## 2022-03-31 NOTE — PATIENT INSTRUCTIONS
Heart-Healthy Diet: Care Instructions  Your Care Instructions     A heart-healthy diet has lots of vegetables, fruits, nuts, beans, and whole grains, and is low in salt. It limits foods that are high in saturated fat, such as meats, cheeses, and fried foods. It may be hard to change your diet, but even small changes can lower your risk of heart attack and heart disease. Follow-up care is a key part of your treatment and safety. Be sure to make and go to all appointments, and call your doctor if you are having problems. It's also a good idea to know your test results and keep a list of the medicines you take. How can you care for yourself at home? Watch your portions  · Learn what a serving is. A \"serving\" and a \"portion\" are not always the same thing. Make sure that you are not eating larger portions than are recommended. For example, a serving of pasta is ½ cup. A serving size of meat is 2 to 3 ounces. A 3-ounce serving is about the size of a deck of cards. Measure serving sizes until you are good at [place]" them. Keep in mind that restaurants often serve portions that are 2 or 3 times the size of one serving. · To keep your energy level up and keep you from feeling hungry, eat often but in smaller portions. · Eat only the number of calories you need to stay at a healthy weight. If you need to lose weight, eat fewer calories than your body burns (through exercise and other physical activity). Eat more fruits and vegetables  · Eat a variety of fruit and vegetables every day. Dark green, deep orange, red, or yellow fruits and vegetables are especially good for you. Examples include spinach, carrots, peaches, and berries. · Keep carrots, celery, and other veggies handy for snacks. Buy fruit that is in season and store it where you can see it so that you will be tempted to eat it. · Cook dishes that have a lot of veggies in them, such as stir-fries and soups.   Limit saturated and trans fat  · Read food labels, and try to avoid saturated and trans fats. They increase your risk of heart disease. · Use olive or canola oil when you cook. · Bake, broil, grill, or steam foods instead of frying them. · Choose lean meats instead of high-fat meats such as hot dogs and sausages. Cut off all visible fat when you prepare meat. · Eat fish, skinless poultry, and meat alternatives such as soy products instead of high-fat meats. Soy products, such as tofu, may be especially good for your heart. · Choose low-fat or fat-free milk and dairy products. Eat foods high in fiber  · Eat a variety of grain products every day. Include whole-grain foods that have lots of fiber and nutrients. Examples of whole-grain foods include oats, whole wheat bread, and brown rice. · Buy whole-grain breads and cereals, instead of white bread or pastries. Limit salt and sodium  · Limit how much salt and sodium you eat to help lower your blood pressure. · Taste food before you salt it. Add only a little salt when you think you need it. With time, your taste buds will adjust to less salt. · Eat fewer snack items, fast foods, and other high-salt, processed foods. Check food labels for the amount of sodium in packaged foods. · Choose low-sodium versions of canned goods (such as soups, vegetables, and beans). Limit sugar  · Limit drinks and foods with added sugar. These include candy, desserts, and soda pop. Limit alcohol  · Limit alcohol to no more than 2 drinks a day for men and 1 drink a day for women. Too much alcohol can cause health problems. When should you call for help? Watch closely for changes in your health, and be sure to contact your doctor if:    · You would like help planning heart-healthy meals. Where can you learn more? Go to http://www.flyRuby.com.com/  Enter V137 in the search box to learn more about \"Heart-Healthy Diet: Care Instructions. \"  Current as of: August 22, 2019               Content Version: 12.6  © 9014-3090 [employer]. Care instructions adapted under license by [employer] (which disclaims liability or warranty for this information). If you have questions about a medical condition or this instruction, always ask your healthcare professional. [address] any warranty or liability for your use of this information. Learning About Low-Sodium Foods  What foods are low in sodium? The foods you eat contain nutrients, such as vitamins and minerals. Sodium is a nutrient. Your body needs the right amount to stay healthy and work as it should. You can use the list below to help you make choices about which foods to eat. Fruits  · Fresh, frozen, canned, or dried fruit  Vegetables  · Fresh or frozen vegetables, with no added salt  · Canned vegetables, low-sodium or with no added salt  Grains  · Bagels without salted tops  · Cereal with no added salt  · Corn tortillas  · Crackers with no added salt  · Oatmeal, cooked without salt  · Popcorn with no salt  · Pasta and noodles, cooked without salt  · Rice, cooked without salt  · Unsalted pretzels  Dairy and dairy alternatives  · Butter, unsalted  · Cream cheese  · Ice cream  · Milk  · Soy milk  Meats and other protein foods  · Beans and peas, canned with no salt  · Eggs  · Fresh fish (not smoked)  · Fresh meats (not smoked or cured)  · Nuts and nut butter, prepared without salt  · Poultry, not packaged with sodium solution  · Tofu, unseasoned  · Tuna, canned without salt  Seasonings  · Garlic  · Herbs and spices  · Lemon juice  · Mustard  · Olive oil  · Salt-free seasoning mixes  · Vinegar  Work with your doctor to find out how much of this nutrient you need. Depending on your health, you may need more or less of it in your diet. Where can you learn more?   Go to http://www.gray.com/  Enter S460 in the search box to learn more about \"Learning About Low-Sodium Foods.\"  Current as of: August 22, 2019               Content Version: 12.6  © 1130-2827 [employer]. Care instructions adapted under license by [employer] (which disclaims liability or warranty for this information). If you have questions about a medical condition or this instruction, always ask your healthcare professional. [address] any warranty or liability for your use of this information. Low Sodium Diet (2,000 Milligram): Care Instructions  Your Care Instructions     Too much sodium causes your body to hold on to extra water. This can raise your blood pressure and force your heart and kidneys to work harder. In very serious cases, this could cause you to be put in the hospital. It might even be life-threatening. By limiting sodium, you will feel better and lower your risk of serious problems. The most common source of sodium is salt. People get most of the salt in their diet from canned, prepared, and packaged foods. Fast food and restaurant meals also are very high in sodium. Your doctor will probably limit your sodium to less than 2,000 milligrams (mg) a day. This limit counts all the sodium in prepared and packaged foods and any salt you add to your food. Follow-up care is a key part of your treatment and safety. Be sure to make and go to all appointments, and call your doctor if you are having problems. It's also a good idea to know your test results and keep a list of the medicines you take. How can you care for yourself at home? Read food labels  · Read labels on cans and food packages. The labels tell you how much sodium is in each serving. Make sure that you look at the serving size. If you eat more than the serving size, you have eaten more sodium. · Food labels also tell you the Percent Daily Value for sodium. Choose products with low Percent Daily Values for sodium.   · Be aware that sodium can come in forms other than salt, including monosodium glutamate (MSG), sodium citrate, and sodium bicarbonate (baking soda). MSG is often added to Asian food. When you eat out, you can sometimes ask for food without MSG or added salt. Buy low-sodium foods  · Buy foods that are labeled \"unsalted\" (no salt added), \"sodium-free\" (less than 5 mg of sodium per serving), or \"low-sodium\" (less than 140 mg of sodium per serving). Foods labeled \"reduced-sodium\" and \"light sodium\" may still have too much sodium. Be sure to read the label to see how much sodium you are getting. · Buy fresh vegetables, or frozen vegetables without added sauces. Buy low-sodium versions of canned vegetables, soups, and other canned goods. Prepare low-sodium meals  · Cut back on the amount of salt you use in cooking. This will help you adjust to the taste. Do not add salt after cooking. One teaspoon of salt has about 2,300 mg of sodium. · Take the salt shaker off the table. · Flavor your food with garlic, lemon juice, onion, vinegar, herbs, and spices. Do not use soy sauce, lite soy sauce, steak sauce, onion salt, garlic salt, celery salt, mustard, or ketchup on your food. · Use low-sodium salad dressings, sauces, and ketchup. Or make your own salad dressings and sauces without adding salt. · Use less salt (or none) when recipes call for it. You can often use half the salt a recipe calls for without losing flavor. Other foods such as rice, pasta, and grains do not need added salt. · Rinse canned vegetables, and cook them in fresh water. This removes somebut not allof the salt. · Avoid water that is naturally high in sodium or that has been treated with water softeners, which add sodium. Call your local water company to find out the sodium content of your water supply. If you buy bottled water, read the label and choose a sodium-free brand. Avoid high-sodium foods  · Avoid eating:  ? Smoked, cured, salted, and canned meat, fish, and poultry.   ? Ham, jesus, hot dogs, and luncheon meats.  ? Regular, hard, and processed cheese and regular peanut butter. ? Crackers with salted tops, and other salted snack foods such as pretzels, chips, and salted popcorn. ? Frozen prepared meals, unless labeled low-sodium. ? Canned and dried soups, broths, and bouillon, unless labeled sodium-free or low-sodium. ? Canned vegetables, unless labeled sodium-free or low-sodium. ? [place] fries, pizza, tacos, and other fast foods. ? Pickles, olives, ketchup, and other condiments, especially soy sauce, unless labeled sodium-free or low-sodium. Where can you learn more? Go to http://www.gray.com/  Enter V843 in the search box to learn more about \"Low Sodium Diet (2,000 Milligram): Care Instructions. \"  Current as of: August 22, 2019               Content Version: 12.6  © 7507-8825 [employer]. Care instructions adapted under license by [employer] (which disclaims liability or warranty for this information). If you have questions about a medical condition or this instruction, always ask your healthcare professional. [place] [address] any warranty or liability for your use of this information. Medicare Wellness Visit, Male    The best way to improve and maintain good health is to have a healthy lifestyle by eating a well-balanced diet, exercising regularly, limiting alcohol and stopping smoking. Regular visits with your physician or non-physician health care provider also support your good health. Preventive screening tests can find health problems before they become diseases or illnesses. Preventive services such as immunizations prevent serious infections. All people over age 72 should have a Pneumovax and a Prevnar-13 shot to prevent potentially life threatening infections with the pneumococcus bacteria, a common cause of pneumonia. These are once in a lifetime unless you and your provider decide differently.     All people over 65 should have a yearly influenza vaccine or \"flu\" shot. This does not prevent infection with cold viruses but has been proven to prevent hospitalization and death from influenza. Although Medicare part B \"regular Medicare\" currently only covers tetanus vaccination in the context of an injury, a tetanus vaccine (Tdap or Td) is recommended every 10 years. A shingles vaccine is recommended once in a lifetime after age 61. The Shingles vaccine is also not covered by Medicare part B. Note, however, that both the Shingles vaccine and Tdap/Td are generally covered by secondary carriers. Please check your coverage and out of pocket expenses. Consider contacting your local health department because it may stock these vaccines for a reasonable charge. We currently have documentation of the following immunization history for you:  Immunization History   Administered Date(s) Administered    COVID-19, Pfizer Purple top, DILUTE for use, 12+ yrs, 30mcg/0.3mL dose 04/27/2021, 05/18/2021, 12/16/2021    Influenza High Dose Vaccine PF 10/10/2017, 10/26/2018, 11/09/2019    Influenza Vaccine 10/14/2014    Influenza Vaccine (Tri) Adjuvanted (>65 Yrs FLUAD TRI 63816) 10/04/2018, 11/09/2019    Influenza Vaccine Split 09/29/2011    Influenza Vaccine Whole 09/14/2010    Influenza, Quadrivalent, Adjuvanted (>65 Yrs FLUAD QUAD 42401) 09/30/2020, 10/20/2021    Pneumococcal Conjugate (PCV-13) 06/29/2017    Pneumococcal Polysaccharide (PPSV-23) 10/14/2014    Td 01/01/2008    Tdap 01/12/2018    Zoster Recombinant 11/30/2020, 02/06/2021       Screening for infection with Hepatitis C is recommended for anyone born between [birth year] through 1965. The table at the bottom of this document indicates the status of this and other screening services. Screening for diabetes mellitus with a blood sugar test (glucose) should be done at least every 3 years until age 79.  You and your health care provider may decide whether to continue screening after age 79. The most recent blood glucose we have on file for you is:   Lab Results   Component Value Date/Time    Glucose 115 (H) 03/24/2022 09:58 AM    Glucose (POC) 105 11/17/2014 07:18 AM    Glucose,  (H) 11/14/2014 04:26 AM       Glaucoma is a disease of the eye due to increased ocular pressure that can lead to blindness. People with risk factors for glaucoma ( race, diabetes, family history) should be screened at least every 2 years by an eye professional. This may be covered annually if indicated as determined by you and your doctor. Cardiovascular screening tests that check for elevated lipids or cholesterol (fatty part of blood) which can lead to heart disease and strokes should be done every 4-6 years through age 79. You and your health care provider may decide whether to continue screening after age 79. The most recent lipid panel we have on file for you is:   Lab Results   Component Value Date/Time    Cholesterol, total 132 03/24/2022 09:58 AM    HDL Cholesterol 41 03/24/2022 09:58 AM    LDL, calculated 69 03/24/2022 09:58 AM    VLDL, calculated 22 03/24/2022 09:58 AM    Triglyceride 110 03/24/2022 09:58 AM    CHOL/HDL Ratio 3.2 03/24/2022 09:58 AM       Colorectal cancer screening that evaluates for blood or polyps in your colon for people with average risk should be done yearly as a stool test, every five years as a flexible sigmoidoscope or every 10 years as a colonoscopy up to age 76. You and your health care provider may decide whether to continue screening after age 76. Men up to age 76 may elect to screen for prostate cancer with a blood test called a PSA at certain intervals, depending on their personal and family history. This decision is between the patient and his provider.  The most recent PSA values we have on file for you are:  Lab Results   Component Value Date/Time    Prostate Specific Ag 0.2 12/07/2021 10:52 AM    Prostate Specific Ag 0.3 08/04/2021 01:40 PM    Prostate Specific Ag 0.4 11/16/2020 10:34 AM       If you have been a smoker or had family history of abdominal aortic aneurysms, you and your provider may decide to schedule an ultrasound test of your aorta. Our records show this was done on: n/a    People who have smoked the equivalent of 1 pack per day for 30 years or more may benefit from screening for lung cancer with a yearly low dose CT scan until they have been non smokers for 15 years or competing health conditions render this unlikely to be beneficial. Our records show: n/a    Your Medicare Wellness Exam is recommended annually.     Here is a list of your current Health Maintenance items with a due date:  Health Maintenance   Topic Date Due    Eye Exam Retinal or Dilated  06/25/2021    Foot Exam Q1  11/11/2022    Depression Screen  12/14/2022    A1C test (Diabetic or Prediabetic)  03/24/2023    MICROALBUMIN Q1  03/24/2023    Lipid Screen  03/24/2023    Medicare Yearly Exam  04/01/2023    Colorectal Cancer Screening Combo  10/26/2027    DTaP/Tdap/Td series (2 - Td or Tdap) 01/12/2028    Hepatitis C Screening  Completed    Shingrix Vaccine Age 50>  Completed    Flu Vaccine  Completed    COVID-19 Vaccine  Completed    Pneumococcal 65+ years  Completed

## 2022-03-31 NOTE — PROGRESS NOTES
Annual Wellness Visit done today. Patient evaluated for CCM, patient declined. <-- Click to add NO significant Past Surgical History

## 2022-03-31 NOTE — ACP (ADVANCE CARE PLANNING)
Advance Care Planning     Advance Care Planning (ACP) Physician/NP/PA Conversation      Date of Conversation: 3/31/2022  Conducted with: Patient with Decision Making Capacity    Healthcare Decision Maker:     Primary Decision Maker: [address] [place] [address]    Secondary Decision Maker: Jaleel Lopez Son - [phone]    Secondary Decision Maker: Karen Hernandez - Daughter - [phone]  Click here to complete [address] including selection of the Healthcare Decision Maker Relationship (ie \"Primary\")        Today we documented Decision Maker(s) consistent with Legal Next of Kin hierarchy. Care Preferences:    Hospitalization: \"If your health worsens and it becomes clear that your chance of recovery is unlikely, what would be your preference regarding hospitalization? \"  The patient would prefer hospitalization. Ventilation: \"If you were unable to breathe on your own and your chance of recovery was unlikely, what would be your preference about the use of a ventilator (breathing machine) if it was available to you? \"   The patient would desire the use of a ventilator. Resuscitation: \"In the event your heart stopped as a result of an underlying serious health condition, would you want attempts to be made to restart your heart, or would you prefer a natural death? \"   Yes, attempt to resuscitate.     Additional topics discussed: treatment goals, benefit/burden of treatment options, ventilation preferences, hospitalization preferences, resuscitation preferences and end of life care preferences (vegetative state/imminent death)    Conversation Outcomes / Follow-Up Plan:   ACP complete - no further action today  Reviewed DNR/DNI and patient elects Full Code (Attempt Resuscitation)     Length of Voluntary ACP Conversation in minutes:  16 minutes    Kailyn Calderon MD

## 2022-03-31 NOTE — PROGRESS NOTES
1. \"Have you been to the ER, urgent care clinic since your last visit? Hospitalized since your last visit? \" No    2. \"Have you seen or consulted any other health care providers outside of the [address] [place] [address] since your last visit? \" No     3. For patients aged 39-70: Has the patient had a colonoscopy / FIT/ Cologuard? Yes - no Care Gap present      If the patient is female:    4. For patients aged 41-77: Has the patient had a mammogram within the past 2 years? NA - based on age or sex      11. For patients aged 21-65: Has the patient had a pap smear?  NA - based on age or sex

## 2022-03-31 NOTE — PROGRESS NOTES
This is the Subsequent Medicare Annual Wellness Exam, performed 12 months or more after the Initial AWV or the last Subsequent AWV    I have reviewed the patient's medical history in detail and updated the computerized patient record. Assessment/Plan   Education and counseling provided:  Are appropriate based on today's review and evaluation  End-of-Life planning (with patient's consent)  Influenza Vaccine  Prostate cancer screening tests (PSA, covered annually)  Colorectal cancer screening tests  Cardiovascular screening blood test  Screening for glaucoma  Medical nutrition therapy for individuals with diabetes or renal disease  COVID 19 booster dose (2nd)    1. Essential hypertension  -     MAGNESIUM; Future  -     METABOLIC PANEL, COMPREHENSIVE; Future  2. Type 2 diabetes mellitus with stage 3a chronic kidney disease, without long-term current use of insulin (HCC)  -     HEMOGLOBIN A1C WITH EAG; Future  -     MAGNESIUM; Future  -     METABOLIC PANEL, COMPREHENSIVE; Future  -     MICROALBUMIN, UR, RAND W/ MICROALB/CREAT RATIO; Future  3. Prostate cancer ([facility] [address]  -     PSA, DIAGNOSTIC (PROSTATE SPECIFIC AG); Future  4. Stage 3a chronic kidney disease (HCC)  -     METABOLIC PANEL, COMPREHENSIVE; Future  -     MICROALBUMIN, UR, RAND W/ MICROALB/CREAT RATIO; Future  -     VITAMIN D, 25 HYDROXY; Future  -     URINALYSIS W/MICROSCOPIC; Future  5. Hyperlipidemia, unspecified hyperlipidemia type  -     LIPID PANEL; Future  -     METABOLIC PANEL, COMPREHENSIVE; Future  6. CAD in native artery  7. S/P AVR (aortic valve replacement)  8. Anemia, unspecified type  -     CBC WITH AUTOMATED DIFF; Future  9. Vitamin B12 deficiency  -     VITAMIN B12; Future  10. Vitamin D deficiency  11. Overweight (BMI 25.0-29.9)  12.  Medicare annual wellness visit, subsequent  -     ADVANCE CARE PLANNING FIRST 30 MINS  13. Advanced directives, counseling/discussion  -     ADVANCE CARE PLANNING FIRST 27 MINS  14. Screening for depression Depression Risk Factor Screening     3 most recent PHQ Screens 3/31/2022   Little interest or pleasure in doing things Not at all   Feeling down, depressed, irritable, or hopeless Not at all   Total Score PHQ 2 0       Alcohol & Drug Abuse Risk Screen    Do you average more than 1 drink per night or more than 7 drinks a week: No    In the past three months have you have had more than 4 drinks containing alcohol on one occasion: Yes          Functional Ability and Level of Safety    Hearing: Hearing is good. Activities of Daily Living: The home contains: no safety equipment. Patient does total self care      Ambulation: with no difficulty     Fall Risk:  Fall Risk Assessment, last 12 mths 3/31/2022   Able to walk? Yes   Fall in past 12 months? 0   Do you feel unsteady? 0   Are you worried about falling 0   Number of falls in past 12 months -   Fall with injury?  -      Abuse Screen:  Patient is not abused       Cognitive Screening    Has your family/caregiver stated any concerns about your memory: no     Cognitive Screening: none performed today    Health Maintenance Due     Health Maintenance Due   Topic Date Due    Eye Exam Retinal or Dilated  06/25/2021       Patient Care Team   Patient Care Team:  Paty Licea MD as PCP - General (Internal Medicine)  Paty Licea MD as PCP - [facility] Empaneled Provider  Juarez Wong, LUKE Carias MD (Cardiology)  Graeme Swift MD (Ophthalmology)  Natanael Burnette MD (Urology)  Terra Saba MD (Radiation Oncology)    History     Patient Active Problem List   Diagnosis Code    Hyperlipidemia E78.5    Gout M10.9    Vitamin D deficiency E55.9    CAD in native artery I25.10    Hypertensive heart disease without congestive heart failure I11.9    S/P CABG x 2 Z95.1    S/P AVR (aortic valve replacement) Z95.2    Type 2 diabetes mellitus with stage 3 chronic kidney disease, without long-term current use of insulin ([facility] [address] E11.22, N18.30    Stage 3 chronic kidney disease ([facility] N18.30    Prostate cancer ([facility] C61    Essential hypertension I10    Anemia D64.9    Vitamin B12 deficiency E53.8    Overweight (BMI 25.0-29. 9) E66.3     Past Medical History:   Diagnosis Date    Aortic stenosis     s/p AVR    Aortic stenosis, severe 10/16/2014    Echocardiogram EF 60% peak gradient 67 mmHg, mean gradient 30 mmHg, MARY 0.8 cm    ASHD (arteriosclerotic heart disease)     Chronic renal disease, stage III (HCC)     DM (diabetes mellitus) ([facility]     Gout     HCD (hypertensive cardiovascular disease)     History of echocardiogram 10/16/2014    EF 55-60%. No RWMA. Mild LVH. Gr 1 DDfx. Mild LAE. Severe AS (mean grad 30 mmHg).  Hyperlipidemia     Hypertension     Prostate cancer ([facility] 08/17/2017    [id number] Kaylynn Grade 7 (3 + 4) adenocarcinoma of the prostate in 3/12 cores, 2-10% of each core; PSA 7.24. Dr. Elvia Dominguez.  S/P AVR (aortic valve replacement) 11/13/2014    #23 mm Marifer Barbosa Magna pericardial valve. Dr. Charley Brand.  S/P CABG x 2 11/13/2014    LIMA to LAD, SVG to OM1. Dr. Charley Brand.  S/P cardiac catheterization 11/06/2014    RCA dom. mRCA 100%. LM patent. dLAD 75% x 2. pCX 80% (ELENA-3). LVEDP 14 mmHg. EF 50-55%. Severe inferobasal hypk. Severe AS. AV replacement & CABG recommended.       Past Surgical History:   Procedure Laterality Date    HX APPENDECTOMY       Current Outpatient Medications   Medication Sig Dispense Refill    lisinopriL (PRINIVIL, ZESTRIL) 20 mg tablet TAKE 1 TABLET DAILY 90 Tablet 3    amLODIPine (NORVASC) 10 mg tablet TAKE 1 TABLET DAILY 90 Tablet 3    allopurinoL (ZYLOPRIM) 100 mg tablet TAKE 1 TABLET DAILY 90 Tablet 3    metoprolol succinate (TOPROL-XL) 50 mg XL tablet TAKE 1 TABLET DAILY 90 Tablet 3    atorvastatin (LIPITOR) 20 mg tablet TAKE 1 TABLET DAILY 90 Tablet 3    fluticasone propionate (Flonase Allergy Relief) 50 mcg/actuation nasal spray 2 Sprays by Both Nostrils route daily as needed for Rhinitis or Allergies. 1 Bottle 0    sodium chloride (Saline Nasal Mist) 0.65 % nasal squeeze bottle 0.05 mL by Both Nostrils route as needed for Congestion. 30 mL 0    acetaminophen (Tylenol Extra Strength) 500 mg tablet Take  by mouth every six (6) hours as needed for Pain.  cyanocobalamin, vitamin B-12, 1,000 mcg lozg 1,000 mcg by SubLINGual route daily. 90 Lozenge 2    ascorbic acid, vitamin C, (Vitamin C) 500 mg tablet Take  by mouth.  sildenafil citrate (VIAGRA) 100 mg tablet Take 1 Tab by mouth as needed. 6 Tab 3    docusate sodium (Colace) 100 mg capsule Take 100 mg by mouth daily as needed.  cholecalciferol, vitamin D3, 2,000 unit tab Take  by mouth daily.  aspirin delayed-release 81 mg tablet Take 1 tablet by mouth daily. 30 tablet 2     No Known Allergies    Family History   Problem Relation Age of Onset    COPD Father     Other Father         pneumoconiosis    Lung Disease Father     Other Mother         meigs syndrome     Social History     Tobacco Use    Smoking status: Never Smoker    Smokeless tobacco: Never Used   Substance Use Topics    Alcohol use:  Yes     Alcohol/week: 12.0 standard drinks     Types: 12 Cans of beer per week         Jacky Cantu MD

## 2022-04-02 NOTE — PROGRESS NOTES
HPI:   Destiney Walls is a 67y.o. year old male who presents for a routine visit. He has a history of hypertension, hyperlipidemia, ASHD s/p CABG, aortic stenosis s/p AVR, diabetes mellitus, chronic renal disease stage 3, prostate cancer, and gout. He has completed the [employer] COVID-19 vaccine series and received [employer] booster dose. He states that he has been eating less with decreased portions and is pleased that his weight has decreased 10 pounds since 11/2021. He states that he is no longer taking hydrochlorothiazide since his blood pressure has improved following weight loss. He reports that he is otherwise doing generally well and is without specific complaints. Summary of prior hospitalizations and medical history:  In 6/2017, he was noted to have an elevated PSA of 6.0, which was confirmed on repeat to be 6.6. He was referred to Dr. Adrián Jett and PSA was again repeated and found to be increased at 7.24. He underwent TRUS biopsy on 8/17/2017, which showed [id number] [place] Grade 7 (3 + 4) adenocarcinoma of the prostate in 3/12 cores, 2-10% of each core. Options for management were discussed and he selected active surveillance. Plans were for repeat PSA and MRI prostate in 6 months (due 2/2018). However, the patient never had tests performed and he did not follow-up as discussed. On 10/4/2018, at his routine visit, a PSA level was obtained and was found to have increased to 11.3, and he was advised to follow-up with Dr. Adrián Jett. He underwent a prostate MRI 3T [facility] on 12/31/2018 showing several peripheral zone PI-RADS 4 observations: right posterolateral peripheral zone at the mid gland/apex and anterior peripheral zone at the apex bilaterally. The former exhibits questionable capsular bulging laterally, but not a definitive appearance for extracapsular extension. He had a follow-up appointment with Dr. Adrián Jett on 1/11/2019 and decision made to proceed with EBRT.  He had a staging bone scan (1/23/2019) which was negative for osseous metastases, and a CT abdomen and pelvis (1/23/2019) which showed no adenopathy of evidence of metastases, mild hepatic steatosis, aortic atherosclerosis, and cholelithiasis. He met with Dr. Dominique Quinones on 1/30/2019 and decided to proceed with EBRT as definitive treatment for his prostate cancer, receiving his last treatment on 5/6/2019. Follow-up PSA level in 11/2020 at 0.4. He has a history of hypertension, hyperlipidemia, ASHD, and aortic stenosis. In 10/2014, he presented with progressive chest pain and shortness of breath and an echocardiogram was obtained (10/16/2014) showing normal LV function (EF 55-60%), no RWMA, grade 1 diastolic dysfunction, mild LAE, and severe AS (mean gradient 30 mmHg, peak 67 mmHg, and AV area 0.8 cm2). He was referred to see Dr. Flory Lilly and underwent cardiac catheterization (11/6/2014) showing 100% RCA (distal collaterals from left), 70-80% distal LAD, 80% proximal LCx, inferior basal hypokinesis, and EF 55%. On 11/13/2014, he underwent 2 vessel CABG (LIMA to LAD and SVG to OM1) and AV replacement with a bioprosthetic 23 mm Marifer Barbosa Magna pericardial valve by Dr. Bethel Peoples. He has done well since that time and remains asymptomatic. He denies any chest pain, shortness of breath at rest or with exertion, palpitations, lightheadedness, or edema. His current regimen includes aspirin, metoprolol, lisinopril, and moderate intensity dose atorvastatin. He is followed by Dr. Flory Lilly. He had a repeat echocardiogram (8/21/2017) showing normal LV function (EF 55-60%), no RWMA, mild MIL, and normally functioning bioprosthetic valve with peak gradient 17 mmHg, mean gradient 10 mmHg, and valve area 1.86 cm2. Repeat echocardiogram (9/18/2020) showed normal LV size and function (EF 55-60%), AV prosthetic functioning normally with mean gradient of 7.2mmHg and MARY 1.8 cm2. Moderate LAE, and mild MR.      He has a history of diabetes mellitus, which has not required treatment with medication. Review of his record shows that his Hba1c has remained between 5.7-6.3 since 2011. He denies any polyuria, polydipsia, nocturia, or blurry vision, and has no history of retinopathy or neuropathy. He does have evidence of chronic renal disease, stage 3, with baseline creatinine 1.22-1.37/ eGFR 55-59 since 11/2014. He had been having regular eye exams with Dr. Kiersten Carrasquillo. He has a history of gout, but has not had a flare in many years since being treated with allopurinol. He has never had a screening colonoscopy. He did undergo Cologuard testing in 11/2017 which was negative. He denies any abdominal pain, nausea, vomiting, melena, hematochezia, or change in bowel movements. Past Medical History:   Diagnosis Date    Aortic stenosis     s/p AVR    Aortic stenosis, severe 10/16/2014    Echocardiogram EF 60% peak gradient 67 mmHg, mean gradient 30 mmHg, MARY 0.8 cm    ASHD (arteriosclerotic heart disease)     Chronic renal disease, stage III (HCC)     DM [facility]diabetes mellitus) ([facility] [address]     Gout     HCD (hypertensive cardiovascular disease)     History of echocardiogram 10/16/2014    EF 55-60%. No RWMA. Mild LVH. Gr 1 DDfx. Mild LAE. Severe AS (mean grad 30 mmHg).  Hyperlipidemia     Hypertension     Prostate cancer ([facility] [address] 08/17/2017    [id number] [place] Grade 7 (3 + 4) adenocarcinoma of the prostate in 3/12 cores, 2-10% of each core; PSA 7.24. Dr. Caitlin Wagoner.  S/P AVR (aortic valve replacement) 11/13/2014    #23 mm Marifer Barbosa Magna pericardial valve. Dr. Kunal Liu.  S/P CABG x 2 11/13/2014    LIMA to LAD, SVG to OM1. Dr. Kunal Liu.  S/P cardiac catheterization 11/06/2014    RCA dom. mRCA 100%. LM patent. dLAD 75% x 2. pCX 80% (ELENA-3). LVEDP 14 mmHg. EF 50-55%. Severe inferobasal hypk. Severe AS. AV replacement & CABG recommended.      Past Surgical History:   Procedure Laterality Date    HX APPENDECTOMY       Current Outpatient Medications   Medication Sig    lisinopriL (PRINIVIL, ZESTRIL) 20 mg tablet TAKE 1 TABLET DAILY    amLODIPine (NORVASC) 10 mg tablet TAKE 1 TABLET DAILY    allopurinoL (ZYLOPRIM) 100 mg tablet TAKE 1 TABLET DAILY    metoprolol succinate (TOPROL-XL) 50 mg XL tablet TAKE 1 TABLET DAILY    atorvastatin (LIPITOR) 20 mg tablet TAKE 1 TABLET DAILY    fluticasone propionate (Flonase Allergy Relief) 50 mcg/actuation nasal spray 2 Sprays by Both Nostrils route daily as needed for Rhinitis or Allergies.  sodium chloride (Saline Nasal Mist) 0.65 % nasal squeeze bottle 0.05 mL by Both Nostrils route as needed for Congestion.  acetaminophen (Tylenol Extra Strength) 500 mg tablet Take  by mouth every six (6) hours as needed for Pain.  cyanocobalamin, vitamin B-12, 1,000 mcg lozg 1,000 mcg by SubLINGual route daily.  ascorbic acid, vitamin C, (Vitamin C) 500 mg tablet Take  by mouth.  sildenafil citrate (VIAGRA) 100 mg tablet Take 1 Tab by mouth as needed.  docusate sodium (Colace) 100 mg capsule Take 100 mg by mouth daily as needed.  cholecalciferol, vitamin D3, 2,000 unit tab Take  by mouth daily.  aspirin delayed-release 81 mg tablet Take 1 tablet by mouth daily. No current facility-administered medications for this visit. Allergies and Intolerances:   No Known Allergies     Family History:   Family History   Problem Relation Age of Onset   Duana Big COPD Father     Other Father         pneumoconiosis    Lung Disease Father     Other Mother         meigs syndrome     Social History:   He  reports that he has never smoked. He has never used smokeless tobacco. He reports that he drinks approximately one case of beer per week. He is  with two adult children. He is retired from working in the [employer]. He started as a , and then became an .     Social History     Substance and Sexual Activity   Alcohol Use Yes    Alcohol/week: 12.0 standard drinks    Types: 12 Cans of beer per week     Immunization History:  Immunization History   Administered Date(s) Administered    COVID-19, Pfizer Purple top, DILUTE for use, 12+ yrs, 30mcg/0.3mL dose 04/27/2021, 05/18/2021, 12/16/2021    Influenza High Dose Vaccine PF 10/10/2017, 10/26/2018, 11/09/2019    Influenza Vaccine 10/14/2014    Influenza Vaccine (Tri) Adjuvanted (>65 Yrs FLUAD TRI 83369) 10/04/2018, 11/09/2019    Influenza Vaccine Split 09/29/2011    Influenza Vaccine Whole 09/14/2010    Influenza, Quadrivalent, Adjuvanted (>65 Yrs FLUAD QUAD 42770) 09/30/2020, 10/20/2021    Pneumococcal Conjugate (PCV-13) 06/29/2017    Pneumococcal Polysaccharide (PPSV-23) 10/14/2014    Td 01/01/2008    Tdap 01/12/2018    Zoster Recombinant 11/30/2020, 02/06/2021       Review of Systems:   As above included in HPI. Otherwise 11 point review of systems negative including constitutional, skin, HENT, eyes, respiratory, cardiovascular, gastrointestinal, genitourinary, musculoskeletal, endocrine, hematologic, allergy, and neurologic. Physical:   Visit Vitals  /64 (BP 1 Location: Left arm, BP Patient Position: Sitting)   Pulse 73   Temp 98.1 °F (36.7 °C) (Temporal)   Resp 16   Ht 6' 1\" (1.854 m)   Wt 221 lb (100.2 kg)   SpO2 98%   BMI 29.16 kg/m²         Exam:   Patient appears in no apparent distress. Affect is appropriate. HEENT: PERRLA, anicteric, no JVD, adenopathy or thyromegaly. No carotid bruits or radiated murmur. Lungs: clear to auscultation, no wheezes, rhonchi, or rales. Heart: regular rate and rhythm. No murmur, rubs, gallops  Abdomen: soft, nontender, nondistended, normal bowel sounds, no hepatosplenomegaly or masses. Extremities: without edema. Review of Data:  Labs:  No visits with results within 2 Day(s) from this visit.    Latest known visit with results is:   Hospital Outpatient Visit on 03/24/2022   Component Date Value Ref Range Status    WBC 03/24/2022 6.9  4.6 - 13.2 K/uL Final    RBC 03/24/2022 3.52* 4.35 - 5.65 M/uL Final    HGB 03/24/2022 11.9* 13.0 - 16.0 g/dL Final    HCT 03/24/2022 36.2  36.0 - 48.0 % Final    MCV 03/24/2022 102.8* 78.0 - 100.0 FL Final    MCH 03/24/2022 33.8  24.0 - 34.0 PG Final    MCHC 03/24/2022 32.9  31.0 - 37.0 g/dL Final    RDW 03/24/2022 13.6  11.6 - 14.5 % Final    PLATELET 95/17/1149 009  135 - 420 K/uL Final    MPV 03/24/2022 9.4  9.2 - 11.8 FL Final    NRBC 03/24/2022 0.0  0  WBC Final    ABSOLUTE NRBC 03/24/2022 0.00  0.00 - 0.01 K/uL Final    NEUTROPHILS 03/24/2022 69  40 - 73 % Final    LYMPHOCYTES 03/24/2022 16* 21 - 52 % Final    MONOCYTES 03/24/2022 8  3 - 10 % Final    EOSINOPHILS 03/24/2022 6* 0 - 5 % Final    BASOPHILS 03/24/2022 0  0 - 2 % Final    IMMATURE GRANULOCYTES 03/24/2022 0  0.0 - 0.5 % Final    ABS. NEUTROPHILS 03/24/2022 4.8  1.8 - 8.0 K/UL Final    ABS. LYMPHOCYTES 03/24/2022 1.1  0.9 - 3.6 K/UL Final    ABS. MONOCYTES 03/24/2022 0.6  0.05 - 1.2 K/UL Final    ABS. EOSINOPHILS 03/24/2022 0.4  0.0 - 0.4 K/UL Final    ABS. BASOPHILS 03/24/2022 0.0  0.0 - 0.1 K/UL Final    ABS. IMM.  GRANS. 03/24/2022 0.0  0.00 - 0.04 K/UL Final    DF 03/24/2022 AUTOMATED    Final    Hemoglobin A1c 03/24/2022 5.9* 4.2 - 5.6 % Final    Est. average glucose 03/24/2022 123  mg/dL Final    LIPID PROFILE 03/24/2022        Final    Cholesterol, total 03/24/2022 132  <200 MG/DL Final    Triglyceride 03/24/2022 110  <150 MG/DL Final    HDL Cholesterol 03/24/2022 41  40 - 60 MG/DL Final    LDL, calculated 03/24/2022 69  0 - 100 MG/DL Final    VLDL, calculated 03/24/2022 22  MG/DL Final    CHOL/HDL Ratio 03/24/2022 3.2  0 - 5.0   Final    Magnesium 03/24/2022 2.1  1.6 - 2.6 mg/dL Final    Sodium 03/24/2022 135* 136 - 145 mmol/L Final    Potassium 03/24/2022 4.9  3.5 - 5.5 mmol/L Final    Chloride 03/24/2022 106  100 - 111 mmol/L Final    CO2 03/24/2022 21  21 - 32 mmol/L Final    Anion gap 03/24/2022 8  3.0 - 18 mmol/L Final  Glucose 03/24/2022 115* 74 - 99 mg/dL Final    BUN 03/24/2022 30* 7.0 - 18 MG/DL Final    Creatinine 03/24/2022 1.68* 0.6 - 1.3 MG/DL Final    BUN/Creatinine ratio 03/24/2022 18  12 - 20   Final    GFR est AA 03/24/2022 49* >60 ml/min/1.73m2 Final    GFR est non-AA 03/24/2022 40* >60 ml/min/1.73m2 Final    Calcium 03/24/2022 9.5  8.5 - 10.1 MG/DL Final    Bilirubin, total 03/24/2022 1.0  0.2 - 1.0 MG/DL Final    ALT (SGPT) 03/24/2022 23  16 - 61 U/L Final    AST (SGOT) 03/24/2022 23  10 - 38 U/L Final    Alk. phosphatase 03/24/2022 74  45 - 117 U/L Final    Protein, total 03/24/2022 7.1  6.4 - 8.2 g/dL Final    Albumin 03/24/2022 3.9  3.4 - 5.0 g/dL Final    Globulin 03/24/2022 3.2  2.0 - 4.0 g/dL Final    A-G Ratio 03/24/2022 1.2  0.8 - 1.7   Final    Microalbumin,urine random 03/24/2022 0.95  0 - 3.0 MG/DL Final    Creatinine, urine 03/24/2022 156.00* 30 - 125 mg/dL Final    Microalbumin/Creat ratio (mg/g cre* 03/24/2022 6  0 - 30 mg/g Final    Vitamin D 25-Hydroxy 03/24/2022 45.7  30 - 100 ng/mL Final       Lab Results   Component Value Date/Time    Prostate Specific Ag 0.2 12/07/2021 10:52 AM    Prostate Specific Ag 0.3 08/04/2021 01:40 PM    Prostate Specific Ag 0.4 11/16/2020 10:34 AM       EKG (8/8/2019) sinus rhythm at 68 bpm. Normal intervals. Lack of R waves in V2 and V3 likely lead placement. No ischemic changes when compared with prior in 8/2018. Health Maintenance:  Screening:    Colorectal: Cologuard (11/2/2017) negative. Due 11/2020. Refusing further screening. Depression: none   DM (HbA1c/FPG): HbA1c 5.9 (3/2022)   Hepatitis C: negative (1/2018)   Falls: none   DEXA: N/A   PSA/KIEL: PSA 0.2 (12/2021).  Dr. Prashant Walls   Glaucoma: regular eye exams with Dr. Rosa Rodgers (last 6/2019) Overdue   Smoking: none   Vitamin D: 45.7 (3/2022)   Medicare Wellness: today    Impression:  Patient Active Problem List   Diagnosis Code    Hyperlipidemia E78.5    Gout M10.9    Vitamin D deficiency E55.9    CAD in native artery I25.10    Hypertensive heart disease without congestive heart failure I11.9    S/P CABG x 2 Z95.1    S/P AVR (aortic valve replacement) Z95.2    Type 2 diabetes mellitus with stage 3 chronic kidney disease, without long-term current use of insulin (HCC) E11.22, N18.30    Stage 3 chronic kidney disease (HCC) N18.30    Prostate cancer ([facility] [address] C61    Essential hypertension I10    Anemia D64.9    Vitamin B12 deficiency E53.8    Overweight (BMI 25.0-29. 9) E66.3       Plan:  1. Hypertension. Blood pressure remains controlled on metoprolol succinate 50 mg daily, lisinopril 20 mg daily, and amlodipine 10 mg daily. No longer taking hydrochlorothiazide 25 mg daily since weight decreased and became lightheaded. Renal function remains stable with creatinine 1.68/ eGFR 40. Also with evidence of mild hyponatremia with Na 135. Advised to increase fluid intake. Will continue to monitor. 2. Prostate cancer. [id number] [place] Grade 7 (3 + 4) adenocarcinoma. Patient initially chose active surveillance rather than definitive treatment. Repeat PSA and MRI prostate ordered for 2/2018, but he did not follow-up. Repeat PSA obtained and had increased from 7.24 at diagnosis to 11.3. Referred for follow-up with Dr. Cat Guevara, and MRI prostate performed on 12/31/2018 showing several peripheral zone PI-RADS 4 with one area showing questionable capsular bulging laterally, but not a definitive appearance for extracapsular extension. He decided to proceed with EBRT, and underwent fiducial marker and [employer] placement on 3/11/2019 followed by weekly treatments from 3/27-5/6/2019. Tolerated well except for some fatigue which has improved. Previously followed by Dr. Brice Gandhi and Dr. Cat Guevara. PSA 1.1 (11/2019) and testosterone 289, indicative of no evidence of active disease. Followed-up with radiation oncology in 11/2020 and PSA improved to 0.4.  Not wishing to schedule with Dr. Rekha Edwards currently. Repeat PSA 0.2 in 12/2021. Will continue to monitor. 3. Hyperlipidemia. On moderate intensity dose atorvastatin with LDL 69 and HDL 41, indicative of good control. Will continue to monitor. 4. ASHD s/p 2 vessel CABG. Remains asymptomatic. On aspirin, metoprolol, and statin. Followed by Dr. Marleen Choudhary with 6-month follow-up appointment scheduled for next month. 5. Aortic stenosis s/p bioprosthetic AVR. Remains asymptomatic, and underwent repeat echocardiogram in 9/2020 with good functioning valve. Needs antibiotic prophylaxis for procedures. Being followed by Dr. Marleen Choudhary. 6. Diabetes mellitus. Patient with diagnosis of diabetes mellitus, but review of record shows HbA1c ranging from 5.7-6.3 since 2011 and -112 during that time period. HbA1c remains controlled today at 5.9. On Ace-I and statin. Continue follow-up with Dr. Brandon Lugo for annual eye exams. Overdue and urged to schedule. Foot exam normal (11/2021). Urine microalbumin/ creatinine ratio normal (6 mg/g). Does have evidence of chronic renal disease stage 3. Emphasized importance of lifestyle modifications, including diet, exercise, and weight loss. 7. Chronic renal disease, stage 3. Most likely secondary to long standing hypertension and prediabetes, although also appears to have developed at time of CABG and AVR so may be related to hypoperfusion during surgery. On statin and Ace-I. Discussed importance of avoiding NSAIDS and prerenal status. Remains stable today with creatinine 1.68/eGFR 40 and advised to increase fluid intake. Will continue to follow. 8. Macrocytic anemia. Mild anemia developed during radiation therapy. Macrocytosis evident. Vitamin B12 level very low in 8/2019 at 191 started on cyanocobalamin 1000 mcg SL daily. Level remains normal indicative of compliance, and anemia improved although macrocytosis persists. Also advised to decrease alcohol consumption. Will continue to monitor. 9. Gout. Asymptomatic. On allopurinol. No recent flares. 10. Overweight. Weight decreased 10 pounds since 11/2021 and patient reports that he is eating better with smaller portions. Emphasized importance of lifestyle modifications, including heart healthy diet, regular exercise, and weight loss. Will monitor. 11. Health maintenance. Completed the [employer] COVID-19 vaccine series and received a Pf[employer] booster dose. Completed 2/2 doses of [employer] vaccine. Other immunizations up to date. [facility] and due for repeat, but not wishing to proceed. Continue regular eye exams with Dr. Naga Mullen. Overdue and provided with contact number to schedule. Reports that he has decreased his alcohol consumption and now drinking a case every 2 weeks (previously 1 case per week). Vitamin D level remains normal on maintenance dose supplement. In addition, an annual Medicare wellness visit was done today. Patient understands recommendations and agrees with plan. Follow-up in 3 months.

## 2022-04-19 ENCOUNTER — TELEPHONE (OUTPATIENT)
Dept: INTERNAL MEDICINE CLINIC | Age: 72
End: 2022-04-19

## 2022-04-19 NOTE — TELEPHONE ENCOUNTER
Left message to change appt time from 7/7 to 7/6 at 1:30 PM. If this does not work for pt he should call us back to find another appt time.

## 2022-05-06 ENCOUNTER — OFFICE VISIT (OUTPATIENT)
Dept: CARDIOLOGY CLINIC | Age: 72
End: 2022-05-06
Payer: MEDICARE

## 2022-05-06 VITALS
BODY MASS INDEX: 29.69 KG/M2 | WEIGHT: 224 LBS | SYSTOLIC BLOOD PRESSURE: 132 MMHG | HEART RATE: 73 BPM | DIASTOLIC BLOOD PRESSURE: 60 MMHG | HEIGHT: 73 IN | OXYGEN SATURATION: 98 %

## 2022-05-06 DIAGNOSIS — I11.9 HYPERTENSIVE HEART DISEASE WITHOUT CONGESTIVE HEART FAILURE: ICD-10-CM

## 2022-05-06 DIAGNOSIS — I10 ESSENTIAL HYPERTENSION: ICD-10-CM

## 2022-05-06 DIAGNOSIS — I25.10 CAD IN NATIVE ARTERY: Primary | ICD-10-CM

## 2022-05-06 DIAGNOSIS — Z95.2 S/P AVR (AORTIC VALVE REPLACEMENT): ICD-10-CM

## 2022-05-06 PROCEDURE — G8417 CALC BMI ABV UP PARAM F/U: HCPCS | Performed by: INTERNAL MEDICINE

## 2022-05-06 PROCEDURE — 3017F COLORECTAL CA SCREEN DOC REV: CPT | Performed by: INTERNAL MEDICINE

## 2022-05-06 PROCEDURE — G8754 DIAS BP LESS 90: HCPCS | Performed by: INTERNAL MEDICINE

## 2022-05-06 PROCEDURE — G8752 SYS BP LESS 140: HCPCS | Performed by: INTERNAL MEDICINE

## 2022-05-06 PROCEDURE — G8536 NO DOC ELDER MAL SCRN: HCPCS | Performed by: INTERNAL MEDICINE

## 2022-05-06 PROCEDURE — 93000 ELECTROCARDIOGRAM COMPLETE: CPT | Performed by: INTERNAL MEDICINE

## 2022-05-06 PROCEDURE — G8510 SCR DEP NEG, NO PLAN REQD: HCPCS | Performed by: INTERNAL MEDICINE

## 2022-05-06 PROCEDURE — 99214 OFFICE O/P EST MOD 30 MIN: CPT | Performed by: INTERNAL MEDICINE

## 2022-05-06 PROCEDURE — G8427 DOCREV CUR MEDS BY ELIG CLIN: HCPCS | Performed by: INTERNAL MEDICINE

## 2022-05-06 PROCEDURE — 1101F PT FALLS ASSESS-DOCD LE1/YR: CPT | Performed by: INTERNAL MEDICINE

## 2022-05-06 NOTE — PROGRESS NOTES
HISTORY OF PRESENT ILLNESS  Destiney Walls is a 67 y.o. male. ASSESSMENT and PLAN    Mr. Julia Moore initially presented with chest pains, and significant aortic stenosis with mean gradient of 30 mmHg, peak gradient of 67 mmHg, MARY of 0.8 cm². He ultimately underwent open-heart surgery in November of 2014. He had coronary artery bypass graft surgery with LIMA to LAD and SVG to OM1. He also had aortic valve replacement with #23 Marifer-Barbosa Magna pericardial valve. His echocardiogram from August 2017 showed normal LV function with well-functioning Marifer-Barbosa magna pericardial bioprosthetic aortic valve with mean gradient of 10 mmHg, and MARY of 1.86 cm².  His echocardiogram September 2020 revealed normal LV function with EF 60%.  Prosthetic aortic valve with mean gradient of 7.2 mmHg and MARY of 1.8 cm².  He has 23 mm Marifer-Barbosa magna bioprosthetic aortic valve.  No significant pulmonary hypertension was noted. · CAD:    Symptomatically stable. · AS: Bioprosthetic AVR, clinically stable. · BP:    Well controlled at 132/60. · Rhythm:    Stable sinus at 73 bpm.  · CHF:    There is no evidence of decompensated CHF noted. · Weight:     His weight today is 224 pounds. His baseline weight is 215 pounds. I did advise him to try to get back to his baseline weight. · Cholesterol:   Target LDL <70. Lipitor 20. · Anti-platelet:   Remains on ASA. I will see him back in 6 months. Thank you. Encounter Diagnoses   Name Primary?     CAD in native artery Yes    Hypertensive heart disease without congestive heart failure     Essential hypertension     S/P AVR (aortic valve replacement)      current treatment plan is effective, no change in therapy  lab results and schedule of future lab studies reviewed with patient  reviewed diet, exercise and weight control  cardiovascular risk and specific lipid/LDL goals reviewed  use of aspirin to prevent MI and TIA's discussed      HPI   Today, Mr. Helga Marie has no complaints of chest pains, increased shortness of breath or decreased exercise capacity. He denies any changes in his activity levels. He denies any orthopnea or PND. He denies any palpitations or dizziness. He states that he has continued decline in memory. Just by speaking to him, it is apparent. Review of Systems   Respiratory: Negative for shortness of breath. Cardiovascular: Negative for chest pain, palpitations, orthopnea, claudication, leg swelling and PND. All other systems reviewed and are negative. Physical Exam  Vitals and nursing note reviewed. Constitutional:       Appearance: Normal appearance. HENT:      Head: Normocephalic. Eyes:      Conjunctiva/sclera: Conjunctivae normal.   Neck:      Vascular: No carotid bruit. Cardiovascular:      Rate and Rhythm: Normal rate and regular rhythm. Heart sounds: Murmur heard. Crescendo decrescendo systolic murmur is present with a grade of 1/6. Pulmonary:      Breath sounds: Normal breath sounds. Abdominal:      Palpations: Abdomen is soft. Musculoskeletal:         General: No swelling. Cervical back: No rigidity. Skin:     General: Skin is warm and dry. Neurological:      General: No focal deficit present. Mental Status: He is alert and oriented to person, place, and time. Psychiatric:         Mood and Affect: Mood normal.         Behavior: Behavior normal.         PCP: Miladys Negrete MD    Past Medical History:   Diagnosis Date    Aortic stenosis     s/p AVR    Aortic stenosis, severe 10/16/2014    Echocardiogram EF 60% peak gradient 67 mmHg, mean gradient 30 mmHg, MARY 0.8 cm    ASHD (arteriosclerotic heart disease)     Chronic renal disease, stage III (HCC)     [facility] mellitus[facility] [address]     Gout     HCD (hypertensive cardiovascular disease)     History of echocardiogram 10/16/2014    EF 55-60%. No RWMA. Mild LVH. Gr 1 DDfx. Mild LAE. Severe AS (mean grad 30 mmHg).  Hyperlipidemia     Hypertension     Prostate cancer ([facility] [address] 08/17/2017    [id number] [place] Grade 7 (3 + 4) adenocarcinoma of the prostate in 3/12 cores, 2-10% of each core; PSA 7.24. Dr. Caitlin Wagoner.  S/P AVR (aortic valve replacement) 11/13/2014    #23 mm Marifer Barbosa Magna pericardial valve. Dr. Kunal Liu.  S/P CABG x 2 11/13/2014    LIMA to LAD, SVG to OM1. Dr. Kunal Liu.  S/P cardiac catheterization 11/06/2014    RCA dom. mRCA 100%. LM patent. dLAD 75% x 2. pCX 80% (ELENA-3). LVEDP 14 mmHg. EF 50-55%. Severe inferobasal hypk. Severe AS. AV replacement & CABG recommended. Past Surgical History:   Procedure Laterality Date    HX APPENDECTOMY         Current Outpatient Medications   Medication Sig Dispense Refill    lisinopriL (PRINIVIL, ZESTRIL) 20 mg tablet TAKE 1 TABLET DAILY 90 Tablet 3    amLODIPine (NORVASC) 10 mg tablet TAKE 1 TABLET DAILY 90 Tablet 3    allopurinoL (ZYLOPRIM) 100 mg tablet TAKE 1 TABLET DAILY 90 Tablet 3    metoprolol succinate (TOPROL-XL) 50 mg XL tablet TAKE 1 TABLET DAILY 90 Tablet 3    atorvastatin (LIPITOR) 20 mg tablet TAKE 1 TABLET DAILY 90 Tablet 3    fluticasone propionate (Flonase Allergy Relief) 50 mcg/actuation nasal spray 2 Sprays by Both Nostrils route daily as needed for Rhinitis or Allergies. 1 Bottle 0    sodium chloride (Saline Nasal Mist) 0.65 % nasal squeeze bottle 0.05 mL by Both Nostrils route as needed for Congestion. 30 mL 0    acetaminophen (Tylenol Extra Strength) 500 mg tablet Take  by mouth every six (6) hours as needed for Pain.  cyanocobalamin, vitamin B-12, 1,000 mcg lozg 1,000 mcg by SubLINGual route daily. 90 Lozenge 2    ascorbic acid, vitamin C, (Vitamin C) 500 mg tablet Take  by mouth.  sildenafil citrate (VIAGRA) 100 mg tablet Take 1 Tab by mouth as needed. 6 Tab 3    docusate sodium (Colace) 100 mg capsule Take 100 mg by mouth daily as needed.       cholecalciferol, vitamin D3, 2,000 unit tab Take  by mouth daily.  aspirin delayed-release 81 mg tablet Take 1 tablet by mouth daily. 30 tablet 2       The patient has a family history of    Social History     Tobacco Use    Smoking status: Never Smoker    Smokeless tobacco: Never Used   Substance Use Topics    Alcohol use:  Yes     Alcohol/week: 12.0 standard drinks     Types: 12 Cans of beer per week    Drug use: No       Lab Results   Component Value Date/Time    Cholesterol, total 132 03/24/2022 09:58 AM    HDL Cholesterol 41 03/24/2022 09:58 AM    LDL, calculated 69 03/24/2022 09:58 AM    Triglyceride 110 03/24/2022 09:58 AM    CHOL/HDL Ratio 3.2 03/24/2022 09:58 AM        BP Readings from Last 3 Encounters:   05/06/22 132/60   03/31/22 122/64   12/14/21 126/60        Pulse Readings from Last 3 Encounters:   05/06/22 73   03/31/22 73   12/14/21 77       Wt Readings from Last 3 Encounters:   05/06/22 101.6 kg (224 lb)   03/31/22 100.2 kg (221 lb)   12/14/21 103.9 kg (229 lb)         EKG: unchanged from previous tracings sinus rhythm with first-degree AVB, nonspecific ST and T waves changes, occasional PVC noted, unifocal.

## 2022-05-06 NOTE — PROGRESS NOTES
Josiane  presents today for   Chief Complaint   Patient presents with    Follow-up     6 month follow up- no complaints        Keara Eduardofrances Moris preferred language for health care discussion is english/other. Is someone accompanying this pt? no    Is the patient using any DME equipment during [address]? no    Depression Screening:  3 most recent PHQ Screens 5/6/2022   Little interest or pleasure in doing things Not at all   Feeling down, depressed, irritable, or hopeless Not at all   Total Score PHQ 2 0       Learning Assessment:  Learning Assessment 5/8/2019   PRIMARY LEARNER Patient   BARRIERS PRIMARY LEARNER [address] CAREGIVER No   PRIMARY LANGUAGE ENGLISH   LEARNER PREFERENCE PRIMARY DEMONSTRATION     READING   ANSWERED BY patient   RELATIONSHIP SELF       Abuse Screening:  Abuse Screening Questionnaire 5/6/2022   Do you ever feel afraid of your partner? N   Are you in a relationship with someone who physically or mentally threatens you? N   Is it safe for you to go home? Y       Fall Risk  Fall Risk Assessment, last 12 mths 5/6/2022   Able to walk? Yes   Fall in past 12 months? 0   Do you feel unsteady? 0   Are you worried about falling 0   Number of falls in past 12 months -   Fall with injury? -       Pt currently taking Anticoagulant therapy? no    Coordination of Care:  1. Have you been to the ER, urgent care clinic since your last visit? Hospitalized since your last visit? no    2. Have you seen or consulted any other health care providers outside of the [address] [place] [address] since your last visit? Include any pap smears or colon screening.  no

## 2022-06-30 ENCOUNTER — HOSPITAL ENCOUNTER (OUTPATIENT)
Dept: LAB | Age: 72
Discharge: HOME | End: 2022-06-30
Payer: MEDICARE

## 2022-06-30 ENCOUNTER — APPOINTMENT (OUTPATIENT)
Dept: INTERNAL MEDICINE CLINIC | Age: 72
End: 2022-06-30

## 2022-06-30 DIAGNOSIS — C61 PROSTATE CANCER (HCC): ICD-10-CM

## 2022-06-30 DIAGNOSIS — E78.5 HYPERLIPIDEMIA, UNSPECIFIED HYPERLIPIDEMIA TYPE: ICD-10-CM

## 2022-06-30 DIAGNOSIS — D64.9 ANEMIA, UNSPECIFIED TYPE: ICD-10-CM

## 2022-06-30 DIAGNOSIS — I10 ESSENTIAL HYPERTENSION: ICD-10-CM

## 2022-06-30 DIAGNOSIS — E11.22 TYPE 2 DIABETES MELLITUS WITH STAGE 3A CHRONIC KIDNEY DISEASE, WITHOUT LONG-TERM CURRENT USE OF INSULIN (HCC): ICD-10-CM

## 2022-06-30 DIAGNOSIS — E53.8 VITAMIN B12 DEFICIENCY: ICD-10-CM

## 2022-06-30 DIAGNOSIS — N18.31 TYPE 2 DIABETES MELLITUS WITH STAGE 3A CHRONIC KIDNEY DISEASE, WITHOUT LONG-TERM CURRENT USE OF INSULIN (HCC): ICD-10-CM

## 2022-06-30 DIAGNOSIS — N18.31 STAGE 3A CHRONIC KIDNEY DISEASE (HCC): ICD-10-CM

## 2022-06-30 LAB
25(OH)D3 SERPL-MCNC: 43 NG/ML (ref 30–100)
ALBUMIN SERPL-MCNC: 3.9 G/DL (ref 3.4–5)
ALBUMIN/GLOB SERPL: 1.1 {RATIO} (ref 0.8–1.7)
ALP SERPL-CCNC: 64 U/L (ref 45–117)
ALT SERPL-CCNC: 22 U/L (ref 16–61)
ANION GAP SERPL CALC-SCNC: 8 MMOL/L (ref 3–18)
APPEARANCE UR: CLEAR
AST SERPL-CCNC: 16 U/L (ref 10–38)
BASOPHILS # BLD: 0.1 K/UL (ref 0–0.1)
BASOPHILS NFR BLD: 1 % (ref 0–2)
BILIRUB SERPL-MCNC: 0.9 MG/DL (ref 0.2–1)
BILIRUB UR QL: NEGATIVE
BUN SERPL-MCNC: 28 MG/DL (ref 7–18)
BUN/CREAT SERPL: 15 (ref 12–20)
CALCIUM SERPL-MCNC: 8.9 MG/DL (ref 8.5–10.1)
CHLORIDE SERPL-SCNC: 102 MMOL/L (ref 100–111)
CHOLEST SERPL-MCNC: 125 MG/DL
CO2 SERPL-SCNC: 21 MMOL/L (ref 21–32)
COLOR UR: YELLOW
CREAT SERPL-MCNC: 1.81 MG/DL (ref 0.6–1.3)
CREAT UR-MCNC: 128 MG/DL (ref 30–125)
DIFFERENTIAL METHOD BLD: ABNORMAL
EOSINOPHIL # BLD: 0.3 K/UL (ref 0–0.4)
EOSINOPHIL NFR BLD: 4 % (ref 0–5)
ERYTHROCYTE [DISTWIDTH] IN BLOOD BY AUTOMATED COUNT: 12.7 % (ref 11.6–14.5)
EST. AVERAGE GLUCOSE BLD GHB EST-MCNC: 131 MG/DL
GLOBULIN SER CALC-MCNC: 3.4 G/DL (ref 2–4)
GLUCOSE SERPL-MCNC: 118 MG/DL (ref 74–99)
GLUCOSE UR STRIP.AUTO-MCNC: NEGATIVE MG/DL
HBA1C MFR BLD: 6.2 % (ref 4.2–5.6)
HCT VFR BLD AUTO: 39.3 % (ref 36–48)
HDLC SERPL-MCNC: 61 MG/DL (ref 40–60)
HDLC SERPL: 2 {RATIO} (ref 0–5)
HGB BLD-MCNC: 13.3 G/DL (ref 13–16)
HGB UR QL STRIP: NEGATIVE
IMM GRANULOCYTES # BLD AUTO: 0 K/UL (ref 0–0.04)
IMM GRANULOCYTES NFR BLD AUTO: 0 % (ref 0–0.5)
KETONES UR QL STRIP.AUTO: NEGATIVE MG/DL
LDLC SERPL CALC-MCNC: 47.8 MG/DL (ref 0–100)
LEUKOCYTE ESTERASE UR QL STRIP.AUTO: NEGATIVE
LIPID PROFILE,FLP: ABNORMAL
LYMPHOCYTES # BLD: 1.2 K/UL (ref 0.9–3.6)
LYMPHOCYTES NFR BLD: 17 % (ref 21–52)
MAGNESIUM SERPL-MCNC: 2.1 MG/DL (ref 1.6–2.6)
MCH RBC QN AUTO: 33.9 PG (ref 24–34)
MCHC RBC AUTO-ENTMCNC: 33.8 G/DL (ref 31–37)
MCV RBC AUTO: 100.3 FL (ref 78–100)
MICROALBUMIN UR-MCNC: 0.67 MG/DL (ref 0–3)
MICROALBUMIN/CREAT UR-RTO: 5 MG/G (ref 0–30)
MONOCYTES # BLD: 0.6 K/UL (ref 0.05–1.2)
MONOCYTES NFR BLD: 9 % (ref 3–10)
NEUTS SEG # BLD: 4.6 K/UL (ref 1.8–8)
NEUTS SEG NFR BLD: 69 % (ref 40–73)
NITRITE UR QL STRIP.AUTO: NEGATIVE
NRBC # BLD: 0 K/UL (ref 0–0.01)
NRBC BLD-RTO: 0 PER 100 WBC
PH UR STRIP: 5.5 [PH] (ref 5–8)
PLATELET # BLD AUTO: 185 K/UL (ref 135–420)
PMV BLD AUTO: 9.1 FL (ref 9.2–11.8)
POTASSIUM SERPL-SCNC: 4.6 MMOL/L (ref 3.5–5.5)
PROT SERPL-MCNC: 7.3 G/DL (ref 6.4–8.2)
PROT UR STRIP-MCNC: NEGATIVE MG/DL
PSA SERPL-MCNC: 0.2 NG/ML (ref 0–4)
RBC # BLD AUTO: 3.92 M/UL (ref 4.35–5.65)
SODIUM SERPL-SCNC: 131 MMOL/L (ref 136–145)
SP GR UR REFRACTOMETRY: 1.01 (ref 1–1.03)
TRIGL SERPL-MCNC: 81 MG/DL (ref ?–150)
UROBILINOGEN UR QL STRIP.AUTO: 0.2 EU/DL (ref 0.2–1)
VIT B12 SERPL-MCNC: 1165 PG/ML (ref 211–911)
VLDLC SERPL CALC-MCNC: 16.2 MG/DL
WBC # BLD AUTO: 6.7 K/UL (ref 4.6–13.2)

## 2022-06-30 PROCEDURE — 81003 URINALYSIS AUTO W/O SCOPE: CPT

## 2022-06-30 PROCEDURE — 36415 COLL VENOUS BLD VENIPUNCTURE: CPT

## 2022-06-30 PROCEDURE — 83036 HEMOGLOBIN GLYCOSYLATED A1C: CPT

## 2022-06-30 PROCEDURE — 80053 COMPREHEN METABOLIC PANEL: CPT

## 2022-06-30 PROCEDURE — 83735 ASSAY OF MAGNESIUM: CPT

## 2022-06-30 PROCEDURE — 82043 UR ALBUMIN QUANTITATIVE: CPT

## 2022-06-30 PROCEDURE — 82306 VITAMIN D 25 HYDROXY: CPT

## 2022-06-30 PROCEDURE — 84153 ASSAY OF PSA TOTAL: CPT

## 2022-06-30 PROCEDURE — 80061 LIPID PANEL: CPT

## 2022-06-30 PROCEDURE — 82607 VITAMIN B-12: CPT

## 2022-06-30 PROCEDURE — 85025 COMPLETE CBC W/AUTO DIFF WBC: CPT

## 2022-07-04 RX ORDER — ATORVASTATIN CALCIUM 20 MG/1
TABLET, FILM COATED ORAL
Qty: 90 TABLET | Refills: 3 | Status: SHIPPED | OUTPATIENT
Start: 2022-07-04

## 2022-07-06 ENCOUNTER — TELEPHONE (OUTPATIENT)
Dept: INTERNAL MEDICINE CLINIC | Age: 72
End: 2022-07-06

## 2022-07-06 ENCOUNTER — OFFICE VISIT (OUTPATIENT)
Dept: INTERNAL MEDICINE CLINIC | Age: 72
End: 2022-07-06
Payer: MEDICARE

## 2022-07-06 VITALS
OXYGEN SATURATION: 96 % | HEIGHT: 73 IN | RESPIRATION RATE: 16 BRPM | DIASTOLIC BLOOD PRESSURE: 71 MMHG | WEIGHT: 219 LBS | SYSTOLIC BLOOD PRESSURE: 130 MMHG | TEMPERATURE: 97.5 F | BODY MASS INDEX: 29.03 KG/M2 | HEART RATE: 66 BPM

## 2022-07-06 DIAGNOSIS — N18.32 STAGE 3B CHRONIC KIDNEY DISEASE (HCC): ICD-10-CM

## 2022-07-06 DIAGNOSIS — E78.5 HYPERLIPIDEMIA, UNSPECIFIED HYPERLIPIDEMIA TYPE: ICD-10-CM

## 2022-07-06 DIAGNOSIS — R79.89 BLOOD CREATININE INCREASED COMPARED WITH PRIOR MEASUREMENT: ICD-10-CM

## 2022-07-06 DIAGNOSIS — I10 ESSENTIAL HYPERTENSION: Primary | ICD-10-CM

## 2022-07-06 DIAGNOSIS — E66.3 OVERWEIGHT (BMI 25.0-29.9): ICD-10-CM

## 2022-07-06 DIAGNOSIS — E11.22 TYPE 2 DIABETES MELLITUS WITH STAGE 3A CHRONIC KIDNEY DISEASE, WITHOUT LONG-TERM CURRENT USE OF INSULIN (HCC): ICD-10-CM

## 2022-07-06 DIAGNOSIS — C61 PROSTATE CANCER (HCC): ICD-10-CM

## 2022-07-06 DIAGNOSIS — E55.9 VITAMIN D DEFICIENCY: ICD-10-CM

## 2022-07-06 DIAGNOSIS — Z95.2 S/P AVR (AORTIC VALVE REPLACEMENT): ICD-10-CM

## 2022-07-06 DIAGNOSIS — N18.31 TYPE 2 DIABETES MELLITUS WITH STAGE 3A CHRONIC KIDNEY DISEASE, WITHOUT LONG-TERM CURRENT USE OF INSULIN (HCC): ICD-10-CM

## 2022-07-06 DIAGNOSIS — Z86.16 HISTORY OF 2019 NOVEL CORONAVIRUS DISEASE (COVID-19): ICD-10-CM

## 2022-07-06 DIAGNOSIS — I25.10 CAD IN NATIVE ARTERY: ICD-10-CM

## 2022-07-06 PROCEDURE — 3044F HG A1C LEVEL LT 7.0%: CPT | Performed by: INTERNAL MEDICINE

## 2022-07-06 PROCEDURE — G8427 DOCREV CUR MEDS BY ELIG CLIN: HCPCS | Performed by: INTERNAL MEDICINE

## 2022-07-06 PROCEDURE — 1101F PT FALLS ASSESS-DOCD LE1/YR: CPT | Performed by: INTERNAL MEDICINE

## 2022-07-06 PROCEDURE — G8417 CALC BMI ABV UP PARAM F/U: HCPCS | Performed by: INTERNAL MEDICINE

## 2022-07-06 PROCEDURE — 3017F COLORECTAL CA SCREEN DOC REV: CPT | Performed by: INTERNAL MEDICINE

## 2022-07-06 PROCEDURE — G8510 SCR DEP NEG, NO PLAN REQD: HCPCS | Performed by: INTERNAL MEDICINE

## 2022-07-06 PROCEDURE — G0463 HOSPITAL OUTPT CLINIC VISIT: HCPCS | Performed by: INTERNAL MEDICINE

## 2022-07-06 PROCEDURE — G8752 SYS BP LESS 140: HCPCS | Performed by: INTERNAL MEDICINE

## 2022-07-06 PROCEDURE — G8536 NO DOC ELDER MAL SCRN: HCPCS | Performed by: INTERNAL MEDICINE

## 2022-07-06 PROCEDURE — 1123F ACP DISCUSS/DSCN MKR DOCD: CPT | Performed by: INTERNAL MEDICINE

## 2022-07-06 PROCEDURE — 99214 OFFICE O/P EST MOD 30 MIN: CPT | Performed by: INTERNAL MEDICINE

## 2022-07-06 PROCEDURE — 2022F DILAT RTA XM EVC RTNOPTHY: CPT | Performed by: INTERNAL MEDICINE

## 2022-07-06 PROCEDURE — G8754 DIAS BP LESS 90: HCPCS | Performed by: INTERNAL MEDICINE

## 2022-07-06 NOTE — PROGRESS NOTES
Olaf Vasquez presents today for   Chief Complaint   Patient presents with    Follow-up    Hypertension       1. \"Have you been to the ER, urgent care clinic since your last visit? Hospitalized since your last visit? \" no    2. \"Have you seen or consulted any other health care providers outside of the [address] [place] [address] since your last visit? \" no     3. For patients aged 39-70: Has the patient had a colonoscopy / FIT/ Cologuard? Yes - no Care Gap present      If the patient is female:    4. For patients aged 41-77: Has the patient had a mammogram within the past 2 years? NA - based on age or sex  See top three    5. For patients aged 21-65: Has the patient had a pap smear?  NA - based on age or sex

## 2022-07-06 NOTE — PATIENT INSTRUCTIONS
Heart-Healthy Diet: Care Instructions  Your Care Instructions     A heart-healthy diet has lots of vegetables, fruits, nuts, beans, and whole grains, and is low in salt. It limits foods that are high in saturated fat, such as meats, cheeses, and fried foods. It may be hard to change your diet, but even small changes can lower your risk of heart attack and heart disease. Follow-up care is a key part of your treatment and safety. Be sure to make and go to all appointments, and call your doctor if you are having problems. It's also a good idea to know your test results and keep a list of the medicines you take. How can you care for yourself at home? Watch your portions  · Learn what a serving is. A \"serving\" and a \"portion\" are not always the same thing. Make sure that you are not eating larger portions than are recommended. For example, a serving of pasta is ½ cup. A serving size of meat is 2 to 3 ounces. A 3-ounce serving is about the size of a deck of cards. Measure serving sizes until you are good at [place]" them. Keep in mind that restaurants often serve portions that are 2 or 3 times the size of one serving. · To keep your energy level up and keep you from feeling hungry, eat often but in smaller portions. · Eat only the number of calories you need to stay at a healthy weight. If you need to lose weight, eat fewer calories than your body burns (through exercise and other physical activity). Eat more fruits and vegetables  · Eat a variety of fruit and vegetables every day. Dark green, deep orange, red, or yellow fruits and vegetables are especially good for you. Examples include spinach, carrots, peaches, and berries. · Keep carrots, celery, and other veggies handy for snacks. Buy fruit that is in season and store it where you can see it so that you will be tempted to eat it. · Cook dishes that have a lot of veggies in them, such as stir-fries and soups.   Limit saturated and trans fat  · Read food labels, and try to avoid saturated and trans fats. They increase your risk of heart disease. · Use olive or canola oil when you cook. · Bake, broil, grill, or steam foods instead of frying them. · Choose lean meats instead of high-fat meats such as hot dogs and sausages. Cut off all visible fat when you prepare meat. · Eat fish, skinless poultry, and meat alternatives such as soy products instead of high-fat meats. Soy products, such as tofu, may be especially good for your heart. · Choose low-fat or fat-free milk and dairy products. Eat foods high in fiber  · Eat a variety of grain products every day. Include whole-grain foods that have lots of fiber and nutrients. Examples of whole-grain foods include oats, whole wheat bread, and brown rice. · Buy whole-grain breads and cereals, instead of white bread or pastries. Limit salt and sodium  · Limit how much salt and sodium you eat to help lower your blood pressure. · Taste food before you salt it. Add only a little salt when you think you need it. With time, your taste buds will adjust to less salt. · Eat fewer snack items, fast foods, and other high-salt, processed foods. Check food labels for the amount of sodium in packaged foods. · Choose low-sodium versions of canned goods (such as soups, vegetables, and beans). Limit sugar  · Limit drinks and foods with added sugar. These include candy, desserts, and soda pop. Limit alcohol  · Limit alcohol to no more than 2 drinks a day for men and 1 drink a day for women. Too much alcohol can cause health problems. When should you call for help? Watch closely for changes in your health, and be sure to contact your doctor if:    · You would like help planning heart-healthy meals. Where can you learn more? Go to http://www.[employer].com/  Enter V137 in the search box to learn more about \"Heart-Healthy Diet: Care Instructions. \"  Current as of: August 22, 2019               Content Version: 12.6  © 2530-4872 [employer]. Care instructions adapted under license by [employer] (which disclaims liability or warranty for this information). If you have questions about a medical condition or this instruction, always ask your healthcare professional. [address] any warranty or liability for your use of this information. Learning About Diabetes Food Guidelines  Your Care Instructions     Meal planning is important to manage diabetes. It helps keep your blood sugar at a target level (which you set with your doctor). You don't have to eat special foods. You can eat what your family eats, including sweets once in a while. But you do have to pay attention to how often you eat and how much you eat of certain foods. You may want to work with a dietitian or a certified diabetes educator (CDE) to help you plan meals and snacks. A dietitian or CDE can also help you lose weight if that is one of your goals. What should you know about eating carbs? Managing the amount of carbohydrate (carbs) you eat is an important part of healthy meals when you have diabetes. Carbohydrate is found in many foods. · Learn which foods have carbs. And learn the amounts of carbs in different foods. ? Bread, cereal, pasta, and rice have about 15 grams of carbs in a serving. A serving is 1 slice of bread (1 ounce), ½ cup of cooked cereal, or 1/3 cup of cooked pasta or rice. ? Fruits have 15 grams of carbs in a serving. A serving is 1 small fresh fruit, such as an apple or orange; ½ of a banana; ½ cup of cooked or canned fruit; ½ cup of fruit juice; 1 cup of melon or raspberries; or 2 tablespoons of dried fruit. ? Milk and no-sugar-added yogurt have 15 grams of carbs in a serving. A serving is 1 cup of milk or 2/3 cup of no-sugar-added yogurt. ? Starchy vegetables have 15 grams of carbs in a serving.  A serving is ½ cup of mashed potatoes or sweet potato; 1 cup winter squash; ½ of a small baked potato; ½ cup of cooked beans; or ½ cup cooked corn or green peas. · Learn how much carbs to eat each day and at each meal. A dietitian or CDE can teach you how to keep track of the amount of carbs you eat. This is called carbohydrate counting. · If you are not sure how to count carbohydrate grams, use the Plate Method to plan meals. It is a good, quick way to make sure that you have a balanced meal. It also helps you spread carbs throughout the day. ? Divide your plate by types of foods. Put non-starchy vegetables on half the plate, meat or other protein food on one-quarter of the plate, and a grain or starchy vegetable in the final quarter of the plate. To this you can add a small piece of fruit and 1 cup of milk or yogurt, depending on how many carbs you are supposed to eat at a meal.  · Try to eat about the same amount of carbs at each meal. Do not \"save up\" your daily allowance of carbs to eat at one meal.  · Proteins have very little or no carbs per serving. Examples of proteins are beef, chicken, turkey, fish, eggs, tofu, cheese, cottage cheese, and peanut butter. A serving size of meat is 3 ounces, which is about the size of a deck of cards. Examples of meat substitute serving sizes (equal to 1 ounce of meat) are 1/4 cup of cottage cheese, 1 egg, 1 tablespoon of peanut butter, and ½ cup of tofu. How can you eat out and still eat healthy? · Learn to estimate the serving sizes of foods that have carbohydrate. If you measure food at home, it will be easier to estimate the amount in a serving of restaurant food. · If the meal you order has too much carbohydrate (such as potatoes, corn, or baked beans), ask to have a low-carbohydrate food instead. Ask for a salad or green vegetables. · If you use insulin, check your blood sugar before and after eating out to help you plan how much to eat in the future.   · If you eat more carbohydrate at a meal than you had planned, take a walk or do other exercise. This will help lower your blood sugar. What else should you know? · Limit saturated fat, such as the fat from meat and dairy products. This is a healthy choice because people who have diabetes are at higher risk of heart disease. So choose lean cuts of meat and nonfat or low-fat dairy products. Use olive or canola oil instead of butter or shortening when cooking. · Don't skip meals. Your blood sugar may drop too low if you skip meals and take insulin or certain medicines for diabetes. · Check with your doctor before you drink alcohol. Alcohol can cause your blood sugar to drop too low. Alcohol can also cause a bad reaction if you take certain diabetes medicines. Follow-up care is a key part of your treatment and safety. Be sure to make and go to all appointments, and call your doctor if you are having problems. It's also a good idea to know your test results and keep a list of the medicines you take. Where can you learn more? Go to http://www.martínez.com/  Enter I147 in the search box to learn more about \"Learning About Diabetes Food Guidelines. \"  Current as of: December 20, 2019               Content Version: 12.6  © 1189-8241 [employer]. Care instructions adapted under license by [employer] (which disclaims liability or warranty for this information). If you have questions about a medical condition or this instruction, always ask your healthcare professional. [address] any warranty or liability for your use of this information. Learning About Low-Sodium Foods  What foods are low in sodium? The foods you eat contain nutrients, such as vitamins and minerals. Sodium is a nutrient. Your body needs the right amount to stay healthy and work as it should. You can use the list below to help you make choices about which foods to eat.   Fruits  · Fresh, frozen, canned, or dried fruit  Vegetables  · Fresh or frozen vegetables, with no added salt  · Canned vegetables, low-sodium or with no added salt  Grains  · Bagels without salted tops  · Cereal with no added salt  · Corn tortillas  · Crackers with no added salt  · Oatmeal, cooked without salt  · Popcorn with no salt  · Pasta and noodles, cooked without salt  · Rice, cooked without salt  · Unsalted pretzels  Dairy and dairy alternatives  · Butter, unsalted  · Cream cheese  · Ice cream  · Milk  · Soy milk  Meats and other protein foods  · Beans and peas, canned with no salt  · Eggs  · Fresh fish (not smoked)  · Fresh meats (not smoked or cured)  · Nuts and nut butter, prepared without salt  · Poultry, not packaged with sodium solution  · Tofu, unseasoned  · Tuna, canned without salt  Seasonings  · Garlic  · Herbs and spices  · Lemon juice  · Mustard  · Olive oil  · Salt-free seasoning mixes  · Vinegar  Work with your doctor to find out how much of this nutrient you need. Depending on your health, you may need more or less of it in your diet. Where can you learn more? Go to http://www.gray.com/  Enter S460 in the search box to learn more about \"Learning About Low-Sodium Foods. \"  Current as of: August 22, 2019               Content Version: 12.6  © 3618-8848 [employer]. Care instructions adapted under license by [employer] (which disclaims liability or warranty for this information). If you have questions about a medical condition or this instruction, always ask your healthcare professional. [place] [address] any warranty or liability for your use of this information. Low Sodium Diet (2,000 Milligram): Care Instructions  Your Care Instructions     Too much sodium causes your body to hold on to extra water. This can raise your blood pressure and force your heart and kidneys to work harder. In very serious cases, this could cause you to be put in the hospital. It might even be life-threatening.  By limiting sodium, you will feel better and lower your risk of serious problems. The most common source of sodium is salt. People get most of the salt in their diet from canned, prepared, and packaged foods. Fast food and restaurant meals also are very high in sodium. Your doctor will probably limit your sodium to less than 2,000 milligrams (mg) a day. This limit counts all the sodium in prepared and packaged foods and any salt you add to your food. Follow-up care is a key part of your treatment and safety. Be sure to make and go to all appointments, and call your doctor if you are having problems. It's also a good idea to know your test results and keep a list of the medicines you take. How can you care for yourself at home? Read food labels  · Read labels on cans and food packages. The labels tell you how much sodium is in each serving. Make sure that you look at the serving size. If you eat more than the serving size, you have eaten more sodium. · Food labels also tell you the Percent Daily Value for sodium. Choose products with low Percent Daily Values for sodium. · Be aware that sodium can come in forms other than salt, including monosodium glutamate (MSG), sodium citrate, and sodium bicarbonate (baking soda). MSG is often added to Asian food. When you eat out, you can sometimes ask for food without MSG or added salt. Buy low-sodium foods  · Buy foods that are labeled \"unsalted\" (no salt added), \"sodium-free\" (less than 5 mg of sodium per serving), or \"low-sodium\" (less than 140 mg of sodium per serving). Foods labeled \"reduced-sodium\" and \"light sodium\" may still have too much sodium. Be sure to read the label to see how much sodium you are getting. · Buy fresh vegetables, or frozen vegetables without added sauces. Buy low-sodium versions of canned vegetables, soups, and other canned goods. Prepare low-sodium meals  · Cut back on the amount of salt you use in cooking.  This will help you adjust to the taste. Do not add salt after cooking. One teaspoon of salt has about 2,300 mg of sodium. · Take the salt shaker off the table. · Flavor your food with garlic, lemon juice, onion, vinegar, herbs, and spices. Do not use soy sauce, lite soy sauce, steak sauce, onion salt, garlic salt, celery salt, mustard, or ketchup on your food. · Use low-sodium salad dressings, sauces, and ketchup. Or make your own salad dressings and sauces without adding salt. · Use less salt (or none) when recipes call for it. You can often use half the salt a recipe calls for without losing flavor. Other foods such as rice, pasta, and grains do not need added salt. · Rinse canned vegetables, and cook them in fresh water. This removes somebut not allof the salt. · Avoid water that is naturally high in sodium or that has been treated with water softeners, which add sodium. Call your local water company to find out the sodium content of your water supply. If you buy bottled water, read the label and choose a sodium-free brand. Avoid high-sodium foods  · Avoid eating:  ? Smoked, cured, salted, and canned meat, fish, and poultry. ? Ham, jesus, hot dogs, and luncheon meats. ? Regular, hard, and processed cheese and regular peanut butter. ? Crackers with salted tops, and other salted snack foods such as pretzels, chips, and salted popcorn. ? Frozen prepared meals, unless labeled low-sodium. ? Canned and dried soups, broths, and bouillon, unless labeled sodium-free or low-sodium. ? Canned vegetables, unless labeled sodium-free or low-sodium. ? [place] fries, pizza, tacos, and other fast foods. ? Pickles, olives, ketchup, and other condiments, especially soy sauce, unless labeled sodium-free or low-sodium. Where can you learn more? Go to http://www.gray.com/  Enter V843 in the search box to learn more about \"Low Sodium Diet (2,000 Milligram): Care Instructions. \"  Current as of: August 22, 2019 Content Version: 12.6  © 5244-3207 [employer]. Care instructions adapted under license by [employer] (which disclaims liability or warranty for this information). If you have questions about a medical condition or this instruction, always ask your healthcare professional. [address] any warranty or liability for your use of this information.

## 2022-07-06 NOTE — TELEPHONE ENCOUNTER
Please request recent eye exam from Dr. Rahul Mendoza . Patient reports being seen in the last 6 months . Thank you.

## 2022-07-07 NOTE — TELEPHONE ENCOUNTER
Eye exam has been requested Subjective   Mike Cox is a 67 y.o. male.     Pleasant pt  here today, received a tattoo Friday, unfortunately he developed redness swelling tenderness, symptoms of bacterial  Cellulitis  Was treated in the emergency room Saturday, treated for cellulitis 1 g Rocephin and discharged with Bactrim  Ultimately culture was negative    He reports 60% improvement  No fever no chills no increasing pain or swelling          Plan discontinue Bactrim  Augmentin 875 twice a day ×7 days  We should make sure he is 100% clear if his cellulitis  As he is high risk for diabetes    Any increasing pain redness swelling fever emergency room  Probiotics           The following portions of the patient's history were reviewed and updated as appropriate: allergies, current medications, past family history, past social history, past surgical history and problem list.    Review of Systems   Skin:        Infected tattoo right arm   All other systems reviewed and are negative.      Objective   Physical Exam   Constitutional: He appears well-developed.   HENT:   Head: Normocephalic.   Eyes: Pupils are equal, round, and reactive to light.   Pulmonary/Chest: Effort normal.   Musculoskeletal: He exhibits tenderness. He exhibits no edema.   Physical exam  Quite a bit of skin peeling related to his tattoo patient says is normal  Proximal to the right before meals space mild tenderness and minimal swelling  Area proximally 3×4 cm  No pustules no abscess normal range of motion       Skin:   As above see muscle skeletal   Psychiatric: He has a normal mood and affect. His behavior is normal. Judgment and thought content normal.   Vitals reviewed.        Assessment/Plan   Mike was seen today for follow-up.    Diagnoses and all orders for this visit:    Cellulitis of right upper extremity    Other orders  -     amoxicillin-clavulanate (AUGMENTIN) 875-125 MG per tablet; Take 1 tablet by mouth 2 (Two) Times a Day for 7 days.                   Patient reports arm is much better  But he still has some tenderness and localized redness, is not completely resolved  Is without abscess  He has no significant upper arm swelling and tenderness to suggest DVT  In the absence of MRSA infection prefer Augmentin      Discharge instructions  May stop the last day of Bactrim and cephalexin    Augmentin 875 twice a day ×7 days  Probiotic  Increased pain redness swelling emergency room    Routine follow-up cardiology endocrinology  And Dr. Salas    Thank You,      James Epley,  NP

## 2022-07-11 PROBLEM — Z86.16 HISTORY OF 2019 NOVEL CORONAVIRUS DISEASE (COVID-19): Status: ACTIVE | Noted: 2022-07-11

## 2022-07-11 PROBLEM — D64.9 ANEMIA: Status: RESOLVED | Noted: 2019-06-09 | Resolved: 2022-07-11

## 2022-07-12 NOTE — PROGRESS NOTES
HPI:   Zaria Hopper is a 67y.o. year old male who presents for a physical exam. He has a history of hypertension, hyperlipidemia, ASHD s/p CABG, aortic stenosis s/p AVR, diabetes mellitus, chronic renal disease stage 3, prostate cancer, and gout. He has completed the [employer] COVID-19 vaccine series and received two Pfizer booster doses. He states that he has been eating less with decreased portions and is pleased that his weight has decreased 12 pounds since 11/2021. He reports that he is otherwise doing generally well and is without specific complaints. Summary of prior hospitalizations and medical history:  In 6/2017, he was noted to have an elevated PSA of 6.0, which was confirmed on repeat to be 6.6. He was referred to Dr. Rajan Celis and PSA was again repeated and found to be increased at 7.24. He underwent TRUS biopsy on 8/17/2017, which showed [id number] Kaylynn Grade 7 (3 + 4) adenocarcinoma of the prostate in 3/12 cores, 2-10% of each core. Options for management were discussed and he selected active surveillance. Plans were for repeat PSA and MRI prostate in 6 months (due 2/2018). However, the patient never had tests performed and he did not follow-up as discussed. On 10/4/2018, at his routine visit, a PSA level was obtained and was found to have increased to 11.3, and he was advised to follow-up with Dr. Rajan Celis. He underwent a prostate MRI 3T at [facility] on 12/31/2018 showing several peripheral zone PI-RADS 4 observations: right posterolateral peripheral zone at the mid gland/apex and anterior peripheral zone at the apex bilaterally. The former exhibits questionable capsular bulging laterally, but not a definitive appearance for extracapsular extension. He had a follow-up appointment with Dr. Rajan Celis on 1/11/2019 and decision made to proceed with EBRT.  He had a staging bone scan (1/23/2019) which was negative for osseous metastases, and a CT abdomen and pelvis (1/23/2019) which showed no adenopathy of evidence of metastases, mild hepatic steatosis, aortic atherosclerosis, and cholelithiasis. He met with Dr. Karthikeyan Ayala on 1/30/2019 and decided to proceed with EBRT as definitive treatment for his prostate cancer, receiving his last treatment on 5/6/2019. Follow-up PSA level in 11/2020 at 0.4. He has a history of hypertension, hyperlipidemia, ASHD, and aortic stenosis. In 10/2014, he presented with progressive chest pain and shortness of breath and an echocardiogram was obtained (10/16/2014) showing normal LV function (EF 55-60%), no RWMA, grade 1 diastolic dysfunction, mild LAE, and severe AS (mean gradient 30 mmHg, peak 67 mmHg, and AV area 0.8 cm2). He was referred to see Dr. Virgilio Tian and underwent cardiac catheterization (11/6/2014) showing 100% RCA (distal collaterals from left), 70-80% distal LAD, 80% proximal LCx, inferior basal hypokinesis, and EF 55%. On 11/13/2014, he underwent 2 vessel CABG (LIMA to LAD and SVG to OM1) and AV replacement with a bioprosthetic 23 mm Marifer Barbosa Magna pericardial valve by Dr. Hesham Crawford. He has done well since that time and remains asymptomatic. He denies any chest pain, shortness of breath at rest or with exertion, palpitations, lightheadedness, or edema. His current regimen includes aspirin, metoprolol, lisinopril, and moderate intensity dose atorvastatin. He is followed by Dr. Virgilio Tian. He had a repeat echocardiogram (8/21/2017) showing normal LV function (EF 55-60%), no RWMA, mild MIL, and normally functioning bioprosthetic valve with peak gradient 17 mmHg, mean gradient 10 mmHg, and valve area 1.86 cm2. Repeat echocardiogram (9/18/2020) showed normal LV size and function (EF 55-60%), AV prosthetic functioning normally with mean gradient of 7.2mmHg and MARY 1.8 cm2. Moderate LAE, and mild MR. He has a history of diabetes mellitus, which has not required treatment with medication.  Review of his record shows that his Hba1c has remained between 5.7-6.3 since 2011. He denies any polyuria, polydipsia, nocturia, or blurry vision, and has no history of retinopathy or neuropathy. He does have evidence of chronic renal disease, stage 3, with baseline creatinine 1.22-1.37/ eGFR 55-59 since 11/2014. He had been having regular eye exams with Dr. Timo Nathan. He has a history of gout, but has not had a flare in many years since being treated with allopurinol. He has never had a screening colonoscopy. He did undergo Cologuard testing in 11/2017 which was negative. He denies any abdominal pain, nausea, vomiting, melena, hematochezia, or change in bowel movements. Past Medical History:   Diagnosis Date    Aortic stenosis     s/p AVR    Aortic stenosis, severe 10/16/2014    Echocardiogram EF 60% peak gradient 67 mmHg, mean gradient 30 mmHg, MARY 0.8 cm    ASHD (arteriosclerotic heart disease)     Chronic renal disease, stage III (HCC)     DM (diabetes mellitus) ([facility] [address]     Gout     HCD (hypertensive cardiovascular disease)     History of echocardiogram 10/16/2014    EF 55-60%. No RWMA. Mild LVH. Gr 1 DDfx. Mild LAE. Severe AS (mean grad 30 mmHg).  Hyperlipidemia     Hypertension     Prostate cancer ([facility] [address] 08/17/2017    [id number] Marion Grade 7 (3 + 4) adenocarcinoma of the prostate in 3/12 cores, 2-10% of each core; PSA 7.24. Dr. Krissy Pizano.  S/P AVR (aortic valve replacement) 11/13/2014    #23 mm Marifer Barbosa Magna pericardial valve. Dr. Tessa Linder.  S/P CABG x 2 11/13/2014    LIMA to LAD, SVG to OM1. Dr. Tessa Linder.  S/P cardiac catheterization 11/06/2014    RCA dom. mRCA 100%. LM patent. dLAD 75% x 2. pCX 80% (ELENA-3). LVEDP 14 mmHg. EF 50-55%. Severe inferobasal hypk. Severe AS. AV replacement & CABG recommended.      Past Surgical History:   Procedure Laterality Date    HX APPENDECTOMY       Current Outpatient Medications   Medication Sig    atorvastatin (LIPITOR) 20 mg tablet TAKE 1 TABLET DAILY    lisinopriL (PRINIVIL, ZESTRIL) 20 mg tablet TAKE 1 TABLET DAILY    amLODIPine (NORVASC) 10 mg tablet TAKE 1 TABLET DAILY    allopurinoL (ZYLOPRIM) 100 mg tablet TAKE 1 TABLET DAILY    metoprolol succinate (TOPROL-XL) 50 mg XL tablet TAKE 1 TABLET DAILY    fluticasone propionate (Flonase Allergy Relief) 50 mcg/actuation nasal spray 2 Sprays by Both Nostrils route daily as needed for Rhinitis or Allergies.  sodium chloride (Saline Nasal Mist) 0.65 % nasal squeeze bottle 0.05 mL by Both Nostrils route as needed for Congestion.  acetaminophen (Tylenol Extra Strength) 500 mg tablet Take  by mouth every six (6) hours as needed for Pain.  cyanocobalamin, vitamin B-12, 1,000 mcg lozg 1,000 mcg by SubLINGual route daily.  ascorbic acid, vitamin C, (Vitamin C) 500 mg tablet Take  by mouth.  docusate sodium (Colace) 100 mg capsule Take 100 mg by mouth daily as needed.  cholecalciferol, vitamin D3, 2,000 unit tab Take  by mouth daily.  aspirin delayed-release 81 mg tablet Take 1 tablet by mouth daily.  sildenafil citrate (VIAGRA) 100 mg tablet Take 1 Tab by mouth as needed. (Patient not taking: Reported on 7/6/2022)     No current facility-administered medications for this visit. Allergies and Intolerances:   No Known Allergies     Family History:   Family History   Problem Relation Age of Onset   Cassidy Chappell COPD Father     Other Father         pneumoconiosis    Lung Disease Father     Other Mother         meigs syndrome     Social History:   He  reports that he has never smoked. He has never used smokeless tobacco. He reports that he drinks approximately one case of beer per week. He is  with two adult children. He is retired from working in the [employer]. He started as a , and then became an .     Social History     Substance and Sexual Activity   Alcohol Use Yes    Alcohol/week: 12.0 standard drinks    Types: 12 Cans of beer per week     Immunization History:  Immunization History Administered Date(s) Administered    COVID-19, PFIZER PURPLE top, DILUTE for use, (age 15 y+), IM, 30mcg/0.3mL 04/27/2021, 05/18/2021, 12/16/2021, 05/02/2022    Influenza High Dose Vaccine PF 10/10/2017, 10/26/2018, 11/09/2019    Influenza Vaccine 10/14/2014    Influenza Vaccine (Tri) Adjuvanted (>65 Yrs FLUAD TRI 40520) 10/04/2018, 11/09/2019    Influenza Vaccine Split 09/29/2011    Influenza Vaccine Whole 09/14/2010    Influenza, Quadrivalent, Adjuvanted (>65 Yrs FLUAD QUAD [id number]) 09/30/2020, 10/20/2021    Pneumococcal Conjugate (PCV-13) 06/29/2017    Pneumococcal Polysaccharide (PPSV-23) 10/14/2014    Td 01/01/2008    Tdap 01/12/2018    Zoster Recombinant 11/30/2020, 02/06/2021       Review of Systems:   As above included in HPI. Otherwise 11 point review of systems negative including constitutional, skin, HENT, eyes, respiratory, cardiovascular, gastrointestinal, genitourinary, musculoskeletal, endocrine, hematologic, allergy, and neurologic. Physical:   Visit Vitals  /71   Pulse 66   Temp 97.5 °F (36.4 °C) (Temporal)   Resp 16   Ht 6' 1\" (1.854 m)   Wt 219 lb (99.3 kg)   SpO2 96%   BMI 28.89 kg/m²         Exam:   Patient appears in no apparent distress. Affect is appropriate. HEENT: PERRLA, anicteric, no JVD, adenopathy or thyromegaly. No carotid bruits or radiated murmur. Lungs: clear to auscultation, no wheezes, rhonchi, or rales. Heart: regular rate and rhythm. No murmur, rubs, gallops  Abdomen: soft, nontender, nondistended, normal bowel sounds, no hepatosplenomegaly or masses. Extremities: without edema. Review of Data:  Labs:  No visits with results within 2 Day(s) from this visit.    Latest known visit with results is:   Hospital Outpatient Visit on 06/30/2022   Component Date Value Ref Range Status    WBC 06/30/2022 6.7  4.6 - 13.2 K/uL Final    RBC 06/30/2022 3.92* 4.35 - 5.65 M/uL Final    HGB 06/30/2022 13.3  13.0 - 16.0 g/dL Final    HCT 06/30/2022 39.3  36.0 - 48.0 % Final    MCV 06/30/2022 100.3* 78.0 - 100.0 FL Final    MCH 06/30/2022 33.9  24.0 - 34.0 PG Final    MCHC 06/30/2022 33.8  31.0 - 37.0 g/dL Final    RDW 06/30/2022 12.7  11.6 - 14.5 % Final    PLATELET 59/78/4053 039  135 - 420 K/uL Final    MPV 06/30/2022 9.1* 9.2 - 11.8 FL Final    NRBC 06/30/2022 0.0  0  WBC Final    ABSOLUTE NRBC 06/30/2022 0.00  0.00 - 0.01 K/uL Final    NEUTROPHILS 06/30/2022 69  40 - 73 % Final    LYMPHOCYTES 06/30/2022 17* 21 - 52 % Final    MONOCYTES 06/30/2022 9  3 - 10 % Final    EOSINOPHILS 06/30/2022 4  0 - 5 % Final    BASOPHILS 06/30/2022 1  0 - 2 % Final    IMMATURE GRANULOCYTES 06/30/2022 0  0.0 - 0.5 % Final    ABS. NEUTROPHILS 06/30/2022 4.6  1.8 - 8.0 K/UL Final    ABS. LYMPHOCYTES 06/30/2022 1.2  0.9 - 3.6 K/UL Final    ABS. MONOCYTES 06/30/2022 0.6  0.05 - 1.2 K/UL Final    ABS. EOSINOPHILS 06/30/2022 0.3  0.0 - 0.4 K/UL Final    ABS. BASOPHILS 06/30/2022 0.1  0.0 - 0.1 K/UL Final    ABS. IMM.  GRANS. 06/30/2022 0.0  0.00 - 0.04 K/UL Final    DF 06/30/2022 AUTOMATED    Final    Hemoglobin A1c 06/30/2022 6.2* 4.2 - 5.6 % Final    Est. average glucose 06/30/2022 131  mg/dL Final    LIPID PROFILE 06/30/2022        Final    Cholesterol, total 06/30/2022 125  <200 MG/DL Final    Triglyceride 06/30/2022 81  <150 MG/DL Final    HDL Cholesterol 06/30/2022 61* 40 - 60 MG/DL Final    LDL, calculated 06/30/2022 47.8  0 - 100 MG/DL Final    VLDL, calculated 06/30/2022 16.2  MG/DL Final    CHOL/HDL Ratio 06/30/2022 2.0  0 - 5.0   Final    Magnesium 06/30/2022 2.1  1.6 - 2.6 mg/dL Final    Sodium 06/30/2022 131* 136 - 145 mmol/L Final    Potassium 06/30/2022 4.6  3.5 - 5.5 mmol/L Final    Chloride 06/30/2022 102  100 - 111 mmol/L Final    CO2 06/30/2022 21  21 - 32 mmol/L Final    Anion gap 06/30/2022 8  3.0 - 18 mmol/L Final    Glucose 06/30/2022 118* 74 - 99 mg/dL Final    BUN 06/30/2022 28* 7.0 - 18 MG/DL Final    Creatinine 06/30/2022 1.81* 0.6 - 1.3 MG/DL Final    BUN/Creatinine ratio 06/30/2022 15  12 - 20   Final    GFR est AA 06/30/2022 45* >60 ml/min/1.73m2 Final    GFR est non-AA 06/30/2022 37* >60 ml/min/1.73m2 Final    Calcium 06/30/2022 8.9  8.5 - 10.1 MG/DL Final    Bilirubin, total 06/30/2022 0.9  0.2 - 1.0 MG/DL Final    ALT (SGPT) 06/30/2022 22  16 - 61 U/L Final    AST (SGOT) 06/30/2022 16  10 - 38 U/L Final    Alk. phosphatase 06/30/2022 64  45 - 117 U/L Final    Protein, total 06/30/2022 7.3  6.4 - 8.2 g/dL Final    Albumin 06/30/2022 3.9  3.4 - 5.0 g/dL Final    Globulin 06/30/2022 3.4  2.0 - 4.0 g/dL Final    A-G Ratio 06/30/2022 1.1  0.8 - 1.7   Final    Microalbumin,urine random 06/30/2022 0.67  0 - 3.0 MG/DL Final    Creatinine, urine 06/30/2022 128.00* 30 - 125 mg/dL Final    Microalbumin/Creat ratio (mg/g cre* 06/30/2022 5  0 - 30 mg/g Final    Vitamin D 25-Hydroxy 06/30/2022 43.0  30 - 100 ng/mL Final    Prostate Specific Ag 06/30/2022 0.2  0.0 - 4.0 ng/mL Final    Color 06/30/2022 YELLOW    Final    Appearance 06/30/2022 CLEAR    Final    Specific gravity 06/30/2022 1.011  1.005 - 1.030   Final    pH (UA) 06/30/2022 5.5  5.0 - 8.0   Final    Protein 06/30/2022 Negative  NEG mg/dL Final    Glucose 06/30/2022 Negative  NEG mg/dL Final    Ketone 06/30/2022 Negative  NEG mg/dL Final    Bilirubin 06/30/2022 Negative  NEG   Final    Blood 06/30/2022 Negative  NEG   Final    Urobilinogen 06/30/2022 0.2  0.2 - 1.0 EU/dL Final    Nitrites 06/30/2022 Negative  NEG   Final    Leukocyte Esterase 06/30/2022 Negative  NEG   Final    Vitamin B12 06/30/2022 1,165* 211 - 911 pg/mL Final       Lab Results   Component Value Date/Time    Prostate Specific Ag 0.2 06/30/2022 10:36 AM    Prostate Specific Ag 0.2 12/07/2021 10:52 AM    Prostate Specific Ag 0.3 08/04/2021 01:40 PM       EKG (8/8/2019) sinus rhythm at 68 bpm. Normal intervals. Lack of R waves in V2 and V3 likely lead placement.  No ischemic changes when compared with prior in 8/2018. Health Maintenance:  Screening:    Colorectal: Cologuard (11/2/2017) negative. Due 11/2020. Not wishing further screening. Depression: none   DM (HbA1c/FPG): HbA1c 6.2 (6/2022)   Hepatitis C: negative (1/2018)   Falls: none   DEXA: N/A   PSA/KIEL: PSA 0.2 (6/2022). Dr. Consuelo Whittington   Glaucoma: regular eye exams with Dr. Los Wang (last 5/2022)    Smoking: none   Vitamin D: 43.0 (6/2022)   Medicare Wellness: 3/31/2022      Impression:  Patient Active Problem List   Diagnosis Code    Hyperlipidemia E78.5    Gout M10.9    Vitamin D deficiency E55.9    CAD in native artery I25.10    Hypertensive heart disease without congestive heart failure I11.9    S/P CABG x 2 Z95.1    S/P AVR (aortic valve replacement) Z95.2    Type 2 diabetes mellitus with stage 3 chronic kidney disease, without long-term current use of insulin (HCC) E11.22, N18.30    Stage 3 chronic kidney disease (HCC) N18.30    Prostate cancer ([facility] [address] C61    Essential hypertension I10    Anemia D64.9    Vitamin B12 deficiency E53.8    Overweight (BMI 25.0-29. 9) E66.3    History of 2019 novel coronavirus disease (COVID-19) Z86.16       Plan:  1. Hypertension. Blood pressure remains controlled on metoprolol succinate 50 mg daily, lisinopril 20 mg daily, and amlodipine 10 mg daily. No longer taking hydrochlorothiazide 25 mg daily since weight decreased and became lightheaded. Renal function worsened today with creatinine 1.81/ eGFR 37. Will obtain renal ultrasound to evaluate. Also with evidence of worsening hyponatremia with Na 131. Advised to increase fluid intake. Will continue to monitor. 2. Prostate cancer. [id number] Kaylynn Grade 7 (3 + 4) adenocarcinoma. Patient initially chose active surveillance rather than definitive treatment. Repeat PSA and MRI prostate ordered for 2/2018, but he did not follow-up. Repeat PSA obtained and had increased from 7.24 at diagnosis to 11.3.  Referred for follow-up with Dr. Deidra Maynard, and MRI prostate performed on 12/31/2018 showing several peripheral zone PI-RADS 4 with one area showing questionable capsular bulging laterally, but not a definitive appearance for extracapsular extension. He decided to proceed with EBRT, and underwent fiducial marker and [employer] placement on 3/11/2019 followed by weekly treatments from 3/27-5/6/2019. Tolerated well except for some fatigue which has improved. Previously followed by Dr. Myke Conway and Dr. Deidra Maynard. PSA 1.1 (11/2019) and testosterone 289, indicative of no evidence of active disease. Followed-up with radiation oncology in 11/2020 and PSA improved to 0.4. Not wishing to schedule with Dr. Deidra Maynard currently. Repeat PSA 0.2 today (6/2022). Will continue to monitor every 6 months (due 12/2022). 3. Hyperlipidemia. On moderate intensity dose atorvastatin with LDL 47 and HDL 61, indicative of good control. Will continue to monitor. 4. ASHD s/p 2 vessel CABG. Remains asymptomatic. On aspirin, metoprolol succinate, and statin. Followed by Dr. Sergio Ashraf every 6 months with last visit in 5/2022.  5. Aortic stenosis s/p bioprosthetic AVR. Remains asymptomatic, and underwent repeat echocardiogram in 9/2020 with good functioning valve. Needs antibiotic prophylaxis for procedures. Being followed by Dr. Sergio Ashraf with last visit in 5/2022.   6. Diabetes mellitus. Patient with diagnosis of diabetes mellitus, but review of record shows HbA1c ranging from 5.7-6.3 since 2011 and -112 during that time period. HbA1c slightly worsened today to 6.2. On Ace-I and statin. Continue follow-up with Dr. Melissa Prater for annual eye exams. Foot exam normal (11/2021). Urine microalbumin/ creatinine ratio normal (5 mg/g). Does have evidence of chronic renal disease stage 3. Emphasized importance of lifestyle modifications, including heart healthy diet, regular exercise, and weight loss. 7. Chronic renal disease, stage 3.  Most likely secondary to long standing hypertension and prediabetes, although also appears to have developed at time of CABG and AVR so may be related to hypoperfusion during surgery. On statin and Ace-I. Noting some worsening today with creatinine 1.81/eGFR 37 and advised to increase fluid intake as likely due to hypovolemia given mild hyponatremia. However, given gradual worsening, will obtain renal ultrasound to evaluate. Discussed importance of avoiding NSAIDS and prerenal status. Will continue to follow. 8. History of COVID-19. Patient tested positive for SARS-CoV-2 by PCR testing performed in the [facility] clinic on 1/15/2021. Reported relatively mild symptoms with fatigue, nasal congestion, decreased taste, and cough. Reports symptoms resolved without sequela. He has completed the [facility] COVID-19 vaccine series and received two Pf[employer] booster doses. 9. Macrocytosis without anemia. Mild anemia developed during radiation therapy. Macrocytosis evident. Vitamin B12 level very low in 8/2019 at 191 started on cyanocobalamin 1000 mcg SL daily. Level remains normal indicative of compliance, and anemia now resolved although macrocytosis persists. Also advised to decrease alcohol consumption. Will continue to monitor. 10. Gout. Asymptomatic. On allopurinol. No recent flares. 11. Overweight. Weight decreased 12 pounds since 12/2021 and patient reports that he is eating better with smaller portions and fewer sweets. Emphasized importance of lifestyle modifications, including heart healthy diet, regular exercise, and weight loss. Will monitor. 12. Health maintenance. Completed the [facility] COVID-19 vaccine series and received two Pf[employer] booster doses. Completed 2/2 doses of Sh[employer]rix vaccine. Other immunizations up to date. Completed Cologuard for colorectal cancer screening and due for repeat but unwilling to proceed. Continue regular eye exams with Dr. Naomi Go.   Patient reports that completed eye exam since last visit and will request report. Reports that he has decreased his alcohol consumption and now drinking a case every 2 weeks (previously 1 case per week). Vitamin D level remains normal on maintenance dose supplement. Medicare wellness visit up to date. Patient understands recommendations and agrees with plan. Follow-up in 3 months.

## 2022-07-27 ENCOUNTER — HOSPITAL ENCOUNTER (OUTPATIENT)
Dept: ULTRASOUND IMAGING | Age: 72
Discharge: HOME | End: 2022-07-27
Attending: INTERNAL MEDICINE
Payer: MEDICARE

## 2022-07-27 ENCOUNTER — TELEPHONE (OUTPATIENT)
Dept: INTERNAL MEDICINE CLINIC | Age: 72
End: 2022-07-27

## 2022-07-27 PROCEDURE — 76770 US EXAM ABDO BACK WALL COMP: CPT

## 2022-07-27 NOTE — TELEPHONE ENCOUNTER
US Results (most recent):  Results from Orders Only encounter on 07/06/22    US RETROPERITONEUM COMP    Narrative  Ultrasound retroperitoneal    HISTORY: Chronic kidney disease    COMPARISON: None    FINDINGS: Multiple images from a retroperitoneal ultrasound study. The right kidney measures 10.4 cm in length. Normal cortical thickness and  echotexture. No solid or cystic renal mass. No hydronephrosis. The left kidney measures 10.1 cm in length. Normal cortical thickness and  echotexture. No solid or cystic renal mass. No hydronephrosis. Underdistended bladder. Impression  Sonographic appearance of the kidneys are within normal limits. No  hydronephrosis. Please let the patient know that his kidney ultrasound was normal without any evidence of obstruction. His kidneys also appeared normal.  No further evaluation needed. Please advise that he increase his fluid intake to help with his kidney function.

## 2022-10-04 ENCOUNTER — HOSPITAL ENCOUNTER (OUTPATIENT)
Dept: LAB | Age: 72
Discharge: HOME | End: 2022-10-04
Payer: MEDICARE

## 2022-10-04 ENCOUNTER — APPOINTMENT (OUTPATIENT)
Dept: INTERNAL MEDICINE CLINIC | Age: 72
End: 2022-10-04

## 2022-10-04 DIAGNOSIS — E11.22 TYPE 2 DIABETES MELLITUS WITH STAGE 3A CHRONIC KIDNEY DISEASE, WITHOUT LONG-TERM CURRENT USE OF INSULIN (HCC): ICD-10-CM

## 2022-10-04 DIAGNOSIS — E78.5 HYPERLIPIDEMIA, UNSPECIFIED HYPERLIPIDEMIA TYPE: ICD-10-CM

## 2022-10-04 DIAGNOSIS — I10 ESSENTIAL HYPERTENSION: ICD-10-CM

## 2022-10-04 DIAGNOSIS — N18.31 TYPE 2 DIABETES MELLITUS WITH STAGE 3A CHRONIC KIDNEY DISEASE, WITHOUT LONG-TERM CURRENT USE OF INSULIN (HCC): ICD-10-CM

## 2022-10-04 DIAGNOSIS — E55.9 VITAMIN D DEFICIENCY: ICD-10-CM

## 2022-10-04 DIAGNOSIS — N18.32 STAGE 3B CHRONIC KIDNEY DISEASE (HCC): ICD-10-CM

## 2022-10-04 LAB
25(OH)D3 SERPL-MCNC: 44.2 NG/ML (ref 30–100)
ALBUMIN SERPL-MCNC: 4 G/DL (ref 3.4–5)
ALBUMIN/GLOB SERPL: 1.1 {RATIO} (ref 0.8–1.7)
ALP SERPL-CCNC: 75 U/L (ref 45–117)
ALT SERPL-CCNC: 22 U/L (ref 16–61)
ANION GAP SERPL CALC-SCNC: 7 MMOL/L (ref 3–18)
AST SERPL-CCNC: 14 U/L (ref 10–38)
BASOPHILS # BLD: 0 K/UL (ref 0–0.1)
BASOPHILS NFR BLD: 1 % (ref 0–2)
BILIRUB SERPL-MCNC: 1.2 MG/DL (ref 0.2–1)
BUN SERPL-MCNC: 24 MG/DL (ref 7–18)
BUN/CREAT SERPL: 14 (ref 12–20)
CALCIUM SERPL-MCNC: 9.5 MG/DL (ref 8.5–10.1)
CHLORIDE SERPL-SCNC: 101 MMOL/L (ref 100–111)
CHOLEST SERPL-MCNC: 120 MG/DL
CO2 SERPL-SCNC: 25 MMOL/L (ref 21–32)
CREAT SERPL-MCNC: 1.68 MG/DL (ref 0.6–1.3)
CREAT UR-MCNC: 161 MG/DL (ref 30–125)
DIFFERENTIAL METHOD BLD: ABNORMAL
EOSINOPHIL # BLD: 0.3 K/UL (ref 0–0.4)
EOSINOPHIL NFR BLD: 4 % (ref 0–5)
ERYTHROCYTE [DISTWIDTH] IN BLOOD BY AUTOMATED COUNT: 13.2 % (ref 11.6–14.5)
EST. AVERAGE GLUCOSE BLD GHB EST-MCNC: 108 MG/DL
GLOBULIN SER CALC-MCNC: 3.6 G/DL (ref 2–4)
GLUCOSE SERPL-MCNC: 105 MG/DL (ref 74–99)
HBA1C MFR BLD: 5.4 % (ref 4.2–5.6)
HCT VFR BLD AUTO: 43.3 % (ref 36–48)
HDLC SERPL-MCNC: 68 MG/DL (ref 40–60)
HDLC SERPL: 1.8 {RATIO} (ref 0–5)
HGB BLD-MCNC: 14.1 G/DL (ref 13–16)
IMM GRANULOCYTES # BLD AUTO: 0 K/UL (ref 0–0.04)
IMM GRANULOCYTES NFR BLD AUTO: 0 % (ref 0–0.5)
LDLC SERPL CALC-MCNC: 37 MG/DL (ref 0–100)
LIPID PROFILE,FLP: ABNORMAL
LYMPHOCYTES # BLD: 1.1 K/UL (ref 0.9–3.6)
LYMPHOCYTES NFR BLD: 16 % (ref 21–52)
MAGNESIUM SERPL-MCNC: 2.2 MG/DL (ref 1.6–2.6)
MCH RBC QN AUTO: 32.9 PG (ref 24–34)
MCHC RBC AUTO-ENTMCNC: 32.6 G/DL (ref 31–37)
MCV RBC AUTO: 101.2 FL (ref 78–100)
MICROALBUMIN UR-MCNC: 1.74 MG/DL (ref 0–3)
MICROALBUMIN/CREAT UR-RTO: 11 MG/G (ref 0–30)
MONOCYTES # BLD: 0.6 K/UL (ref 0.05–1.2)
MONOCYTES NFR BLD: 8 % (ref 3–10)
NEUTS SEG # BLD: 5.1 K/UL (ref 1.8–8)
NEUTS SEG NFR BLD: 72 % (ref 40–73)
NRBC # BLD: 0 K/UL (ref 0–0.01)
NRBC BLD-RTO: 0 PER 100 WBC
PLATELET # BLD AUTO: 177 K/UL (ref 135–420)
PMV BLD AUTO: 8.9 FL (ref 9.2–11.8)
POTASSIUM SERPL-SCNC: 5.1 MMOL/L (ref 3.5–5.5)
PROT SERPL-MCNC: 7.6 G/DL (ref 6.4–8.2)
RBC # BLD AUTO: 4.28 M/UL (ref 4.35–5.65)
SODIUM SERPL-SCNC: 133 MMOL/L (ref 136–145)
TRIGL SERPL-MCNC: 75 MG/DL (ref ?–150)
VLDLC SERPL CALC-MCNC: 15 MG/DL
WBC # BLD AUTO: 7.2 K/UL (ref 4.6–13.2)

## 2022-10-04 PROCEDURE — 85025 COMPLETE CBC W/AUTO DIFF WBC: CPT

## 2022-10-04 PROCEDURE — 82306 VITAMIN D 25 HYDROXY: CPT

## 2022-10-04 PROCEDURE — 83735 ASSAY OF MAGNESIUM: CPT

## 2022-10-04 PROCEDURE — 80053 COMPREHEN METABOLIC PANEL: CPT

## 2022-10-04 PROCEDURE — 82043 UR ALBUMIN QUANTITATIVE: CPT

## 2022-10-04 PROCEDURE — 36415 COLL VENOUS BLD VENIPUNCTURE: CPT

## 2022-10-04 PROCEDURE — 83036 HEMOGLOBIN GLYCOSYLATED A1C: CPT

## 2022-10-04 PROCEDURE — 80061 LIPID PANEL: CPT

## 2022-10-11 ENCOUNTER — OFFICE VISIT (OUTPATIENT)
Dept: INTERNAL MEDICINE CLINIC | Age: 72
End: 2022-10-11
Payer: MEDICARE

## 2022-10-11 VITALS
HEART RATE: 70 BPM | DIASTOLIC BLOOD PRESSURE: 62 MMHG | RESPIRATION RATE: 16 BRPM | BODY MASS INDEX: 27.96 KG/M2 | TEMPERATURE: 97 F | HEIGHT: 73 IN | OXYGEN SATURATION: 98 % | WEIGHT: 211 LBS | SYSTOLIC BLOOD PRESSURE: 125 MMHG

## 2022-10-11 DIAGNOSIS — E78.5 HYPERLIPIDEMIA, UNSPECIFIED HYPERLIPIDEMIA TYPE: ICD-10-CM

## 2022-10-11 DIAGNOSIS — Z95.2 S/P AVR (AORTIC VALVE REPLACEMENT): ICD-10-CM

## 2022-10-11 DIAGNOSIS — I10 ESSENTIAL HYPERTENSION: Primary | ICD-10-CM

## 2022-10-11 DIAGNOSIS — Z86.16 HISTORY OF 2019 NOVEL CORONAVIRUS DISEASE (COVID-19): ICD-10-CM

## 2022-10-11 DIAGNOSIS — C61 PROSTATE CANCER (HCC): ICD-10-CM

## 2022-10-11 DIAGNOSIS — E11.22 TYPE 2 DIABETES MELLITUS WITH STAGE 3A CHRONIC KIDNEY DISEASE, WITHOUT LONG-TERM CURRENT USE OF INSULIN (HCC): ICD-10-CM

## 2022-10-11 DIAGNOSIS — E53.8 VITAMIN B12 DEFICIENCY: ICD-10-CM

## 2022-10-11 DIAGNOSIS — N18.32 STAGE 3B CHRONIC KIDNEY DISEASE (HCC): ICD-10-CM

## 2022-10-11 DIAGNOSIS — Z23 NEEDS FLU SHOT: ICD-10-CM

## 2022-10-11 DIAGNOSIS — I25.10 CAD IN NATIVE ARTERY: ICD-10-CM

## 2022-10-11 DIAGNOSIS — E66.3 OVERWEIGHT (BMI 25.0-29.9): ICD-10-CM

## 2022-10-11 DIAGNOSIS — N18.31 TYPE 2 DIABETES MELLITUS WITH STAGE 3A CHRONIC KIDNEY DISEASE, WITHOUT LONG-TERM CURRENT USE OF INSULIN (HCC): ICD-10-CM

## 2022-10-11 PROCEDURE — G8417 CALC BMI ABV UP PARAM F/U: HCPCS | Performed by: INTERNAL MEDICINE

## 2022-10-11 PROCEDURE — G8510 SCR DEP NEG, NO PLAN REQD: HCPCS | Performed by: INTERNAL MEDICINE

## 2022-10-11 PROCEDURE — 90694 VACC AIIV4 NO PRSRV 0.5ML IM: CPT | Performed by: INTERNAL MEDICINE

## 2022-10-11 PROCEDURE — G0463 HOSPITAL OUTPT CLINIC VISIT: HCPCS | Performed by: INTERNAL MEDICINE

## 2022-10-11 PROCEDURE — G8752 SYS BP LESS 140: HCPCS | Performed by: INTERNAL MEDICINE

## 2022-10-11 PROCEDURE — 2022F DILAT RTA XM EVC RTNOPTHY: CPT | Performed by: INTERNAL MEDICINE

## 2022-10-11 PROCEDURE — 1123F ACP DISCUSS/DSCN MKR DOCD: CPT | Performed by: INTERNAL MEDICINE

## 2022-10-11 PROCEDURE — G8427 DOCREV CUR MEDS BY ELIG CLIN: HCPCS | Performed by: INTERNAL MEDICINE

## 2022-10-11 PROCEDURE — 1101F PT FALLS ASSESS-DOCD LE1/YR: CPT | Performed by: INTERNAL MEDICINE

## 2022-10-11 PROCEDURE — G8754 DIAS BP LESS 90: HCPCS | Performed by: INTERNAL MEDICINE

## 2022-10-11 PROCEDURE — 99214 OFFICE O/P EST MOD 30 MIN: CPT | Performed by: INTERNAL MEDICINE

## 2022-10-11 PROCEDURE — 3044F HG A1C LEVEL LT 7.0%: CPT | Performed by: INTERNAL MEDICINE

## 2022-10-11 PROCEDURE — G8536 NO DOC ELDER MAL SCRN: HCPCS | Performed by: INTERNAL MEDICINE

## 2022-10-11 PROCEDURE — 3017F COLORECTAL CA SCREEN DOC REV: CPT | Performed by: INTERNAL MEDICINE

## 2022-10-11 NOTE — PROGRESS NOTES
Avila Owen 1950 male who presents for routine immunizations. Patient denies any symptoms , reactions or allergies that would exclude them from being immunized today. Risks and adverse reactions were discussed and the VIS was given to them. All questions were addressed. Order placed for HD Influenza,  per Verbal Order from  with read back. Patient was observed for 15 min post injection. There were no reactions observed. Meenakshi Childs LPN      1. \"Have you been to the ER, urgent care clinic since your last visit? Hospitalized since your last visit? \" No    2. \"Have you seen or consulted any other health care providers outside of the [address] [place] [address] since your last visit? \" No     3. For patients aged 39-70: Has the patient had a colonoscopy / FIT/ Cologuard? Yes - no Care Gap present      If the patient is female:    4. For patients aged 41-77: Has the patient had a mammogram within the past 2 years? NA - based on age or sex      11. For patients aged 21-65: Has the patient had a pap smear?  NA - based on age or sex

## 2022-10-11 NOTE — PATIENT INSTRUCTIONS
Vaccine Information Statement    Influenza (Flu) Vaccine (Inactivated or Recombinant): What You Need to Know    Many vaccine information statements are available in [place] and other languages. See www.immunize.org/vis. Hojas de información sobre vacunas están disponibles en español y en muchos otros idiomas. Visite www.immunize.org/vis. 1. Why get vaccinated? Influenza vaccine can prevent influenza (flu). Flu is a contagious disease that spreads around the [place] [facility] every year, usually between October and May. Anyone can get the flu, but it is more dangerous for some people. Infants and young children, people 72 years and older, pregnant people, and people with certain health conditions or a weakened immune system are at greatest risk of flu complications. Pneumonia, bronchitis, sinus infections, and ear infections are examples of flu-related complications. If you have a medical condition, such as heart disease, cancer, or diabetes, flu can make it worse. Flu can cause fever and chills, sore throat, muscle aches, fatigue, cough, headache, and runny or stuffy nose. Some people may have vomiting and diarrhea, though this is more common in children than adults. In an average year, thousands of people in the [facility] die from flu, and many more are hospitalized. Flu vaccine prevents millions of illnesses and flu-related visits to the doctor each year. 2. Influenza vaccines     CDC recommends everyone 6 months and older get vaccinated every flu season. Children 6 months through 6years of age may need 2 doses during a single flu season. Everyone else needs only 1 dose each flu season. It takes about 2 weeks for protection to develop after vaccination. There are many flu viruses, and they are always changing. Each year a new flu vaccine is made to protect against the influenza viruses believed to be likely to cause disease in the upcoming flu season.  Even when the vaccine doesnt exactly match these viruses, it may still provide some protection. Influenza vaccine does not cause flu. Influenza vaccine may be given at the same time as other vaccines. 3. Talk with your health care provider    Tell your vaccination provider if the person getting the vaccine:  Has had an allergic reaction after a previous dose of influenza vaccine, or has any severe, life-threatening allergies   Has ever had Guillain-Barré Syndrome (also called GBS)    In some cases, your health care provider may decide to postpone influenza vaccination until a future visit. Influenza vaccine can be administered at any time during pregnancy. People who are or will be pregnant during influenza season should receive inactivated influenza vaccine. People with minor illnesses, such as a cold, may be vaccinated. People who are moderately or severely ill should usually wait until they recover before getting influenza vaccine. Your health care provider can give you more information. 4. Risks of a vaccine reaction    Soreness, redness, and swelling where the shot is given, fever, muscle aches, and headache can happen after influenza vaccination. There may be a very small increased risk of Guillain-Barré Syndrome (GBS) after inactivated influenza vaccine (the flu shot). Ardine Gum children who get the flu shot along with pneumococcal vaccine (PCV13) and/or DTaP vaccine at the same time might be slightly more likely to have a seizure caused by fever. Tell your health care provider if a child who is getting flu vaccine has ever had a seizure. People sometimes faint after medical procedures, including vaccination. Tell your provider if you feel dizzy or have vision changes or ringing in the ears. As with any medicine, there is a very remote chance of a vaccine causing a severe allergic reaction, other serious injury, or death. 5. What if there is a serious problem?     An allergic reaction could occur after the vaccinated person leaves the clinic. If you see signs of a severe allergic reaction (hives, swelling of the face and throat, difficulty breathing, a fast heartbeat, dizziness, or weakness), call 9-1-1 and get the person to the nearest hospital.    For other signs that concern you, call your health care provider. Adverse reactions should be reported to the Vaccine Adverse Event Reporting System (VAERS). Your health care provider will usually file this report, or you can do it yourself. Visit the VAERS website at www.vaers. [facility].gov or call 4-633.800.2591. VAERS is only for reporting reactions, and VAERS staff members do not give medical advice. 6. The National Vaccine Injury Compensation Program    The [facility] Vaccine Injury Compensation Program (VICP) is a federal program that was created to compensate people who may have been injured by certain vaccines. Claims regarding alleged injury or death due to vaccination have a time limit for filing, which may be as short as two years. Visit the VICP website at www.[facility].gov/vaccinecompensation or call 0-994.408.8768 to learn about the program and about filing a claim. 7. How can I learn more? Ask your health care provider. Call your local or state health department. Visit the website of the Food and Drug Administration (FDA) for vaccine package inserts and additional information at www.fda.gov/vaccines-blood-biologics/vaccines. Contact the Centers for Disease Control and Prevention (CDC): Call 2-765.293.5852 (1-800-CDC-INFO) or  Visit CDCs influenza website at www.cdc.gov/flu. Vaccine Information Statement   Inactivated Influenza Vaccine   8/6/2021  42 CAMMYVictorino Lou Flavin [id number]     Department of Health and Human Services  Centers for Disease Control and Prevention    Office Use Only         Heart-Healthy Diet: Care Instructions  Your Care Instructions     A heart-healthy diet has lots of vegetables, fruits, nuts, beans, and whole grains, and is low in salt. It limits foods that are high in saturated fat, such as meats, cheeses, and fried foods. It may be hard to change your diet, but even small changes can lower your risk of heart attack and heart disease. Follow-up care is a key part of your treatment and safety. Be sure to make and go to all appointments, and call your doctor if you are having problems. It's also a good idea to know your test results and keep a list of the medicines you take. How can you care for yourself at home? Watch your portions  Learn what a serving is. A \"serving\" and a \"portion\" are not always the same thing. Make sure that you are not eating larger portions than are recommended. For example, a serving of pasta is ½ cup. A serving size of meat is 2 to 3 ounces. A 3-ounce serving is about the size of a deck of cards. Measure serving sizes until you are good at [place]" them. Keep in mind that restaurants often serve portions that are 2 or 3 times the size of one serving. To keep your energy level up and keep you from feeling hungry, eat often but in smaller portions. Eat only the number of calories you need to stay at a healthy weight. If you need to lose weight, eat fewer calories than your body burns (through exercise and other physical activity). Eat more fruits and vegetables  Eat a variety of fruit and vegetables every day. Dark green, deep orange, red, or yellow fruits and vegetables are especially good for you. Examples include spinach, carrots, peaches, and berries. Keep carrots, celery, and other veggies handy for snacks. Buy fruit that is in season and store it where you can see it so that you will be tempted to eat it. Cook dishes that have a lot of veggies in them, such as stir-fries and soups. Limit saturated and trans fat  Read food labels, and try to avoid saturated and trans fats. They increase your risk of heart disease. Use olive or canola oil when you cook.   Bake, broil, grill, or steam foods instead of frying them.  Choose lean meats instead of high-fat meats such as hot dogs and sausages. Cut off all visible fat when you prepare meat. Eat fish, skinless poultry, and meat alternatives such as soy products instead of high-fat meats. Soy products, such as tofu, may be especially good for your heart. Choose low-fat or fat-free milk and dairy products. Eat foods high in fiber  Eat a variety of grain products every day. Include whole-grain foods that have lots of fiber and nutrients. Examples of whole-grain foods include oats, whole wheat bread, and brown rice. Buy whole-grain breads and cereals, instead of white bread or pastries. Limit salt and sodium  Limit how much salt and sodium you eat to help lower your blood pressure. Taste food before you salt it. Add only a little salt when you think you need it. With time, your taste buds will adjust to less salt. Eat fewer snack items, fast foods, and other high-salt, processed foods. Check food labels for the amount of sodium in packaged foods. Choose low-sodium versions of canned goods (such as soups, vegetables, and beans). Limit sugar  Limit drinks and foods with added sugar. These include candy, desserts, and soda pop. Limit alcohol  Limit alcohol to no more than 2 drinks a day for men and 1 drink a day for women. Too much alcohol can cause health problems. When should you call for help? Watch closely for changes in your health, and be sure to contact your doctor if:    You would like help planning heart-healthy meals. Where can you learn more? Go to http://luis e-erica.info/  Enter V137 in the search box to learn more about \"Heart-Healthy Diet: Care Instructions. \"  Current as of: August 22, 2019               Content Version: 12.6  © 7154-2518 [employer]. Care instructions adapted under license by [employer] (which disclaims liability or warranty for this information).  If you have questions about a medical condition or this instruction, always ask your healthcare professional. Nany Casper any warranty or liability for your use of this information. Learning About Low-Sodium Foods  What foods are low in sodium? The foods you eat contain nutrients, such as vitamins and minerals. Sodium is a nutrient. Your body needs the right amount to stay healthy and work as it should. You can use the list below to help you make choices about which foods to eat. Fruits  Fresh, frozen, canned, or dried fruit  Vegetables  Fresh or frozen vegetables, with no added salt  Canned vegetables, low-sodium or with no added salt  Grains  Bagels without salted tops  Cereal with no added salt  Corn tortillas  Crackers with no added salt  Oatmeal, cooked without salt  Popcorn with no salt  Pasta and noodles, cooked without salt  Rice, cooked without salt  Unsalted pretzels  Dairy and dairy alternatives  Butter, unsalted  Cream cheese  Ice cream  Milk  Soy milk  Meats and other protein foods  Beans and peas, canned with no salt  Eggs  Fresh fish (not smoked)  Fresh meats (not smoked or cured)  Nuts and nut butter, prepared without salt  Poultry, not packaged with sodium solution  Tofu, unseasoned  Tuna, canned without salt  Seasonings  Garlic  Herbs and spices  Lemon juice  Mustard  Olive oil  Salt-free seasoning mixes  Vinegar  Work with your doctor to find out how much of this nutrient you need. Depending on your health, you may need more or less of it in your diet. Where can you learn more? Go to http://luis e-erica.info/  Enter S460 in the search box to learn more about \"Learning About Low-Sodium Foods. \"  Current as of: August 22, 2019               Content Version: 12.6  © 7693-9826 [employer]. Care instructions adapted under license by [employer] (which disclaims liability or warranty for this information).  If you have questions about a medical condition or this instruction, always ask your healthcare professional. [address] any warranty or liability for your use of this information. Low Sodium Diet (2,000 Milligram): Care Instructions  Your Care Instructions     Too much sodium causes your body to hold on to extra water. This can raise your blood pressure and force your heart and kidneys to work harder. In very serious cases, this could cause you to be put in the hospital. It might even be life-threatening. By limiting sodium, you will feel better and lower your risk of serious problems. The most common source of sodium is salt. People get most of the salt in their diet from canned, prepared, and packaged foods. Fast food and restaurant meals also are very high in sodium. Your doctor will probably limit your sodium to less than 2,000 milligrams (mg) a day. This limit counts all the sodium in prepared and packaged foods and any salt you add to your food. Follow-up care is a key part of your treatment and safety. Be sure to make and go to all appointments, and call your doctor if you are having problems. It's also a good idea to know your test results and keep a list of the medicines you take. How can you care for yourself at home? Read food labels  Read labels on cans and food packages. The labels tell you how much sodium is in each serving. Make sure that you look at the serving size. If you eat more than the serving size, you have eaten more sodium. Food labels also tell you the Percent Daily Value for sodium. Choose products with low Percent Daily Values for sodium. Be aware that sodium can come in forms other than salt, including monosodium glutamate (MSG), sodium citrate, and sodium bicarbonate (baking soda). MSG is often added to Asian food. When you eat out, you can sometimes ask for food without MSG or added salt.   Buy low-sodium foods  Buy foods that are labeled \"unsalted\" (no salt added), \"sodium-free\" (less than 5 mg of sodium per serving), or \"low-sodium\" (less than 140 mg of sodium per serving). Foods labeled \"reduced-sodium\" and \"light sodium\" may still have too much sodium. Be sure to read the label to see how much sodium you are getting. Buy fresh vegetables, or frozen vegetables without added sauces. Buy low-sodium versions of canned vegetables, soups, and other canned goods. Prepare low-sodium meals  Cut back on the amount of salt you use in cooking. This will help you adjust to the taste. Do not add salt after cooking. One teaspoon of salt has about 2,300 mg of sodium. Take the salt shaker off the table. Flavor your food with garlic, lemon juice, onion, vinegar, herbs, and spices. Do not use soy sauce, lite soy sauce, steak sauce, onion salt, garlic salt, celery salt, mustard, or ketchup on your food. Use low-sodium salad dressings, sauces, and ketchup. Or make your own salad dressings and sauces without adding salt. Use less salt (or none) when recipes call for it. You can often use half the salt a recipe calls for without losing flavor. Other foods such as rice, pasta, and grains do not need added salt. Rinse canned vegetables, and cook them in fresh water. This removes some--but not all--of the salt. Avoid water that is naturally high in sodium or that has been treated with water softeners, which add sodium. Call your local water company to find out the sodium content of your water supply. If you buy bottled water, read the label and choose a sodium-free brand. Avoid high-sodium foods  Avoid eating:  Smoked, cured, salted, and canned meat, fish, and poultry. Ham, jesus, hot dogs, and luncheon meats. Regular, hard, and processed cheese and regular peanut butter. Crackers with salted tops, and other salted snack foods such as pretzels, chips, and salted popcorn. Frozen prepared meals, unless labeled low-sodium. Canned and dried soups, broths, and bouillon, unless labeled sodium-free or low-sodium.   Canned vegetables, unless labeled sodium-free or low-sodium. [place] fries, pizza, tacos, and other fast foods. Pickles, olives, ketchup, and other condiments, especially soy sauce, unless labeled sodium-free or low-sodium. Where can you learn more? Go to http://www.gray.com/  Enter V843 in the search box to learn more about \"Low Sodium Diet (2,000 Milligram): Care Instructions. \"  Current as of: August 22, 2019               Content Version: 12.6  © 8188-4780 [employer]. Care instructions adapted under license by [employer] (which disclaims liability or warranty for this information). If you have questions about a medical condition or this instruction, always ask your healthcare professional. [address] any warranty or liability for your use of this information.

## 2022-10-12 RX ORDER — METOPROLOL SUCCINATE 50 MG/1
TABLET, EXTENDED RELEASE ORAL
Qty: 90 TABLET | Refills: 3 | Status: SHIPPED | OUTPATIENT
Start: 2022-10-12

## 2022-10-16 NOTE — PROGRESS NOTES
HPI:   Raul Mackey is a 67y.o. year old male who presents for a routine visit. He has a history of hypertension, hyperlipidemia, ASHD s/p CABG, aortic stenosis s/p AVR, diabetes mellitus, chronic renal disease stage 3, prostate cancer, and gout. He has completed the [employer] COVID-19 vaccine series and received two Pfizer booster doses. He states that he has been eating less with smaller portions and is pleased that his weight has decreased a total of 20 pounds since 11/2021. He reports that he is otherwise doing generally well and is without specific complaints. Summary of prior hospitalizations and medical history:  In 6/2017, he was noted to have an elevated PSA of 6.0, which was confirmed on repeat to be 6.6. He was referred to Dr. Leighton Jack and PSA was again repeated and found to be increased at 7.24. He underwent TRUS biopsy on 8/17/2017, which showed [id number] Thatcher Grade 7 (3 + 4) adenocarcinoma of the prostate in 3/12 cores, 2-10% of each core. Options for management were discussed and he selected active surveillance. Plans were for repeat PSA and MRI prostate in 6 months (due 2/2018). However, the patient never had tests performed and he did not follow-up as discussed. On 10/4/2018, at his routine visit, a PSA level was obtained and was found to have increased to 11.3, and he was advised to follow-up with Dr. Leighton Jack. He underwent a prostate MRI 3T at [facility] on 12/31/2018 showing several peripheral zone PI-RADS 4 observations: right posterolateral peripheral zone at the mid gland/apex and anterior peripheral zone at the apex bilaterally. The former exhibits questionable capsular bulging laterally, but not a definitive appearance for extracapsular extension. He had a follow-up appointment with Dr. Leighton Jack on 1/11/2019 and decision made to proceed with EBRT.  He had a staging bone scan (1/23/2019) which was negative for osseous metastases, and a CT abdomen and pelvis (1/23/2019) which showed no adenopathy of evidence of metastases, mild hepatic steatosis, aortic atherosclerosis, and cholelithiasis. He met with Dr. Romero Ayers on 1/30/2019 and decided to proceed with EBRT as definitive treatment for his prostate cancer, receiving his last treatment on 5/6/2019. Follow-up PSA level in 11/2020 at 0.4. He has a history of hypertension, hyperlipidemia, ASHD, and aortic stenosis. In 10/2014, he presented with progressive chest pain and shortness of breath and an echocardiogram was obtained (10/16/2014) showing normal LV function (EF 55-60%), no RWMA, grade 1 diastolic dysfunction, mild LAE, and severe AS (mean gradient 30 mmHg, peak 67 mmHg, and AV area 0.8 cm2). He was referred to see Dr. Cayla Brown and underwent cardiac catheterization (11/6/2014) showing 100% RCA (distal collaterals from left), 70-80% distal LAD, 80% proximal LCx, inferior basal hypokinesis, and EF 55%. On 11/13/2014, he underwent 2 vessel CABG (LIMA to LAD and SVG to OM1) and AV replacement with a bioprosthetic 23 mm Marifer Barbosa Magna pericardial valve by Dr. Yolanda Enrique. He has done well since that time and remains asymptomatic. He denies any chest pain, shortness of breath at rest or with exertion, palpitations, lightheadedness, or edema. His current regimen includes aspirin, metoprolol, lisinopril, and moderate intensity dose atorvastatin. He is followed by Dr. Cayla Brown. He had a repeat echocardiogram (8/21/2017) showing normal LV function (EF 55-60%), no RWMA, mild MIL, and normally functioning bioprosthetic valve with peak gradient 17 mmHg, mean gradient 10 mmHg, and valve area 1.86 cm2. Repeat echocardiogram (9/18/2020) showed normal LV size and function (EF 55-60%), AV prosthetic functioning normally with mean gradient of 7.2mmHg and MARY 1.8 cm2. Moderate LAE, and mild MR. He has a history of diabetes mellitus, which has not required treatment with medication.  Review of his record shows that his Hba1c has remained between 5.7-6.3 since 2011. He denies any polyuria, polydipsia, nocturia, or blurry vision, and has no history of retinopathy or neuropathy. He does have evidence of chronic renal disease, stage 3, with baseline creatinine 1.22-1.37/ eGFR 55-59 since 11/2014. He had been having regular eye exams with Dr. Ye Laughlin. He has a history of gout, but has not had a flare in many years since being treated with allopurinol. He has never had a screening colonoscopy. He did undergo Cologuard testing in 11/2017 which was negative. He denies any abdominal pain, nausea, vomiting, melena, hematochezia, or change in bowel movements. Past Medical History:   Diagnosis Date    Aortic stenosis     s/p AVR    Aortic stenosis, severe 10/16/2014    Echocardiogram EF 60% peak gradient 67 mmHg, mean gradient 30 mmHg, MARY 0.8 cm    ASHD (arteriosclerotic heart disease)     Chronic renal disease, stage III (HCC)     DM (diabetes mellitus) (HCC)     Gout     HCD (hypertensive cardiovascular disease)     History of echocardiogram 10/16/2014    EF 55-60%. No RWMA. Mild LVH. Gr 1 DDfx. Mild LAE. Severe AS (mean grad 30 mmHg). Hyperlipidemia     Hypertension     Prostate cancer ([address] 08/17/2017    [id number] Kaylynn Grade 7 (3 + 4) adenocarcinoma of the prostate in 3/12 cores, 2-10% of each core; PSA 7.24. Dr. Paige Saenz. S/P AVR (aortic valve replacement) 11/13/2014    #23 mm Marifer Barbosa Magna pericardial valve. Dr. Franc Santacruz. S/P CABG x 2 11/13/2014    LIMA to LAD, SVG to OM1. Dr. Franc Santacruz. S/P cardiac catheterization 11/06/2014    RCA dom. mRCA 100%. LM patent. dLAD 75% x 2. pCX 80% (ELENA-3). LVEDP 14 mmHg. EF 50-55%. Severe inferobasal hypk. Severe AS. AV replacement & CABG recommended. Past Surgical History:   Procedure Laterality Date    HX APPENDECTOMY       Current Outpatient Medications   Medication Sig    simvastatin (ZOCOR) 40 mg tablet Take 40 mg by mouth nightly.     atorvastatin (LIPITOR) 20 mg tablet TAKE 1 TABLET DAILY    lisinopriL (PRINIVIL, ZESTRIL) 20 mg tablet TAKE 1 TABLET DAILY    amLODIPine (NORVASC) 10 mg tablet TAKE 1 TABLET DAILY    allopurinoL (ZYLOPRIM) 100 mg tablet TAKE 1 TABLET DAILY    fluticasone propionate (Flonase Allergy Relief) 50 mcg/actuation nasal spray 2 Sprays by Both Nostrils route daily as needed for Rhinitis or Allergies. sodium chloride (Saline Nasal Mist) 0.65 % nasal squeeze bottle 0.05 mL by Both Nostrils route as needed for Congestion. acetaminophen (TYLENOL) 500 mg tablet Take  by mouth every six (6) hours as needed for Pain. cyanocobalamin, vitamin B-12, 1,000 mcg lozg 1,000 mcg by SubLINGual route daily. ascorbic acid, vitamin C, (Vitamin C) 500 mg tablet Take  by mouth.    sildenafil citrate (VIAGRA) 100 mg tablet Take 1 Tab by mouth as needed. docusate sodium (Colace) 100 mg capsule Take 100 mg by mouth daily as needed. cholecalciferol, vitamin D3, 2,000 unit tab Take  by mouth daily. aspirin delayed-release 81 mg tablet Take 1 tablet by mouth daily. metoprolol succinate (TOPROL-XL) 50 mg XL tablet TAKE 1 TABLET DAILY     No current facility-administered medications for this visit. Allergies and Intolerances:   No Known Allergies     Family History:   Family History   Problem Relation Age of Onset    COPD Father     Other Father         pneumoconiosis    Lung Disease Father     Other Mother         meigs syndrome     Social History:   He  reports that he has never smoked. He has never used smokeless tobacco. He reports that he drinks approximately one case of beer per week. He is  with two adult children. He is retired from working in the [employer]. He started as a , and then became an .     Social History     Substance and Sexual Activity   Alcohol Use Yes    Alcohol/week: 12.0 standard drinks    Types: 12 Cans of beer per week     Immunization History:  Immunization History   Administered Date(s) Administered COVID-19, PFIZER PURPLE top, DILUTE for use, (age 15 y+), IM, 30mcg/0.3mL 04/27/2021, 05/18/2021, 12/16/2021, 05/02/2022    Influenza High Dose Vaccine PF 10/10/2017, 10/26/2018, 11/09/2019    Influenza Vaccine 10/14/2014    Influenza Vaccine (Tri) Adjuvanted (>65 Yrs FLUAD TRI [id number]) 10/04/2018, 11/09/2019    Influenza Vaccine Split 09/29/2011    Influenza Vaccine Whole 09/14/2010    Influenza, FLUAD, (age 72 y+), Adjuvanted 09/30/2020, 10/20/2021, 10/11/2022    Pneumococcal Conjugate (PCV-13) 06/29/2017    Pneumococcal Polysaccharide (PPSV-23) 10/14/2014    Td 01/01/2008    Tdap 01/12/2018    Zoster Recombinant 11/30/2020, 02/06/2021       Review of Systems:   As above included in HPI. Otherwise 11 point review of systems negative including constitutional, skin, HENT, eyes, respiratory, cardiovascular, gastrointestinal, genitourinary, musculoskeletal, endocrine, hematologic, allergy, and neurologic. Physical:   Visit Vitals  /62 (BP 1 Location: Left upper arm, BP Patient Position: Sitting)   Pulse 70   Temp 97 °F (36.1 °C) (Temporal)   Resp 16   Ht 6' 1\" (1.854 m)   Wt 211 lb (95.7 kg)   SpO2 98%   BMI 27.84 kg/m²         Exam:   Patient appears in no apparent distress. Affect is appropriate. HEENT: PERRLA, anicteric, no JVD, adenopathy or thyromegaly. No carotid bruits or radiated murmur. Lungs: clear to auscultation, no wheezes, rhonchi, or rales. Heart: regular rate and rhythm. No murmur, rubs, gallops  Abdomen: soft, nontender, nondistended, normal bowel sounds, no hepatosplenomegaly or masses. Extremities: without edema. Review of Data:  Labs:  No visits with results within 2 Day(s) from this visit.    Latest known visit with results is:   Hospital Outpatient Visit on 10/04/2022   Component Date Value Ref Range Status    WBC 10/04/2022 7.2  4.6 - 13.2 K/uL Final    RBC 10/04/2022 4.28 (A)  4.35 - 5.65 M/uL Final    HGB 10/04/2022 14.1  13.0 - 16.0 g/dL Final    HCT 10/04/2022 43.3 36.0 - 48.0 % Final    MCV 10/04/2022 101.2 (A)  78.0 - 100.0 FL Final    MCH 10/04/2022 32.9  24.0 - 34.0 PG Final    MCHC 10/04/2022 32.6  31.0 - 37.0 g/dL Final    RDW 10/04/2022 13.2  11.6 - 14.5 % Final    PLATELET 73/86/0190 181  135 - 420 K/uL Final    MPV 10/04/2022 8.9 (A)  9.2 - 11.8 FL Final    NRBC 10/04/2022 0.0  0  WBC Final    ABSOLUTE NRBC 10/04/2022 0.00  0.00 - 0.01 K/uL Final    NEUTROPHILS 10/04/2022 72  40 - 73 % Final    LYMPHOCYTES 10/04/2022 16 (A)  21 - 52 % Final    MONOCYTES 10/04/2022 8  3 - 10 % Final    EOSINOPHILS 10/04/2022 4  0 - 5 % Final    BASOPHILS 10/04/2022 1  0 - 2 % Final    IMMATURE GRANULOCYTES 10/04/2022 0  0.0 - 0.5 % Final    ABS. NEUTROPHILS 10/04/2022 5.1  1.8 - 8.0 K/UL Final    ABS. LYMPHOCYTES 10/04/2022 1.1  0.9 - 3.6 K/UL Final    ABS. MONOCYTES 10/04/2022 0.6  0.05 - 1.2 K/UL Final    ABS. EOSINOPHILS 10/04/2022 0.3  0.0 - 0.4 K/UL Final    ABS. BASOPHILS 10/04/2022 0.0  0.0 - 0.1 K/UL Final    ABS. IMM. GRANS.  10/04/2022 0.0  0.00 - 0.04 K/UL Final    DF 10/04/2022 AUTOMATED    Final    Hemoglobin A1c 10/04/2022 5.4  4.2 - 5.6 % Final    Est. average glucose 10/04/2022 108  mg/dL Final    LIPID PROFILE 10/04/2022        Final    Cholesterol, total 10/04/2022 120  <200 MG/DL Final    Triglyceride 10/04/2022 75  <150 MG/DL Final    HDL Cholesterol 10/04/2022 68 (A)  40 - 60 MG/DL Final    LDL, calculated 10/04/2022 37  0 - 100 MG/DL Final    VLDL, calculated 10/04/2022 15  MG/DL Final    CHOL/HDL Ratio 10/04/2022 1.8  0 - 5.0   Final    Magnesium 10/04/2022 2.2  1.6 - 2.6 mg/dL Final    Sodium 10/04/2022 133 (A)  136 - 145 mmol/L Final    Potassium 10/04/2022 5.1  3.5 - 5.5 mmol/L Final    Chloride 10/04/2022 101  100 - 111 mmol/L Final    CO2 10/04/2022 25  21 - 32 mmol/L Final    Anion gap 10/04/2022 7  3.0 - 18 mmol/L Final    Glucose 10/04/2022 105 (A)  74 - 99 mg/dL Final    BUN 10/04/2022 24 (A)  7.0 - 18 MG/DL Final    Creatinine 10/04/2022 1.68 (A) 0.6 - 1.3 MG/DL Final    BUN/Creatinine ratio 10/04/2022 14  12 - 20   Final    eGFR 10/04/2022 43 (A)  >60 ml/min/1.73m2 Final    Calcium 10/04/2022 9.5  8.5 - 10.1 MG/DL Final    Bilirubin, total 10/04/2022 1.2 (A)  0.2 - 1.0 MG/DL Final    ALT (SGPT) 10/04/2022 22  16 - 61 U/L Final    AST (SGOT) 10/04/2022 14  10 - 38 U/L Final    Alk. phosphatase 10/04/2022 75  45 - 117 U/L Final    Protein, total 10/04/2022 7.6  6.4 - 8.2 g/dL Final    Albumin 10/04/2022 4.0  3.4 - 5.0 g/dL Final    Globulin 10/04/2022 3.6  2.0 - 4.0 g/dL Final    A-G Ratio 10/04/2022 1.1  0.8 - 1.7   Final    Microalbumin,urine random 10/04/2022 1.74  0 - 3.0 MG/DL Final    Creatinine, urine random 10/04/2022 161.00 (A)  30 - 125 mg/dL Final    Microalbumin/Creat ratio (mg/g cre* 10/04/2022 11  0 - 30 mg/g Final    Vitamin D 25-Hydroxy 10/04/2022 44.2  30 - 100 ng/mL Final       Lab Results   Component Value Date/Time    Prostate Specific Ag 0.2 06/30/2022 10:36 AM    Prostate Specific Ag 0.2 12/07/2021 10:52 AM    Prostate Specific Ag 0.3 08/04/2021 01:40 PM       EKG (8/8/2019) sinus rhythm at 68 bpm. Normal intervals. Lack of R waves in V2 and V3 likely lead placement. No ischemic changes when compared with prior in 8/2018. Health Maintenance:  Screening:    Colorectal: Cologuard (11/2/2017) negative. Due 11/2020. Not wishing further screening. Depression: none   DM (HbA1c/FPG): HbA1c 5.4 (10/2022)   Hepatitis C: negative (1/2018)   Falls: none   DEXA: N/A   PSA/KIEL: PSA 0.2 (6/2022).  Dr. Suzanne Montez   Glaucoma: regular eye exams with Dr. Nola Monson (last 5/2022)    Smoking: none   Vitamin D: 44.2 (10/2022)   Medicare Wellness: 3/31/2022      Impression:  Patient Active Problem List   Diagnosis Code    Hyperlipidemia E78.5    Gout M10.9    Vitamin D deficiency E55.9    CAD in native artery I25.10    Hypertensive heart disease without congestive heart failure I11.9    S/P CABG x 2 Z95.1    S/P AVR (aortic valve replacement) Z95.2    Type 2 diabetes mellitus with stage 3 chronic kidney disease, without long-term current use of insulin (HCC) E11.22, N18.30    Stage 3 chronic kidney disease ([facility] [address] N18.30    Prostate cancer ([facility] [address] C61    Essential hypertension I10    Vitamin B12 deficiency E53.8    Overweight (BMI 25.0-29. 9) E66.3    History of 2019 novel coronavirus disease (COVID-19) Z86.16       Plan:  1. Hypertension. Blood pressure remains controlled on metoprolol succinate 50 mg daily, lisinopril 20 mg daily, and amlodipine 10 mg daily. No longer taking hydrochlorothiazide 25 mg daily since weight decreased and became lightheaded. Renal function improved today with creatinine 1.68 eGFR 43. Renal ultrasound (7/2022) showed normal-appearing kidneys without hydronephrosis. Mild hyponatremia today with sodium 133. Will continue to monitor. 2. Prostate cancer. [id number] [place] Grade 7 (3 + 4) adenocarcinoma. Patient initially chose active surveillance rather than definitive treatment. Repeat PSA and MRI prostate ordered for 2/2018, but he did not follow-up. Repeat PSA obtained and had increased from 7.24 at diagnosis to 11.3. Referred for follow-up with Dr. Korin Patrick, and MRI prostate performed on 12/31/2018 showing several peripheral zone PI-RADS 4 with one area showing questionable capsular bulging laterally, but not a definitive appearance for extracapsular extension. He decided to proceed with EBRT, and underwent fiducial marker and [employer] placement on 3/11/2019 followed by weekly treatments from 3/27-5/6/2019. Tolerated well except for some fatigue which has improved. Previously followed by Dr. Radha Snyder and Dr. Korin Patrick. PSA 1.1 (11/2019) and testosterone 289, indicative of no evidence of active disease. Followed-up with radiation oncology in 11/2020 and PSA improved to 0.4. Repeat PSA 0.2 (6/2022). Reestablish care with DERRICK Bedolla in 7/2022 and scheduled for follow-up with LUKE Bowen next week.   3. Hyperlipidemia. On moderate intensity dose atorvastatin with LDL 37 and HDL 68, indicative of good control. Will continue to monitor. 4. ASHD s/p 2 vessel CABG. Remains asymptomatic. On aspirin, metoprolol succinate, and statin. Followed by Dr. Rachana Gale every 6 months with last visit in 5/2022.  5. Aortic stenosis s/p bioprosthetic AVR. Remains asymptomatic, and underwent repeat echocardiogram in 9/2020 with good functioning valve. Needs antibiotic prophylaxis for procedures. Being followed by Dr. Rachana Gale with last visit in 5/2022.   6. Diabetes mellitus. Patient with diagnosis of diabetes mellitus, but review of record shows HbA1c ranging from 5.7-6.3 since 2011 and -112 during that time period. HbA1c slightly worsened last visit to 6.2, but normalized today to 5.4 with 8 pound weight loss. On Ace-I and statin. Continue follow-up with Dr. Ye Laughlin for annual eye exams. Foot exam normal (11/2021). Will reassess at next visit. Urine microalbumin/ creatinine ratio normal (11 mg/g). Does have evidence of chronic renal disease stage 3. Emphasized importance of lifestyle modifications, including heart healthy diet, regular exercise, and weight loss. 7. Chronic renal disease, stage 3. Most likely secondary to long standing hypertension and prediabetes, although also appears to have developed at time of CABG and AVR so may be related to hypoperfusion during surgery. On statin and Ace-I. Noted some worsening in 7/2022 with creatinine 1.81/eGFR 37 and advised to increase fluid intake as likely due to hypovolemia given mild hyponatremia. Underwent renal ultrasound to evaluate (7/27/2022) which showed normal-appearing kidneys without hydronephrosis. Discussed importance of avoiding NSAIDS and prerenal status. Will continue to follow. 8. History of COVID-19. Patient tested positive for SARS-CoV-2 by PCR testing performed in the [facility] clinic on 1/15/2021.   Reported relatively mild symptoms with fatigue, nasal congestion, decreased taste, and cough. Reports symptoms resolved without sequela. He has completed the [employer] COVID-19 vaccine series and received two Pfizer booster doses. 9. Macrocytosis without anemia. Mild anemia developed during radiation therapy. Macrocytosis evident. Vitamin B12 level very low in 8/2019 at 191 started on cyanocobalamin 1000 mcg SL daily. Level remains normal indicative of compliance, and anemia now resolved although macrocytosis persists. Also advised to decrease alcohol consumption. Will continue to monitor. 10. Gout. Remains asymptomatic. On allopurinol. No recent flares. 11. Overweight. Weight decreased 20 pounds over the last year and patient reports that he is eating better with smaller portions and fewer sweets. Emphasized importance of continued attempts at lifestyle modifications, including heart healthy diet, regular exercise, and weight loss. Will monitor. 12. Health maintenance. Completed the [employer] [place] COVID-19 vaccine series and received two Pfizer booster doses. Advised to proceed with updated bivalent booster dose. Will give influenza vaccine today. Completed 2/2 doses of Shingrix vaccine. Other immunizations up to date. [facility] and due for repeat but not wishing to proceed. Continue regular eye exams with Dr. Jose Olson. Reports that he has decreased his alcohol consumption and now drinking a case every 2 weeks (previously 1 case per week). Vitamin D level remains normal on maintenance dose supplement. Medicare wellness visit up to date. Patient understands recommendations and agrees with plan. Follow-up in 6 months. Future orders:    ICD-10-CM ICD-9-CM    1. Essential hypertension  I10 401.9 CBC WITH AUTOMATED DIFF      LIPID PANEL      METABOLIC PANEL, COMPREHENSIVE      2.  Type 2 diabetes mellitus with stage 3a chronic kidney disease, without long-term current use of insulin (HCC)  E11.22 250.40 HEMOGLOBIN A1C WITH EAG    L70.07 972.1 METABOLIC PANEL, COMPREHENSIVE      MICROALBUMIN, UR, RAND W/ MICROALB/CREAT RATIO      3. Stage 3b chronic kidney disease (HCC)  N18.32 585.3 CBC WITH AUTOMATED DIFF      METABOLIC PANEL, COMPREHENSIVE      MICROALBUMIN, UR, RAND W/ MICROALB/CREAT RATIO      URINALYSIS W/MICROSCOPIC      VITAMIN D, 25 HYDROXY      4. Prostate cancer (HCC)  C61 185       5. Hyperlipidemia, unspecified hyperlipidemia type  E78.5 272.4 LIPID PANEL      MAGNESIUM      6. CAD in native artery  I25.10 414.01 LIPID PANEL      MAGNESIUM      7. S/P AVR (aortic valve replacement)  Z95.2 V43.3 MAGNESIUM      8. History of 2019 novel coronavirus disease (COVID-19)  Z86.16 V12.09       9. Vitamin B12 deficiency  E53.8 266.2 VITAMIN B12      10. Overweight (BMI 25.0-29. 9)  E66.3 278.02       11.  Needs flu shot  Z23 V04.81 INFLUENZA, FLUAD, (AGE 65 Y+), IM, PF, 0.5 ML

## 2022-11-09 ENCOUNTER — OFFICE VISIT (OUTPATIENT)
Dept: CARDIOLOGY CLINIC | Age: 72
End: 2022-11-09
Payer: MEDICARE

## 2022-11-09 VITALS — WEIGHT: 209 LBS | BODY MASS INDEX: 27.7 KG/M2 | HEIGHT: 73 IN

## 2022-11-09 DIAGNOSIS — I10 ESSENTIAL HYPERTENSION: ICD-10-CM

## 2022-11-09 DIAGNOSIS — I25.10 CAD IN NATIVE ARTERY: Primary | ICD-10-CM

## 2022-11-09 DIAGNOSIS — I11.9 HYPERTENSIVE HEART DISEASE WITHOUT CONGESTIVE HEART FAILURE: ICD-10-CM

## 2022-11-09 PROCEDURE — G8432 DEP SCR NOT DOC, RNG: HCPCS | Performed by: INTERNAL MEDICINE

## 2022-11-09 PROCEDURE — G8417 CALC BMI ABV UP PARAM F/U: HCPCS | Performed by: INTERNAL MEDICINE

## 2022-11-09 PROCEDURE — 1123F ACP DISCUSS/DSCN MKR DOCD: CPT | Performed by: INTERNAL MEDICINE

## 2022-11-09 PROCEDURE — G8536 NO DOC ELDER MAL SCRN: HCPCS | Performed by: INTERNAL MEDICINE

## 2022-11-09 PROCEDURE — 99214 OFFICE O/P EST MOD 30 MIN: CPT | Performed by: INTERNAL MEDICINE

## 2022-11-09 PROCEDURE — 93000 ELECTROCARDIOGRAM COMPLETE: CPT | Performed by: INTERNAL MEDICINE

## 2022-11-09 PROCEDURE — 3017F COLORECTAL CA SCREEN DOC REV: CPT | Performed by: INTERNAL MEDICINE

## 2022-11-09 PROCEDURE — G8756 NO BP MEASURE DOC: HCPCS | Performed by: INTERNAL MEDICINE

## 2022-11-09 PROCEDURE — 1101F PT FALLS ASSESS-DOCD LE1/YR: CPT | Performed by: INTERNAL MEDICINE

## 2022-11-09 PROCEDURE — G8427 DOCREV CUR MEDS BY ELIG CLIN: HCPCS | Performed by: INTERNAL MEDICINE

## 2022-11-09 NOTE — PROGRESS NOTES
HISTORY OF PRESENT ILLNESS  Jude Maravilla is a 67 y.o. male. ASSESSMENT and PLAN    Mr. Oneyda Ruiz initially presented with chest pains, and significant aortic stenosis with mean gradient of 30 mmHg, peak gradient of 67 mmHg, MARY of 0.8 cm². He ultimately underwent open-heart surgery in November of 2014. He had coronary artery bypass graft surgery with LIMA to LAD and SVG to OM1. He also had aortic valve replacement with #23 Marifer-Barbosa Magna pericardial valve. His echocardiogram from August 2017 showed normal LV function with well-functioning Marifer-Barbosa magna pericardial bioprosthetic aortic valve with mean gradient of 10 mmHg, and MARY of 1.86 cm². His echocardiogram September 2020 revealed normal LV function with EF 60%. Prosthetic aortic valve with mean gradient of 7.2 mmHg and MARY of 1.8 cm². He has 23 mm Marifer-Barbosa magna bioprosthetic aortic valve. No significant pulmonary hypertension was noted. CAD:   Clinically stable. AS:   Bioprosthetic AVR, clinically stable. BP:   Well controlled at 130/62. Rhythm:   Stable sinus at 61 bpm with first-degree AV block. CHF:    There is no evidence of decompensated CHF noted. Weight:    His weight today is 209 pounds. His baseline weight is 215 pounds. His target weight is around 205 pounds. Cholesterol:   Target LDL <70. Lipitor 20. Anti-platelet:   Remains on ASA. I will see him back in 6 months. Thank you. Encounter Diagnoses   Name Primary?     CAD in native artery Yes    Hypertensive heart disease without congestive heart failure     Essential hypertension      current treatment plan is effective, no change in therapy  lab results and schedule of future lab studies reviewed with patient  reviewed diet, exercise and weight control  cardiovascular risk and specific lipid/LDL goals reviewed  use of aspirin to prevent MI and TIA's discussed    Follow-up  Pertinent negatives include no chest pain and no shortness of breath. Today, Mr. Rosa Ayala has no complaints of chest pains, increased shortness of breath, or decreased exercise capacity. He stays active physically. With careful dieting, he has lost about 15 pounds in last 6 months. He is in good spirits. He denies any orthopnea or PND. He denies any palpitations or dizziness. Review of Systems   Respiratory:  Negative for shortness of breath. Cardiovascular:  Negative for chest pain, palpitations, orthopnea, claudication, leg swelling and PND. All other systems reviewed and are negative. Physical Exam  Vitals and nursing note reviewed. Constitutional:       Appearance: Normal appearance. HENT:      Head: Normocephalic. Eyes:      Conjunctiva/sclera: Conjunctivae normal.   Cardiovascular:      Rate and Rhythm: Normal rate and regular rhythm. Heart sounds: Murmur heard. Crescendo decrescendo systolic murmur is present with a grade of 1/6. Pulmonary:      Breath sounds: Normal breath sounds. Abdominal:      Palpations: Abdomen is soft. Musculoskeletal:         General: No swelling. Cervical back: No rigidity. Skin:     General: Skin is warm and dry. Neurological:      General: No focal deficit present. Mental Status: He is alert and oriented to person, place, and time. Psychiatric:         Mood and Affect: Mood normal.         Behavior: Behavior normal.       PCP: Geogrina Ashraf MD    Past Medical History:   Diagnosis Date    Aortic stenosis     s/p AVR    Aortic stenosis, severe 10/16/2014    Echocardiogram EF 60% peak gradient 67 mmHg, mean gradient 30 mmHg, MARY 0.8 cm    ASHD (arteriosclerotic heart disease)     Chronic renal disease, stage III (HCC)     DM [facility]diabetes mellitus) ([facility] [address]     Gout     HCD (hypertensive cardiovascular disease)     History of echocardiogram 10/16/2014    EF 55-60%. No RWMA. Mild LVH. Gr 1 DDfx. Mild LAE. Severe AS (mean grad 30 mmHg).       Hyperlipidemia     Hypertension     Prostate cancer ([facility] [address] 08/17/2017    [id number] [place] Grade 7 (3 + 4) adenocarcinoma of the prostate in 3/12 cores, 2-10% of each core; PSA 7.24. Dr. Belkys Esparza.  S/P AVR (aortic valve replacement) 11/13/2014    #23 mm Marifer Barbosa Magna pericardial valve. Dr. Josue Jules.  S/P CABG x 2 11/13/2014    LIMA to LAD, SVG to OM1. Dr. Josue Jules.  S/P cardiac catheterization 11/06/2014    RCA dom. mRCA 100%. LM patent. dLAD 75% x 2. pCX 80% (ELENA-3). LVEDP 14 mmHg. EF 50-55%. Severe inferobasal hypk. Severe AS. AV replacement & CABG recommended. Past Surgical History:   Procedure Laterality Date    HX APPENDECTOMY         Current Outpatient Medications   Medication Sig Dispense Refill    metoprolol succinate (TOPROL-XL) 50 mg XL tablet TAKE 1 TABLET DAILY 90 Tablet 3    simvastatin (ZOCOR) 40 mg tablet Take 40 mg by mouth nightly.  atorvastatin (LIPITOR) 20 mg tablet TAKE 1 TABLET DAILY 90 Tablet 3    lisinopriL (PRINIVIL, ZESTRIL) 20 mg tablet TAKE 1 TABLET DAILY 90 Tablet 3    amLODIPine (NORVASC) 10 mg tablet TAKE 1 TABLET DAILY 90 Tablet 3    allopurinoL (ZYLOPRIM) 100 mg tablet TAKE 1 TABLET DAILY 90 Tablet 3    fluticasone propionate (Flonase Allergy Relief) 50 mcg/actuation nasal spray 2 Sprays by Both Nostrils route daily as needed for Rhinitis or Allergies. 1 Bottle 0    sodium chloride (Saline Nasal Mist) 0.65 % nasal squeeze bottle 0.05 mL by Both Nostrils route as needed for Congestion. 30 mL 0    acetaminophen (TYLENOL) 500 mg tablet Take  by mouth every six (6) hours as needed for Pain.  cyanocobalamin, vitamin B-12, 1,000 mcg lozg 1,000 mcg by SubLINGual route daily. 90 Lozenge 2    ascorbic acid, vitamin C, (Vitamin C) 500 mg tablet Take  by mouth.  sildenafil citrate (VIAGRA) 100 mg tablet Take 1 Tab by mouth as needed. 6 Tab 3    docusate sodium (Colace) 100 mg capsule Take 100 mg by mouth daily as needed.       cholecalciferol, vitamin D3, 2,000 unit tab Take  by mouth daily.  aspirin delayed-release 81 mg tablet Take 1 tablet by mouth daily. 30 tablet 2       The patient has a family history of    Social History     Tobacco Use    Smoking status: Never    Smokeless tobacco: Never   Substance Use Topics    Alcohol use: Yes     Alcohol/week: 12.0 standard drinks     Types: 12 Cans of beer per week    Drug use: No       Lab Results   Component Value Date/Time    Cholesterol, total 120 10/04/2022 11:03 AM    HDL Cholesterol 68 (H) 10/04/2022 11:03 AM    LDL, calculated 37 10/04/2022 11:03 AM    Triglyceride 75 10/04/2022 11:03 AM    CHOL/HDL Ratio 1.8 10/04/2022 11:03 AM        BP Readings from Last 3 Encounters:   10/11/22 125/62   07/06/22 130/71   05/06/22 132/60        Pulse Readings from Last 3 Encounters:   10/11/22 70   07/06/22 66   05/06/22 73       Wt Readings from Last 3 Encounters:   11/09/22 94.8 kg (209 lb)   10/19/22 95.7 kg (211 lb)   10/11/22 95.7 kg (211 lb)         EKG: unchanged from previous tracings, normal sinus rhythm, 1st degree AV block.

## 2022-11-28 RX ORDER — ALLOPURINOL 100 MG/1
TABLET ORAL
Qty: 90 TABLET | Refills: 3 | Status: SHIPPED | OUTPATIENT
Start: 2022-11-28

## 2023-02-04 DIAGNOSIS — N18.32 STAGE 3B CHRONIC KIDNEY DISEASE (HCC): ICD-10-CM

## 2023-02-04 DIAGNOSIS — E11.22 TYPE 2 DIABETES MELLITUS WITH STAGE 3A CHRONIC KIDNEY DISEASE, WITHOUT LONG-TERM CURRENT USE OF INSULIN (HCC): Primary | ICD-10-CM

## 2023-02-04 DIAGNOSIS — N18.31 TYPE 2 DIABETES MELLITUS WITH STAGE 3A CHRONIC KIDNEY DISEASE, WITHOUT LONG-TERM CURRENT USE OF INSULIN (HCC): Primary | ICD-10-CM

## 2023-02-04 DIAGNOSIS — E53.8 VITAMIN B12 DEFICIENCY: Primary | ICD-10-CM

## 2023-02-05 DIAGNOSIS — I10 ESSENTIAL HYPERTENSION: Primary | ICD-10-CM

## 2023-02-05 DIAGNOSIS — N18.32 STAGE 3B CHRONIC KIDNEY DISEASE (HCC): Primary | ICD-10-CM

## 2023-02-05 DIAGNOSIS — E78.5 HYPERLIPIDEMIA, UNSPECIFIED HYPERLIPIDEMIA TYPE: ICD-10-CM

## 2023-02-06 DIAGNOSIS — I10 ESSENTIAL HYPERTENSION: Primary | ICD-10-CM

## 2023-02-06 DIAGNOSIS — N18.32 STAGE 3B CHRONIC KIDNEY DISEASE (HCC): ICD-10-CM

## 2023-02-06 RX ORDER — LISINOPRIL 20 MG/1
20 TABLET ORAL DAILY
Qty: 90 TABLET | Refills: 3 | Status: SHIPPED | OUTPATIENT
Start: 2023-02-06

## 2023-02-06 RX ORDER — ALLOPURINOL 100 MG/1
100 TABLET ORAL DAILY
Qty: 90 TABLET | Refills: 3 | Status: SHIPPED | OUTPATIENT
Start: 2023-02-06

## 2023-02-06 RX ORDER — ATORVASTATIN CALCIUM 20 MG/1
20 TABLET, FILM COATED ORAL DAILY
Qty: 90 TABLET | Refills: 3 | Status: SHIPPED | OUTPATIENT
Start: 2023-02-06

## 2023-02-06 RX ORDER — AMLODIPINE BESYLATE 10 MG/1
10 TABLET ORAL DAILY
Qty: 90 TABLET | Refills: 3 | Status: SHIPPED | OUTPATIENT
Start: 2023-02-06

## 2023-02-06 RX ORDER — METOPROLOL SUCCINATE 50 MG/1
50 TABLET, EXTENDED RELEASE ORAL DAILY
Qty: 90 TABLET | Refills: 3 | Status: SHIPPED | OUTPATIENT
Start: 2023-02-06

## 2023-02-06 NOTE — TELEPHONE ENCOUNTER
Requested Prescriptions     Pending Prescriptions Disp Refills    atorvastatin (LIPITOR) 20 mg tablet 90 Tablet 3     Si Tablet daily. allopurinoL (ZYLOPRIM) 100 mg tablet 90 Tablet 3     Si Tablet daily. amLODIPine (NORVASC) 10 mg tablet 90 Tablet 3     Si Tablet daily. metoprolol succinate (TOPROL-XL) 50 mg XL tablet 90 Tablet 3     Si Tablet daily. lisinopriL (PRINIVIL, ZESTRIL) 20 mg tablet 90 Tablet 3     Si Tablet daily.

## 2023-02-07 DIAGNOSIS — E11.22 TYPE 2 DIABETES MELLITUS WITH STAGE 3A CHRONIC KIDNEY DISEASE, WITHOUT LONG-TERM CURRENT USE OF INSULIN (HCC): Primary | ICD-10-CM

## 2023-02-07 DIAGNOSIS — N18.31 TYPE 2 DIABETES MELLITUS WITH STAGE 3A CHRONIC KIDNEY DISEASE, WITHOUT LONG-TERM CURRENT USE OF INSULIN (HCC): Primary | ICD-10-CM

## 2023-02-13 RX ORDER — METOPROLOL SUCCINATE 50 MG/1
TABLET, EXTENDED RELEASE ORAL
Qty: 90 TABLET | Refills: 3 | Status: SHIPPED | OUTPATIENT
Start: 2023-02-13

## 2023-02-13 RX ORDER — LISINOPRIL 20 MG/1
TABLET ORAL
Qty: 90 TABLET | Refills: 3 | Status: SHIPPED | OUTPATIENT
Start: 2023-02-13

## 2023-02-13 RX ORDER — ATORVASTATIN CALCIUM 20 MG/1
TABLET, FILM COATED ORAL
Qty: 90 TABLET | Refills: 3 | Status: SHIPPED | OUTPATIENT
Start: 2023-02-13

## 2023-02-13 RX ORDER — ALLOPURINOL 100 MG/1
TABLET ORAL
Qty: 90 TABLET | Refills: 3 | Status: SHIPPED | OUTPATIENT
Start: 2023-02-13

## 2023-02-13 RX ORDER — AMLODIPINE BESYLATE 10 MG/1
TABLET ORAL
Qty: 90 TABLET | Refills: 3 | Status: SHIPPED | OUTPATIENT
Start: 2023-02-13

## 2023-02-13 NOTE — TELEPHONE ENCOUNTER
As stated in previous message. Pt's meds were sent to [facility] last week but they need to go to [facility].      Pharmacy updated

## 2023-02-13 NOTE — TELEPHONE ENCOUNTER
Pt's meds were sent to [facility] last week but they need to go to [facility]. atorvastatin (LIPITOR) 20 mg tablet 90 Tablet 3 2/6/2023     Sig - Route: Take 1 Tablet by mouth daily. - Oral      allopurinoL (ZYLOPRIM) 100 mg tablet 90 Tablet 3 2/6/2023     Sig - Route: Take 1 Tablet by mouth daily. - Oral      amLODIPine (NORVASC) 10 mg tablet 90 Tablet 3 2/6/2023     Sig - Route: Take 1 Tablet by mouth daily. - Oral      metoprolol succinate (TOPROL-XL) 50 mg XL tablet 90 Tablet 3 2/6/2023     Sig - Route: Take 1 Tablet by mouth daily. - Oral    Sent to pharmacy as: metoprolol succinate ER 50 mg tablet,extended release 24       lisinopriL (PRINIVIL, ZESTRIL) 20 mg tablet 90 Tablet 3 2/6/2023     Sig - Route:  Take 1 Tablet by mouth daily. - Oral

## 2023-04-18 ENCOUNTER — OFFICE VISIT (OUTPATIENT)
Age: 73
End: 2023-04-18
Payer: MEDICARE

## 2023-04-18 ENCOUNTER — HOSPITAL ENCOUNTER (OUTPATIENT)
Facility: HOSPITAL | Age: 73
Setting detail: SPECIMEN
Discharge: HOME | End: 2023-04-21
Payer: MEDICARE

## 2023-04-18 VITALS
HEIGHT: 73 IN | BODY MASS INDEX: 26.48 KG/M2 | DIASTOLIC BLOOD PRESSURE: 56 MMHG | SYSTOLIC BLOOD PRESSURE: 112 MMHG | OXYGEN SATURATION: 97 % | WEIGHT: 199.8 LBS | TEMPERATURE: 97.5 F | RESPIRATION RATE: 16 BRPM | HEART RATE: 63 BPM

## 2023-04-18 DIAGNOSIS — E53.8 VITAMIN B12 DEFICIENCY: ICD-10-CM

## 2023-04-18 DIAGNOSIS — I10 ESSENTIAL (PRIMARY) HYPERTENSION: ICD-10-CM

## 2023-04-18 DIAGNOSIS — E55.9 VITAMIN D DEFICIENCY: ICD-10-CM

## 2023-04-18 DIAGNOSIS — E66.3 OVERWEIGHT (BMI 25.0-29.9): ICD-10-CM

## 2023-04-18 DIAGNOSIS — E78.00 PURE HYPERCHOLESTEROLEMIA: ICD-10-CM

## 2023-04-18 DIAGNOSIS — N18.30 TYPE 2 DIABETES MELLITUS WITH STAGE 3 CHRONIC KIDNEY DISEASE, WITHOUT LONG-TERM CURRENT USE OF INSULIN, UNSPECIFIED WHETHER STAGE 3A OR 3B CKD (HCC): ICD-10-CM

## 2023-04-18 DIAGNOSIS — C61 PROSTATE CANCER (HCC): ICD-10-CM

## 2023-04-18 DIAGNOSIS — F09 COGNITIVE DYSFUNCTION: ICD-10-CM

## 2023-04-18 DIAGNOSIS — I49.3 FREQUENT PVCS: ICD-10-CM

## 2023-04-18 DIAGNOSIS — E11.22 TYPE 2 DIABETES MELLITUS WITH STAGE 3 CHRONIC KIDNEY DISEASE, WITHOUT LONG-TERM CURRENT USE OF INSULIN, UNSPECIFIED WHETHER STAGE 3A OR 3B CKD (HCC): ICD-10-CM

## 2023-04-18 DIAGNOSIS — Z00.00 MEDICARE ANNUAL WELLNESS VISIT, SUBSEQUENT: ICD-10-CM

## 2023-04-18 DIAGNOSIS — I25.10 CAD IN NATIVE ARTERY: ICD-10-CM

## 2023-04-18 DIAGNOSIS — N18.32 STAGE 3B CHRONIC KIDNEY DISEASE (HCC): ICD-10-CM

## 2023-04-18 DIAGNOSIS — Z71.89 ACP (ADVANCE CARE PLANNING): ICD-10-CM

## 2023-04-18 DIAGNOSIS — R06.09 DYSPNEA ON EXERTION: ICD-10-CM

## 2023-04-18 DIAGNOSIS — F09 COGNITIVE DYSFUNCTION: Primary | ICD-10-CM

## 2023-04-18 LAB
ALBUMIN SERPL-MCNC: 4.2 G/DL (ref 3.4–5)
ALBUMIN/GLOB SERPL: 1.2 (ref 0.8–1.7)
ALP SERPL-CCNC: 80 U/L (ref 45–117)
ALT SERPL-CCNC: 23 U/L (ref 16–61)
ANION GAP SERPL CALC-SCNC: 8 MMOL/L (ref 3–18)
AST SERPL-CCNC: 12 U/L (ref 10–38)
BILIRUB SERPL-MCNC: 1.1 MG/DL (ref 0.2–1)
BUN SERPL-MCNC: 34 MG/DL (ref 7–18)
BUN/CREAT SERPL: 19 (ref 12–20)
CALCIUM SERPL-MCNC: 9.8 MG/DL (ref 8.5–10.1)
CHLORIDE SERPL-SCNC: 104 MMOL/L (ref 100–111)
CO2 SERPL-SCNC: 20 MMOL/L (ref 21–32)
CREAT SERPL-MCNC: 1.78 MG/DL (ref 0.6–1.3)
GLOBULIN SER CALC-MCNC: 3.6 G/DL (ref 2–4)
GLUCOSE SERPL-MCNC: 102 MG/DL (ref 74–99)
POTASSIUM SERPL-SCNC: 5.2 MMOL/L (ref 3.5–5.5)
PROT SERPL-MCNC: 7.8 G/DL (ref 6.4–8.2)
SODIUM SERPL-SCNC: 132 MMOL/L (ref 136–145)
TSH SERPL DL<=0.05 MIU/L-ACNC: 1.4 UIU/ML (ref 0.36–3.74)

## 2023-04-18 PROCEDURE — 80053 COMPREHEN METABOLIC PANEL: CPT

## 2023-04-18 PROCEDURE — 84443 ASSAY THYROID STIM HORMONE: CPT

## 2023-04-18 PROCEDURE — 86592 SYPHILIS TEST NON-TREP QUAL: CPT

## 2023-04-18 PROCEDURE — 93005 ELECTROCARDIOGRAM TRACING: CPT | Performed by: INTERNAL MEDICINE

## 2023-04-18 PROCEDURE — 99211 OFF/OP EST MAY X REQ PHY/QHP: CPT | Performed by: INTERNAL MEDICINE

## 2023-04-18 PROCEDURE — 36415 COLL VENOUS BLD VENIPUNCTURE: CPT

## 2023-04-18 SDOH — ECONOMIC STABILITY: FOOD INSECURITY: WITHIN THE PAST 12 MONTHS, THE FOOD YOU BOUGHT JUST DIDN'T LAST AND YOU DIDN'T HAVE MONEY TO GET MORE.: NEVER TRUE

## 2023-04-18 SDOH — ECONOMIC STABILITY: INCOME INSECURITY: HOW HARD IS IT FOR YOU TO PAY FOR THE VERY BASICS LIKE FOOD, HOUSING, MEDICAL CARE, AND HEATING?: NOT HARD AT ALL

## 2023-04-18 SDOH — ECONOMIC STABILITY: FOOD INSECURITY: WITHIN THE PAST 12 MONTHS, YOU WORRIED THAT YOUR FOOD WOULD RUN OUT BEFORE YOU GOT MONEY TO BUY MORE.: NEVER TRUE

## 2023-04-18 SDOH — ECONOMIC STABILITY: HOUSING INSECURITY
IN THE LAST 12 MONTHS, WAS THERE A TIME WHEN YOU DID NOT HAVE A STEADY PLACE TO SLEEP OR SLEPT IN A SHELTER (INCLUDING NOW)?: NO

## 2023-04-18 ASSESSMENT — PATIENT HEALTH QUESTIONNAIRE - PHQ9
2. FEELING DOWN, DEPRESSED OR HOPELESS: 0
1. LITTLE INTEREST OR PLEASURE IN DOING THINGS: 0
SUM OF ALL RESPONSES TO PHQ QUESTIONS 1-9: 0
SUM OF ALL RESPONSES TO PHQ9 QUESTIONS 1 & 2: 0
SUM OF ALL RESPONSES TO PHQ QUESTIONS 1-9: 0

## 2023-04-18 ASSESSMENT — LIFESTYLE VARIABLES
HOW OFTEN DO YOU HAVE A DRINK CONTAINING ALCOHOL: 2-4 TIMES A MONTH
HOW MANY STANDARD DRINKS CONTAINING ALCOHOL DO YOU HAVE ON A TYPICAL DAY: 1 OR 2

## 2023-04-18 NOTE — PROGRESS NOTES
Becca Thomas presents today for   Chief Complaint   Patient presents with    Medicare AWV    6 Month Follow-Up         1. \"Have you been to the ER, urgent care clinic since your last visit? Hospitalized since your last visit? \" no    2. \"Have you seen or consulted any other health care providers outside of the [address] [place] [address] since your last visit? \" no     3. For patients aged 39-70: Has the patient had a colonoscopy / FIT/ Cologuard? Yes - no Care Gap present      If the patient is female:    4. For patients aged 41-77: Has the patient had a mammogram within the past 2 years? NA - based on age or sex      11. For patients aged 21-65: Has the patient had a pap smear?  NA - based on age or sex
core. Options for management were discussed and he selected active surveillance. Plans were for repeat PSA and MRI prostate in 6 months (due 2/2018). However, the patient never had tests performed and he did not follow-up as discussed. On 10/4/2018, at his routine visit, a PSA level was obtained and was found to have increased to 11.3, and he was advised to follow-up with Dr. Fabienne Lopez. He underwent a prostate MRI 3T at [facility] on 12/31/2018 showing several peripheral zone PI-RADS 4 observations: right posterolateral peripheral zone at the mid gland/apex and anterior peripheral zone at the apex bilaterally. The former exhibits questionable capsular bulging laterally, but not a definitive appearance for extracapsular extension. He had a follow-up appointment with Dr. Fabienne Lopez on 1/11/2019 and decision made to proceed with EBRT. He had a staging bone scan (1/23/2019) which was negative for osseous metastases, and a CT abdomen and pelvis (1/23/2019) which showed no adenopathy of evidence of metastases, mild hepatic steatosis, aortic atherosclerosis, and cholelithiasis. He met with Dr. Alejandra Peralta on 1/30/2019 and decided to proceed with EBRT as definitive treatment for his prostate cancer, receiving his last treatment on 5/6/2019. Follow-up PSA level in 11/2020 at 0.4. He has a history of hypertension, hyperlipidemia, ASHD, and aortic stenosis. In 10/2014, he presented with progressive chest pain and shortness of breath and an echocardiogram was obtained (10/16/2014) showing normal LV function (EF 55-60%), no RWMA, grade 1 diastolic dysfunction, mild LAE, and severe AS (mean gradient 30 mmHg, peak 67 mmHg, and AV area 0.8 cm2). He was referred to see Dr. Enma Stevenson and underwent cardiac catheterization (11/6/2014) showing 100% RCA (distal collaterals from left), 70-80% distal LAD, 80% proximal LCx, inferior basal hypokinesis, and EF 55%.  On 11/13/2014, he underwent 2 vessel CABG (LIMA to LAD and SVG to OM1)
TABS Take by mouth daily Yes Ar Automatic Reconciliation   Cyanocobalamin 1000 MCG LOZG Place 1,000 mcg under the tongue daily Yes Ar Automatic Reconciliation   fluticasone (FLONASE) 50 MCG/ACT nasal spray 2 sprays by Nasal route daily as needed Yes Ar Automatic Reconciliation   sodium chloride (OCEAN) 0.65 % nasal spray 1 spray by Nasal route as needed Yes Ar Automatic Reconciliation   sildenafil (VIAGRA) 100 MG tablet Take 1 tablet by mouth as needed Yes Ar Automatic Reconciliation       CareTeam (Including outside providers/suppliers regularly involved in providing care):   Patient Care Team:  Dipesh Edwards MD as PCP - Nithya العراقي MD as PCP - Empaneled Provider     Reviewed and updated this visit:  Tobacco  Allergies  Meds  Problems  Med Hx  Surg Hx  Soc Hx  Fam Hx               Dipesh Edwards MD

## 2023-04-18 NOTE — ACP (ADVANCE CARE PLANNING)
Advance Care Planning     Advance Care Planning (ACP) Physician/NP/PA Conversation    Date of Conversation: 4/18/2023  Conducted with: Patient with Decision Making Capacity    Healthcare Decision Maker:      Primary Decision Maker: Michelle Peraza - Spouse - [phone]    Secondary Decision Maker: Adriana Nicole - Child - [phone]    Secondary Decision Maker: Kristina Liu - Child - [phone]    Click here to complete Healthcare Decision Makers including selection of the Healthcare Decision Maker Relationship (ie \"Primary\")  Today we documented Decision Maker(s) consistent with ACP documents on file. Care Preferences:    Hospitalization: \"If your health worsens and it becomes clear that your chance of recovery is unlikely, what would be your preference regarding hospitalization? \"  The patient is unsure. Ventilation: \"If you were unable to breath on your own and your chance of recovery was unlikely, what would be your preference about the use of a ventilator (breathing machine) if it was available to you? \"  The patient would NOT desire the use of a ventilator. Resuscitation: \"In the event your heart stopped as a result of an underlying serious health condition, would you want attempts made to restart your heart, or would you prefer a natural death? \"  Yes, attempt to resuscitate.     treatment goals, benefit/burden of treatment options, ventilation preferences, hospitalization preferences, resuscitation preferences, and end of life care preferences (vegetative state/imminent death)    Conversation Outcomes / Follow-Up Plan:  ACP complete - no further action today  Reviewed DNR/DNI and patient elects Full Code (Attempt Resuscitation)    Length of Voluntary ACP Conversation in minutes:  16 minutes    Alen Steven MD

## 2023-04-18 NOTE — PATIENT INSTRUCTIONS
Summary for your review. Other Preventive Recommendations:    A preventive eye exam performed by an eye specialist is recommended every 1-2 years to screen for glaucoma; cataracts, macular degeneration, and other eye disorders. A preventive dental visit is recommended every 6 months. Try to get at least 150 minutes of exercise per week or 10,000 steps per day on a pedometer . Order or download the FREE \"Exercise & Physical Activity: Your Everyday Guide\" from [employer]. Call 6-189.405.3670 or search [employer] online. You need 7722-4861 mg of calcium and 0960-8336 IU of vitamin D per day. It is possible to meet your calcium requirement with diet alone, but a vitamin D supplement is usually necessary to meet this goal.  When exposed to the sun, use a sunscreen that protects against both UVA and UVB radiation with an SPF of 30 or greater. Reapply every 2 to 3 hours or after sweating, drying off with a towel, or swimming. Always wear a seat belt when traveling in a car. Always wear a helmet when riding a bicycle or motorcycle.

## 2023-04-20 LAB — RPR SER QL: NONREACTIVE

## 2023-05-10 ENCOUNTER — OFFICE VISIT (OUTPATIENT)
Age: 73
End: 2023-05-10

## 2023-05-10 VITALS — OXYGEN SATURATION: 97 % | HEIGHT: 73 IN | BODY MASS INDEX: 26.24 KG/M2 | WEIGHT: 198 LBS

## 2023-05-10 DIAGNOSIS — I10 ESSENTIAL (PRIMARY) HYPERTENSION: ICD-10-CM

## 2023-05-10 DIAGNOSIS — I11.9 HYPERTENSIVE HEART DISEASE WITHOUT HEART FAILURE: ICD-10-CM

## 2023-05-10 DIAGNOSIS — I25.10 ATHEROSCLEROSIS OF NATIVE CORONARY ARTERY WITHOUT ANGINA PECTORIS, UNSPECIFIED WHETHER NATIVE OR TRANSPLANTED HEART: Primary | ICD-10-CM

## 2023-05-10 ASSESSMENT — PATIENT HEALTH QUESTIONNAIRE - PHQ9
1. LITTLE INTEREST OR PLEASURE IN DOING THINGS: 0
SUM OF ALL RESPONSES TO PHQ QUESTIONS 1-9: 0
2. FEELING DOWN, DEPRESSED OR HOPELESS: 0
SUM OF ALL RESPONSES TO PHQ9 QUESTIONS 1 & 2: 0

## 2023-05-10 NOTE — PROGRESS NOTES
Usman Sanchez presents today for   Chief Complaint   Patient presents with    Follow-up     6 month f/u        Usman Sanchez preferred language for health care discussion is english/other. Is someone accompanying this pt? o    Is the patient using any DME equipment during OV? no    Depression Screening:  Depression: Not at risk    PHQ-2 Score: 0        Learning Assessment:  Who is the primary learner? Patient    What is the preferred language for health care of the primary learner? ENGLISH    How does the primary learner prefer to learn new concepts? DEMONSTRATION    Answered By patient    Relationship to Learner SELF           Pt currently taking Anticoagulant therapy? no    Pt currently taking Antiplatelet therapy ? ASA 81 mg 1x daily       Coordination of Care:  1. Have you been to the ER, urgent care clinic since your last visit? Hospitalized since your last visit? no    2. Have you seen or consulted any other health care providers outside of the [address] [place] [address] since your last visit? Include any pap smears or colon screening.  no

## 2023-05-10 NOTE — PROGRESS NOTES
HISTORY OF PRESENT ILLNESS  Heather Peraza  75 y.o. male     Chief Complaint   Patient presents with    Follow-up     6 month f/u        ASSESSMENT and PLAN    The primary encounter diagnosis was Atherosclerosis of native coronary artery without angina pectoris, unspecified whether native or transplanted heart. Diagnoses of Hypertensive heart disease without heart failure and Essential (primary) hypertension were also pertinent to this visit. Mr. Salinas Zuleta initially presented with chest pains, and significant aortic stenosis with mean gradient of 30 mmHg, peak gradient of 67 mmHg, DEYSI of 0.8 cm². He ultimately underwent open-heart surgery in November of 2014. He had coronary artery bypass graft surgery with LIMA to LAD and SVG to OM1. He also had aortic valve replacement with #23 Neil-Wei Magna pericardial valve. His echocardiogram from August 2017 showed normal LV function with well-functioning Neil-Wei magna pericardial bioprosthetic aortic valve with mean gradient of 10 mmHg, and DEYSI of 1.86 cm². His echocardiogram September 2020 revealed normal LV function with EF 60%. Prosthetic aortic valve with mean gradient of 7.2 mmHg and DEYSI of 1.8 cm². He has 23 mm Neil-Wei magna bioprosthetic aortic valve. No significant pulmonary hypertension was noted. His biggest issue starting in 2022 has been short-term memory loss. CAD:    Clinically stable. AS:   Bioprosthetic AVR, clinically stable. He is to continue baby aspirin daily. BP:    Well controlled at 120/74. Rhythm:    Stable sinus at 65 bpm.  CHF:    There is no evidence of decompensated CHF noted. Weight:     His weight today is 198 pounds. His baseline weight was 210 pounds. With worsening memory loss, he has lost further weight. Cholesterol:   Target LDL <70. Lipitor 20. Anti-platelet:   Remains on ASA. I will see him back in 6 months. Thank you.     Orders Placed This Encounter   Procedures    EKG 12 Lead

## 2023-05-25 ENCOUNTER — OFFICE VISIT (OUTPATIENT)
Age: 73
End: 2023-05-25
Payer: MEDICARE

## 2023-05-25 ENCOUNTER — TELEPHONE (OUTPATIENT)
Age: 73
End: 2023-05-25

## 2023-05-25 VITALS
HEART RATE: 55 BPM | WEIGHT: 203.4 LBS | HEIGHT: 73 IN | DIASTOLIC BLOOD PRESSURE: 52 MMHG | SYSTOLIC BLOOD PRESSURE: 133 MMHG | RESPIRATION RATE: 18 BRPM | OXYGEN SATURATION: 97 % | BODY MASS INDEX: 26.96 KG/M2 | TEMPERATURE: 97.6 F

## 2023-05-25 DIAGNOSIS — M77.12 LATERAL EPICONDYLITIS OF LEFT ELBOW: Primary | ICD-10-CM

## 2023-05-25 PROCEDURE — G8427 DOCREV CUR MEDS BY ELIG CLIN: HCPCS | Performed by: NURSE PRACTITIONER

## 2023-05-25 PROCEDURE — G8417 CALC BMI ABV UP PARAM F/U: HCPCS | Performed by: NURSE PRACTITIONER

## 2023-05-25 PROCEDURE — 99213 OFFICE O/P EST LOW 20 MIN: CPT | Performed by: NURSE PRACTITIONER

## 2023-05-25 PROCEDURE — 1123F ACP DISCUSS/DSCN MKR DOCD: CPT | Performed by: NURSE PRACTITIONER

## 2023-05-25 PROCEDURE — 3078F DIAST BP <80 MM HG: CPT | Performed by: NURSE PRACTITIONER

## 2023-05-25 PROCEDURE — 3075F SYST BP GE 130 - 139MM HG: CPT | Performed by: NURSE PRACTITIONER

## 2023-05-25 PROCEDURE — 3017F COLORECTAL CA SCREEN DOC REV: CPT | Performed by: NURSE PRACTITIONER

## 2023-05-25 PROCEDURE — 1036F TOBACCO NON-USER: CPT | Performed by: NURSE PRACTITIONER

## 2023-05-25 RX ORDER — PREDNISONE 10 MG/1
TABLET ORAL
Qty: 18 TABLET | Refills: 0 | Status: SHIPPED | OUTPATIENT
Start: 2023-05-25 | End: 2023-06-03

## 2023-05-25 NOTE — PROGRESS NOTES
Erasmo Rodgers presents today for   Chief Complaint   Patient presents with    Elbow Pain     Pt c/o left elbow pain x onset yesterday       1. \"Have you been to the ER, urgent care clinic since your last visit? Hospitalized since your last visit? \" No    2. \"Have you seen or consulted any other health care providers outside of the [address] [place] [address] since your last visit? \" No     3. For patients aged 39-70: Has the patient had a colonoscopy / FIT/ Cologuard? Yes      If the patient is female:    4. For patients aged 41-77: Has the patient had a mammogram within the past 2 years? Yes  See top three    5. For patients aged 21-65: Has the patient had a pap smear?  N/A
THEN 2 tablets daily for 3 days, THEN 1 tablet daily for 3 days. , Disp-18 tablet, R-0Normal      Reviewed medication and completed medication reconciliation with the patient. Reviewed side effects of medications with the patient. Questions were answered and patient verb understanding. Past Surgical History:   Procedure Laterality Date    APPENDECTOMY       Allergies and Intolerances:   No Known Allergies  Family History:   Family History   Problem Relation Age of Onset    COPD Father     Other Mother         meigs syndrome    Lung Disease Father     Other Father         pneumoconiosis     Social History:   He  reports that he has never smoked. He has never used smokeless tobacco.   Social History     Substance and Sexual Activity   Alcohol Use Yes    Alcohol/week: 12.0 standard drinks     Immunization History:  Immunization History   Administered Date(s) Administered    COVID-19, PFIZER PURPLE top, DILUTE for use, (age 15 y+), 30mcg/0.3mL 04/27/2021, 05/18/2021, 12/16/2021    Influenza Trivalent 10/14/2014    Influenza Virus Vaccine 09/14/2010, 09/29/2011    Influenza, FLUAD, (age 72 y+), Adjuvanted, 0.5mL 09/30/2020, 10/20/2021, 10/11/2022    Influenza, High Dose (Fluzone 65 yrs and older) 10/10/2017, 10/26/2018, 11/09/2019    Influenza, Triv, inactivated, subunit, adjuvanted, IM (Fluad 65 yrs and older) 10/04/2018, 11/09/2019    Pneumococcal, PCV-13, PREVNAR 15, (age 6w+), IM, 0.5mL 06/29/2017    Pneumococcal, PPSV23, PNEUMOVAX 21, (age 2y+), SC/IM, 0.5mL 10/14/2014    TDaP, ADACEL (age 10y-63y), BOOSTRIX (age 10y+), IM, 0.5mL 01/12/2018    Td vaccine (adult) 01/01/2008    Zoster Recombinant (Shingrix) 11/30/2020, 02/06/2021         Return if symptoms worsen or fail to improve.         Dr. Omaira Amanda, AGNP-C, [facility]  [facility] of [address] [place] [address]

## 2023-05-25 NOTE — TELEPHONE ENCOUNTER
Patient called in stating that he is having left elbow pain, no numbness or anything. The pain started yesterday. He would like to know if he can be seen today please advise.

## 2023-05-25 NOTE — ASSESSMENT & PLAN NOTE
Apply ice/heat 2-3 times per day  Avoid exercise of overuse of LUE x5-7 days or until sx resolve  Initiate prednisone taper  Avoid NSAIDs while on taper  May take tylenol prn  Follow up with PCP if not improved, may require xray at that time

## 2023-06-22 ENCOUNTER — TELEPHONE (OUTPATIENT)
Age: 73
End: 2023-06-22

## 2023-06-22 DIAGNOSIS — Z95.2 PRESENCE OF PROSTHETIC HEART VALVE: ICD-10-CM

## 2023-06-22 DIAGNOSIS — I25.10 ATHEROSCLEROSIS OF NATIVE CORONARY ARTERY WITHOUT ANGINA PECTORIS, UNSPECIFIED WHETHER NATIVE OR TRANSPLANTED HEART: Primary | ICD-10-CM

## 2023-06-22 NOTE — TELEPHONE ENCOUNTER
----- Message from [id number] MD sent at 6/15/2023  5:10 PM EDT -----  Can you check a echo on this patient.  ----- Message -----  From: Archie Darnell RN  Sent: 4/24/2023   6:48 AM EDT  To: [address], [place]  [address] MD  [facility],     This patient was complaining of some fatigue and questionable dyspnea when cutting his grass. I noticed he was irregular on exam and his EKG showed frequent PVC's and bigeminy. No electrolyte abnormalities. His last echo was in 9/2020.  He has an upcoming appointment with you on 5/10/2023 so I wanted to bring this to your

## 2023-07-26 ENCOUNTER — HOSPITAL ENCOUNTER (OUTPATIENT)
Facility: HOSPITAL | Age: 73
Setting detail: SPECIMEN
Discharge: HOME | End: 2023-07-29
Payer: MEDICARE

## 2023-07-26 DIAGNOSIS — E78.00 PURE HYPERCHOLESTEROLEMIA: ICD-10-CM

## 2023-07-26 DIAGNOSIS — I10 ESSENTIAL (PRIMARY) HYPERTENSION: ICD-10-CM

## 2023-07-26 DIAGNOSIS — N18.30 TYPE 2 DIABETES MELLITUS WITH STAGE 3 CHRONIC KIDNEY DISEASE, WITHOUT LONG-TERM CURRENT USE OF INSULIN, UNSPECIFIED WHETHER STAGE 3A OR 3B CKD (HCC): ICD-10-CM

## 2023-07-26 DIAGNOSIS — E11.22 TYPE 2 DIABETES MELLITUS WITH STAGE 3 CHRONIC KIDNEY DISEASE, WITHOUT LONG-TERM CURRENT USE OF INSULIN, UNSPECIFIED WHETHER STAGE 3A OR 3B CKD (HCC): ICD-10-CM

## 2023-07-26 DIAGNOSIS — I25.10 CAD IN NATIVE ARTERY: ICD-10-CM

## 2023-07-26 LAB
ALBUMIN SERPL-MCNC: 4 G/DL (ref 3.4–5)
ALBUMIN/GLOB SERPL: 1.3 (ref 0.8–1.7)
ALP SERPL-CCNC: 67 U/L (ref 45–117)
ALT SERPL-CCNC: 24 U/L (ref 16–61)
ANION GAP SERPL CALC-SCNC: 6 MMOL/L (ref 3–18)
AST SERPL-CCNC: 15 U/L (ref 10–38)
BASOPHILS # BLD: 0.1 K/UL (ref 0–0.1)
BASOPHILS NFR BLD: 1 % (ref 0–2)
BILIRUB SERPL-MCNC: 0.9 MG/DL (ref 0.2–1)
BUN SERPL-MCNC: 27 MG/DL (ref 7–18)
BUN/CREAT SERPL: 15 (ref 12–20)
CALCIUM SERPL-MCNC: 9.2 MG/DL (ref 8.5–10.1)
CHLORIDE SERPL-SCNC: 104 MMOL/L (ref 100–111)
CHOLEST SERPL-MCNC: 134 MG/DL
CO2 SERPL-SCNC: 22 MMOL/L (ref 21–32)
CREAT SERPL-MCNC: 1.81 MG/DL (ref 0.6–1.3)
DIFFERENTIAL METHOD BLD: ABNORMAL
EOSINOPHIL # BLD: 0.4 K/UL (ref 0–0.4)
EOSINOPHIL NFR BLD: 7 % (ref 0–5)
ERYTHROCYTE [DISTWIDTH] IN BLOOD BY AUTOMATED COUNT: 13.6 % (ref 11.6–14.5)
GLOBULIN SER CALC-MCNC: 3.1 G/DL (ref 2–4)
GLUCOSE SERPL-MCNC: 99 MG/DL (ref 74–99)
HBA1C MFR BLD: 5.4 % (ref 4.2–5.6)
HCT VFR BLD AUTO: 38.4 % (ref 36–48)
HDLC SERPL-MCNC: 78 MG/DL (ref 40–60)
HDLC SERPL: 1.7 (ref 0–5)
HGB BLD-MCNC: 12.6 G/DL (ref 13–16)
IMM GRANULOCYTES # BLD AUTO: 0 K/UL (ref 0–0.04)
IMM GRANULOCYTES NFR BLD AUTO: 0 % (ref 0–0.5)
LDLC SERPL CALC-MCNC: 45.6 MG/DL (ref 0–100)
LIPID PANEL: ABNORMAL
LYMPHOCYTES # BLD: 1.4 K/UL (ref 0.9–3.6)
LYMPHOCYTES NFR BLD: 23 % (ref 21–52)
MCH RBC QN AUTO: 33.1 PG (ref 24–34)
MCHC RBC AUTO-ENTMCNC: 32.8 G/DL (ref 31–37)
MCV RBC AUTO: 100.8 FL (ref 78–100)
MONOCYTES # BLD: 0.6 K/UL (ref 0.05–1.2)
MONOCYTES NFR BLD: 10 % (ref 3–10)
NEUTS SEG # BLD: 3.7 K/UL (ref 1.8–8)
NEUTS SEG NFR BLD: 60 % (ref 40–73)
NRBC # BLD: 0 K/UL (ref 0–0.01)
NRBC BLD-RTO: 0 PER 100 WBC
PLATELET # BLD AUTO: 157 K/UL (ref 135–420)
PMV BLD AUTO: 9 FL (ref 9.2–11.8)
POTASSIUM SERPL-SCNC: 4.9 MMOL/L (ref 3.5–5.5)
PROT SERPL-MCNC: 7.1 G/DL (ref 6.4–8.2)
RBC # BLD AUTO: 3.81 M/UL (ref 4.35–5.65)
SODIUM SERPL-SCNC: 132 MMOL/L (ref 136–145)
TRIGL SERPL-MCNC: 52 MG/DL
VLDLC SERPL CALC-MCNC: 10.4 MG/DL
WBC # BLD AUTO: 6.2 K/UL (ref 4.6–13.2)

## 2023-07-26 PROCEDURE — 83036 HEMOGLOBIN GLYCOSYLATED A1C: CPT

## 2023-07-26 PROCEDURE — 85025 COMPLETE CBC W/AUTO DIFF WBC: CPT

## 2023-07-26 PROCEDURE — 80061 LIPID PANEL: CPT

## 2023-07-26 PROCEDURE — 36415 COLL VENOUS BLD VENIPUNCTURE: CPT

## 2023-07-26 PROCEDURE — 80053 COMPREHEN METABOLIC PANEL: CPT

## 2023-08-01 ENCOUNTER — OFFICE VISIT (OUTPATIENT)
Age: 73
End: 2023-08-01

## 2023-08-01 VITALS
OXYGEN SATURATION: 98 % | DIASTOLIC BLOOD PRESSURE: 55 MMHG | WEIGHT: 194 LBS | SYSTOLIC BLOOD PRESSURE: 111 MMHG | HEART RATE: 66 BPM | RESPIRATION RATE: 18 BRPM | TEMPERATURE: 97.7 F | HEIGHT: 73 IN | BODY MASS INDEX: 25.71 KG/M2

## 2023-08-01 DIAGNOSIS — C61 PROSTATE CANCER (HCC): ICD-10-CM

## 2023-08-01 DIAGNOSIS — E11.22 TYPE 2 DIABETES MELLITUS WITH STAGE 3B CHRONIC KIDNEY DISEASE, WITHOUT LONG-TERM CURRENT USE OF INSULIN (HCC): ICD-10-CM

## 2023-08-01 DIAGNOSIS — N18.32 STAGE 3B CHRONIC KIDNEY DISEASE (HCC): ICD-10-CM

## 2023-08-01 DIAGNOSIS — I25.10 CAD IN NATIVE ARTERY: ICD-10-CM

## 2023-08-01 DIAGNOSIS — E78.00 PURE HYPERCHOLESTEROLEMIA: ICD-10-CM

## 2023-08-01 DIAGNOSIS — N18.32 TYPE 2 DIABETES MELLITUS WITH STAGE 3B CHRONIC KIDNEY DISEASE, WITHOUT LONG-TERM CURRENT USE OF INSULIN (HCC): ICD-10-CM

## 2023-08-01 DIAGNOSIS — I10 ESSENTIAL HYPERTENSION: Primary | ICD-10-CM

## 2023-08-01 DIAGNOSIS — E53.8 VITAMIN B12 DEFICIENCY: ICD-10-CM

## 2023-08-01 DIAGNOSIS — Z95.2 S/P AVR (AORTIC VALVE REPLACEMENT): ICD-10-CM

## 2023-08-01 DIAGNOSIS — I35.0 AORTIC VALVE STENOSIS, ETIOLOGY OF CARDIAC VALVE DISEASE UNSPECIFIED: ICD-10-CM

## 2023-08-01 ASSESSMENT — PATIENT HEALTH QUESTIONNAIRE - PHQ9
SUM OF ALL RESPONSES TO PHQ9 QUESTIONS 1 & 2: 0
SUM OF ALL RESPONSES TO PHQ QUESTIONS 1-9: 0
1. LITTLE INTEREST OR PLEASURE IN DOING THINGS: 0
2. FEELING DOWN, DEPRESSED OR HOPELESS: 0
SUM OF ALL RESPONSES TO PHQ QUESTIONS 1-9: 0

## 2023-08-01 NOTE — PROGRESS NOTES
Ida Coronado presents today for   Chief Complaint   Patient presents with    Follow-up     3 month follow up    Hypertension       1. \"Have you been to the ER, urgent care clinic since your last visit? Hospitalized since your last visit? \" no    2. \"Have you seen or consulted any other health care providers outside of the [address] [place] [address] since your last visit? \" no     3. For patients aged 43-73: Has the patient had a colonoscopy / FIT/ Cologuard? Yes - no Care Gap present      If the patient is female:    4. For patients aged 43-66: Has the patient had a mammogram within the past 2 years? NA - based on age or sex      11. For patients aged 21-65: Has the patient had a pap smear?  NA - based on age or sex
5.4.  Likely related to 9 pound weight loss since last visit. On Ace-I and statin. Continue follow-up with Dr. Julissa Jamison for annual eye exams. Urged to schedule. Foot exam normal (11/2021). Will reassess at next visit. Urine microalbumin/ creatinine ratio normal (16 mg/g 4/2023). Does have evidence of chronic renal disease stage 3. Emphasized importance of lifestyle modifications, including heart healthy low carbohydrate diet, regular exercise, and weight loss. 7. Chronic renal disease, stage 3. Most likely secondary to long standing hypertension and prediabetes, although also appears to have developed at time of CABG and AVR so may have been related to hypoperfusion during surgery. On statin and Ace-I. Noted some worsening in 7/2022 with creatinine 1.81/eGFR 37 and has remained stable since that time. Underwent renal ultrasound (7/27/2022) which showed normal-appearing kidneys without hydronephrosis. Discussed importance of avoiding NSAIDS and prerenal status. Remaining stable today with creatinine 1.81/eGFR 39. Will continue to follow. 8. Prostate cancer. [id number] [place] Grade 7 (3 + 4) adenocarcinoma. Patient initially chose active surveillance, but repeat PSA in 12/2018 showed that it had increased from 7.24 at diagnosis to 11.3. Referred for follow-up with Dr. Jennifer March, and MRI prostate performed on 12/31/2018 showing several peripheral zone PI-RADS 4 with one area showing questionable capsular bulging laterally, but not a definitive appearance for extracapsular extension. He decided to proceed with EBRT, and underwent fiducial marker and SpaceOar placement on 3/11/2019 followed by weekly treatments from 3/27-5/6/2019. Previously followed by Dr. Reny Eden and Dr. Jennifer March. PSA 1.1 (11/2019) and testosterone 289, indicative of no evidence of active disease. Followed-up with radiation oncology in 11/2020 and PSA improved to 0.4. Repeat PSA 0.2 (6/2022).  Reestablished care with BURT Lopez in

## 2023-08-05 PROBLEM — M77.12 LATERAL EPICONDYLITIS OF LEFT ELBOW: Status: RESOLVED | Noted: 2023-05-25 | Resolved: 2023-08-05

## 2023-11-14 ENCOUNTER — OFFICE VISIT (OUTPATIENT)
Age: 73
End: 2023-11-14
Payer: MEDICARE

## 2023-11-14 VITALS
WEIGHT: 197 LBS | OXYGEN SATURATION: 100 % | SYSTOLIC BLOOD PRESSURE: 118 MMHG | BODY MASS INDEX: 26.11 KG/M2 | HEIGHT: 73 IN | HEART RATE: 62 BPM | DIASTOLIC BLOOD PRESSURE: 62 MMHG

## 2023-11-14 DIAGNOSIS — Z95.2 PRESENCE OF PROSTHETIC HEART VALVE: ICD-10-CM

## 2023-11-14 DIAGNOSIS — I10 ESSENTIAL (PRIMARY) HYPERTENSION: ICD-10-CM

## 2023-11-14 DIAGNOSIS — I11.9 HYPERTENSIVE HEART DISEASE WITHOUT HEART FAILURE: ICD-10-CM

## 2023-11-14 DIAGNOSIS — I25.10 ATHEROSCLEROSIS OF NATIVE CORONARY ARTERY WITHOUT ANGINA PECTORIS, UNSPECIFIED WHETHER NATIVE OR TRANSPLANTED HEART: Primary | ICD-10-CM

## 2023-11-14 PROCEDURE — 1036F TOBACCO NON-USER: CPT | Performed by: INTERNAL MEDICINE

## 2023-11-14 PROCEDURE — 3078F DIAST BP <80 MM HG: CPT | Performed by: INTERNAL MEDICINE

## 2023-11-14 PROCEDURE — 99214 OFFICE O/P EST MOD 30 MIN: CPT | Performed by: INTERNAL MEDICINE

## 2023-11-14 PROCEDURE — G8484 FLU IMMUNIZE NO ADMIN: HCPCS | Performed by: INTERNAL MEDICINE

## 2023-11-14 PROCEDURE — G8417 CALC BMI ABV UP PARAM F/U: HCPCS | Performed by: INTERNAL MEDICINE

## 2023-11-14 PROCEDURE — G8427 DOCREV CUR MEDS BY ELIG CLIN: HCPCS | Performed by: INTERNAL MEDICINE

## 2023-11-14 PROCEDURE — 3017F COLORECTAL CA SCREEN DOC REV: CPT | Performed by: INTERNAL MEDICINE

## 2023-11-14 PROCEDURE — 1123F ACP DISCUSS/DSCN MKR DOCD: CPT | Performed by: INTERNAL MEDICINE

## 2023-11-14 PROCEDURE — 93000 ELECTROCARDIOGRAM COMPLETE: CPT | Performed by: INTERNAL MEDICINE

## 2023-11-14 PROCEDURE — 3074F SYST BP LT 130 MM HG: CPT | Performed by: INTERNAL MEDICINE

## 2023-11-14 ASSESSMENT — PATIENT HEALTH QUESTIONNAIRE - PHQ9
SUM OF ALL RESPONSES TO PHQ QUESTIONS 1-9: 0
SUM OF ALL RESPONSES TO PHQ QUESTIONS 1-9: 0
1. LITTLE INTEREST OR PLEASURE IN DOING THINGS: 0
SUM OF ALL RESPONSES TO PHQ QUESTIONS 1-9: 0
SUM OF ALL RESPONSES TO PHQ QUESTIONS 1-9: 0
SUM OF ALL RESPONSES TO PHQ9 QUESTIONS 1 & 2: 0
2. FEELING DOWN, DEPRESSED OR HOPELESS: 0

## 2023-11-28 ENCOUNTER — HOSPITAL ENCOUNTER (OUTPATIENT)
Facility: HOSPITAL | Age: 73
Setting detail: SPECIMEN
Discharge: HOME | End: 2023-12-01
Payer: MEDICARE

## 2023-11-28 DIAGNOSIS — E78.00 PURE HYPERCHOLESTEROLEMIA: ICD-10-CM

## 2023-11-28 DIAGNOSIS — N18.32 STAGE 3B CHRONIC KIDNEY DISEASE (HCC): ICD-10-CM

## 2023-11-28 DIAGNOSIS — N18.32 TYPE 2 DIABETES MELLITUS WITH STAGE 3B CHRONIC KIDNEY DISEASE, WITHOUT LONG-TERM CURRENT USE OF INSULIN (HCC): ICD-10-CM

## 2023-11-28 DIAGNOSIS — E53.8 VITAMIN B12 DEFICIENCY: ICD-10-CM

## 2023-11-28 DIAGNOSIS — I10 ESSENTIAL HYPERTENSION: ICD-10-CM

## 2023-11-28 DIAGNOSIS — E11.22 TYPE 2 DIABETES MELLITUS WITH STAGE 3B CHRONIC KIDNEY DISEASE, WITHOUT LONG-TERM CURRENT USE OF INSULIN (HCC): ICD-10-CM

## 2023-11-28 LAB
25(OH)D3 SERPL-MCNC: 35.9 NG/ML (ref 30–100)
ALBUMIN SERPL-MCNC: 4.1 G/DL (ref 3.4–5)
ALBUMIN/GLOB SERPL: 1.3 (ref 0.8–1.7)
ALP SERPL-CCNC: 78 U/L (ref 45–117)
ALT SERPL-CCNC: 17 U/L (ref 16–61)
ANION GAP SERPL CALC-SCNC: 4 MMOL/L (ref 3–18)
APPEARANCE UR: CLEAR
AST SERPL-CCNC: 13 U/L (ref 10–38)
BACTERIA URNS QL MICRO: NEGATIVE /HPF
BASOPHILS # BLD: 0 K/UL (ref 0–0.1)
BASOPHILS NFR BLD: 1 % (ref 0–2)
BILIRUB SERPL-MCNC: 1.2 MG/DL (ref 0.2–1)
BILIRUB UR QL: NEGATIVE
BUN SERPL-MCNC: 25 MG/DL (ref 7–18)
BUN/CREAT SERPL: 15 (ref 12–20)
CALCIUM SERPL-MCNC: 9.3 MG/DL (ref 8.5–10.1)
CHLORIDE SERPL-SCNC: 105 MMOL/L (ref 100–111)
CHOLEST SERPL-MCNC: 156 MG/DL
CO2 SERPL-SCNC: 26 MMOL/L (ref 21–32)
COLOR UR: YELLOW
CREAT SERPL-MCNC: 1.7 MG/DL (ref 0.6–1.3)
CREAT UR-MCNC: 169 MG/DL (ref 30–125)
DIFFERENTIAL METHOD BLD: ABNORMAL
EOSINOPHIL # BLD: 0.3 K/UL (ref 0–0.4)
EOSINOPHIL NFR BLD: 6 % (ref 0–5)
EPITH CASTS URNS QL MICRO: NORMAL /LPF (ref 0–5)
ERYTHROCYTE [DISTWIDTH] IN BLOOD BY AUTOMATED COUNT: 12.9 % (ref 11.6–14.5)
EST. AVERAGE GLUCOSE BLD GHB EST-MCNC: 111 MG/DL
GLOBULIN SER CALC-MCNC: 3.1 G/DL (ref 2–4)
GLUCOSE SERPL-MCNC: 97 MG/DL (ref 74–99)
GLUCOSE UR STRIP.AUTO-MCNC: NEGATIVE MG/DL
HBA1C MFR BLD: 5.5 % (ref 4.2–5.6)
HCT VFR BLD AUTO: 38.8 % (ref 36–48)
HDLC SERPL-MCNC: 66 MG/DL (ref 40–60)
HDLC SERPL: 2.4 (ref 0–5)
HGB BLD-MCNC: 12.5 G/DL (ref 13–16)
HGB UR QL STRIP: NEGATIVE
HYALINE CASTS URNS QL MICRO: NORMAL /LPF (ref 0–2)
IMM GRANULOCYTES # BLD AUTO: 0 K/UL (ref 0–0.04)
IMM GRANULOCYTES NFR BLD AUTO: 0 % (ref 0–0.5)
KETONES UR QL STRIP.AUTO: NEGATIVE MG/DL
LDLC SERPL CALC-MCNC: 70.2 MG/DL (ref 0–100)
LEUKOCYTE ESTERASE UR QL STRIP.AUTO: NEGATIVE
LIPID PANEL: ABNORMAL
LYMPHOCYTES # BLD: 1.1 K/UL (ref 0.9–3.6)
LYMPHOCYTES NFR BLD: 20 % (ref 21–52)
MCH RBC QN AUTO: 33.8 PG (ref 24–34)
MCHC RBC AUTO-ENTMCNC: 32.2 G/DL (ref 31–37)
MCV RBC AUTO: 104.9 FL (ref 78–100)
MICROALBUMIN UR-MCNC: 0.9 MG/DL (ref 0–3)
MICROALBUMIN/CREAT UR-RTO: 5 MG/G (ref 0–30)
MONOCYTES # BLD: 0.6 K/UL (ref 0.05–1.2)
MONOCYTES NFR BLD: 11 % (ref 3–10)
NEUTS SEG # BLD: 3.3 K/UL (ref 1.8–8)
NEUTS SEG NFR BLD: 62 % (ref 40–73)
NITRITE UR QL STRIP.AUTO: NEGATIVE
NRBC # BLD: 0 K/UL (ref 0–0.01)
NRBC BLD-RTO: 0 PER 100 WBC
PH UR STRIP: 6 (ref 5–8)
PLATELET # BLD AUTO: 172 K/UL (ref 135–420)
PMV BLD AUTO: 8.7 FL (ref 9.2–11.8)
POTASSIUM SERPL-SCNC: 5.4 MMOL/L (ref 3.5–5.5)
PROT SERPL-MCNC: 7.2 G/DL (ref 6.4–8.2)
PROT UR STRIP-MCNC: NEGATIVE MG/DL
RBC # BLD AUTO: 3.7 M/UL (ref 4.35–5.65)
RBC #/AREA URNS HPF: NEGATIVE /HPF (ref 0–5)
SODIUM SERPL-SCNC: 135 MMOL/L (ref 136–145)
SP GR UR REFRACTOMETRY: 1.01 (ref 1–1.03)
TRIGL SERPL-MCNC: 99 MG/DL
UROBILINOGEN UR QL STRIP.AUTO: 0.2 EU/DL (ref 0.2–1)
VIT B12 SERPL-MCNC: 1231 PG/ML (ref 211–911)
VLDLC SERPL CALC-MCNC: 19.8 MG/DL
WBC # BLD AUTO: 5.4 K/UL (ref 4.6–13.2)
WBC URNS QL MICRO: NORMAL /HPF (ref 0–4)

## 2023-11-28 PROCEDURE — 81001 URINALYSIS AUTO W/SCOPE: CPT

## 2023-11-28 PROCEDURE — 82570 ASSAY OF URINE CREATININE: CPT

## 2023-11-28 PROCEDURE — 83036 HEMOGLOBIN GLYCOSYLATED A1C: CPT

## 2023-11-28 PROCEDURE — 80061 LIPID PANEL: CPT

## 2023-11-28 PROCEDURE — 82043 UR ALBUMIN QUANTITATIVE: CPT

## 2023-11-28 PROCEDURE — 82306 VITAMIN D 25 HYDROXY: CPT

## 2023-11-28 PROCEDURE — 80053 COMPREHEN METABOLIC PANEL: CPT

## 2023-11-28 PROCEDURE — 85025 COMPLETE CBC W/AUTO DIFF WBC: CPT

## 2023-11-28 PROCEDURE — 36415 COLL VENOUS BLD VENIPUNCTURE: CPT

## 2023-11-28 PROCEDURE — 82607 VITAMIN B-12: CPT

## 2023-12-05 ENCOUNTER — OFFICE VISIT (OUTPATIENT)
Age: 73
End: 2023-12-05
Payer: MEDICARE

## 2023-12-05 VITALS
HEART RATE: 55 BPM | TEMPERATURE: 98.3 F | RESPIRATION RATE: 16 BRPM | SYSTOLIC BLOOD PRESSURE: 112 MMHG | BODY MASS INDEX: 26.77 KG/M2 | HEIGHT: 73 IN | OXYGEN SATURATION: 98 % | WEIGHT: 202 LBS | DIASTOLIC BLOOD PRESSURE: 60 MMHG

## 2023-12-05 DIAGNOSIS — N18.32 TYPE 2 DIABETES MELLITUS WITH STAGE 3B CHRONIC KIDNEY DISEASE, WITHOUT LONG-TERM CURRENT USE OF INSULIN (HCC): Primary | ICD-10-CM

## 2023-12-05 DIAGNOSIS — I25.10 CAD IN NATIVE ARTERY: ICD-10-CM

## 2023-12-05 DIAGNOSIS — E11.22 TYPE 2 DIABETES MELLITUS WITH STAGE 3B CHRONIC KIDNEY DISEASE, WITHOUT LONG-TERM CURRENT USE OF INSULIN (HCC): Primary | ICD-10-CM

## 2023-12-05 DIAGNOSIS — I10 ESSENTIAL HYPERTENSION: ICD-10-CM

## 2023-12-05 DIAGNOSIS — D64.9 ANEMIA, UNSPECIFIED TYPE: ICD-10-CM

## 2023-12-05 DIAGNOSIS — E78.00 PURE HYPERCHOLESTEROLEMIA: ICD-10-CM

## 2023-12-05 DIAGNOSIS — N18.32 STAGE 3B CHRONIC KIDNEY DISEASE (HCC): ICD-10-CM

## 2023-12-05 DIAGNOSIS — C61 PROSTATE CANCER (HCC): ICD-10-CM

## 2023-12-05 DIAGNOSIS — E66.3 OVERWEIGHT (BMI 25.0-29.9): ICD-10-CM

## 2023-12-05 DIAGNOSIS — Z95.2 S/P AVR (AORTIC VALVE REPLACEMENT): ICD-10-CM

## 2023-12-05 PROCEDURE — G8427 DOCREV CUR MEDS BY ELIG CLIN: HCPCS | Performed by: INTERNAL MEDICINE

## 2023-12-05 PROCEDURE — G8484 FLU IMMUNIZE NO ADMIN: HCPCS | Performed by: INTERNAL MEDICINE

## 2023-12-05 PROCEDURE — 99214 OFFICE O/P EST MOD 30 MIN: CPT | Performed by: INTERNAL MEDICINE

## 2023-12-05 PROCEDURE — G8417 CALC BMI ABV UP PARAM F/U: HCPCS | Performed by: INTERNAL MEDICINE

## 2023-12-05 PROCEDURE — 3017F COLORECTAL CA SCREEN DOC REV: CPT | Performed by: INTERNAL MEDICINE

## 2023-12-05 PROCEDURE — 3074F SYST BP LT 130 MM HG: CPT | Performed by: INTERNAL MEDICINE

## 2023-12-05 PROCEDURE — 2022F DILAT RTA XM EVC RTNOPTHY: CPT | Performed by: INTERNAL MEDICINE

## 2023-12-05 PROCEDURE — 1123F ACP DISCUSS/DSCN MKR DOCD: CPT | Performed by: INTERNAL MEDICINE

## 2023-12-05 PROCEDURE — 1036F TOBACCO NON-USER: CPT | Performed by: INTERNAL MEDICINE

## 2023-12-05 PROCEDURE — 3044F HG A1C LEVEL LT 7.0%: CPT | Performed by: INTERNAL MEDICINE

## 2023-12-05 PROCEDURE — 3078F DIAST BP <80 MM HG: CPT | Performed by: INTERNAL MEDICINE

## 2023-12-05 SDOH — ECONOMIC STABILITY: INCOME INSECURITY: HOW HARD IS IT FOR YOU TO PAY FOR THE VERY BASICS LIKE FOOD, HOUSING, MEDICAL CARE, AND HEATING?: NOT HARD AT ALL

## 2023-12-05 SDOH — ECONOMIC STABILITY: FOOD INSECURITY: WITHIN THE PAST 12 MONTHS, YOU WORRIED THAT YOUR FOOD WOULD RUN OUT BEFORE YOU GOT MONEY TO BUY MORE.: NEVER TRUE

## 2023-12-05 SDOH — ECONOMIC STABILITY: FOOD INSECURITY: WITHIN THE PAST 12 MONTHS, THE FOOD YOU BOUGHT JUST DIDN'T LAST AND YOU DIDN'T HAVE MONEY TO GET MORE.: NEVER TRUE

## 2023-12-05 ASSESSMENT — PATIENT HEALTH QUESTIONNAIRE - PHQ9
1. LITTLE INTEREST OR PLEASURE IN DOING THINGS: 0
2. FEELING DOWN, DEPRESSED OR HOPELESS: 0
SUM OF ALL RESPONSES TO PHQ QUESTIONS 1-9: 0
SUM OF ALL RESPONSES TO PHQ9 QUESTIONS 1 & 2: 0

## 2023-12-05 NOTE — PATIENT INSTRUCTIONS
salt in their diet from canned, prepared, and packaged foods. Fast food and restaurant meals also are very high in sodium. Your doctor will probably limit your sodium to less than 2,000 milligrams (mg) a day. This limit counts all the sodium in prepared and packaged foods and any salt you add to your food. Follow-up care is a key part of your treatment and safety. Be sure to make and go to all appointments, and call your doctor if you are having problems. It's also a good idea to know your test results and keep a list of the medicines you take. How can you care for yourself at home? Read food labels  Read labels on cans and food packages. The labels tell you how much sodium is in each serving. Make sure that you look at the serving size. If you eat more than the serving size, you have eaten more sodium. Food labels also tell you the Percent Daily Value for sodium. Choose products with low Percent Daily Values for sodium. Be aware that sodium can come in forms other than salt, including monosodium glutamate (MSG), sodium citrate, and sodium bicarbonate (baking soda). MSG is often added to Asian food. When you eat out, you can sometimes ask for food without MSG or added salt. Buy low-sodium foods  Buy foods that are labeled \"unsalted\" (no salt added), \"sodium-free\" (less than 5 mg of sodium per serving), or \"low-sodium\" (less than 140 mg of sodium per serving). Foods labeled \"reduced-sodium\" and \"light sodium\" may still have too much sodium. Be sure to read the label to see how much sodium you are getting. Buy fresh vegetables, or frozen vegetables without added sauces. Buy low-sodium versions of canned vegetables, soups, and other canned goods. Prepare low-sodium meals  Cut back on the amount of salt you use in cooking. This will help you adjust to the taste. Do not add salt after cooking. One teaspoon of salt has about 2,300 mg of sodium. Take the salt shaker off the table.   Flavor your food with garlic,

## 2024-01-18 RX ORDER — AMLODIPINE BESYLATE 10 MG/1
TABLET ORAL
Qty: 90 TABLET | Refills: 3 | Status: SHIPPED | OUTPATIENT
Start: 2024-01-18

## 2024-01-18 RX ORDER — METOPROLOL SUCCINATE 50 MG/1
TABLET, EXTENDED RELEASE ORAL
Qty: 90 TABLET | Refills: 3 | Status: SHIPPED | OUTPATIENT
Start: 2024-01-18

## 2024-01-18 RX ORDER — ALLOPURINOL 100 MG/1
TABLET ORAL
Qty: 90 TABLET | Refills: 3 | Status: SHIPPED | OUTPATIENT
Start: 2024-01-18

## 2024-01-18 RX ORDER — LISINOPRIL 20 MG/1
TABLET ORAL
Qty: 90 TABLET | Refills: 3 | Status: SHIPPED | OUTPATIENT
Start: 2024-01-18

## 2024-01-18 RX ORDER — ATORVASTATIN CALCIUM 20 MG/1
TABLET, FILM COATED ORAL
Qty: 90 TABLET | Refills: 3 | Status: SHIPPED | OUTPATIENT
Start: 2024-01-18

## 2024-01-18 NOTE — TELEPHONE ENCOUNTER
PCP: Karley Fernandez MD    LAST REFILL PER CHART:  Medication:allopurinol (ZYLOPRIM) 100 MG tablet   Ordered On:02/13/2023  Instructions:TAKE 1 TABLET DAILY   Dispense:90 tablets  Refills:3    LAST REFILL PER CHART:  Medication:amLODIPine (NORVASC) 10 MG tablet   Ordered On:02/13/2023  Instructions:TAKE 1 TABLET DAILY   Dispense:90 tablets  Refills:3      LAST REFILL PER CHART:  Medication:atorvastatin (LIPITOR) 20 MG tablet   Ordered On:02/13/2023  Instructions:TAKE 1 TABLET DAILY   Dispense:90 tablets  Refills:3      LAST REFILL PER CHART:  Medication:lisinopril (PRINIVIL;ZESTRIL) 20 MG tablet   Ordered On:02/13/2023  Instructions:TAKE 1 TABLET DAILY   Dispense:90 tablets  Refills:3      LAST REFILL PER CHART:  Medication:metoprolol succinate (TOPROL XL) 50 MG extended release tablet   Ordered On:02/13/2023  Instructions:TAKE 1 TABLET DAILY   Dispense:90 tablets  Refills:3      Future Appointments   Date Time Provider Department Center   4/4/2024  9:15 AM [facility] LAB VISIT [facility]   4/9/2024 10:00 AM Karley Fernandez MD [facility] BS AMB   5/14/2024  1:40 PM Terence Piña MD [facility] BS AMB

## 2024-04-04 ENCOUNTER — HOSPITAL ENCOUNTER (OUTPATIENT)
Facility: HOSPITAL | Age: 74
Setting detail: SPECIMEN
Discharge: HOME | End: 2024-04-04
Payer: MEDICARE

## 2024-04-04 DIAGNOSIS — E78.00 PURE HYPERCHOLESTEROLEMIA: ICD-10-CM

## 2024-04-04 DIAGNOSIS — E11.22 TYPE 2 DIABETES MELLITUS WITH STAGE 3B CHRONIC KIDNEY DISEASE, WITHOUT LONG-TERM CURRENT USE OF INSULIN (HCC): ICD-10-CM

## 2024-04-04 DIAGNOSIS — I10 ESSENTIAL HYPERTENSION: ICD-10-CM

## 2024-04-04 DIAGNOSIS — N18.32 STAGE 3B CHRONIC KIDNEY DISEASE (HCC): ICD-10-CM

## 2024-04-04 DIAGNOSIS — N18.32 TYPE 2 DIABETES MELLITUS WITH STAGE 3B CHRONIC KIDNEY DISEASE, WITHOUT LONG-TERM CURRENT USE OF INSULIN (HCC): ICD-10-CM

## 2024-04-04 DIAGNOSIS — D64.9 ANEMIA, UNSPECIFIED TYPE: ICD-10-CM

## 2024-04-04 DIAGNOSIS — C61 PROSTATE CANCER (HCC): ICD-10-CM

## 2024-04-04 LAB
25(OH)D3 SERPL-MCNC: 32 NG/ML (ref 30–100)
ALBUMIN SERPL-MCNC: 4.3 G/DL (ref 3.4–5)
ALBUMIN/GLOB SERPL: 1.3 (ref 0.8–1.7)
ALP SERPL-CCNC: 74 U/L (ref 45–117)
ALT SERPL-CCNC: 20 U/L (ref 16–61)
ANION GAP SERPL CALC-SCNC: 4 MMOL/L (ref 3–18)
AST SERPL-CCNC: 17 U/L (ref 10–38)
BASOPHILS # BLD: 0.1 K/UL (ref 0–0.1)
BASOPHILS NFR BLD: 1 % (ref 0–2)
BILIRUB SERPL-MCNC: 1.2 MG/DL (ref 0.2–1)
BUN SERPL-MCNC: 25 MG/DL (ref 7–18)
BUN/CREAT SERPL: 14 (ref 12–20)
CALCIUM SERPL-MCNC: 9.4 MG/DL (ref 8.5–10.1)
CHLORIDE SERPL-SCNC: 101 MMOL/L (ref 100–111)
CHOLEST SERPL-MCNC: 167 MG/DL
CO2 SERPL-SCNC: 25 MMOL/L (ref 21–32)
CREAT SERPL-MCNC: 1.78 MG/DL (ref 0.6–1.3)
CREAT UR-MCNC: 73 MG/DL (ref 30–125)
DIFFERENTIAL METHOD BLD: ABNORMAL
EOSINOPHIL # BLD: 0.4 K/UL (ref 0–0.4)
EOSINOPHIL NFR BLD: 6 % (ref 0–5)
ERYTHROCYTE [DISTWIDTH] IN BLOOD BY AUTOMATED COUNT: 13.1 % (ref 11.6–14.5)
EST. AVERAGE GLUCOSE BLD GHB EST-MCNC: 108 MG/DL
FERRITIN SERPL-MCNC: 158 NG/ML (ref 8–388)
GLOBULIN SER CALC-MCNC: 3.4 G/DL (ref 2–4)
GLUCOSE SERPL-MCNC: 102 MG/DL (ref 74–99)
HBA1C MFR BLD: 5.4 % (ref 4.2–5.6)
HCT VFR BLD AUTO: 37.8 % (ref 36–48)
HDLC SERPL-MCNC: 84 MG/DL (ref 40–60)
HDLC SERPL: 2 (ref 0–5)
HGB BLD-MCNC: 12.6 G/DL (ref 13–16)
IMM GRANULOCYTES # BLD AUTO: 0 K/UL (ref 0–0.04)
IMM GRANULOCYTES NFR BLD AUTO: 0 % (ref 0–0.5)
IRON SATN MFR SERPL: 36 % (ref 20–50)
IRON SERPL-MCNC: 109 UG/DL (ref 50–175)
LDLC SERPL CALC-MCNC: 68.4 MG/DL (ref 0–100)
LIPID PANEL: ABNORMAL
LYMPHOCYTES # BLD: 1 K/UL (ref 0.9–3.6)
LYMPHOCYTES NFR BLD: 17 % (ref 21–52)
MCH RBC QN AUTO: 34.2 PG (ref 24–34)
MCHC RBC AUTO-ENTMCNC: 33.3 G/DL (ref 31–37)
MCV RBC AUTO: 102.7 FL (ref 78–100)
MICROALBUMIN UR-MCNC: 0.78 MG/DL (ref 0–3)
MICROALBUMIN/CREAT UR-RTO: 11 MG/G (ref 0–30)
MONOCYTES # BLD: 0.6 K/UL (ref 0.05–1.2)
MONOCYTES NFR BLD: 11 % (ref 3–10)
NEUTS SEG # BLD: 3.5 K/UL (ref 1.8–8)
NEUTS SEG NFR BLD: 64 % (ref 40–73)
NRBC # BLD: 0 K/UL (ref 0–0.01)
NRBC BLD-RTO: 0 PER 100 WBC
PLATELET # BLD AUTO: 168 K/UL (ref 135–420)
PMV BLD AUTO: 9.2 FL (ref 9.2–11.8)
POTASSIUM SERPL-SCNC: 5.2 MMOL/L (ref 3.5–5.5)
PROT SERPL-MCNC: 7.7 G/DL (ref 6.4–8.2)
PSA SERPL-MCNC: 0.1 NG/ML (ref 0–4)
RBC # BLD AUTO: 3.68 M/UL (ref 4.35–5.65)
SODIUM SERPL-SCNC: 130 MMOL/L (ref 136–145)
TIBC SERPL-MCNC: 304 UG/DL (ref 250–450)
TRIGL SERPL-MCNC: 73 MG/DL
VLDLC SERPL CALC-MCNC: 14.6 MG/DL
WBC # BLD AUTO: 5.6 K/UL (ref 4.6–13.2)

## 2024-04-04 PROCEDURE — 83036 HEMOGLOBIN GLYCOSYLATED A1C: CPT

## 2024-04-04 PROCEDURE — 82570 ASSAY OF URINE CREATININE: CPT

## 2024-04-04 PROCEDURE — 83540 ASSAY OF IRON: CPT

## 2024-04-04 PROCEDURE — 80053 COMPREHEN METABOLIC PANEL: CPT

## 2024-04-04 PROCEDURE — 83550 IRON BINDING TEST: CPT

## 2024-04-04 PROCEDURE — 84153 ASSAY OF PSA TOTAL: CPT

## 2024-04-04 PROCEDURE — 36415 COLL VENOUS BLD VENIPUNCTURE: CPT

## 2024-04-04 PROCEDURE — 82043 UR ALBUMIN QUANTITATIVE: CPT

## 2024-04-04 PROCEDURE — 82728 ASSAY OF FERRITIN: CPT

## 2024-04-04 PROCEDURE — 82306 VITAMIN D 25 HYDROXY: CPT

## 2024-04-04 PROCEDURE — 80061 LIPID PANEL: CPT

## 2024-04-04 PROCEDURE — 85025 COMPLETE CBC W/AUTO DIFF WBC: CPT

## 2024-04-09 ENCOUNTER — OFFICE VISIT (OUTPATIENT)
Age: 74
End: 2024-04-09

## 2024-04-09 VITALS
HEIGHT: 73 IN | SYSTOLIC BLOOD PRESSURE: 118 MMHG | RESPIRATION RATE: 14 BRPM | BODY MASS INDEX: 25.84 KG/M2 | OXYGEN SATURATION: 98 % | WEIGHT: 195 LBS | TEMPERATURE: 98.3 F | DIASTOLIC BLOOD PRESSURE: 60 MMHG | HEART RATE: 62 BPM

## 2024-04-09 DIAGNOSIS — E53.8 VITAMIN B12 DEFICIENCY: ICD-10-CM

## 2024-04-09 DIAGNOSIS — R41.3 MEMORY LOSS: ICD-10-CM

## 2024-04-09 DIAGNOSIS — I25.10 CAD IN NATIVE ARTERY: ICD-10-CM

## 2024-04-09 DIAGNOSIS — F03.90 DEMENTIA WITHOUT BEHAVIORAL DISTURBANCE, PSYCHOTIC DISTURBANCE, MOOD DISTURBANCE, OR ANXIETY, UNSPECIFIED DEMENTIA SEVERITY, UNSPECIFIED DEMENTIA TYPE (HCC): Primary | ICD-10-CM

## 2024-04-09 DIAGNOSIS — N18.32 TYPE 2 DIABETES MELLITUS WITH STAGE 3B CHRONIC KIDNEY DISEASE, WITHOUT LONG-TERM CURRENT USE OF INSULIN (HCC): ICD-10-CM

## 2024-04-09 DIAGNOSIS — I10 ESSENTIAL HYPERTENSION: ICD-10-CM

## 2024-04-09 DIAGNOSIS — E78.00 PURE HYPERCHOLESTEROLEMIA: ICD-10-CM

## 2024-04-09 DIAGNOSIS — N18.32 STAGE 3B CHRONIC KIDNEY DISEASE (HCC): ICD-10-CM

## 2024-04-09 DIAGNOSIS — Z71.89 ACP (ADVANCE CARE PLANNING): ICD-10-CM

## 2024-04-09 DIAGNOSIS — E11.22 TYPE 2 DIABETES MELLITUS WITH STAGE 3B CHRONIC KIDNEY DISEASE, WITHOUT LONG-TERM CURRENT USE OF INSULIN (HCC): ICD-10-CM

## 2024-04-09 DIAGNOSIS — Z95.2 S/P AVR (AORTIC VALVE REPLACEMENT): ICD-10-CM

## 2024-04-09 DIAGNOSIS — C61 PROSTATE CANCER (HCC): ICD-10-CM

## 2024-04-09 DIAGNOSIS — Z00.00 MEDICARE ANNUAL WELLNESS VISIT, SUBSEQUENT: ICD-10-CM

## 2024-04-09 RX ORDER — ASPIRIN 81 MG/1
81 TABLET ORAL DAILY
Qty: 90 TABLET | Refills: 3 | Status: SHIPPED | OUTPATIENT
Start: 2024-04-09

## 2024-04-09 ASSESSMENT — LIFESTYLE VARIABLES
HOW MANY STANDARD DRINKS CONTAINING ALCOHOL DO YOU HAVE ON A TYPICAL DAY: 1 OR 2
HOW OFTEN DO YOU HAVE A DRINK CONTAINING ALCOHOL: MONTHLY OR LESS

## 2024-04-09 ASSESSMENT — PATIENT HEALTH QUESTIONNAIRE - PHQ9
SUM OF ALL RESPONSES TO PHQ QUESTIONS 1-9: 0
SUM OF ALL RESPONSES TO PHQ QUESTIONS 1-9: 0
SUM OF ALL RESPONSES TO PHQ9 QUESTIONS 1 & 2: 0
1. LITTLE INTEREST OR PLEASURE IN DOING THINGS: NOT AT ALL
2. FEELING DOWN, DEPRESSED OR HOPELESS: NOT AT ALL
SUM OF ALL RESPONSES TO PHQ QUESTIONS 1-9: 0
SUM OF ALL RESPONSES TO PHQ QUESTIONS 1-9: 0

## 2024-04-09 NOTE — PATIENT INSTRUCTIONS
minds work well overall. They can carry out daily tasks that are normal for them.  People with MCI have a higher chance of one day getting dementia. But not all people who have it will get dementia. Some people may stay the same over time.  What are the symptoms?  People with MCI have more memory loss than what occurs with normal aging. They may have increasing trouble with recalling words and keeping up with conversations. They may also have trouble remembering important events and making decisions.  What puts you at risk?  The risk of getting MCI increases with age. Having high blood pressure or having a family history of MCI may also increase your risk.  How is it diagnosed?  Your doctor will do a physical exam.  You may be asked questions to check your memory and other mental skills. Your doctor may also talk to close friends and family members. This can help the doctor figure out how your memory and other mental skills have changed.  You may get blood tests and tests that look at your brain.  These questions and tests can make sure you don't have other conditions that can cause symptoms like MCI. These include depression, sleep problems, and side effects from medicines.  How is it treated?  There are no medicines to treat MCI or to keep it from progressing to dementia. But treating conditions like high blood pressure and diabetes may help. A person with MCI needs routine follow-up visits with their doctor to check on changes in the person's mental skills.  How can you care for yourself at home?  Keeping your body active can help slow MCI. Exercises like walking can help. Try to stay active mentally too. Read or do things like crossword puzzles if you enjoy doing them.  If you need help coping with MCI, you may want to get support from family, friends, a support group, or a counselor who works with people who have MCI.  Though the future isn't always clear, it can be good to plan ahead with instructions for your

## 2024-04-10 NOTE — ACP (ADVANCE CARE PLANNING)
Advance Care Planning     Advance Care Planning (ACP) Physician/NP/PA Conversation    Date of Conversation: 4/9/2024  Conducted with: Patient with Decision Making Capacity    Healthcare Decision Maker:      Primary Decision Maker: Michelle Peraza - Spouse - [phone]    Secondary Decision Maker: Da Peraza - Child - [phone]    Secondary Decision Maker: Elba Tipton - Child - [phone]    Click here to complete Healthcare Decision Makers including selection of the Healthcare Decision Maker Relationship (ie \"Primary\")  Today we confirmed healthcare decision-maker that his wife and 2 children    Care Preferences:    Hospitalization:  \"If your health worsens and it becomes clear that your chance of recovery is unlikely, what would be your preference regarding hospitalization?\"  The patient would prefer hospitalization.    Ventilation:  \"If you were unable to breath on your own and your chance of recovery was unlikely, what would be your preference about the use of a ventilator (breathing machine) if it was available to you?\"  The patient would desire the use of a ventilator.    Resuscitation:  \"In the event your heart stopped as a result of an underlying serious health condition, would you want attempts made to restart your heart, or would you prefer a natural death?\"  Yes, attempt to resuscitate.    treatment goals, benefit/burden of treatment options, ventilation preferences, hospitalization preferences, resuscitation preferences, and end of life care preferences (vegetative state/imminent death)    Conversation Outcomes / Follow-Up Plan:  ACP complete - no further action today  Reviewed DNR/DNI and patient elects Full Code (Attempt Resuscitation)    Length of Voluntary ACP Conversation in minutes:  16 minutes    Karley Fernandez MD

## 2024-04-10 NOTE — PROGRESS NOTES
Nick Peraza presents today for   Chief Complaint   Patient presents with    Medicare AWV       \"Have you been to the ER, urgent care clinic since your last visit?  Hospitalized since your last visit?\"    NO    “Have you seen or consulted any other health care providers outside of [facility] since your last visit?”    NO          
 Med Hx  Surg Hx  Soc Hx  Fam Hx             
PSA today stable at 0.1.  Missed follow-up appointment with urology and urged to schedule.  Will continue to monitor PSA in this office until patient schedules.  9.  History of COVID-19.  Patient tested positive for SARS-CoV-2 by PCR testing on 1/15/2021.  Reported relatively mild symptoms with fatigue, nasal congestion, decreased taste, and cough.  Reported symptoms resolved without sequela.    10. Vitamin B12 deficiency. Mild anemia developed during radiation therapy. Macrocytosis evident. Vitamin B12 level very low in 8/2019 at 191 started on cyanocobalamin 1000 mcg SL daily. Level remains normal (11/2023) indicative of compliance, and anemia improved and stable although macrocytosis persists likely due to alcohol consumption. Will continue to monitor.  11. Gout.  Remains asymptomatic. On allopurinol. No recent flares.  12. Overweight.  Weight has gradually decreased from peak weight of 231 pounds (11/2021) and patient reports that he is eating better with smaller portions and fewer sweets.  Remains overall stable today. Emphasized importance of continued attempts at lifestyle modifications, including heart healthy diet and regular exercise. Will monitor.  13. Health maintenance.  Completed the Pfizer COVID-19 vaccine series and received two Pfizer booster doses and the updated bivalent booster dose. Completed 2/2 doses of Shingrix vaccine. Discussed recommended vaccines for the fall, including influenza vaccine, updated COVID vaccine, and new RSV vaccine.  Advised of recommendation for updated COVID-vaccine given age. Other immunizations up to date. Completed Cologuard for colorectal cancer screening and due for repeat but not wishing to proceed. Continue regular eye exams with Dr. Kumar. Urged to schedule as overdue. Reports that he is continuing to drink approximately 1-2 beers per day (previously 1 case per week).  Advised supplementation with thiamine and folic acid.  Vitamin D level remains normal on

## 2024-04-12 ENCOUNTER — TELEPHONE (OUTPATIENT)
Age: 74
End: 2024-04-12

## 2024-04-12 NOTE — TELEPHONE ENCOUNTER
Called and spoke with patient's wife regarding evidence of patient's declining memory and concern for development of dementia.  She stated that she and other family members have noticed his decrease in memory but were not sure if it was due to normal aging.  Discussed with her my concerns and recommendations for further evaluation with brain MRI and neurology consult.  She stated that she will talk to her children and the patient and call the office if they decide to proceed.

## 2024-04-13 PROBLEM — R41.3 MEMORY LOSS: Status: ACTIVE | Noted: 2024-04-13

## 2024-04-13 PROBLEM — F03.90 DEMENTIA (HCC): Status: ACTIVE | Noted: 2024-04-13

## 2024-04-24 ENCOUNTER — CARE COORDINATION (OUTPATIENT)
Facility: CLINIC | Age: 74
End: 2024-04-24

## 2024-04-24 NOTE — CARE COORDINATION
Ambulatory Care Coordination Note     2024 11:07 AM     Patient Current Location:  Home: [address] [place] [id number]     This patient was received as a referral from Ambulatory Care Manager .    ACM contacted the patient by telephone. Verified name and  with patient as identifiers. Provided introduction to self, and explanation of the ACM role.   Patient accepted care management services at this time.          ACM: Pablo Ralph RN     Challenges to be reviewed by the provider   Additional needs identified to be addressed with provider No  none               Method of communication with provider: none.    Care Summary Note:     Hx of aortic stenosis, ASHD, CKD III, DM2, gout, HTN, HLD, prostate CA , CABG , AvR , dementia, memory loss  CCM per Pcp  Concern for worsening dementia, spoke to patient today.  He was unclear on recent instructions from PCP. Instructed patient to have spouse call me. Will attempt to call the spouse later.  No other questions/issues per patient at this time.    Offered patient enrollment in the Remote Patient Monitoring (RPM) program for in-home monitoring:  not at this time .     Assessments Completed:   Ambulatory Care Coordination Assessment    Care Coordination Protocol  Referral from Primary Care Provider: Yes  Week 1 - Initial Assessment     Do you have all of your prescriptions and are they filled?: Yes  Barriers to medication adherence: None  Are you able to afford your medications?: Yes  How often do you have trouble taking your medications the way you have been told to take them?: I always take them as prescribed.     Do you have Home O2 Therapy?: No      Is patient able to live independently?: Yes     Current Housing: Private Residence  Who do you live with?: Partner/Spouse/SO  Are you an active caregiver in your home?: No     Do you have any DME?: No              Thinking about your patient's physical health needs, are there any symptoms

## 2024-04-29 ENCOUNTER — CARE COORDINATION (OUTPATIENT)
Facility: CLINIC | Age: 74
End: 2024-04-29

## 2024-04-29 ENCOUNTER — TELEPHONE (OUTPATIENT)
Age: 74
End: 2024-04-29

## 2024-04-29 DIAGNOSIS — F03.90 DEMENTIA WITHOUT BEHAVIORAL DISTURBANCE, PSYCHOTIC DISTURBANCE, MOOD DISTURBANCE, OR ANXIETY, UNSPECIFIED DEMENTIA SEVERITY, UNSPECIFIED DEMENTIA TYPE (HCC): ICD-10-CM

## 2024-04-29 DIAGNOSIS — R41.3 MEMORY LOSS: Primary | ICD-10-CM

## 2024-04-29 NOTE — TELEPHONE ENCOUNTER
Dr. Fernandez,     Spoke to the spouse today, and she stated it's ok to refer neuro and order the MRI.  Will continue to coordinate.     Pablo Ralph, BSN/RN   Ambulatory Care Manager   [phone]   [email or web address]       Received message from nurse navigator that wife confirms that they would like to proceed with evaluation for memory loss.  Orders for brain MRI and referral to Dr. Jose murray.

## 2024-04-29 NOTE — CARE COORDINATION
Ambulatory Care Coordination Note     2024 10:36 AM     Patient Current Location:  Home: [address] [place] [phone]     ACM contacted the spouse/partner by telephone. Verified name and  with spouse/partner as identifiers.         ACM: Pablo Ralph RN     Challenges to be reviewed by the provider   Additional needs identified to be addressed with provider Yes  Please place referral to neuro and order MRI. Per spouse, they are ok with that.               Method of communication with provider: chart routing.    Care Summary Note:     Hx of aortic stenosis, ASHD, CKD III, DM2, gout, HTN, HLD, prostate CA , CABG , AvR , dementia, memory loss  CCM per Pcp  Per spouse, patient remains at baseline.  As noted above, spouse ok with referral to neuro and MRI order.  No other questions/issues per patient at this time.    Offered patient enrollment in the Remote Patient Monitoring (RPM) program for in-home monitoring: not at this time.     Assessments Completed:   General Assessment    Do you have any symptoms that are causing concern?: No          Care Planning:    Goals Addressed                   This Visit's Progress     Conditions and Symptoms   On track     I will schedule office visits, as directed by my provider.  I will keep my appointment or reschedule if I have to cancel.  I will notify my provider of any barriers to my plan of care.  I will notify my provider of any symptoms that indicate a worsening of my condition.    Barriers: none  Plan for overcoming my barriers: N/A  Confidence: 10/10  Anticipated Goal Completion Date: 24         Medication Management   On track     I will take my medication as directed.  I will notify my provider of any problems with medications, like adverse effects or side effects.  I will notify my provider/Care Coordinator if I am unable to afford my medications.  I will notify my provider for advice before I stop taking any of my

## 2024-04-30 ENCOUNTER — CARE COORDINATION (OUTPATIENT)
Facility: CLINIC | Age: 74
End: 2024-04-30

## 2024-04-30 NOTE — CARE COORDINATION
Attempted to contact patient's family for Complex Care Management after missed call after hours last night. No answer, left message and provided contact information. Will attempt contact at a later time.

## 2024-05-09 ENCOUNTER — CARE COORDINATION (OUTPATIENT)
Facility: CLINIC | Age: 74
End: 2024-05-09

## 2024-05-09 NOTE — CARE COORDINATION
I am unable to afford my medications.  I will notify my provider for advice before I stop taking any of my medication.    Barriers: none  Plan for overcoming my barriers: N/A  Confidence: 10/10  Anticipated Goal Completion Date: 7/24/24       Reduce Risk of Hospitalization   On track     I will take appropriate steps to reduce my risk of Hospitalization to include:  Take all of medications as prescribed  Visit w/ all of my recommended specialists.  Visit w/ my PCP as recommended.  Avoid activities that can trigger my chronic conditions.    Barriers: none  Plan for overcoming my barriers: N/A  Confidence: 10/10  Anticipated Goal Completion Date: 7/24/24               Advance Care Planning:   Reviewed and current     Medications Reviewed:   Not completed during this call: will complete at future outreach    PCP/Specialist follow up:   Future Appointments         Provider Specialty Dept Phone    5/14/2024 1:40 PM Terence Piña MD Cardiology [phone]    7/23/2024 10:00 AM [facility] LAB VISIT Internal Medicine [phone]    7/30/2024 9:30 AM Karley Fernandez MD Internal Medicine [phone]    12/12/2024 1:30 PM Raffi Alvarez MD Urology [phone]            Follow Up:   Plan for next ACM outreach in approximately 1 week to complete disease specific assessments, SDOH assessments, medication review , and goal progression; patient  is agreeable to this plan.

## 2024-05-13 ENCOUNTER — CARE COORDINATION (OUTPATIENT)
Facility: CLINIC | Age: 74
End: 2024-05-13

## 2024-05-13 NOTE — CARE COORDINATION
Ambulatory Care Coordination Note     5/13/2024 10:25 AM     Patient Current Location:  Home: [address] [place] [id number]     caregiver outreach attempt by this ACM today to perform care management follow up . ACM was unable to reach the caregiver by telephone today; left voice message requesting a return phone call to this ACM.     ACM: Pablo Ralph RN

## 2024-05-14 ENCOUNTER — OFFICE VISIT (OUTPATIENT)
Age: 74
End: 2024-05-14
Payer: MEDICARE

## 2024-05-14 VITALS
DIASTOLIC BLOOD PRESSURE: 64 MMHG | BODY MASS INDEX: 24.78 KG/M2 | HEIGHT: 73 IN | OXYGEN SATURATION: 98 % | HEART RATE: 59 BPM | SYSTOLIC BLOOD PRESSURE: 122 MMHG | WEIGHT: 187 LBS

## 2024-05-14 DIAGNOSIS — I10 ESSENTIAL (PRIMARY) HYPERTENSION: ICD-10-CM

## 2024-05-14 DIAGNOSIS — I11.9 HYPERTENSIVE HEART DISEASE WITHOUT HEART FAILURE: ICD-10-CM

## 2024-05-14 DIAGNOSIS — Z95.2 PRESENCE OF PROSTHETIC HEART VALVE: ICD-10-CM

## 2024-05-14 DIAGNOSIS — I25.10 ATHEROSCLEROSIS OF NATIVE CORONARY ARTERY WITHOUT ANGINA PECTORIS, UNSPECIFIED WHETHER NATIVE OR TRANSPLANTED HEART: Primary | ICD-10-CM

## 2024-05-14 PROCEDURE — 3017F COLORECTAL CA SCREEN DOC REV: CPT | Performed by: INTERNAL MEDICINE

## 2024-05-14 PROCEDURE — 3074F SYST BP LT 130 MM HG: CPT | Performed by: INTERNAL MEDICINE

## 2024-05-14 PROCEDURE — 93000 ELECTROCARDIOGRAM COMPLETE: CPT | Performed by: INTERNAL MEDICINE

## 2024-05-14 PROCEDURE — G8420 CALC BMI NORM PARAMETERS: HCPCS | Performed by: INTERNAL MEDICINE

## 2024-05-14 PROCEDURE — 3078F DIAST BP <80 MM HG: CPT | Performed by: INTERNAL MEDICINE

## 2024-05-14 PROCEDURE — 1036F TOBACCO NON-USER: CPT | Performed by: INTERNAL MEDICINE

## 2024-05-14 PROCEDURE — 99214 OFFICE O/P EST MOD 30 MIN: CPT | Performed by: INTERNAL MEDICINE

## 2024-05-14 PROCEDURE — 1123F ACP DISCUSS/DSCN MKR DOCD: CPT | Performed by: INTERNAL MEDICINE

## 2024-05-14 PROCEDURE — G8427 DOCREV CUR MEDS BY ELIG CLIN: HCPCS | Performed by: INTERNAL MEDICINE

## 2024-05-14 ASSESSMENT — PATIENT HEALTH QUESTIONNAIRE - PHQ9
SUM OF ALL RESPONSES TO PHQ9 QUESTIONS 1 & 2: 0
SUM OF ALL RESPONSES TO PHQ QUESTIONS 1-9: 0
1. LITTLE INTEREST OR PLEASURE IN DOING THINGS: NOT AT ALL
SUM OF ALL RESPONSES TO PHQ QUESTIONS 1-9: 0
2. FEELING DOWN, DEPRESSED OR HOPELESS: NOT AT ALL

## 2024-05-14 NOTE — PROGRESS NOTES
HISTORY OF PRESENT ILLNESS  Nick Peraza  74 y.o. male     Chief Complaint   Patient presents with    Follow-up     6 months       ASSESSMENT and PLAN    The primary encounter diagnosis was Atherosclerosis of native coronary artery without angina pectoris, unspecified whether native or transplanted heart. Diagnoses of Presence of prosthetic heart valve, Hypertensive heart disease without heart failure, and Essential (primary) hypertension were also pertinent to this visit.    Mr. Nick Peraza initially presented with chest pains, and significant aortic stenosis with mean gradient of 30 mmHg, peak gradient of 67 mmHg, DEYSI of 0.8 cm². He ultimately underwent open-heart surgery in November of 2014. He had coronary artery bypass graft surgery with LIMA to LAD and SVG to OM1. He also had aortic valve replacement with #23 Neil-Wei Magna pericardial valve.  His echocardiogram from August 2017 showed normal LV function with well-functioning Neil-Wei magna pericardial bioprosthetic aortic valve with mean gradient of 10 mmHg, and DEYSI of 1.86 cm².  His echocardiogram September 2020 revealed normal LV function with EF 60%.  Prosthetic aortic valve with mean gradient of 7.2 mmHg and DEYSI of 1.8 cm².  He has 23 mm Neil-Wei magna bioprosthetic aortic valve.  No significant pulmonary hypertension was noted.  His biggest issue starting in 2022 has been short-term memory loss.  His echocardiogram in December 2023 showed EF 55-60%.  Bioprosthetic aortic valve with mean gradient 12 mmHg, mild to moderate MR.  PA pressure was noted to be 20 mmHg.    CAD:    Clinically stable.  AS:   Bioprosthetic AVR, clinically stable.  He is to continue baby aspirin daily.    BP:    Well-controlled at 122/64.  Rhythm:    Asymptomatic sinus bradycardia 59 bpm with first-degree AVB, [place] interval 262 ms.  CHF:    There is no evidence of decompensated CHF noted.  Weight:     His weight today is 187 pounds.  His baseline

## 2024-05-14 NOTE — PROGRESS NOTES
Nick Peraza presents today for   Chief Complaint   Patient presents with    Follow-up     6 months       Nick Peraza preferred language for health care discussion is english/other.    Is someone accompanying this pt? no    Is the patient using any DME equipment during OV? no    Depression Screening:  Depression: Not at risk (5/14/2024)    PHQ-2     PHQ-2 Score: 0        Learning Assessment:  Who is the primary learner? Patient    What is the preferred language for health care of the primary learner? ENGLISH    How does the primary learner prefer to learn new concepts? DEMONSTRATION    Answered By patient    Relationship to Learner SELF           Pt currently taking Anticoagulant therapy? no    Pt currently taking Antiplatelet therapy ? aspirin      Coordination of Care:  1. Have you been to the ER, urgent care clinic since your last visit? Hospitalized since your last visit? no    2. Have you seen or consulted any other health care providers outside of the [facility] since your last visit? Include any pap smears or colon screening. no

## 2024-05-21 ENCOUNTER — CARE COORDINATION (OUTPATIENT)
Facility: CLINIC | Age: 74
End: 2024-05-21

## 2024-05-21 NOTE — CARE COORDINATION
Ambulatory Care Coordination Note     5/21/2024 3:49 PM     Spouse/Partner outreach attempt by this ACM today to perform care management follow up . ACM was unable to reach the spouse/partner by telephone today; left voice message requesting a return phone call to this ACM.     ACM: Pablo Ralph RN     Care Summary Note:     PCP/Specialist follow up:   Future Appointments         Provider Specialty Dept Phone    7/23/2024 10:00 AM [facility] LAB VISIT Internal Medicine [phone]    7/30/2024 9:30 AM Karley Fernandez MD Internal Medicine [phone]    11/12/2024 2:20 PM Terence Piña MD Cardiology [phone]    12/12/2024 1:30 PM Raffi Alvarez MD Urology [phone]            Follow Up:   Plan for next AC outreach in approximately 2 weeks to complete:  - disease specific assessments  - SDOH assessments  - medication review  - goal progression.

## 2024-06-12 ENCOUNTER — CARE COORDINATION (OUTPATIENT)
Facility: CLINIC | Age: 74
End: 2024-06-12

## 2024-06-12 NOTE — CARE COORDINATION
Ambulatory Care Coordination Note     6/12/2024 4:01 PM     Patient outreach attempt by this ACM today to perform care management follow up . ACM was unable to reach the patient by telephone today; left voice message requesting a return phone call to this ACM.     ACM: Pablo Ralph RN     Care Summary Note:     PCP/Specialist follow up:   Future Appointments         Provider Specialty Dept Phone    7/23/2024 10:00 AM [facility] LAB VISIT Internal Medicine [phone]    7/30/2024 9:30 AM Karley Fernandez MD Internal Medicine [phone]    11/12/2024 2:20 PM Terence Piña MD Cardiology [phone]    12/12/2024 1:30 PM Raffi Alvarez MD Urology [phone]            Follow Up:   Plan for next AC outreach in approximately 2 weeks to complete:  - disease specific assessments  - SDOH assessments  - medication review  - goal progression.

## 2024-07-08 ENCOUNTER — CARE COORDINATION (OUTPATIENT)
Facility: CLINIC | Age: 74
End: 2024-07-08

## 2024-07-08 NOTE — CARE COORDINATION
Ambulatory Care Coordination Note     7/8/2024 11:34 AM     , Da Beal  outreach attempt by this ACM today to perform care management follow up . ACM was unable to reach the family,    by telephone today; left voice message requesting a return phone call to this ACM.     ACM: Pablo Ralph RN     Care Summary Note: Was the son able to schedule the MRI    PCP/Specialist follow up:   Future Appointments         Provider Specialty Dept Phone    7/23/2024 10:00 AM [facility] LAB VISIT Internal Medicine [phone]    7/30/2024 9:30 AM Karley Fernandez MD Internal Medicine [phone]    9/13/2024 11:15 AM Diane Tello APRN - NP Neurology [phone]    11/12/2024 2:20 PM Terence Piña MD Cardiology [phone]    12/12/2024 1:30 PM Raffi Alvarez MD Urology [phone]            Follow Up:   Plan for next ACM outreach in approximately 1 month to complete:  - medication review  - goal progression  - was the MRI scheduled .

## 2024-08-12 ENCOUNTER — CARE COORDINATION (OUTPATIENT)
Facility: CLINIC | Age: 74
End: 2024-08-12

## 2024-08-12 NOTE — CARE COORDINATION
conditions.    Barriers: none  Plan for overcoming my barriers: N/A  Confidence: 10/10  Anticipated Goal Completion Date: 7/24/24               PCP/Specialist follow up:   Future Appointments         Provider Specialty Dept Phone    8/14/2024 11:45 AM [facility] LAB VISIT Internal Medicine [phone]    8/21/2024 2:30 PM Karley Fernandez MD Internal Medicine [phone]    9/13/2024 11:15 AM Diane Tello APRN - NP Neurology [phone]    11/12/2024 2:20 PM Terence Piña MD Cardiology [phone]    12/12/2024 1:30 PM Raffi Alvarez MD Urology [phone]            Follow Up:   No further Ambulatory Care Management follow-up scheduled at this time.  Caregiver has Ambulatory Care Manager's contact information for any further questions, concerns or needs.

## 2024-08-21 ENCOUNTER — TELEPHONE (OUTPATIENT)
Facility: CLINIC | Age: 74
End: 2024-08-21

## 2024-08-21 ENCOUNTER — HOSPITAL ENCOUNTER (OUTPATIENT)
Facility: HOSPITAL | Age: 74
Setting detail: SPECIMEN
Discharge: HOME | End: 2024-08-24
Payer: MEDICARE

## 2024-08-21 ENCOUNTER — OFFICE VISIT (OUTPATIENT)
Facility: CLINIC | Age: 74
End: 2024-08-21

## 2024-08-21 VITALS
WEIGHT: 181 LBS | HEIGHT: 73 IN | DIASTOLIC BLOOD PRESSURE: 60 MMHG | BODY MASS INDEX: 23.99 KG/M2 | SYSTOLIC BLOOD PRESSURE: 108 MMHG | OXYGEN SATURATION: 98 % | HEART RATE: 65 BPM | RESPIRATION RATE: 16 BRPM | TEMPERATURE: 98.8 F

## 2024-08-21 DIAGNOSIS — C61 PROSTATE CANCER (HCC): ICD-10-CM

## 2024-08-21 DIAGNOSIS — E87.1 HYPONATREMIA: ICD-10-CM

## 2024-08-21 DIAGNOSIS — N18.32 TYPE 2 DIABETES MELLITUS WITH STAGE 3B CHRONIC KIDNEY DISEASE, WITHOUT LONG-TERM CURRENT USE OF INSULIN (HCC): ICD-10-CM

## 2024-08-21 DIAGNOSIS — E78.00 PURE HYPERCHOLESTEROLEMIA: ICD-10-CM

## 2024-08-21 DIAGNOSIS — I10 ESSENTIAL HYPERTENSION: ICD-10-CM

## 2024-08-21 DIAGNOSIS — N18.32 STAGE 3B CHRONIC KIDNEY DISEASE (HCC): ICD-10-CM

## 2024-08-21 DIAGNOSIS — Z95.2 S/P AVR (AORTIC VALVE REPLACEMENT): ICD-10-CM

## 2024-08-21 DIAGNOSIS — E11.22 TYPE 2 DIABETES MELLITUS WITH STAGE 3B CHRONIC KIDNEY DISEASE, WITHOUT LONG-TERM CURRENT USE OF INSULIN (HCC): ICD-10-CM

## 2024-08-21 DIAGNOSIS — F03.90 DEMENTIA WITHOUT BEHAVIORAL DISTURBANCE, PSYCHOTIC DISTURBANCE, MOOD DISTURBANCE, OR ANXIETY, UNSPECIFIED DEMENTIA SEVERITY, UNSPECIFIED DEMENTIA TYPE (HCC): Primary | ICD-10-CM

## 2024-08-21 DIAGNOSIS — E53.8 VITAMIN B12 DEFICIENCY: ICD-10-CM

## 2024-08-21 DIAGNOSIS — R63.4 WEIGHT LOSS, UNINTENTIONAL: ICD-10-CM

## 2024-08-21 DIAGNOSIS — R41.3 MEMORY LOSS: ICD-10-CM

## 2024-08-21 DIAGNOSIS — I25.10 CAD IN NATIVE ARTERY: ICD-10-CM

## 2024-08-21 LAB
25(OH)D3 SERPL-MCNC: 42.6 NG/ML (ref 30–100)
ALBUMIN SERPL-MCNC: 4.1 G/DL (ref 3.4–5)
ALBUMIN/GLOB SERPL: 1.3 (ref 0.8–1.7)
ALP SERPL-CCNC: 70 U/L (ref 45–117)
ALT SERPL-CCNC: 21 U/L (ref 16–61)
ANION GAP SERPL CALC-SCNC: 8 MMOL/L (ref 3–18)
AST SERPL-CCNC: 16 U/L (ref 10–38)
BASOPHILS # BLD: 0 K/UL (ref 0–0.1)
BASOPHILS NFR BLD: 1 % (ref 0–2)
BILIRUB SERPL-MCNC: 1.7 MG/DL (ref 0.2–1)
BUN SERPL-MCNC: 24 MG/DL (ref 7–18)
BUN/CREAT SERPL: 13 (ref 12–20)
CALCIUM SERPL-MCNC: 9.2 MG/DL (ref 8.5–10.1)
CHLORIDE SERPL-SCNC: 96 MMOL/L (ref 100–111)
CHOLEST SERPL-MCNC: 156 MG/DL
CO2 SERPL-SCNC: 23 MMOL/L (ref 21–32)
CREAT SERPL-MCNC: 1.82 MG/DL (ref 0.6–1.3)
CREAT UR-MCNC: 280 MG/DL (ref 30–125)
DIFFERENTIAL METHOD BLD: ABNORMAL
EOSINOPHIL # BLD: 0.1 K/UL (ref 0–0.4)
EOSINOPHIL NFR BLD: 2 % (ref 0–5)
ERYTHROCYTE [DISTWIDTH] IN BLOOD BY AUTOMATED COUNT: 13.2 % (ref 11.6–14.5)
EST. AVERAGE GLUCOSE BLD GHB EST-MCNC: 105 MG/DL
GLOBULIN SER CALC-MCNC: 3.2 G/DL (ref 2–4)
GLUCOSE SERPL-MCNC: 102 MG/DL (ref 74–99)
HBA1C MFR BLD: 5.3 % (ref 4.2–5.6)
HCT VFR BLD AUTO: 39.3 % (ref 36–48)
HDLC SERPL-MCNC: 93 MG/DL (ref 40–60)
HDLC SERPL: 1.7 (ref 0–5)
HGB BLD-MCNC: 13.2 G/DL (ref 13–16)
IMM GRANULOCYTES # BLD AUTO: 0 K/UL (ref 0–0.04)
IMM GRANULOCYTES NFR BLD AUTO: 1 % (ref 0–0.5)
LDLC SERPL CALC-MCNC: 46.6 MG/DL (ref 0–100)
LIPID PANEL: ABNORMAL
LYMPHOCYTES # BLD: 0.8 K/UL (ref 0.9–3.6)
LYMPHOCYTES NFR BLD: 15 % (ref 21–52)
MCH RBC QN AUTO: 34 PG (ref 24–34)
MCHC RBC AUTO-ENTMCNC: 33.6 G/DL (ref 31–37)
MCV RBC AUTO: 101.3 FL (ref 78–100)
MICROALBUMIN UR-MCNC: 5.66 MG/DL (ref 0–3)
MICROALBUMIN/CREAT UR-RTO: 20 MG/G (ref 0–30)
MONOCYTES # BLD: 0.6 K/UL (ref 0.05–1.2)
MONOCYTES NFR BLD: 10 % (ref 3–10)
NEUTS SEG # BLD: 4.2 K/UL (ref 1.8–8)
NEUTS SEG NFR BLD: 72 % (ref 40–73)
NRBC # BLD: 0 K/UL (ref 0–0.01)
NRBC BLD-RTO: 0 PER 100 WBC
PLATELET # BLD AUTO: 176 K/UL (ref 135–420)
PMV BLD AUTO: 9 FL (ref 9.2–11.8)
POTASSIUM SERPL-SCNC: 5.7 MMOL/L (ref 3.5–5.5)
PROT SERPL-MCNC: 7.3 G/DL (ref 6.4–8.2)
PSA SERPL-MCNC: 0.1 NG/ML (ref 0–4)
RBC # BLD AUTO: 3.88 M/UL (ref 4.35–5.65)
SODIUM SERPL-SCNC: 127 MMOL/L (ref 136–145)
TRIGL SERPL-MCNC: 82 MG/DL
VIT B12 SERPL-MCNC: 835 PG/ML (ref 211–911)
VLDLC SERPL CALC-MCNC: 16.4 MG/DL
WBC # BLD AUTO: 5.8 K/UL (ref 4.6–13.2)

## 2024-08-21 PROCEDURE — 85025 COMPLETE CBC W/AUTO DIFF WBC: CPT

## 2024-08-21 PROCEDURE — 80061 LIPID PANEL: CPT

## 2024-08-21 PROCEDURE — 84153 ASSAY OF PSA TOTAL: CPT

## 2024-08-21 PROCEDURE — 82043 UR ALBUMIN QUANTITATIVE: CPT

## 2024-08-21 PROCEDURE — 82570 ASSAY OF URINE CREATININE: CPT

## 2024-08-21 PROCEDURE — 83036 HEMOGLOBIN GLYCOSYLATED A1C: CPT

## 2024-08-21 PROCEDURE — 80053 COMPREHEN METABOLIC PANEL: CPT

## 2024-08-21 PROCEDURE — 82607 VITAMIN B-12: CPT

## 2024-08-21 PROCEDURE — 82306 VITAMIN D 25 HYDROXY: CPT

## 2024-08-21 PROCEDURE — 36415 COLL VENOUS BLD VENIPUNCTURE: CPT

## 2024-08-21 NOTE — TELEPHONE ENCOUNTER
Called patients sonDa. States patients wife is in a nursing home, they are unsure if she has dementia or mental health issues, they are hoping she will only be at the nursing home for a few months. Son states he lives 1 street over from the patient and checks on him everyday, the daughter is also involved in his care. Son states he has a tracker on patients car to make sure he doesn't wander or get lost. MRI that was ordered was scheduled but they had to cancel due to wife's health issues and appointments. Gave son scheduling number and he will call to get that rescheduled.

## 2024-08-21 NOTE — PROGRESS NOTES
Nick Peraza presents today for   Chief Complaint   Patient presents with    3 Month Follow-Up       \"Have you been to the ER, urgent care clinic since your last visit?  Hospitalized since your last visit?\"    NO    “Have you seen or consulted any other health care providers outside of [facility] since your last visit?”    NO          
in 8/2019 at 89 started on cyanocobalamin 1000 mcg SL daily. Level remains normal today indicative of compliance, and anemia improved and stable although macrocytosis persists likely due to alcohol consumption. Will continue to monitor.  11. Gout.  Remains asymptomatic. On allopurinol. No recent flares.  12. History of COVID-19.  Patient tested positive for SARS-CoV-2 by PCR testing on 1/15/2021. Reported relatively mild symptoms with fatigue, nasal congestion, decreased taste, and cough. Reported symptoms resolved without sequela.    13. Weight loss.  Weight has gradually decreased from peak weight of 231 pounds (11/2021) and patient reports that he is eating better with smaller portions and fewer sweets.  Now acutely decreased 14 pounds since last visit in 4/2024.  Concern raised regarding adequate intake given dementia and will need to readdress at next visit.  Will monitor.  13. Health maintenance.  Completed the Pfizer COVID-19 vaccine series and received two Pfizer booster doses and the updated bivalent booster dose. Completed 2/2 doses of Shingrix vaccine. Discussed recommended vaccines for the fall, including influenza vaccine, updated COVID vaccine, and new RSV vaccine. Other immunizations up to date. Completed Cologuard for colorectal cancer screening and due for repeat but not wishing to proceed. Continue regular eye exams with Dr. Kumar. Urged to schedule as overdue. Reports that he is continuing to drink approximately 1-2 beers per day (previously 1 case per week).  Continue supplementation with thiamine and folic acid.  Vitamin D level remains normal on maintenance dose supplement. Medicare wellness visit up-to-date.       Patient understands recommendations and agrees with plan.  Follow-up in 4 weeks.    This visit required high complexity medically necessary decision making and management plans.     Time spent in preparing for the visit, including review of history, tests done prior to arrival,

## 2024-08-23 ENCOUNTER — TELEPHONE (OUTPATIENT)
Facility: CLINIC | Age: 74
End: 2024-08-23

## 2024-08-23 RX ORDER — LISINOPRIL 10 MG/1
10 TABLET ORAL DAILY
Qty: 90 TABLET | Refills: 3 | Status: SHIPPED | OUTPATIENT
Start: 2024-08-23

## 2024-08-23 NOTE — TELEPHONE ENCOUNTER
Mr. Da Peraza is aware of results and recommendations.  Appointments scheduled.   
fluid intake but also to increase the amount of sodium intake.  If taking any electrolyte drinks such as Gatorade, please make sure they were low in potassium due to elevated level.    Lipid panel is well-controlled with LDL 46 and HDL 93 on atorvastatin 20 mg daily.      Vitamin D level and vitamin B12 are normal reflective of compliance with his supplements and would continue.    PSA stable at 0.1 no evidence of cancer recurrence.    Please schedule him for a lab appointment and follow-up visit in 4 weeks so that labs can be reassessed and his blood pressure rechecked.

## 2024-08-25 PROBLEM — E87.1 HYPONATREMIA: Status: ACTIVE | Noted: 2024-08-25

## 2024-09-23 ENCOUNTER — HOSPITAL ENCOUNTER (OUTPATIENT)
Facility: HOSPITAL | Age: 74
Setting detail: SPECIMEN
Discharge: HOME | End: 2024-09-26
Payer: MEDICARE

## 2024-09-23 DIAGNOSIS — E87.1 HYPONATREMIA: ICD-10-CM

## 2024-09-23 DIAGNOSIS — N18.32 STAGE 3B CHRONIC KIDNEY DISEASE (HCC): ICD-10-CM

## 2024-09-23 LAB
ALBUMIN SERPL-MCNC: 4 G/DL (ref 3.4–5)
ANION GAP SERPL CALC-SCNC: 5 MMOL/L (ref 3–18)
BUN SERPL-MCNC: 26 MG/DL (ref 7–18)
BUN/CREAT SERPL: 15 (ref 12–20)
CALCIUM SERPL-MCNC: 9.2 MG/DL (ref 8.5–10.1)
CHLORIDE SERPL-SCNC: 101 MMOL/L (ref 100–111)
CO2 SERPL-SCNC: 26 MMOL/L (ref 21–32)
CREAT SERPL-MCNC: 1.74 MG/DL (ref 0.6–1.3)
GLUCOSE SERPL-MCNC: 136 MG/DL (ref 74–99)
OSMOLALITY SERPL: 293 MOSM/KG H2O (ref 280–301)
OSMOLALITY UR: 354 MOSM/KG H2O
PHOSPHATE SERPL-MCNC: 3.6 MG/DL (ref 2.5–4.9)
POTASSIUM SERPL-SCNC: 4.7 MMOL/L (ref 3.5–5.5)
SODIUM SERPL-SCNC: 132 MMOL/L (ref 136–145)
SODIUM UR-SCNC: 69 MMOL/L (ref 20–110)

## 2024-09-23 PROCEDURE — 83930 ASSAY OF BLOOD OSMOLALITY: CPT

## 2024-09-23 PROCEDURE — 80069 RENAL FUNCTION PANEL: CPT

## 2024-09-23 PROCEDURE — 84300 ASSAY OF URINE SODIUM: CPT

## 2024-09-23 PROCEDURE — 36415 COLL VENOUS BLD VENIPUNCTURE: CPT

## 2024-09-23 PROCEDURE — 83935 ASSAY OF URINE OSMOLALITY: CPT

## 2024-11-12 ENCOUNTER — OFFICE VISIT (OUTPATIENT)
Age: 74
End: 2024-11-12

## 2024-11-12 VITALS
HEIGHT: 73 IN | WEIGHT: 188 LBS | OXYGEN SATURATION: 99 % | BODY MASS INDEX: 24.92 KG/M2 | HEART RATE: 66 BPM | DIASTOLIC BLOOD PRESSURE: 78 MMHG | SYSTOLIC BLOOD PRESSURE: 114 MMHG

## 2024-11-12 DIAGNOSIS — I11.9 HYPERTENSIVE HEART DISEASE WITHOUT HEART FAILURE: Primary | ICD-10-CM

## 2024-11-12 DIAGNOSIS — Z95.2 PRESENCE OF PROSTHETIC HEART VALVE: ICD-10-CM

## 2024-11-12 DIAGNOSIS — I10 ESSENTIAL (PRIMARY) HYPERTENSION: ICD-10-CM

## 2024-11-12 DIAGNOSIS — I25.10 ATHEROSCLEROSIS OF NATIVE CORONARY ARTERY WITHOUT ANGINA PECTORIS, UNSPECIFIED WHETHER NATIVE OR TRANSPLANTED HEART: ICD-10-CM

## 2024-11-12 ASSESSMENT — PATIENT HEALTH QUESTIONNAIRE - PHQ9
SUM OF ALL RESPONSES TO PHQ QUESTIONS 1-9: 0
1. LITTLE INTEREST OR PLEASURE IN DOING THINGS: NOT AT ALL
2. FEELING DOWN, DEPRESSED OR HOPELESS: NOT AT ALL
SUM OF ALL RESPONSES TO PHQ9 QUESTIONS 1 & 2: 0
SUM OF ALL RESPONSES TO PHQ QUESTIONS 1-9: 0

## 2024-11-12 NOTE — PROGRESS NOTES
HISTORY OF PRESENT ILLNESS  Nick Peraza  74 y.o. male     Chief Complaint   Patient presents with    Follow-up     6 months       ASSESSMENT and PLAN    The primary encounter diagnosis was Hypertensive heart disease without heart failure. Diagnoses of Atherosclerosis of native coronary artery without angina pectoris, unspecified whether native or transplanted heart, Presence of prosthetic heart valve, and Essential (primary) hypertension were also pertinent to this visit.    Mr. Nick Peraza initially presented with chest pains, and significant aortic stenosis with mean gradient of 30 mmHg, peak gradient of 67 mmHg, DEYSI of 0.8 cm². He ultimately underwent open-heart surgery in November of 2014. He had coronary artery bypass graft surgery with LIMA to LAD and SVG to OM1. He also had aortic valve replacement with #23 Neil-Wei Magna pericardial valve.  His echocardiogram from August 2017 showed normal LV function with well-functioning Neil-Wei magna pericardial bioprosthetic aortic valve with mean gradient of 10 mmHg, and DEYSI of 1.86 cm².  His echocardiogram September 2020 revealed normal LV function with EF 60%.  Prosthetic aortic valve with mean gradient of 7.2 mmHg and DEYSI of 1.8 cm².  He has 23 mm Neil-Wei magna bioprosthetic aortic valve.  No significant pulmonary hypertension was noted.  His biggest issue starting in 2022 has been short-term memory loss.  His echocardiogram in December 2023 showed EF 55-60%.  Bioprosthetic aortic valve with mean gradient 12 mmHg, mild to moderate MR.  PA pressure was noted to be 20 mmHg.    Medication regimen reviewed and current cardiac regimen will be continued.  All new testing since the last office visit was independently reviewed by me.    CAD:    Clinically stable.  AS:   Bioprosthetic AVR, clinically stable.  He is to continue baby aspirin daily.   HTN:   Well-controlled at 114/78.  Continue current BP regimen.  Rhythm:    Sinus rhythm at

## 2024-11-12 NOTE — PROGRESS NOTES
Nick Peraza presents today for   Chief Complaint   Patient presents with    Follow-up     6 months       Nick Peraza preferred language for health care discussion is english/other.    Is someone accompanying this pt? yes    Is the patient using any DME equipment during OV? no    Depression Screening:  Depression: Not at risk (11/12/2024)    PHQ-2     PHQ-2 Score: 0        Learning Assessment:  Who is the primary learner? Patient    What is the preferred language for health care of the primary learner? ENGLISH    How does the primary learner prefer to learn new concepts? DEMONSTRATION    Answered By patient    Relationship to Learner SELF           Pt currently taking Anticoagulant therapy? yes    Pt currently taking Antiplatelet therapy ? no      Coordination of Care:  1. Have you been to the ER, urgent care clinic since your last visit? Hospitalized since your last visit? no    2. Have you seen or consulted any other health care providers outside of the [facility] since your last visit? Include any pap smears or colon screening. no

## 2024-12-01 DIAGNOSIS — E78.00 PURE HYPERCHOLESTEROLEMIA: ICD-10-CM

## 2024-12-01 DIAGNOSIS — I10 ESSENTIAL HYPERTENSION: ICD-10-CM

## 2024-12-01 DIAGNOSIS — R41.3 MEMORY LOSS: ICD-10-CM

## 2024-12-01 DIAGNOSIS — E11.22 TYPE 2 DIABETES MELLITUS WITH STAGE 3B CHRONIC KIDNEY DISEASE, WITHOUT LONG-TERM CURRENT USE OF INSULIN (HCC): ICD-10-CM

## 2024-12-01 DIAGNOSIS — N18.32 STAGE 3B CHRONIC KIDNEY DISEASE (HCC): ICD-10-CM

## 2024-12-01 DIAGNOSIS — F03.90 DEMENTIA WITHOUT BEHAVIORAL DISTURBANCE, PSYCHOTIC DISTURBANCE, MOOD DISTURBANCE, OR ANXIETY, UNSPECIFIED DEMENTIA SEVERITY, UNSPECIFIED DEMENTIA TYPE (HCC): Primary | ICD-10-CM

## 2024-12-01 DIAGNOSIS — E87.1 HYPONATREMIA: ICD-10-CM

## 2024-12-01 DIAGNOSIS — N18.32 TYPE 2 DIABETES MELLITUS WITH STAGE 3B CHRONIC KIDNEY DISEASE, WITHOUT LONG-TERM CURRENT USE OF INSULIN (HCC): ICD-10-CM

## 2024-12-04 ENCOUNTER — HOSPITAL ENCOUNTER (OUTPATIENT)
Facility: HOSPITAL | Age: 74
Setting detail: SPECIMEN
Discharge: HOME | End: 2024-12-07
Payer: MEDICARE

## 2024-12-04 DIAGNOSIS — E78.00 PURE HYPERCHOLESTEROLEMIA: ICD-10-CM

## 2024-12-04 DIAGNOSIS — N18.32 STAGE 3B CHRONIC KIDNEY DISEASE (HCC): ICD-10-CM

## 2024-12-04 DIAGNOSIS — E87.1 HYPONATREMIA: ICD-10-CM

## 2024-12-04 DIAGNOSIS — E11.22 TYPE 2 DIABETES MELLITUS WITH STAGE 3B CHRONIC KIDNEY DISEASE, WITHOUT LONG-TERM CURRENT USE OF INSULIN (HCC): ICD-10-CM

## 2024-12-04 DIAGNOSIS — N18.32 TYPE 2 DIABETES MELLITUS WITH STAGE 3B CHRONIC KIDNEY DISEASE, WITHOUT LONG-TERM CURRENT USE OF INSULIN (HCC): ICD-10-CM

## 2024-12-04 DIAGNOSIS — I10 ESSENTIAL HYPERTENSION: ICD-10-CM

## 2024-12-04 LAB
ALBUMIN SERPL-MCNC: 4.2 G/DL (ref 3.4–5)
ALBUMIN/GLOB SERPL: 1.3 (ref 0.8–1.7)
ALP SERPL-CCNC: 78 U/L (ref 45–117)
ALT SERPL-CCNC: 19 U/L (ref 16–61)
ANION GAP SERPL CALC-SCNC: 6 MMOL/L (ref 3–18)
AST SERPL-CCNC: 14 U/L (ref 10–38)
BASOPHILS # BLD: 0 K/UL (ref 0–0.1)
BASOPHILS NFR BLD: 1 % (ref 0–2)
BILIRUB SERPL-MCNC: 2 MG/DL (ref 0.2–1)
BUN SERPL-MCNC: 25 MG/DL (ref 7–18)
BUN/CREAT SERPL: 10 (ref 12–20)
CALCIUM SERPL-MCNC: 9.8 MG/DL (ref 8.5–10.1)
CHLORIDE SERPL-SCNC: 105 MMOL/L (ref 100–111)
CHOLEST SERPL-MCNC: 148 MG/DL
CO2 SERPL-SCNC: 25 MMOL/L (ref 21–32)
CREAT SERPL-MCNC: 2.39 MG/DL (ref 0.6–1.3)
DIFFERENTIAL METHOD BLD: ABNORMAL
EOSINOPHIL # BLD: 0.4 K/UL (ref 0–0.4)
EOSINOPHIL NFR BLD: 7 % (ref 0–5)
ERYTHROCYTE [DISTWIDTH] IN BLOOD BY AUTOMATED COUNT: 12.9 % (ref 11.6–14.5)
EST. AVERAGE GLUCOSE BLD GHB EST-MCNC: 105 MG/DL
GLOBULIN SER CALC-MCNC: 3.3 G/DL (ref 2–4)
GLUCOSE SERPL-MCNC: 99 MG/DL (ref 74–99)
HBA1C MFR BLD: 5.3 % (ref 4.2–5.6)
HCT VFR BLD AUTO: 40.3 % (ref 36–48)
HDLC SERPL-MCNC: 73 MG/DL (ref 40–60)
HDLC SERPL: 2 (ref 0–5)
HGB BLD-MCNC: 12.6 G/DL (ref 13–16)
IMM GRANULOCYTES # BLD AUTO: 0 K/UL (ref 0–0.04)
IMM GRANULOCYTES NFR BLD AUTO: 0 % (ref 0–0.5)
LDLC SERPL CALC-MCNC: 58.6 MG/DL (ref 0–100)
LIPID PANEL: ABNORMAL
LYMPHOCYTES # BLD: 0.9 K/UL (ref 0.9–3.6)
LYMPHOCYTES NFR BLD: 16 % (ref 21–52)
MAGNESIUM SERPL-MCNC: 2.3 MG/DL (ref 1.6–2.6)
MCH RBC QN AUTO: 33.7 PG (ref 24–34)
MCHC RBC AUTO-ENTMCNC: 31.3 G/DL (ref 31–37)
MCV RBC AUTO: 107.8 FL (ref 78–100)
MONOCYTES # BLD: 0.5 K/UL (ref 0.05–1.2)
MONOCYTES NFR BLD: 9 % (ref 3–10)
NEUTS SEG # BLD: 3.8 K/UL (ref 1.8–8)
NEUTS SEG NFR BLD: 67 % (ref 40–73)
NRBC # BLD: 0 K/UL (ref 0–0.01)
NRBC BLD-RTO: 0 PER 100 WBC
PLATELET # BLD AUTO: 167 K/UL (ref 135–420)
PMV BLD AUTO: 8.9 FL (ref 9.2–11.8)
POTASSIUM SERPL-SCNC: 5.2 MMOL/L (ref 3.5–5.5)
PROT SERPL-MCNC: 7.5 G/DL (ref 6.4–8.2)
RBC # BLD AUTO: 3.74 M/UL (ref 4.35–5.65)
SODIUM SERPL-SCNC: 136 MMOL/L (ref 136–145)
TRIGL SERPL-MCNC: 82 MG/DL
VLDLC SERPL CALC-MCNC: 16.4 MG/DL
WBC # BLD AUTO: 5.7 K/UL (ref 4.6–13.2)

## 2024-12-04 PROCEDURE — 85025 COMPLETE CBC W/AUTO DIFF WBC: CPT

## 2024-12-04 PROCEDURE — 82043 UR ALBUMIN QUANTITATIVE: CPT

## 2024-12-04 PROCEDURE — 83735 ASSAY OF MAGNESIUM: CPT

## 2024-12-04 PROCEDURE — 80061 LIPID PANEL: CPT

## 2024-12-04 PROCEDURE — 36415 COLL VENOUS BLD VENIPUNCTURE: CPT

## 2024-12-04 PROCEDURE — 83036 HEMOGLOBIN GLYCOSYLATED A1C: CPT

## 2024-12-04 PROCEDURE — 80053 COMPREHEN METABOLIC PANEL: CPT

## 2024-12-04 PROCEDURE — 82570 ASSAY OF URINE CREATININE: CPT

## 2024-12-05 LAB
CREAT UR-MCNC: 219 MG/DL (ref 30–125)
MICROALBUMIN UR-MCNC: 2.38 MG/DL (ref 0–3)
MICROALBUMIN/CREAT UR-RTO: 11 MG/G (ref 0–30)

## 2024-12-19 ENCOUNTER — OFFICE VISIT (OUTPATIENT)
Facility: CLINIC | Age: 74
End: 2024-12-19

## 2024-12-19 ENCOUNTER — HOSPITAL ENCOUNTER (OUTPATIENT)
Facility: HOSPITAL | Age: 74
Setting detail: SPECIMEN
Discharge: HOME | End: 2024-12-22
Payer: MEDICARE

## 2024-12-19 VITALS
HEIGHT: 73 IN | RESPIRATION RATE: 14 BRPM | HEART RATE: 66 BPM | TEMPERATURE: 98.5 F | WEIGHT: 181 LBS | BODY MASS INDEX: 23.99 KG/M2 | SYSTOLIC BLOOD PRESSURE: 118 MMHG | DIASTOLIC BLOOD PRESSURE: 56 MMHG | OXYGEN SATURATION: 99 %

## 2024-12-19 DIAGNOSIS — N18.32 ACUTE RENAL FAILURE SUPERIMPOSED ON STAGE 3B CHRONIC KIDNEY DISEASE, UNSPECIFIED ACUTE RENAL FAILURE TYPE (HCC): ICD-10-CM

## 2024-12-19 DIAGNOSIS — N17.9 ACUTE RENAL FAILURE SUPERIMPOSED ON STAGE 3B CHRONIC KIDNEY DISEASE, UNSPECIFIED ACUTE RENAL FAILURE TYPE (HCC): ICD-10-CM

## 2024-12-19 DIAGNOSIS — I10 ESSENTIAL HYPERTENSION: ICD-10-CM

## 2024-12-19 DIAGNOSIS — D64.9 ANEMIA, UNSPECIFIED TYPE: ICD-10-CM

## 2024-12-19 DIAGNOSIS — F03.90 DEMENTIA WITHOUT BEHAVIORAL DISTURBANCE, PSYCHOTIC DISTURBANCE, MOOD DISTURBANCE, OR ANXIETY, UNSPECIFIED DEMENTIA SEVERITY, UNSPECIFIED DEMENTIA TYPE (HCC): Primary | ICD-10-CM

## 2024-12-19 DIAGNOSIS — E78.00 PURE HYPERCHOLESTEROLEMIA: ICD-10-CM

## 2024-12-19 DIAGNOSIS — C61 PROSTATE CANCER (HCC): ICD-10-CM

## 2024-12-19 DIAGNOSIS — I25.10 CAD IN NATIVE ARTERY: ICD-10-CM

## 2024-12-19 DIAGNOSIS — R41.3 MEMORY LOSS: ICD-10-CM

## 2024-12-19 DIAGNOSIS — E53.8 VITAMIN B12 DEFICIENCY: ICD-10-CM

## 2024-12-19 DIAGNOSIS — H91.93 DECREASED HEARING OF BOTH EARS: ICD-10-CM

## 2024-12-19 DIAGNOSIS — E11.22 TYPE 2 DIABETES MELLITUS WITH STAGE 3B CHRONIC KIDNEY DISEASE, WITHOUT LONG-TERM CURRENT USE OF INSULIN (HCC): ICD-10-CM

## 2024-12-19 DIAGNOSIS — H61.23 BILATERAL IMPACTED CERUMEN: ICD-10-CM

## 2024-12-19 DIAGNOSIS — N18.32 TYPE 2 DIABETES MELLITUS WITH STAGE 3B CHRONIC KIDNEY DISEASE, WITHOUT LONG-TERM CURRENT USE OF INSULIN (HCC): ICD-10-CM

## 2024-12-19 DIAGNOSIS — Z95.2 S/P AVR (AORTIC VALVE REPLACEMENT): ICD-10-CM

## 2024-12-19 LAB
ALBUMIN SERPL-MCNC: 4.1 G/DL (ref 3.4–5)
ANION GAP SERPL CALC-SCNC: 7 MMOL/L (ref 3–18)
BUN SERPL-MCNC: 25 MG/DL (ref 7–18)
BUN/CREAT SERPL: 13 (ref 12–20)
CALCIUM SERPL-MCNC: 9.6 MG/DL (ref 8.5–10.1)
CHLORIDE SERPL-SCNC: 104 MMOL/L (ref 100–111)
CO2 SERPL-SCNC: 25 MMOL/L (ref 21–32)
CREAT SERPL-MCNC: 1.91 MG/DL (ref 0.6–1.3)
GLUCOSE SERPL-MCNC: 127 MG/DL (ref 74–99)
PHOSPHATE SERPL-MCNC: 4 MG/DL (ref 2.5–4.9)
POTASSIUM SERPL-SCNC: 4.7 MMOL/L (ref 3.5–5.5)
SODIUM SERPL-SCNC: 136 MMOL/L (ref 136–145)

## 2024-12-19 PROCEDURE — 80069 RENAL FUNCTION PANEL: CPT

## 2024-12-19 PROCEDURE — 36415 COLL VENOUS BLD VENIPUNCTURE: CPT

## 2024-12-19 SDOH — ECONOMIC STABILITY: INCOME INSECURITY: HOW HARD IS IT FOR YOU TO PAY FOR THE VERY BASICS LIKE FOOD, HOUSING, MEDICAL CARE, AND HEATING?: NOT HARD AT ALL

## 2024-12-19 SDOH — ECONOMIC STABILITY: FOOD INSECURITY: WITHIN THE PAST 12 MONTHS, YOU WORRIED THAT YOUR FOOD WOULD RUN OUT BEFORE YOU GOT MONEY TO BUY MORE.: NEVER TRUE

## 2024-12-19 SDOH — ECONOMIC STABILITY: FOOD INSECURITY: WITHIN THE PAST 12 MONTHS, THE FOOD YOU BOUGHT JUST DIDN'T LAST AND YOU DIDN'T HAVE MONEY TO GET MORE.: NEVER TRUE

## 2024-12-19 NOTE — PATIENT INSTRUCTIONS
instructions adapted under license by [employer] (which disclaims liability or warranty for this information). If you have questions about a medical condition or this instruction, always ask your healthcare professional. [employer] disclaims any warranty or liability for your use of this information.         Learning About Getting More Protein  How does your body use protein?     Protein is an essential part of our diets. It is made up of chemicals called amino acids. Your body needs protein to help build and repair muscle, skin, and other body tissues. Protein also helps fight infection, balance body fluids, and carry oxygen through the body.  How much protein do you need?  How much protein you need each day depends on your age, sex, and how active you are. It's recommended that most adults eat 5 to 7 ounces of protein foods a day. Sometimes you may need to eat more protein. Your doctor may advise you on the right amount of protein you need.  What foods contain protein?  Protein is found in a variety of foods. High-protein foods include lean meat, poultry, and fish. A serving of these foods is 2 to 3 ounces. (3 ounces is about the size and thickness of a deck of cards.)  Protein isn't just found in meat. If you are looking for other types of protein, the following foods are equal to about 1 ounce of meat:  ¼ cup of cooked beans, peas, or lentils  ¼ cup of tofu (about 2 ounces)  2 Tbsp of hummus  ½ ounce of nuts or seeds (for example, 12 almonds or 7 walnut halves)  1 egg  1 Tbsp of peanut butter or other nut or seed butter  Other sources of protein include cheese, milk, and other milk products. You can also buy protein bars, drinks, and powders. Check the nutrition label for the amount of protein in each serving.  What are some tips for getting more protein?  You can get more protein in your food by adding high-protein ingredients. For example, you can:  Add powdered milk to other foods,

## 2024-12-19 NOTE — PROGRESS NOTES
HPI:   Nick Peraza is a 74 y.o. year old male who presents today for a routine follow-up visit. He has a history of hypertension, hyperlipidemia, ASHD s/p CABG, aortic stenosis s/p AVR, diabetes mellitus, chronic renal disease stage 3, prostate cancer, and gout.  He is accompanied by his son, Da Peraza.  He reports today that that he is doing reasonably well.      In 4/2023, he was noted to have difficulty with short-term memory over the last year, although he denied any difficulty with his memory and stated that no other family members had mentioned this to him.  He stated that he was continuing to drive, but did admit that he may have difficulty finding his way at times.  He stated that he only will drive locally and not at night. A MMSE was performed (4/18/2023) and showed score of 21/30 consistent with probable mild dementia. TSH, RPR, and B12 levels (4/18/2023) were unremarkable.  He declined any further evaluation. He was reevaluated on 4/9/2024 and concern was raised regarding noticeable worsening memory loss and inability to answer questions. An MMSE was performed showing a score of 14/30 with deficits in orientation, attention and calculation, recall, and language.  Clock test 0/5 with him being able to only draw the [place].  His wife was contacted after the visit and it was recommended that he have a brain MRI and evaluation by neurology, but he has been unwilling to proceed.  His son reports today that he was scheduled for the brain MRI on 9/9/2024 but refused to go on the day of the appointment.  He also resisted evaluation by a neurologist so his appointment with [facility] on 9/13/2024 was canceled.    His son, Da, reports today that he is continuing to live with his wife and he states that he lives one block away from the patient.  He and his sister have been checking on the patient every day.  He reports that the patient is continuing to drive but they have placed a tracker on his car to

## 2024-12-19 NOTE — PROGRESS NOTES
Nick Peraza presents today for   Chief Complaint   Patient presents with    Follow-up       \"Have you been to the ER, urgent care clinic since your last visit?  Hospitalized since your last visit?\"    NO    “Have you seen or consulted any other health care providers outside of [facility] since your last visit?”    NO

## 2024-12-20 ENCOUNTER — TELEPHONE (OUTPATIENT)
Facility: CLINIC | Age: 74
End: 2024-12-20

## 2024-12-20 NOTE — TELEPHONE ENCOUNTER
Hospital Outpatient Visit on 12/19/2024   Component Date Value Ref Range Status    Sodium 12/19/2024 136  136 - 145 mmol/L Final    Potassium 12/19/2024 4.7  3.5 - 5.5 mmol/L Final    Chloride 12/19/2024 104  100 - 111 mmol/L Final    CO2 12/19/2024 25  21 - 32 mmol/L Final    Anion Gap 12/19/2024 7  3.0 - 18 mmol/L Final    Glucose 12/19/2024 127 (H)  74 - 99 mg/dL Final    BUN 12/19/2024 25 (H)  7.0 - 18 MG/DL Final    Creatinine 12/19/2024 1.91 (H)  0.6 - 1.3 MG/DL Final    BUN/Creatinine Ratio 12/19/2024 13  12 - 20   Final    Est, Glom Filt Rate 12/19/2024 36 (L)  >60 ml/min/1.73m2 Final    Comment:    Pediatric calculator link: https://www.kidney.org/professionals/kdoqi/gfr_calculatorped     These results are not intended for use in patients <18 years of age.     eGFR results are calculated without a race factor using  the 2021 CKD-EPI equation. Careful clinical correlation is recommended, particularly when comparing to results calculated using previous equations.  The CKD-EPI equation is less accurate in patients with extremes of muscle mass, extra-renal metabolism of creatinine, excessive creatine ingestion, or following therapy that affects renal tubular secretion.      Calcium 12/19/2024 9.6  8.5 - 10.1 MG/DL Final    Phosphorus 12/19/2024 4.0  2.5 - 4.9 MG/DL Final    Albumin 12/19/2024 4.1  3.4 - 5.0 g/dL Final     Please let the patient's son know that his kidney function did improve some from the level that it was on his previsit labs.  His creatinine was 2.39 and has improved to 1.91, but this still does remain above his prior level.  Would recommend that he proceed with the kidney ultrasound as ordered and make sure he is drinking enough water.  Please also confirm that he is not taking any NSAIDs (ibuprofen or Aleve) since this will make kidney function worse.

## 2024-12-24 NOTE — TELEPHONE ENCOUNTER
PCP: Karley Fernandez MD    LAST OFFICE VISIT: 12/29/2024    LAST REFILL PER CHART: 01/18/2024      Future Appointments   Date Time Provider Department Center   12/27/2024  2:30 PM [facility] 2 [facility] [place]   3/20/2025 10:45 AM [facility] LAB VISIT [facility] DEP   3/27/2025 10:00 AM Karley Fernandez MD [facility] DEP   5/14/2025  1:40 PM Terence Piña MD [facility] BS AMB

## 2024-12-30 RX ORDER — AMLODIPINE BESYLATE 10 MG/1
TABLET ORAL
Qty: 90 TABLET | Refills: 0 | Status: SHIPPED | OUTPATIENT
Start: 2024-12-30

## 2024-12-30 RX ORDER — METOPROLOL SUCCINATE 50 MG/1
TABLET, EXTENDED RELEASE ORAL
Qty: 90 TABLET | Refills: 0 | Status: SHIPPED | OUTPATIENT
Start: 2024-12-30

## 2024-12-30 RX ORDER — ALLOPURINOL 100 MG/1
TABLET ORAL
Qty: 90 TABLET | Refills: 0 | Status: SHIPPED | OUTPATIENT
Start: 2024-12-30

## 2024-12-30 RX ORDER — ATORVASTATIN CALCIUM 20 MG/1
TABLET, FILM COATED ORAL
Qty: 90 TABLET | Refills: 0 | Status: SHIPPED | OUTPATIENT
Start: 2024-12-30

## 2025-01-22 NOTE — PATIENT INSTRUCTIONS
Kina here for routine port flush.     Port accessed and de-accessed per [facility] policy and procedure.   See flowsheets and MAR for details.   Patient returns on 03/19/2025 for next routine port flush.   Patient discharged in stable, ambulatory condition.   Vaccine Information Statement Influenza (Flu) Vaccine (Inactivated or Recombinant): What You Need to Know Many Vaccine Information Statements are available in [place] and other languages. See www.immunize.org/vis Hojas de información sobre vacunas están disponibles en español y en muchos otros idiomas. Visite www.immunize.org/vis 1. Why get vaccinated? Influenza vaccine can prevent influenza (flu). Flu is a contagious disease that spreads around the [facility] every year, usually between October and May. Anyone can get the flu, but it is more dangerous for some people. Infants and young children, people 72years of age and older, pregnant women, and people with certain health conditions or a weakened immune system are at greatest risk of flu complications. Pneumonia, bronchitis, sinus infections and ear infections are examples of flu-related complications. If you have a medical condition, such as heart disease, cancer or diabetes, flu can make it worse. Flu can cause fever and chills, sore throat, muscle aches, fatigue, cough, headache, and runny or stuffy nose. Some people may have vomiting and diarrhea, though this is more common in children than adults. Each year thousands of people in the [facility] die from flu, and many more are hospitalized. Flu vaccine prevents millions of illnesses and flu-related visits to the doctor each year. 2. Influenza vaccines CDC recommends everyone 10months of age and older get vaccinated every flu season. Children 6 months through 6years of age may need 2 doses during a single flu season. Everyone else needs only 1 dose each flu season. It takes about 2 weeks for protection to develop after vaccination. There are many flu viruses, and they are always changing. Each year a new flu vaccine is made to protect against three or four viruses that are likely to cause disease in the upcoming flu season.  Even when the vaccine doesnt exactly match these viruses, it may still provide some protection. Influenza vaccine does not cause flu. Influenza vaccine may be given at the same time as other vaccines. 3. Talk with your health care provider Tell your vaccine provider if the person getting the vaccine: 
 Has had an allergic reaction after a previous dose of influenza vaccine, or has any severe, life-threatening allergies.  Has ever had Guillain-Barré Syndrome (also called GBS). In some cases, your health care provider may decide to postpone influenza vaccination to a future visit. People with minor illnesses, such as a cold, may be vaccinated. People who are moderately or severely ill should usually wait until they recover before getting influenza vaccine. Your health care provider can give you more information. 4. Risks of a reaction  Soreness, redness, and swelling where shot is given, fever, muscle aches, and headache can happen after influenza vaccine.  There may be a very small increased risk of Guillain-Barré Syndrome (GBS) after inactivated influenza vaccine (the flu shot). Carie Alonso children who get the flu shot along with pneumococcal vaccine (PCV13), and/or DTaP vaccine at the same time might be slightly more likely to have a seizure caused by fever. Tell your health care provider if a child who is getting flu vaccine has ever had a seizure. People sometimes faint after medical procedures, including vaccination. Tell your provider if you feel dizzy or have vision changes or ringing in the ears. As with any medicine, there is a very remote chance of a vaccine causing a severe allergic reaction, other serious injury, or death. 5. What if there is a serious problem? An allergic reaction could occur after the vaccinated person leaves the clinic.  If you see signs of a severe allergic reaction (hives, swelling of the face and throat, difficulty breathing, a fast heartbeat, dizziness, or weakness), call 9-1-1 and get the person to the nearest hospital. 
 
For other signs that concern you, call your health care provider. Adverse reactions should be reported to the Vaccine Adverse Event Reporting System (VAERS). Your health care provider will usually file this report, or you can do it yourself. Visit the VAERS website at www.vaers. hhs.gov or call 6-601.640.1150. VAERS is only for reporting reactions, and VAERS staff do not give medical advice. 6. The National Vaccine Injury Compensation Program 
 
The [facility] Vaccine Injury Compensation Program (VICP) is a federal program that was created to compensate people who may have been injured by certain vaccines. Visit the VICP website at www.[facility].gov/vaccinecompensation or call 8-892.671.7566 to learn about the program and about filing a claim. There is a time limit to file a claim for compensation. 7. How can I learn more?  Ask your health care provider.  Call your local or state health department.  Contact the Centers for Disease Control and Prevention (CDC): 
- Call 7-124.386.5265 (9-250-KGL-INFO) or 
- Visit CDCs influenza website at www.cdc.gov/flu Vaccine Information Statement (Interim) Inactivated Influenza Vaccine 8/15/2019 
41 CAMMYVictorino Rowell Daryn [id number] Department of Health and [employer] Centers for Disease Control and Prevention Office Use Only Heart-Healthy Diet: Care Instructions Your Care Instructions A heart-healthy diet has lots of vegetables, fruits, nuts, beans, and whole grains, and is low in salt. It limits foods that are high in saturated fat, such as meats, cheeses, and fried foods. It may be hard to change your diet, but even small changes can lower your risk of heart attack and heart disease. Follow-up care is a key part of your treatment and safety.  Be sure to make and go to all appointments, and call your doctor if you are having problems. It's also a good idea to know your test results and keep a list of the medicines you take. How can you care for yourself at home? Watch your portions · Learn what a serving is. A \"serving\" and a \"portion\" are not always the same thing. Make sure that you are not eating larger portions than are recommended. For example, a serving of pasta is ½ cup. A serving size of meat is 2 to 3 ounces. A 3-ounce serving is about the size of a deck of cards. Measure serving sizes until you are good at [employer]" them. Keep in mind that restaurants often serve portions that are 2 or 3 times the size of one serving. · To keep your energy level up and keep you from feeling hungry, eat often but in smaller portions. · Eat only the number of calories you need to stay at a healthy weight. If you need to lose weight, eat fewer calories than your body burns (through exercise and other physical activity). Eat more fruits and vegetables · Eat a variety of fruit and vegetables every day. Dark green, deep orange, red, or yellow fruits and vegetables are especially good for you. Examples include spinach, carrots, peaches, and berries. · Keep carrots, celery, and other veggies handy for snacks. Buy fruit that is in season and store it where you can see it so that you will be tempted to eat it. · Cook dishes that have a lot of veggies in them, such as stir-fries and soups. Limit saturated and trans fat · Read food labels, and try to avoid saturated and trans fats. They increase your risk of heart disease. · Use olive or canola oil when you cook. · Bake, broil, grill, or steam foods instead of frying them. · Choose lean meats instead of high-fat meats such as hot dogs and sausages. Cut off all visible fat when you prepare meat. · Eat fish, skinless poultry, and meat alternatives such as soy products instead of high-fat meats. Soy products, such as tofu, may be especially good for your heart. · Choose low-fat or fat-free milk and dairy products. Eat foods high in fiber · Eat a variety of grain products every day. Include whole-grain foods that have lots of fiber and nutrients. Examples of whole-grain foods include oats, whole wheat bread, and brown rice. · Buy whole-grain breads and cereals, instead of white bread or pastries. Limit salt and sodium · Limit how much salt and sodium you eat to help lower your blood pressure. · Taste food before you salt it. Add only a little salt when you think you need it. With time, your taste buds will adjust to less salt. · Eat fewer snack items, fast foods, and other high-salt, processed foods. Check food labels for the amount of sodium in packaged foods. · Choose low-sodium versions of canned goods (such as soups, vegetables, and beans). Limit sugar · Limit drinks and foods with added sugar. These include candy, desserts, and soda pop. Limit alcohol · Limit alcohol to no more than 2 drinks a day for men and 1 drink a day for women. Too much alcohol can cause health problems. When should you call for help? Watch closely for changes in your health, and be sure to contact your doctor if: 
  · You would like help planning heart-healthy meals. Where can you learn more? Go to http://www.martínez.com/ Enter V137 in the search box to learn more about \"Heart-Healthy Diet: Care Instructions. \" Current as of: August 22, 2019               Content Version: 12.6 © 1717-8749 [employer]. Care instructions adapted under license by [employer] (which disclaims liability or warranty for this information). If you have questions about a medical condition or this instruction, always ask your healthcare professional. [place] [address] any warranty or liability for your use of this information. Please make sure to follow-up with Dr. Susan Lemons for your prostate cancer.

## 2025-03-20 ENCOUNTER — HOSPITAL ENCOUNTER (OUTPATIENT)
Facility: HOSPITAL | Age: 75
Setting detail: SPECIMEN
Discharge: HOME | End: 2025-03-23
Payer: MEDICARE

## 2025-03-20 DIAGNOSIS — E53.8 VITAMIN B12 DEFICIENCY: ICD-10-CM

## 2025-03-20 DIAGNOSIS — N18.32 ACUTE RENAL FAILURE SUPERIMPOSED ON STAGE 3B CHRONIC KIDNEY DISEASE, UNSPECIFIED ACUTE RENAL FAILURE TYPE (HCC): ICD-10-CM

## 2025-03-20 DIAGNOSIS — E11.22 TYPE 2 DIABETES MELLITUS WITH STAGE 3B CHRONIC KIDNEY DISEASE, WITHOUT LONG-TERM CURRENT USE OF INSULIN (HCC): ICD-10-CM

## 2025-03-20 DIAGNOSIS — N17.9 ACUTE RENAL FAILURE SUPERIMPOSED ON STAGE 3B CHRONIC KIDNEY DISEASE, UNSPECIFIED ACUTE RENAL FAILURE TYPE (HCC): ICD-10-CM

## 2025-03-20 DIAGNOSIS — I10 ESSENTIAL HYPERTENSION: ICD-10-CM

## 2025-03-20 DIAGNOSIS — N18.32 TYPE 2 DIABETES MELLITUS WITH STAGE 3B CHRONIC KIDNEY DISEASE, WITHOUT LONG-TERM CURRENT USE OF INSULIN (HCC): ICD-10-CM

## 2025-03-20 DIAGNOSIS — E78.00 PURE HYPERCHOLESTEROLEMIA: ICD-10-CM

## 2025-03-20 DIAGNOSIS — D64.9 ANEMIA, UNSPECIFIED TYPE: ICD-10-CM

## 2025-03-20 LAB
ALBUMIN SERPL-MCNC: 4 G/DL (ref 3.4–5)
ALBUMIN/GLOB SERPL: 1.4 (ref 0.8–1.7)
ALP SERPL-CCNC: 92 U/L (ref 45–117)
ALT SERPL-CCNC: 15 U/L (ref 16–61)
ANION GAP SERPL CALC-SCNC: 6 MMOL/L (ref 3–18)
AST SERPL-CCNC: 12 U/L (ref 10–38)
BASOPHILS # BLD: 0.04 K/UL (ref 0–0.1)
BASOPHILS NFR BLD: 0.8 % (ref 0–2)
BILIRUB SERPL-MCNC: 1.6 MG/DL (ref 0.2–1)
BUN SERPL-MCNC: 23 MG/DL (ref 7–18)
BUN/CREAT SERPL: 15 (ref 12–20)
CALCIUM SERPL-MCNC: 9.3 MG/DL (ref 8.5–10.1)
CHLORIDE SERPL-SCNC: 100 MMOL/L (ref 100–111)
CHOLEST SERPL-MCNC: 143 MG/DL
CO2 SERPL-SCNC: 25 MMOL/L (ref 21–32)
CREAT SERPL-MCNC: 1.58 MG/DL (ref 0.6–1.3)
DIFFERENTIAL METHOD BLD: ABNORMAL
EOSINOPHIL # BLD: 0.29 K/UL (ref 0–0.4)
EOSINOPHIL NFR BLD: 5.5 % (ref 0–5)
ERYTHROCYTE [DISTWIDTH] IN BLOOD BY AUTOMATED COUNT: 13.8 % (ref 11.6–14.5)
EST. AVERAGE GLUCOSE BLD GHB EST-MCNC: 114 MG/DL
FERRITIN SERPL-MCNC: 175 NG/ML (ref 8–388)
GLOBULIN SER CALC-MCNC: 2.9 G/DL (ref 2–4)
GLUCOSE SERPL-MCNC: 117 MG/DL (ref 74–99)
HBA1C MFR BLD: 5.6 % (ref 4.2–5.6)
HCT VFR BLD AUTO: 41.6 % (ref 36–48)
HDLC SERPL-MCNC: 71 MG/DL (ref 40–60)
HDLC SERPL: 2 (ref 0–5)
HGB BLD-MCNC: 13.4 G/DL (ref 13–16)
IMM GRANULOCYTES # BLD AUTO: 0.01 K/UL (ref 0–0.04)
IMM GRANULOCYTES NFR BLD AUTO: 0.2 % (ref 0–0.5)
LDLC SERPL CALC-MCNC: 54.8 MG/DL (ref 0–100)
LIPID PANEL: ABNORMAL
LYMPHOCYTES # BLD: 0.85 K/UL (ref 0.9–3.6)
LYMPHOCYTES NFR BLD: 16.2 % (ref 21–52)
MAGNESIUM SERPL-MCNC: 2 MG/DL (ref 1.6–2.6)
MCH RBC QN AUTO: 33.7 PG (ref 24–34)
MCHC RBC AUTO-ENTMCNC: 32.2 G/DL (ref 31–37)
MCV RBC AUTO: 104.5 FL (ref 78–100)
MONOCYTES # BLD: 0.42 K/UL (ref 0.05–1.2)
MONOCYTES NFR BLD: 8 % (ref 3–10)
NEUTS SEG # BLD: 3.63 K/UL (ref 1.8–8)
NEUTS SEG NFR BLD: 69.3 % (ref 40–73)
NRBC # BLD: 0 K/UL (ref 0–0.01)
NRBC BLD-RTO: 0 PER 100 WBC
PLATELET # BLD AUTO: 192 K/UL (ref 135–420)
PMV BLD AUTO: 9 FL (ref 9.2–11.8)
POTASSIUM SERPL-SCNC: 5 MMOL/L (ref 3.5–5.5)
PROT SERPL-MCNC: 6.9 G/DL (ref 6.4–8.2)
RBC # BLD AUTO: 3.98 M/UL (ref 4.35–5.65)
SODIUM SERPL-SCNC: 131 MMOL/L (ref 136–145)
TRIGL SERPL-MCNC: 86 MG/DL
VIT B12 SERPL-MCNC: 367 PG/ML (ref 211–911)
VLDLC SERPL CALC-MCNC: 17.2 MG/DL
WBC # BLD AUTO: 5.2 K/UL (ref 4.6–13.2)

## 2025-03-20 PROCEDURE — 83735 ASSAY OF MAGNESIUM: CPT

## 2025-03-20 PROCEDURE — 82607 VITAMIN B-12: CPT

## 2025-03-20 PROCEDURE — 80053 COMPREHEN METABOLIC PANEL: CPT

## 2025-03-20 PROCEDURE — 36415 COLL VENOUS BLD VENIPUNCTURE: CPT

## 2025-03-20 PROCEDURE — 83036 HEMOGLOBIN GLYCOSYLATED A1C: CPT

## 2025-03-20 PROCEDURE — 82728 ASSAY OF FERRITIN: CPT

## 2025-03-20 PROCEDURE — 85025 COMPLETE CBC W/AUTO DIFF WBC: CPT

## 2025-03-20 PROCEDURE — 80061 LIPID PANEL: CPT

## 2025-03-24 NOTE — PROGRESS NOTES
Joselyn Wood presents today for   Chief Complaint   Patient presents with    Follow-up     6 months       Joselyn Wood preferred language for health care discussion is english/other. Is someone accompanying this pt? no    Is the patient using any DME equipment during OV? no    Depression Screening:  Depression: Not at risk (8/1/2023)    PHQ-2     PHQ-2 Score: 0        Learning Assessment:  Who is the primary learner? Patient    What is the preferred language for health care of the primary learner? ENGLISH    How does the primary learner prefer to learn new concepts? DEMONSTRATION    Answered By patient    Relationship to Learner SELF           Pt currently taking Anticoagulant therapy? no    Pt currently taking Antiplatelet therapy ? aspirin      Coordination of Care:  1. Have you been to the ER, urgent care clinic since your last visit? Hospitalized since your last visit? no    2. Have you seen or consulted any other health care providers outside of the [address] [place] [address] since your last visit? Include any pap smears or colon screening.  no
06/23/23 (!) 133/52        Pulse Readings from Last 3 Encounters:   11/14/23 62   08/01/23 66   05/25/23 55       Wt Readings from Last 3 Encounters:   11/14/23 89.4 kg (197 lb)   08/01/23 88 kg (194 lb)   06/23/23 92.1 kg (203 lb)         EKG: normal sinus rhythm, subtle lateral ST depression.      Belem Lin MD   11/14/2023
Average

## 2025-03-26 SDOH — ECONOMIC STABILITY: FOOD INSECURITY: WITHIN THE PAST 12 MONTHS, THE FOOD YOU BOUGHT JUST DIDN'T LAST AND YOU DIDN'T HAVE MONEY TO GET MORE.: NEVER TRUE

## 2025-03-26 SDOH — ECONOMIC STABILITY: FOOD INSECURITY: WITHIN THE PAST 12 MONTHS, YOU WORRIED THAT YOUR FOOD WOULD RUN OUT BEFORE YOU GOT MONEY TO BUY MORE.: NEVER TRUE

## 2025-03-26 SDOH — ECONOMIC STABILITY: TRANSPORTATION INSECURITY
IN THE PAST 12 MONTHS, HAS THE LACK OF TRANSPORTATION KEPT YOU FROM MEDICAL APPOINTMENTS OR FROM GETTING MEDICATIONS?: NO

## 2025-03-26 SDOH — ECONOMIC STABILITY: INCOME INSECURITY: IN THE LAST 12 MONTHS, WAS THERE A TIME WHEN YOU WERE NOT ABLE TO PAY THE MORTGAGE OR RENT ON TIME?: NO

## 2025-03-26 SDOH — ECONOMIC STABILITY: TRANSPORTATION INSECURITY
IN THE PAST 12 MONTHS, HAS LACK OF TRANSPORTATION KEPT YOU FROM MEETINGS, WORK, OR FROM GETTING THINGS NEEDED FOR DAILY LIVING?: NO

## 2025-03-27 ENCOUNTER — OFFICE VISIT (OUTPATIENT)
Facility: CLINIC | Age: 75
End: 2025-03-27

## 2025-03-27 VITALS
HEART RATE: 68 BPM | DIASTOLIC BLOOD PRESSURE: 55 MMHG | SYSTOLIC BLOOD PRESSURE: 93 MMHG | OXYGEN SATURATION: 99 % | WEIGHT: 181 LBS | BODY MASS INDEX: 23.99 KG/M2 | TEMPERATURE: 98.4 F | HEIGHT: 73 IN | RESPIRATION RATE: 16 BRPM

## 2025-03-27 DIAGNOSIS — I10 ESSENTIAL HYPERTENSION: ICD-10-CM

## 2025-03-27 DIAGNOSIS — N18.32 STAGE 3B CHRONIC KIDNEY DISEASE (HCC): ICD-10-CM

## 2025-03-27 DIAGNOSIS — N18.32 TYPE 2 DIABETES MELLITUS WITH STAGE 3B CHRONIC KIDNEY DISEASE, WITHOUT LONG-TERM CURRENT USE OF INSULIN (HCC): ICD-10-CM

## 2025-03-27 DIAGNOSIS — Z78.9 ALCOHOL USE: ICD-10-CM

## 2025-03-27 DIAGNOSIS — F03.90 DEMENTIA WITHOUT BEHAVIORAL DISTURBANCE, PSYCHOTIC DISTURBANCE, MOOD DISTURBANCE, OR ANXIETY, UNSPECIFIED DEMENTIA SEVERITY, UNSPECIFIED DEMENTIA TYPE (HCC): Primary | ICD-10-CM

## 2025-03-27 DIAGNOSIS — R63.4 WEIGHT LOSS, UNINTENTIONAL: ICD-10-CM

## 2025-03-27 DIAGNOSIS — E87.1 HYPONATREMIA: ICD-10-CM

## 2025-03-27 DIAGNOSIS — C61 PROSTATE CANCER (HCC): ICD-10-CM

## 2025-03-27 DIAGNOSIS — Z95.2 S/P AVR (AORTIC VALVE REPLACEMENT): ICD-10-CM

## 2025-03-27 DIAGNOSIS — E53.8 VITAMIN B12 DEFICIENCY: ICD-10-CM

## 2025-03-27 DIAGNOSIS — R41.3 MEMORY LOSS: ICD-10-CM

## 2025-03-27 DIAGNOSIS — I25.10 CAD IN NATIVE ARTERY: ICD-10-CM

## 2025-03-27 DIAGNOSIS — E78.00 PURE HYPERCHOLESTEROLEMIA: ICD-10-CM

## 2025-03-27 DIAGNOSIS — E11.22 TYPE 2 DIABETES MELLITUS WITH STAGE 3B CHRONIC KIDNEY DISEASE, WITHOUT LONG-TERM CURRENT USE OF INSULIN (HCC): ICD-10-CM

## 2025-03-27 RX ORDER — FOLIC ACID 1 MG/1
1 TABLET ORAL DAILY
Qty: 90 TABLET | Refills: 3 | Status: SHIPPED | OUTPATIENT
Start: 2025-03-27

## 2025-03-27 RX ORDER — ALLOPURINOL 100 MG/1
TABLET ORAL
Qty: 90 TABLET | Refills: 3 | Status: SHIPPED | OUTPATIENT
Start: 2025-03-27

## 2025-03-27 RX ORDER — AMLODIPINE BESYLATE 5 MG/1
5 TABLET ORAL DAILY
Qty: 90 TABLET | Refills: 3 | Status: SHIPPED | OUTPATIENT
Start: 2025-03-27

## 2025-03-27 RX ORDER — LANOLIN ALCOHOL/MO/W.PET/CERES
100 CREAM (GRAM) TOPICAL DAILY
Qty: 90 TABLET | Refills: 3 | Status: SHIPPED | OUTPATIENT
Start: 2025-03-27

## 2025-03-27 RX ORDER — METOPROLOL SUCCINATE 50 MG/1
TABLET, EXTENDED RELEASE ORAL
Qty: 90 TABLET | Refills: 3 | Status: SHIPPED | OUTPATIENT
Start: 2025-03-27

## 2025-03-27 RX ORDER — ATORVASTATIN CALCIUM 20 MG/1
TABLET, FILM COATED ORAL
Qty: 90 TABLET | Refills: 3 | Status: SHIPPED | OUTPATIENT
Start: 2025-03-27

## 2025-03-27 ASSESSMENT — PATIENT HEALTH QUESTIONNAIRE - PHQ9
SUM OF ALL RESPONSES TO PHQ QUESTIONS 1-9: 0
1. LITTLE INTEREST OR PLEASURE IN DOING THINGS: NOT AT ALL
SUM OF ALL RESPONSES TO PHQ QUESTIONS 1-9: 0
2. FEELING DOWN, DEPRESSED OR HOPELESS: NOT AT ALL

## 2025-03-27 NOTE — PROGRESS NOTES
Nick Peraza presents today for   Chief Complaint   Patient presents with    3 Month Follow-Up       \"Have you been to the ER, urgent care clinic since your last visit?  Hospitalized since your last visit?\"    NO    “Have you seen or consulted any other health care providers outside of [facility] since your last visit?”    NO          
and management plans.     Time spent in preparing for the visit, including review of history, tests done prior to arrival, additional time reviewing clinical data, imaging, outside records and test results:  5  minutes.  Time spent in counseling with patient and/or family members regarding care plan: 25 minutes.  Time spent on same-day documentation, ordering tests, treatments, and referring patient for further care: 15 minutes.   Time spent on visit does not include time for documentation not completed on day of visit.

## 2025-03-30 PROBLEM — F03.90 DEMENTIA (HCC): Status: RESOLVED | Noted: 2024-04-13 | Resolved: 2025-03-30

## 2025-03-30 PROBLEM — D64.9 ANEMIA: Status: RESOLVED | Noted: 2019-06-09 | Resolved: 2025-03-30

## 2025-03-30 PROBLEM — E66.3 OVERWEIGHT (BMI 25.0-29.9): Status: RESOLVED | Noted: 2021-01-03 | Resolved: 2025-03-30

## 2025-03-30 PROBLEM — F03.90 DEMENTIA WITHOUT BEHAVIORAL DISTURBANCE, PSYCHOTIC DISTURBANCE, MOOD DISTURBANCE, OR ANXIETY (HCC): Status: RESOLVED | Noted: 2025-03-30 | Resolved: 2025-03-30

## 2025-03-30 PROBLEM — F03.90 DEMENTIA WITHOUT BEHAVIORAL DISTURBANCE, PSYCHOTIC DISTURBANCE, MOOD DISTURBANCE, OR ANXIETY (HCC): Status: ACTIVE | Noted: 2025-03-30

## 2025-04-03 RX ORDER — ALLOPURINOL 100 MG/1
100 TABLET ORAL DAILY
Qty: 90 TABLET | Refills: 3 | OUTPATIENT
Start: 2025-04-03

## 2025-04-03 RX ORDER — ATORVASTATIN CALCIUM 20 MG/1
20 TABLET, FILM COATED ORAL DAILY
Qty: 90 TABLET | Refills: 3 | OUTPATIENT
Start: 2025-04-03

## 2025-04-03 RX ORDER — METOPROLOL SUCCINATE 50 MG/1
50 TABLET, EXTENDED RELEASE ORAL DAILY
Qty: 90 TABLET | Refills: 3 | OUTPATIENT
Start: 2025-04-03

## 2025-04-24 ENCOUNTER — TELEPHONE (OUTPATIENT)
Age: 75
End: 2025-04-24

## 2025-05-01 ENCOUNTER — OFFICE VISIT (OUTPATIENT)
Facility: CLINIC | Age: 75
End: 2025-05-01

## 2025-05-01 VITALS
DIASTOLIC BLOOD PRESSURE: 64 MMHG | HEART RATE: 76 BPM | WEIGHT: 181 LBS | TEMPERATURE: 98.1 F | RESPIRATION RATE: 14 BRPM | OXYGEN SATURATION: 96 % | HEIGHT: 73 IN | BODY MASS INDEX: 23.99 KG/M2 | SYSTOLIC BLOOD PRESSURE: 124 MMHG

## 2025-05-01 DIAGNOSIS — I25.10 CAD IN NATIVE ARTERY: ICD-10-CM

## 2025-05-01 DIAGNOSIS — I10 ESSENTIAL HYPERTENSION: ICD-10-CM

## 2025-05-01 DIAGNOSIS — E87.1 HYPONATREMIA: ICD-10-CM

## 2025-05-01 DIAGNOSIS — N18.32 TYPE 2 DIABETES MELLITUS WITH STAGE 3B CHRONIC KIDNEY DISEASE, WITHOUT LONG-TERM CURRENT USE OF INSULIN (HCC): ICD-10-CM

## 2025-05-01 DIAGNOSIS — Z95.2 S/P AVR (AORTIC VALVE REPLACEMENT): ICD-10-CM

## 2025-05-01 DIAGNOSIS — Z71.89 ACP (ADVANCE CARE PLANNING): ICD-10-CM

## 2025-05-01 DIAGNOSIS — C61 PROSTATE CANCER (HCC): ICD-10-CM

## 2025-05-01 DIAGNOSIS — E78.00 PURE HYPERCHOLESTEROLEMIA: ICD-10-CM

## 2025-05-01 DIAGNOSIS — Z00.00 MEDICARE ANNUAL WELLNESS VISIT, SUBSEQUENT: ICD-10-CM

## 2025-05-01 DIAGNOSIS — E53.8 VITAMIN B12 DEFICIENCY: ICD-10-CM

## 2025-05-01 DIAGNOSIS — N18.32 STAGE 3B CHRONIC KIDNEY DISEASE (HCC): ICD-10-CM

## 2025-05-01 DIAGNOSIS — E11.22 TYPE 2 DIABETES MELLITUS WITH STAGE 3B CHRONIC KIDNEY DISEASE, WITHOUT LONG-TERM CURRENT USE OF INSULIN (HCC): ICD-10-CM

## 2025-05-01 DIAGNOSIS — F03.90 DEMENTIA WITHOUT BEHAVIORAL DISTURBANCE, PSYCHOTIC DISTURBANCE, MOOD DISTURBANCE, OR ANXIETY, UNSPECIFIED DEMENTIA SEVERITY, UNSPECIFIED DEMENTIA TYPE (HCC): Primary | ICD-10-CM

## 2025-05-01 DIAGNOSIS — R63.4 WEIGHT LOSS, UNINTENTIONAL: ICD-10-CM

## 2025-05-01 DIAGNOSIS — F10.90 ALCOHOL USE: ICD-10-CM

## 2025-05-01 SDOH — HEALTH STABILITY: PHYSICAL HEALTH: ON AVERAGE, HOW MANY DAYS PER WEEK DO YOU ENGAGE IN MODERATE TO STRENUOUS EXERCISE (LIKE A BRISK WALK)?: 0 DAYS

## 2025-05-01 SDOH — HEALTH STABILITY: PHYSICAL HEALTH: ON AVERAGE, HOW MANY MINUTES DO YOU ENGAGE IN EXERCISE AT THIS LEVEL?: 0 MIN

## 2025-05-01 ASSESSMENT — LIFESTYLE VARIABLES
HOW OFTEN DURING THE LAST YEAR HAVE YOU FAILED TO DO WHAT WAS NORMALLY EXPECTED FROM YOU BECAUSE OF DRINKING: NEVER
HOW OFTEN DURING THE LAST YEAR HAVE YOU NEEDED AN ALCOHOLIC DRINK FIRST THING IN THE MORNING TO GET YOURSELF GOING AFTER A NIGHT OF HEAVY DRINKING: NEVER
HOW OFTEN DURING THE LAST YEAR HAVE YOU HAD A FEELING OF GUILT OR REMORSE AFTER DRINKING: NEVER
HOW MANY STANDARD DRINKS CONTAINING ALCOHOL DO YOU HAVE ON A TYPICAL DAY: 1
HAS A RELATIVE, FRIEND, DOCTOR, OR ANOTHER HEALTH PROFESSIONAL EXPRESSED CONCERN ABOUT YOUR DRINKING OR SUGGESTED YOU CUT DOWN: NO
HOW OFTEN DURING THE LAST YEAR HAVE YOU HAD A FEELING OF GUILT OR REMORSE AFTER DRINKING: NEVER
HOW MANY STANDARD DRINKS CONTAINING ALCOHOL DO YOU HAVE ON A TYPICAL DAY: 1 OR 2
HOW OFTEN DO YOU HAVE A DRINK CONTAINING ALCOHOL: 4 OR MORE TIMES A WEEK
HOW OFTEN DO YOU HAVE A DRINK CONTAINING ALCOHOL: 5
HOW OFTEN DURING THE LAST YEAR HAVE YOU NEEDED AN ALCOHOLIC DRINK FIRST THING IN THE MORNING TO GET YOURSELF GOING AFTER A NIGHT OF HEAVY DRINKING: NEVER
HAS A RELATIVE, FRIEND, DOCTOR, OR ANOTHER HEALTH PROFESSIONAL EXPRESSED CONCERN ABOUT YOUR DRINKING OR SUGGESTED YOU CUT DOWN: NO
HOW OFTEN DURING THE LAST YEAR HAVE YOU FOUND THAT YOU WERE NOT ABLE TO STOP DRINKING ONCE YOU HAD STARTED: NEVER
HAVE YOU OR SOMEONE ELSE BEEN INJURED AS A RESULT OF YOUR DRINKING: NO
HOW OFTEN DURING THE LAST YEAR HAVE YOU BEEN UNABLE TO REMEMBER WHAT HAPPENED THE NIGHT BEFORE BECAUSE YOU HAD BEEN DRINKING: NEVER
HOW OFTEN DURING THE LAST YEAR HAVE YOU FOUND THAT YOU WERE NOT ABLE TO STOP DRINKING ONCE YOU HAD STARTED: NEVER
HOW OFTEN DURING THE LAST YEAR HAVE YOU BEEN UNABLE TO REMEMBER WHAT HAPPENED THE NIGHT BEFORE BECAUSE YOU HAD BEEN DRINKING: NEVER
HAVE YOU OR SOMEONE ELSE BEEN INJURED AS A RESULT OF YOUR DRINKING: NO
HOW OFTEN DURING THE LAST YEAR HAVE YOU FAILED TO DO WHAT WAS NORMALLY EXPECTED FROM YOU BECAUSE OF DRINKING: NEVER
HOW OFTEN DO YOU HAVE SIX OR MORE DRINKS ON ONE OCCASION: 1

## 2025-05-01 ASSESSMENT — PATIENT HEALTH QUESTIONNAIRE - PHQ9
2. FEELING DOWN, DEPRESSED OR HOPELESS: NOT AT ALL
1. LITTLE INTEREST OR PLEASURE IN DOING THINGS: NOT AT ALL
SUM OF ALL RESPONSES TO PHQ QUESTIONS 1-9: 0

## 2025-05-01 NOTE — PROGRESS NOTES
HPI:   Nick Peraza is a 75 y.o. year old male who presents today for a follow-up visit. He has a history of hypertension, hyperlipidemia, ASHD s/p CABG, aortic stenosis s/p AVR, diabetes mellitus, chronic renal disease stage 3, prostate cancer, and gout.  He is accompanied by his son, Da Peraza who provides all of the history.  He reports today that that he is doing reasonably well.      According to his son, he reports that the patient is continuing to live with his wife and he states that he lives one block away from the patient.  He and his sister continue to check on him every day. He reports that they had scheduled the brain MRI on 4/26/2025 but were unable to make the appointment.  He asked that a new order be placed so that he may schedule.  He has scheduled a neurology appointment with Dr. Garcia for evaluation of his dementia on 7/31/2025.  He discusses that his weight has stabilized over the last 6 months without further weight loss.  He discusses that he continues to have approximately 1-2 alcoholic drinks nightly.  He has not yet started thiamine or folate supplementation since they were not received from the pharmacy.    At his last visit, his blood pressure was noted to be low and his son reported that he complained of fatigue.  His antihypertensive regimen was changed with discontinuation of lisinopril 10 mg daily and decrease of amlodipine to 5 mg daily.  He reports today that his blood pressure has improved and remains well-controlled on his new regimen of metoprolol succinate 50 mg daily and amlodipine 5 mg daily. His son states that he helps the patient with his medications and reports compliance with doses as prescribed.      He completed a follow-up appointment with Dr. Piña on 11/12/2024 and no further evaluation was felt to be needed.  He was felt to be clinically stable and advised to continue aspirin, atorvastatin, metoprolol succinate, and lisinopril.  He was advised to not lose any

## 2025-05-01 NOTE — PROGRESS NOTES
Medicare Annual Wellness Visit    Nick Peraza is here for Medicare AWV    Assessment & Plan   Dementia without behavioral disturbance, psychotic disturbance, mood disturbance, or anxiety, unspecified dementia severity, unspecified dementia type (HCC)  -     MRI BRAIN WO CONTRAST; Future  Essential hypertension  -     CBC with Auto Differential; Future  CAD in native artery  -     Lipid Panel; Future  S/P AVR (aortic valve replacement)  Hyponatremia  Alcohol use  Pure hypercholesterolemia  -     Lipid Panel; Future  Type 2 diabetes mellitus with stage 3b chronic kidney disease, without long-term current use of insulin (HCC)  -     Hemoglobin A1C; Future  Stage 3b chronic kidney disease (HCC)  -     Comprehensive Metabolic Panel; Future  -     Magnesium; Future  -     Albumin/Creatinine Ratio, Urine; Future  -     Vitamin D 25 Hydroxy; Future  Prostate cancer (HCC)  -     PSA, Diagnostic; Future  Vitamin B12 deficiency  -     Vitamin B12; Future  Weight loss, unintentional  Medicare annual wellness visit, subsequent  ACP (advance care planning)       Return in about 3 months (around 8/1/2025), or if symptoms worsen or fail to improve.     Subjective   See office progress note for details.      Patient's complete Health Risk Assessment and screening values have been reviewed and are found in Flowsheets. The following problems were reviewed today and where indicated follow up appointments were made and/or referrals ordered.    Positive Risk Factor Screenings with Interventions:     Cognitive:   Clock Drawing Test (CDT): (!) Abnormal  Words recalled: 0 Words Recalled  Total Score: (!) 0  Total Score Interpretation: Abnormal Mini-Cog  Interventions:  Brain MRI ordered and patient scheduled for evaluation by neurology            Inactivity:  On average, how many days per week do you engage in moderate to strenuous exercise (like a brisk walk)?: (Patient-Rptd) 0 days (!) Abnormal  On average, how many minutes do you

## 2025-05-01 NOTE — PROGRESS NOTES
Nick Peraza presents today for   Chief Complaint   Patient presents with    Medicare AWV       \"Have you been to the ER, urgent care clinic since your last visit?  Hospitalized since your last visit?\"    NO    “Have you seen or consulted any other health care providers outside of [facility] since your last visit?”    NO           None

## 2025-05-01 NOTE — ACP (ADVANCE CARE PLANNING)
Advance Care Planning     Advance Care Planning (ACP) Physician/NP/PA Conversation    Date of Conversation: 5/1/2025  Conducted with: Healthcare Decision Maker, patient's son Da    Healthcare Decision Maker:      Primary Decision Maker: Da Peraza - Child - [phone]    Primary Decision Maker: Michelle Peraza - Spouse - [phone]    Primary Decision Maker: Elba Tipton - Child - [phone]    Click here to complete Healthcare Decision Makers including selection of the Healthcare Decision Maker Relationship (ie \"Primary\")  Today we updated healthcare decision-makers so that his wife, daughter, and son are all primary decision-makers.    Care Preferences:    Hospitalization:  \"If your health worsens and it becomes clear that your chance of recovery is unlikely, what would be your preference regarding hospitalization?\"  The patient would prefer hospitalization.    Ventilation:  \"If you were unable to breath on your own and your chance of recovery was unlikely, what would be your preference about the use of a ventilator (breathing machine) if it was available to you?\"  The patient would desire the use of a ventilator.    Resuscitation:  \"In the event your heart stopped as a result of an underlying serious health condition, would you want attempts made to restart your heart, or would you prefer a natural death?\"  Yes, attempt to resuscitate.    treatment goals, benefit/burden of treatment options, ventilation preferences, hospitalization preferences, resuscitation preferences, and end of life care preferences (vegetative state/imminent death)    Conversation Outcomes / Follow-Up Plan:  ACP in process - completing/providing documents. Patient has an advanced directive completed previously with an .  Requested they provide copy to office to scan into chart.    Patient's son's states that he would wish resuscitation efforts only as long as meaningful recovery is considered possible and not

## 2025-05-01 NOTE — PATIENT INSTRUCTIONS
salt when you cook and eat. This helps lower your blood pressure. Taste food before salting. Add only a little salt when you think you need it. With time, your taste buds will adjust to less salt.     Eat fewer snack items, fast foods, canned soups, and other high-salt, high-fat, processed foods.     Read food labels and try to avoid saturated and trans fats. They increase your risk of heart disease by raising cholesterol levels.     Limit the amount of solid fat--butter, margarine, and shortening--you eat. Use olive, peanut, or canola oil when you cook. Bake, broil, and steam foods instead of frying them.     Eat a variety of fruit and vegetables every day. Dark green, deep orange, red, or yellow fruits and vegetables are especially good for you. Examples include spinach, carrots, peaches, and berries.     Foods high in fiber can reduce your cholesterol and provide important vitamins and minerals. High-fiber foods include whole-grain cereals and breads, oatmeal, beans, brown rice, citrus fruits, and apples.     Eat lean proteins. Heart-healthy proteins include seafood, lean meats and poultry, eggs, beans, peas, nuts, seeds, and soy products.     Limit drinks and foods with added sugar. These include candy, desserts, and soda pop.   Heart-healthy lifestyle    If your doctor recommends it, get more exercise. For many people, walking is a good choice. Or you may want to swim, bike, or do other activities. Bit by bit, increase the time you're active every day. Try for at least 30 minutes on most days of the week.     Try to quit or cut back on using tobacco and other nicotine products. This includes smoking and vaping. If you need help quitting, talk to your doctor about stop-smoking programs and medicines. These can increase your chances of quitting for good. Quitting is one of the most important things you can do to protect your heart. It is never too late to quit. Try to avoid secondhand smoke too.     Stay at a

## 2025-08-27 ENCOUNTER — OFFICE VISIT (OUTPATIENT)
Age: 75
End: 2025-08-27
Payer: MEDICARE

## 2025-08-27 VITALS
HEIGHT: 73 IN | OXYGEN SATURATION: 98 % | WEIGHT: 176 LBS | BODY MASS INDEX: 23.33 KG/M2 | HEART RATE: 73 BPM | DIASTOLIC BLOOD PRESSURE: 67 MMHG | SYSTOLIC BLOOD PRESSURE: 114 MMHG

## 2025-08-27 DIAGNOSIS — I25.10 ATHEROSCLEROSIS OF NATIVE CORONARY ARTERY WITHOUT ANGINA PECTORIS, UNSPECIFIED WHETHER NATIVE OR TRANSPLANTED HEART: Primary | ICD-10-CM

## 2025-08-27 DIAGNOSIS — Z95.2 PRESENCE OF PROSTHETIC HEART VALVE: ICD-10-CM

## 2025-08-27 DIAGNOSIS — I11.9 HYPERTENSIVE HEART DISEASE WITHOUT HEART FAILURE: ICD-10-CM

## 2025-08-27 PROCEDURE — 1123F ACP DISCUSS/DSCN MKR DOCD: CPT | Performed by: INTERNAL MEDICINE

## 2025-08-27 PROCEDURE — 3074F SYST BP LT 130 MM HG: CPT | Performed by: INTERNAL MEDICINE

## 2025-08-27 PROCEDURE — G8427 DOCREV CUR MEDS BY ELIG CLIN: HCPCS | Performed by: INTERNAL MEDICINE

## 2025-08-27 PROCEDURE — G8420 CALC BMI NORM PARAMETERS: HCPCS | Performed by: INTERNAL MEDICINE

## 2025-08-27 PROCEDURE — 1036F TOBACCO NON-USER: CPT | Performed by: INTERNAL MEDICINE

## 2025-08-27 PROCEDURE — 3078F DIAST BP <80 MM HG: CPT | Performed by: INTERNAL MEDICINE

## 2025-08-27 PROCEDURE — 1159F MED LIST DOCD IN RCRD: CPT | Performed by: INTERNAL MEDICINE

## 2025-08-27 PROCEDURE — 1126F AMNT PAIN NOTED NONE PRSNT: CPT | Performed by: INTERNAL MEDICINE

## 2025-08-27 PROCEDURE — 93000 ELECTROCARDIOGRAM COMPLETE: CPT | Performed by: INTERNAL MEDICINE

## 2025-08-27 PROCEDURE — 99214 OFFICE O/P EST MOD 30 MIN: CPT | Performed by: INTERNAL MEDICINE

## 2025-08-27 ASSESSMENT — PATIENT HEALTH QUESTIONNAIRE - PHQ9
SUM OF ALL RESPONSES TO PHQ QUESTIONS 1-9: 0
2. FEELING DOWN, DEPRESSED OR HOPELESS: NOT AT ALL
SUM OF ALL RESPONSES TO PHQ QUESTIONS 1-9: 0
1. LITTLE INTEREST OR PLEASURE IN DOING THINGS: NOT AT ALL